# Patient Record
Sex: FEMALE | Race: WHITE | Employment: OTHER | URBAN - METROPOLITAN AREA
[De-identification: names, ages, dates, MRNs, and addresses within clinical notes are randomized per-mention and may not be internally consistent; named-entity substitution may affect disease eponyms.]

---

## 2017-03-29 ENCOUNTER — HOSPITAL ENCOUNTER (EMERGENCY)
Age: 42
Discharge: LEFT BEFORE DISCHARGE | End: 2017-03-29
Attending: EMERGENCY MEDICINE
Payer: SELF-PAY

## 2017-03-29 VITALS
RESPIRATION RATE: 24 BRPM | TEMPERATURE: 98.9 F | HEART RATE: 96 BPM | SYSTOLIC BLOOD PRESSURE: 130 MMHG | OXYGEN SATURATION: 99 % | WEIGHT: 190 LBS | DIASTOLIC BLOOD PRESSURE: 88 MMHG | BODY MASS INDEX: 25.73 KG/M2 | HEIGHT: 72 IN

## 2017-03-29 LAB
ALBUMIN SERPL BCP-MCNC: 3.9 G/DL (ref 3.5–5)
ALBUMIN/GLOB SERPL: 1.1 {RATIO} (ref 1.2–3.5)
ALP SERPL-CCNC: 84 U/L (ref 50–136)
ALT SERPL-CCNC: 15 U/L (ref 12–65)
ANION GAP BLD CALC-SCNC: 9 MMOL/L (ref 7–16)
AST SERPL W P-5'-P-CCNC: 8 U/L (ref 15–37)
BASOPHILS # BLD AUTO: 0 K/UL (ref 0–0.2)
BASOPHILS # BLD: 0 % (ref 0–2)
BILIRUB SERPL-MCNC: 0.3 MG/DL (ref 0.2–1.1)
BUN SERPL-MCNC: 11 MG/DL (ref 6–23)
CALCIUM SERPL-MCNC: 8.7 MG/DL (ref 8.3–10.4)
CHLORIDE SERPL-SCNC: 104 MMOL/L (ref 98–107)
CO2 SERPL-SCNC: 27 MMOL/L (ref 21–32)
CREAT SERPL-MCNC: 0.7 MG/DL (ref 0.6–1)
DIFFERENTIAL METHOD BLD: NORMAL
EOSINOPHIL # BLD: 0.1 K/UL (ref 0–0.8)
EOSINOPHIL NFR BLD: 2 % (ref 0.5–7.8)
ERYTHROCYTE [DISTWIDTH] IN BLOOD BY AUTOMATED COUNT: 13.5 % (ref 11.9–14.6)
GLOBULIN SER CALC-MCNC: 3.4 G/DL (ref 2.3–3.5)
GLUCOSE SERPL-MCNC: 152 MG/DL (ref 65–100)
HCT VFR BLD AUTO: 36.7 % (ref 35.8–46.3)
HGB BLD-MCNC: 12.3 G/DL (ref 11.7–15.4)
IMM GRANULOCYTES # BLD: 0 K/UL (ref 0–0.5)
IMM GRANULOCYTES NFR BLD AUTO: 0.2 % (ref 0–5)
LYMPHOCYTES # BLD AUTO: 25 % (ref 13–44)
LYMPHOCYTES # BLD: 1.3 K/UL (ref 0.5–4.6)
MCH RBC QN AUTO: 29.6 PG (ref 26.1–32.9)
MCHC RBC AUTO-ENTMCNC: 33.5 G/DL (ref 31.4–35)
MCV RBC AUTO: 88.2 FL (ref 79.6–97.8)
MONOCYTES # BLD: 0.4 K/UL (ref 0.1–1.3)
MONOCYTES NFR BLD AUTO: 7 % (ref 4–12)
NEUTS SEG # BLD: 3.5 K/UL (ref 1.7–8.2)
NEUTS SEG NFR BLD AUTO: 66 % (ref 43–78)
PLATELET # BLD AUTO: 175 K/UL (ref 150–450)
PMV BLD AUTO: 11 FL (ref 10.8–14.1)
POTASSIUM SERPL-SCNC: 3.7 MMOL/L (ref 3.5–5.1)
PROT SERPL-MCNC: 7.3 G/DL (ref 6.3–8.2)
RBC # BLD AUTO: 4.16 M/UL (ref 4.05–5.25)
SODIUM SERPL-SCNC: 140 MMOL/L (ref 136–145)
WBC # BLD AUTO: 5.3 K/UL (ref 4.3–11.1)

## 2017-03-29 PROCEDURE — 75810000275 HC EMERGENCY DEPT VISIT NO LEVEL OF CARE: Performed by: EMERGENCY MEDICINE

## 2017-03-29 PROCEDURE — 85025 COMPLETE CBC W/AUTO DIFF WBC: CPT | Performed by: EMERGENCY MEDICINE

## 2017-03-29 PROCEDURE — 80053 COMPREHEN METABOLIC PANEL: CPT | Performed by: EMERGENCY MEDICINE

## 2017-03-29 NOTE — ED TRIAGE NOTES
Patient arrives to the ER via EMS. EMS was called otu for vaginal bleeding. Patient states she has had vaginal bleeding for two weeks. Patient states today \"things\" have been falling out of her vagina.

## 2017-03-29 NOTE — ED NOTES
Pt presents to triage staff requesting IV be removed and stating that she wants to leave. IV removed, and pt  LWBS after triage.

## 2017-03-29 NOTE — ED NOTES
Patient states she needs her IV removed. This RN removed IV. Patient did not want to stay to sign AMA.

## 2018-01-31 ENCOUNTER — HOSPITAL ENCOUNTER (EMERGENCY)
Age: 43
Discharge: HOME | End: 2018-02-01
Attending: EMERGENCY MEDICINE
Payer: SELF-PAY

## 2018-01-31 DIAGNOSIS — T81.31XS POSTOPERATIVE WOUND DEHISCENCE, SEQUELA: Primary | ICD-10-CM

## 2018-01-31 PROCEDURE — 99284 EMERGENCY DEPT VISIT MOD MDM: CPT | Performed by: EMERGENCY MEDICINE

## 2018-01-31 PROCEDURE — 85025 COMPLETE CBC W/AUTO DIFF WBC: CPT | Performed by: EMERGENCY MEDICINE

## 2018-01-31 PROCEDURE — 80053 COMPREHEN METABOLIC PANEL: CPT | Performed by: EMERGENCY MEDICINE

## 2018-02-01 VITALS
HEIGHT: 72 IN | OXYGEN SATURATION: 93 % | BODY MASS INDEX: 32.51 KG/M2 | RESPIRATION RATE: 20 BRPM | TEMPERATURE: 97.9 F | HEART RATE: 74 BPM | SYSTOLIC BLOOD PRESSURE: 152 MMHG | DIASTOLIC BLOOD PRESSURE: 78 MMHG | WEIGHT: 240 LBS

## 2018-02-01 LAB
ALBUMIN SERPL-MCNC: 2.5 G/DL (ref 3.5–5)
ALBUMIN/GLOB SERPL: 0.5 {RATIO} (ref 1.2–3.5)
ALP SERPL-CCNC: 148 U/L (ref 50–136)
ALT SERPL-CCNC: 10 U/L (ref 12–65)
ANION GAP SERPL CALC-SCNC: 15 MMOL/L (ref 7–16)
AST SERPL-CCNC: 10 U/L (ref 15–37)
BASOPHILS # BLD: 0.1 K/UL (ref 0–0.2)
BASOPHILS NFR BLD: 1 % (ref 0–2)
BILIRUB SERPL-MCNC: 0.2 MG/DL (ref 0.2–1.1)
BUN SERPL-MCNC: 25 MG/DL (ref 6–23)
CALCIUM SERPL-MCNC: 6.7 MG/DL (ref 8.3–10.4)
CHLORIDE SERPL-SCNC: 105 MMOL/L (ref 98–107)
CO2 SERPL-SCNC: 21 MMOL/L (ref 21–32)
CREAT SERPL-MCNC: 1.5 MG/DL (ref 0.6–1)
DIFFERENTIAL METHOD BLD: ABNORMAL
EOSINOPHIL # BLD: 0.6 K/UL (ref 0–0.8)
EOSINOPHIL NFR BLD: 6 % (ref 0.5–7.8)
ERYTHROCYTE [DISTWIDTH] IN BLOOD BY AUTOMATED COUNT: 15.6 % (ref 11.9–14.6)
GLOBULIN SER CALC-MCNC: 5 G/DL (ref 2.3–3.5)
GLUCOSE SERPL-MCNC: 139 MG/DL (ref 65–100)
HCT VFR BLD AUTO: 36 % (ref 35.8–46.3)
HGB BLD-MCNC: 11.5 G/DL (ref 11.7–15.4)
IMM GRANULOCYTES # BLD: 0 K/UL (ref 0–0.5)
IMM GRANULOCYTES NFR BLD AUTO: 0 % (ref 0–5)
LYMPHOCYTES # BLD: 2.1 K/UL (ref 0.5–4.6)
LYMPHOCYTES NFR BLD: 23 % (ref 13–44)
MCH RBC QN AUTO: 27.8 PG (ref 26.1–32.9)
MCHC RBC AUTO-ENTMCNC: 31.9 G/DL (ref 31.4–35)
MCV RBC AUTO: 87.2 FL (ref 79.6–97.8)
MONOCYTES # BLD: 0.5 K/UL (ref 0.1–1.3)
MONOCYTES NFR BLD: 5 % (ref 4–12)
NEUTS SEG # BLD: 5.9 K/UL (ref 1.7–8.2)
NEUTS SEG NFR BLD: 65 % (ref 43–78)
PLATELET # BLD AUTO: 309 K/UL (ref 150–450)
PMV BLD AUTO: 9.6 FL (ref 10.8–14.1)
POTASSIUM SERPL-SCNC: 4.5 MMOL/L (ref 3.5–5.1)
PROT SERPL-MCNC: 7.5 G/DL (ref 6.3–8.2)
RBC # BLD AUTO: 4.13 M/UL (ref 4.05–5.25)
SODIUM SERPL-SCNC: 141 MMOL/L (ref 136–145)
WBC # BLD AUTO: 9.2 K/UL (ref 4.3–11.1)

## 2018-02-01 PROCEDURE — 97606 NEG PRS WND THER DME>50 SQCM: CPT

## 2018-02-01 RX ORDER — MINOCYCLINE HYDROCHLORIDE 100 MG/1
100 CAPSULE ORAL 2 TIMES DAILY
Qty: 60 CAP | Refills: 0 | Status: ON HOLD | OUTPATIENT
Start: 2018-02-01 | End: 2019-09-18

## 2018-02-01 RX ORDER — GABAPENTIN 600 MG/1
600 TABLET ORAL 3 TIMES DAILY
COMMUNITY

## 2018-02-01 RX ORDER — OXYCODONE HYDROCHLORIDE 5 MG/1
5 TABLET ORAL EVERY 4 HOURS
COMMUNITY
End: 2018-10-17

## 2018-02-01 NOTE — ED PROVIDER NOTES
HPI Comments: Patient is a 49-year-old female with history of diabetes and coronary artery disease who presents to the emergency department this evening after signing herself out of [facility].  She reportedly suffered a cardiac arrest around Thanksgiving and was admitted to [facility]  And had CABG surgery in early December. She was recently admitted to [facility] a few weeks ago with a sternal wound dehiscence. She was taken back to the operating room for debridement and then treated with a wound fact and long-term IV antibiotics. She was sent from [facility] to the [facility] facility earlier today to complete the wound treatment and IV antibiotic course for 6 weeks. She states she did not like the care at that facility and did not like how she was treated so she signed herself out [address] The nurse there remove the wound VAC and placed a dressing on the wound and then family drove her to our hospital system. Patient is a 43 y.o. female presenting with wound check. The history is provided by the patient and a relative. Wound Check           Past Medical History:   Diagnosis Date    DM (diabetes mellitus) ([address]     Other ill-defined conditions     back pain       Past Surgical History:   Procedure Laterality Date    HX ORTHOPAEDIC      back surgery 2000         No family history on file. Social History     Social History    Marital status: SINGLE     Spouse name: N/A    Number of children: N/A    Years of education: N/A     Occupational History    Not on file.      Social History Main Topics    Smoking status: Current Every Day Smoker     Packs/day: 1.00    Smokeless tobacco: Not on file    Alcohol use No    Drug use: No    Sexual activity: Not on file     Other Topics Concern    Not on file     Social History Narrative    No narrative on file         ALLERGIES: Review of patient's allergies indicates no known allergies. Review of Systems   Constitutional: Negative. HENT: Negative. Eyes: Negative. Respiratory: Negative. Cardiovascular: Negative. Endocrine: Negative. Genitourinary: Negative. Neurological: Negative. Vitals:    01/31/18 2041   BP: 160/88   Pulse: 81   Resp: 20   Temp: 97.9 °F (36.6 °C)   SpO2: 96%   Weight: 108.9 kg (240 lb)   Height: 6' 1\" (1.854 m)            Physical Exam   Constitutional: She is oriented to person, place, and time. She appears well-developed and well-nourished. HENT:   Head: Normocephalic and atraumatic. Cardiovascular: Normal rate and regular rhythm. Pulmonary/Chest: Effort normal and breath sounds normal.   Dressing covering the sternal wound. Abdominal: Soft. There is no rebound and no guarding. Musculoskeletal: Normal range of motion. She exhibits no edema or tenderness. Neurological: She is alert and oriented to person, place, and time. GCS eye subscore is 4. GCS verbal subscore is 5. GCS motor subscore is 6. Nursing note and vitals reviewed. MDM  Number of Diagnoses or Management Options  Postoperative wound dehiscence, sequela:   Diagnosis management comments: Differential diagnosis includes wound dehiscence, open surgical wound, osteomyelitis    because all of her care and surgeries were performed at [facility] I will call the referral Center and discussed the case with them and see if they're willing to accept her back. Amount and/or Complexity of Data Reviewed  Review and summarize past medical records: yes  Discuss the patient with other providers: yes    Risk of Complications, Morbidity, and/or Mortality  Presenting problems: low  Diagnostic procedures: low  Management options: low    Patient Progress  Patient progress: stable        ED Course   11:36 PM  Received a phone call back from the [facility] referral center and spoke to their cardiothoracic surgeon who knows patient well.   Their LTAC facility is not willing to accept the patient back. [facility] has no inpatient beds there cardiothoracic surgeon states that she just needs for more weeks of IV daptomycin for the infection and the wound is granulating in appropriately. He states that she does not need any further surgery but can be admitted here for intravenous antibiotics. He states that they were not willing to do a PICC line with home antibiotics because of her history of polysubstance abuse. She received her daily infusion of daptomycin around 4 PM today and is not due again until tomorrow afternoon. 1:33 AM  I discussed the case with the hospitalist tonight and he reports that the patient does not qualify for admission. We will hold her here in the emergency department tonight for social work and case management to be involved in the morning and figure out if they can arrange for home antibiotics and medication or possibly to an LTAC facility. [facility] she is supposed to be on on daptomycin 800 mg IV daily for 4 more weeks. Voice dictation software was used during the making of this note. This software is not perfect and grammatical and other typographical errors may be present. This note has been proofread, but may still contain errors. Jazmine Wilson MD; 2/1/2018 @1:34 AM   ===================================================================        Procedures    Amara Cm MD 1:10 PM  Patient signed out to me for the day shift.  has seen the patient  Unfortunately there is nobody willing to accept her for another LTAC given the patient is uninsured. I called infectious disease to discuss the case they stated that they would follow her up in the office and minocycline oral would be an acceptable alternative. We've had wound care come and see the patient and have set her up with close follow-up.

## 2018-02-01 NOTE — DISCHARGE INSTRUCTIONS
Opened Cut After Surgery: Care Instructions  Your Care Instructions  Sometimes a cut made during surgery opens when it isn't supposed to. This may be because of an infection or another problem that keeps the cut's edges from staying together. The doctor has checked your open cut. He or she may have put a dressing in the cut but left it open to heal. This lets the cut heal from the bottom up. Your doctor may have given you a vacuum device to take home that helps close the cut. A cut may be left open when it is infected or likely to become infected. This is because closing the cut may make an existing infection worse and a new infection more likely. You will have a bandage. Follow-up care is a key part of your treatment and safety. Be sure to make and go to all appointments, and call your doctor if you are having problems. It's also a good idea to know your test results and keep a list of the medicines you take. How can you care for yourself at home? · You may shower with soap and water. Your doctor will tell you when it is safe to use a bathtub or go swimming. · If your doctor told you how to care for your cut, follow your doctor's instructions. If you did not get instructions, follow this general advice:  ¨ Wash around the cut with clean water 2 times a day. Don't use hydrogen peroxide or alcohol, which can slow healing. · Avoid any activity that could cause your cut to get worse. For example, if your cut is in the belly, avoid lifting anything that would make you strain. This may include heavy grocery bags and milk containers, a heavy briefcase or backpack, cat litter or dog food bags, a vacuum , or a child. · Take pain medicines exactly as directed. ¨ If the doctor gave you a prescription medicine for pain, take it as prescribed. ¨ If you are not taking a prescription pain medicine, ask your doctor if you can take an over-the-counter medicine.   · If your doctor prescribed antibiotics, take them as directed. Do not stop taking them just because you feel better. You need to take the full course of antibiotics. · If your cut is packed (gauze is put into the cut), follow your doctor's instructions on how often and how to repack the cut. A home health worker may do this for you. When should you call for help? Call your doctor now or seek immediate medical care if:  ? · The cut gets bigger. ? · You can see organs under the skin. ? · You have new pain, or your pain gets worse. ? · The cut starts to bleed, and blood soaks through the bandage. Oozing small amounts of blood is normal.   ? · The skin near the cut is cold or pale or changes color. ? · You have tingling, weakness, or numbness near the cut.   ? · You have trouble moving the area near the cut.   ? · You have symptoms of infection, such as:  ¨ Increased pain, swelling, warmth, or redness around the cut. ¨ Red streaks leading from the cut. ¨ Pus draining from the cut. ¨ A fever. ? Watch closely for changes in your health, and be sure to contact your doctor if:  ? · The cut is not closing (getting smaller). ? · You do not get better as expected. Where can you learn more? Go to http://justin-yo.info/. Enter B181 in the search box to learn more about \"Opened Cut After Surgery: Care Instructions. \"  Current as of: March 20, 2017  Content Version: 11.4  © 5439-1606 [employer]. Care instructions adapted under license by [employer] (which disclaims liability or warranty for this information). If you have questions about a medical condition or this instruction, always ask your healthcare professional. [address] any warranty or liability for your use of this information.

## 2018-02-01 NOTE — CONSULTS
Case discussed with Dr. Atnonia Davis. Pt left AMA from [facility] while undergoing treatment for MRSA sternal wound infection following CABG. Her PICC line has been removed. Recommend oral minocycline 100mg BID until she can have follow up in the ID office. Pt given ID office information [phone] and instructed to call for follow up. ID office notified. She will also need cardiac surgery follow up for wound care / wound vac needs.

## 2018-02-01 NOTE — WOUND CARE
Placed home wound VAC dressing and set to NPWT machine. Sternal wound clean and has one area at 11 o'clock that has depth but base not visible. Removed aquacell and allevyn from wound prior to dressing placement. Measured 12x5.5x 0.3cm. Answered all questions. Home health needed for dressing changes. Discussed bathing and precautions. Understanding verbalized.

## 2018-02-01 NOTE — ED TRIAGE NOTES
Pt complains of chest wound. States she had CABG at [facility] on 12/3 and was sent to [facility] for abx treatment of chest wound. States she left that facility AMA due to quality of care received.

## 2018-02-02 NOTE — PROGRESS NOTES
Assisted Nurse  Nicolette with discharge plan.       Spoke with patient who states that her surgeon is Dr. Edward Streeter. Patient states that [facility] referred her to [facility]. She states that she has sponsorship already at [facility] and started a medicaid packet there. SW called [facility] to see if they can follow patient for wound care and wound vac. Per Kemar Siddiqui, since patient does not have a PCP, we will need to see if surgeon Norma Edwards) will follow for [facility]. SW has called Patient about also following up with her surgeon to follow her for home health. Patient states that she will go to [facility] office for follow-up as well.       Per Grace Mcdonald she has called Dr. Prince Dean office x2 to discuss them following patient for Home Care. Waiting on call back .       Patient is now staying with her aunt ([phone]) at . [address]

## 2018-02-02 NOTE — PROGRESS NOTES
Per Dr. Orlando Green / he spoke with ID and they state patient can be discharged on oral antibiotics / follow up with them in the office and they will address IV antibiotics in the future with patient if needed. Patient needs to also follow up with the cardi/thoracic surgeon. Per Milagros (CM), patient states surgeon is Dr. Jourdan Luis / patient also told Duyen she has sponsorship already at [facility]. Duyen called [facility] to see if they can follow patient for wound dsg and wound vac. Per [facility], since patient does not have a PCP, we will need to see if surgeon Joelle Nance) will follow for [facility]. Called Dr. Mary Guy office x2 to discuss them following patient for Home Care. Waiting on call back .

## 2018-02-02 NOTE — PROGRESS NOTES
Per MD, spoke with infusion center about patient getting IV infusions of antibiotics and if patient can get a new IV each day (for daily antibiotics x4 weeks) since patient has hx of substance abuse. Per infusion, it can be done but order would need to come from Infectious Disease as they would also need to follow for duration of infusions. Page ID  / ID consult placed / Dr. Nikhil Lyles to speak with ID. Wound care consult in system also to assess sternal wound / dressing and wound vac that patient brought with her. Wound nurse will have to work patient in. Patient and MD aware.

## 2018-08-26 ENCOUNTER — APPOINTMENT (OUTPATIENT)
Dept: CT IMAGING | Age: 43
End: 2018-08-26
Attending: EMERGENCY MEDICINE
Payer: SELF-PAY

## 2018-08-26 ENCOUNTER — HOSPITAL ENCOUNTER (EMERGENCY)
Age: 43
Discharge: HOME | End: 2018-08-26
Attending: EMERGENCY MEDICINE
Payer: SELF-PAY

## 2018-08-26 VITALS
SYSTOLIC BLOOD PRESSURE: 129 MMHG | BODY MASS INDEX: 32.51 KG/M2 | HEIGHT: 72 IN | DIASTOLIC BLOOD PRESSURE: 89 MMHG | TEMPERATURE: 98 F | RESPIRATION RATE: 18 BRPM | WEIGHT: 240 LBS | HEART RATE: 95 BPM | OXYGEN SATURATION: 97 %

## 2018-08-26 DIAGNOSIS — L03.213 PRESEPTAL CELLULITIS OF RIGHT EYE: Primary | ICD-10-CM

## 2018-08-26 LAB
ALBUMIN SERPL-MCNC: 3.2 G/DL (ref 3.5–5)
ALBUMIN/GLOB SERPL: 0.9 {RATIO} (ref 1.2–3.5)
ALP SERPL-CCNC: 130 U/L (ref 50–136)
ALT SERPL-CCNC: 21 U/L (ref 12–65)
ANION GAP SERPL CALC-SCNC: 11 MMOL/L (ref 7–16)
AST SERPL-CCNC: 13 U/L (ref 15–37)
BASOPHILS # BLD: 0.1 K/UL (ref 0–0.2)
BASOPHILS NFR BLD: 1 % (ref 0–2)
BILIRUB SERPL-MCNC: 0.2 MG/DL (ref 0.2–1.1)
BUN SERPL-MCNC: 13 MG/DL (ref 6–23)
CALCIUM SERPL-MCNC: 8.2 MG/DL (ref 8.3–10.4)
CHLORIDE SERPL-SCNC: 102 MMOL/L (ref 98–107)
CO2 SERPL-SCNC: 23 MMOL/L (ref 21–32)
CREAT SERPL-MCNC: 0.69 MG/DL (ref 0.6–1)
DIFFERENTIAL METHOD BLD: NORMAL
EOSINOPHIL # BLD: 0.2 K/UL (ref 0–0.8)
EOSINOPHIL NFR BLD: 3 % (ref 0.5–7.8)
ERYTHROCYTE [DISTWIDTH] IN BLOOD BY AUTOMATED COUNT: 13 %
GLOBULIN SER CALC-MCNC: 3.5 G/DL (ref 2.3–3.5)
GLUCOSE SERPL-MCNC: 228 MG/DL (ref 65–100)
HCT VFR BLD AUTO: 38.8 % (ref 35.8–46.3)
HGB BLD-MCNC: 12.5 G/DL (ref 11.7–15.4)
IMM GRANULOCYTES # BLD: 0 K/UL (ref 0–0.5)
IMM GRANULOCYTES NFR BLD AUTO: 0 % (ref 0–5)
LACTATE BLD-SCNC: 1.9 MMOL/L (ref 0.5–1.9)
LYMPHOCYTES # BLD: 1.3 K/UL (ref 0.5–4.6)
LYMPHOCYTES NFR BLD: 16 % (ref 13–44)
MCH RBC QN AUTO: 30.6 PG (ref 26.1–32.9)
MCHC RBC AUTO-ENTMCNC: 32.2 G/DL (ref 31.4–35)
MCV RBC AUTO: 94.9 FL (ref 79.6–97.8)
MONOCYTES # BLD: 0.5 K/UL (ref 0.1–1.3)
MONOCYTES NFR BLD: 6 % (ref 4–12)
NEUTS SEG # BLD: 6.3 K/UL (ref 1.7–8.2)
NEUTS SEG NFR BLD: 75 % (ref 43–78)
NRBC # BLD: 0 K/UL (ref 0–0.2)
PLATELET # BLD AUTO: 197 K/UL (ref 150–450)
PMV BLD AUTO: 10.8 FL (ref 9.4–12.3)
POTASSIUM SERPL-SCNC: 3.9 MMOL/L (ref 3.5–5.1)
PROT SERPL-MCNC: 6.7 G/DL (ref 6.3–8.2)
RBC # BLD AUTO: 4.09 M/UL (ref 4.05–5.2)
SODIUM SERPL-SCNC: 136 MMOL/L (ref 136–145)
WBC # BLD AUTO: 8.3 K/UL (ref 4.3–11.1)

## 2018-08-26 PROCEDURE — 74011000258 HC RX REV CODE- 258: Performed by: EMERGENCY MEDICINE

## 2018-08-26 PROCEDURE — 70491 CT SOFT TISSUE NECK W/DYE: CPT

## 2018-08-26 PROCEDURE — 36415 COLL VENOUS BLD VENIPUNCTURE: CPT

## 2018-08-26 PROCEDURE — 74011636320 HC RX REV CODE- 636/320: Performed by: EMERGENCY MEDICINE

## 2018-08-26 PROCEDURE — 96365 THER/PROPH/DIAG IV INF INIT: CPT | Performed by: EMERGENCY MEDICINE

## 2018-08-26 PROCEDURE — 99284 EMERGENCY DEPT VISIT MOD MDM: CPT | Performed by: EMERGENCY MEDICINE

## 2018-08-26 PROCEDURE — 80053 COMPREHEN METABOLIC PANEL: CPT

## 2018-08-26 PROCEDURE — 83605 ASSAY OF LACTIC ACID: CPT

## 2018-08-26 PROCEDURE — 85025 COMPLETE CBC W/AUTO DIFF WBC: CPT

## 2018-08-26 PROCEDURE — 74011250636 HC RX REV CODE- 250/636: Performed by: EMERGENCY MEDICINE

## 2018-08-26 PROCEDURE — 74011000250 HC RX REV CODE- 250: Performed by: EMERGENCY MEDICINE

## 2018-08-26 RX ORDER — SODIUM CHLORIDE 0.9 % (FLUSH) 0.9 %
10 SYRINGE (ML) INJECTION
Status: COMPLETED | OUTPATIENT
Start: 2018-08-26 | End: 2018-08-26

## 2018-08-26 RX ORDER — CLINDAMYCIN HYDROCHLORIDE 300 MG/1
300 CAPSULE ORAL 4 TIMES DAILY
Qty: 40 CAP | Refills: 0 | Status: SHIPPED | OUTPATIENT
Start: 2018-08-26 | End: 2018-09-02

## 2018-08-26 RX ADMIN — SODIUM CHLORIDE 900 MG: 900 INJECTION, SOLUTION INTRAVENOUS at 18:36

## 2018-08-26 RX ADMIN — Medication 10 ML: at 18:55

## 2018-08-26 RX ADMIN — SODIUM CHLORIDE 100 ML: 900 INJECTION, SOLUTION INTRAVENOUS at 18:55

## 2018-08-26 RX ADMIN — SODIUM CHLORIDE 1000 ML: 900 INJECTION, SOLUTION INTRAVENOUS at 18:37

## 2018-08-26 RX ADMIN — IOPAMIDOL 80 ML: 755 INJECTION, SOLUTION INTRAVENOUS at 18:55

## 2018-08-26 NOTE — ED NOTES
I have reviewed discharge instructions with the patient. The patient verbalized understanding. Patient left ED via Discharge Method: ambulatory to Home with self. Opportunity for questions and clarification provided. Patient given 1 scripts. To continue your aftercare when you leave the hospital, you may receive an automated call from our care team to check in on how you are doing. This is a free service and part of our promise to provide the best care and service to meet your aftercare needs.  If you have questions, or wish to unsubscribe from this service please call [phone]. Thank you for Choosing our [employer] Emergency Department.

## 2018-08-26 NOTE — DISCHARGE INSTRUCTIONS
Cellulitis: Care Instructions  Your Care Instructions    Cellulitis is a skin infection caused by bacteria, most often strep or staph. It often occurs after a break in the skin from a scrape, cut, bite, or puncture, or after a rash. Cellulitis may be treated without doing tests to find out what caused it. But your doctor may do tests, if needed, to look for a specific bacteria, like methicillin-resistant Staphylococcus aureus (MRSA). The doctor has checked you carefully, but problems can develop later. If you notice any problems or new symptoms, get medical treatment right away. Follow-up care is a key part of your treatment and safety. Be sure to make and go to all appointments, and call your doctor if you are having problems. It's also a good idea to know your test results and keep a list of the medicines you take. How can you care for yourself at home? · Take your antibiotics as directed. Do not stop taking them just because you feel better. You need to take the full course of antibiotics. · Prop up the infected area on pillows to reduce pain and swelling. Try to keep the area above the level of your heart as often as you can. · If your doctor told you how to care for your wound, follow your doctor's instructions. If you did not get instructions, follow this general advice:  ¨ Wash the wound with clean water 2 times a day. Don't use hydrogen peroxide or alcohol, which can slow healing. ¨ You may cover the wound with a thin layer of petroleum jelly, such as Vaseline, and a nonstick bandage. ¨ Apply more petroleum jelly and replace the bandage as needed. · Be safe with medicines. Take pain medicines exactly as directed. ¨ If the doctor gave you a prescription medicine for pain, take it as prescribed. ¨ If you are not taking a prescription pain medicine, ask your doctor if you can take an over-the-counter medicine.   To prevent cellulitis in the future  · Try to prevent cuts, scrapes, or other injuries to your skin. Cellulitis most often occurs where there is a break in the skin. · If you get a scrape, cut, mild burn, or bite, wash the wound with clean water as soon as you can to help avoid infection. Don't use hydrogen peroxide or alcohol, which can slow healing. · If you have swelling in your legs (edema), support stockings and good skin care may help prevent leg sores and cellulitis. · Take care of your feet, especially if you have diabetes or other conditions that increase the risk of infection. Wear shoes and socks. Do not go barefoot. If you have athlete's foot or other skin problems on your feet, talk to your doctor about how to treat them. When should you call for help? Call your doctor now or seek immediate medical care if:    · You have signs that your infection is getting worse, such as:  ¨ Increased pain, swelling, warmth, or redness. ¨ Red streaks leading from the area. ¨ Pus draining from the area. ¨ A fever.     · You get a rash.    Watch closely for changes in your health, and be sure to contact your doctor if:    · You do not get better as expected. Where can you learn more? Go to http://justin-yo.info/. Earlene Motta in the search box to learn more about \"Cellulitis: Care Instructions. \"  Current as of: May 10, 2017  Content Version: 11.7  © 7246-0220 [employer]. Care instructions adapted under license by [employer] (which disclaims liability or warranty for this information). If you have questions about a medical condition or this instruction, always ask your healthcare professional. [place] [address] any warranty or liability for your use of this information. Cellulitis of the Eye: Care Instructions  Your Care Instructions    Cellulitis of the eye is an infection of the skin and tissues around the eye. It is also called preseptal cellulitis or periorbital cellulitis. It is usually caused by bacteria.   This type of infection may happen after a sinus infection or a dental infection. It could also happen after an insect bite or an injury to the face. It most often occurs where there is a break in the skin. Cellulitis of the eye can be very serious. It's important to treat it right away. If you do, it usually goes away without lasting problems. Medicine and home treatment can help you get better. Follow-up care is a key part of your treatment and safety. Be sure to make and go to all appointments, and call your doctor if you are having problems. It's also a good idea to know your test results and keep a list of the medicines you take. How can you care for yourself at home? · Take your antibiotics as directed. Do not stop taking them just because you feel better. You need to take the full course of antibiotics. · Do not wear contact lenses unless your doctor tells you it is okay. · Put your head on pillows, and put a cool, damp cloth on your eye. This can reduce swelling and pain. · If your doctor recommends it, use a warm pack on your eye. · Keep the skin around your eye clean and dry. · Be safe with medicines. Read and follow all instructions on the label. ¨ If the doctor gave you a prescription medicine for pain, take it as prescribed. ¨ If you are not taking a prescription pain medicine, ask your doctor if you can take an over-the-counter medicine. To prevent cellulitis  · Wash your hands well after you use the bathroom and before and after you eat. · Do not rub or pick at the skin around your eyes. Cellulitis occurs most often where there is a break in the skin. · If you get a cut, pimple, or insect bite near your eye, clean the area as soon as you can. This can help prevent an infection. ¨ Wash the area with cool water and a mild soap, such as [place]. ¨ Do not use rubbing alcohol, hydrogen peroxide, iodine, or Mercurochrome. These can harm the tissues and slow healing.   · Call your doctor if you have a sinus infection with redness or swelling of your eyes. When should you call for help? Call your doctor now or seek immediate medical care if:    · You have new or worse signs of an eye infection, such as:  ¨ Pus or thick discharge coming from the eye. ¨ Redness or swelling around the eye. ¨ A fever.     · Your eye seems to be bulging out.     · You seem to be getting sicker.     · You have vision changes.    Watch closely for changes in your health, and be sure to contact your doctor if:    · You do not get better as expected. Where can you learn more? Go to http://justin-yo.info/. Enter 388 [id number] in the search box to learn more about \"Cellulitis of the Eye: Care Instructions. \"  Current as of: December 3, 2017  Content Version: 11.7  © 7709-9623 [employer]. Care instructions adapted under license by [employer] (which disclaims liability or warranty for this information). If you have questions about a medical condition or this instruction, always ask your healthcare professional. [place] [address] any warranty or liability for your use of this information.

## 2018-08-26 NOTE — ED TRIAGE NOTES
Pt states she had bypass surgery in November, and from that surgery she got a skin infection near the incision site with several other abscess like lesions. Pt noticed a similar spot to her chest near her right eye a few days ago and it has progressively gotten more swollen and the entire right orbital is red, swollen, and tender.  Pt denies fever/chills, n/v/d.

## 2018-08-26 NOTE — ED PROVIDER NOTES
HPI Comments: Patient is a 70-year-old female presenting with pain and swelling around her right eye. She states several days ago she thought she had a pimple in the right side of her face lateral to her right eye. Over the past 2 days she's had spreading redness and pain from this region. She thought she felt like she had a low-grade temperature at home but afebrile here. No vomiting. No significant pain with  Extraocular movements. Patient is a 43 y.o. female presenting with skin problem. The history is provided by the patient. No  was used. Skin Problem           Past Medical History:   Diagnosis Date    DM (diabetes mellitus) ([facility] [address]     Other ill-defined conditions     back pain       Past Surgical History:   Procedure Laterality Date    HX ORTHOPAEDIC      back surgery 2000         No family history on file. Social History     Social History    Marital status: SINGLE     Spouse name: N/A    Number of children: N/A    Years of education: N/A     Occupational History    Not on file. Social History Main Topics    Smoking status: Current Every Day Smoker     Packs/day: 1.00    Smokeless tobacco: Not on file    Alcohol use No    Drug use: No    Sexual activity: Not on file     Other Topics Concern    Not on file     Social History Narrative    No narrative on file         ALLERGIES: Review of patient's allergies indicates no known allergies. Review of Systems   Constitutional: Positive for fatigue and fever. HENT: Negative for sore throat. Swelling and redness around the right eye   Eyes: Negative for photophobia, pain, redness and visual disturbance. Respiratory: Negative for cough, chest tightness and shortness of breath. Cardiovascular: Negative for leg swelling. Gastrointestinal: Negative for abdominal pain. Genitourinary: Negative for dysuria. Musculoskeletal: Negative for back pain. Neurological: Negative for syncope and headaches. Psychiatric/Behavioral: Negative for confusion. Vitals:    08/26/18 1731   BP: (!) 134/92   Pulse: 99   Resp: 18   Temp: 98 °F (36.7 °C)   SpO2: 97%   Weight: 108.9 kg (240 lb)   Height: 6' 1\" (1.854 m)            Physical Exam   Constitutional: She is oriented to person, place, and time. She appears well-developed and well-nourished. No distress. HENT:   Head: Normocephalic and atraumatic. Eyes: Conjunctivae and EOM are normal. Pupils are equal, round, and reactive to light. Neck: Normal range of motion. Neck supple. Cardiovascular: Normal rate, regular rhythm and normal heart sounds. Pulmonary/Chest: Effort normal and breath sounds normal. No respiratory distress. She has no wheezes. She has no rales. Abdominal: Soft. She exhibits no distension. There is no tenderness. There is no rebound. Musculoskeletal: Normal range of motion. She exhibits no edema or tenderness. Neurological: She is alert and oriented to person, place, and time. Skin: Skin is warm and dry. No rash noted. She is not diaphoretic. Psychiatric: She has a normal mood and affect. Her behavior is normal.   Vitals reviewed. MDM  Number of Diagnoses or Management Options  Preseptal cellulitis of right eye: new and does not require workup  Diagnosis management comments: Labs unremarkable Except slight hyperglycemia and lactic 1.9  CT demonstrates preseptal and facial soft tissue cellulitis without fluid collection. She was given IV clindamycin in the emergency department. We'll send home on this antibiotic. Hydrated in the ED as well. Discharged in stable condition. Return precautions discussed. Patient expresses understanding. Leslie Nolan MD; 8/26/2018 @8:39 PM Voice dictation software was used during the making of this note. This software is not perfect and grammatical and other typographical errors may be present.   This note has not been proofread for errors.  ===================================================================         Amount and/or Complexity of Data Reviewed  Clinical lab tests: ordered and reviewed (Results for orders placed or performed during the hospital encounter of 08/26/18  -CBC WITH AUTOMATED DIFF       Result                                            Value                         Ref Range                       WBC                                               8.3                           4.3 - 11.1 K/uL                 RBC                                               4.09                          4.05 - 5.2 M/uL                 HGB                                               12.5                          11.7 - 15.4 g/dL                HCT                                               38.8                          35.8 - 46.3 %                   MCV                                               94.9                          79.6 - 97.8 FL                  MCH                                               30.6                          26.1 - 32.9 PG                  MCHC                                              32.2                          31.4 - 35.0 g/dL                RDW                                               13.0                          %                               PLATELET                                          197                           150 - 450 K/uL                  MPV                                               10.8                          9.4 - 12.3 FL                   ABSOLUTE NRBC                                     0.00                          0.0 - 0.2 K/uL                  DF                                                AUTOMATED                                                     NEUTROPHILS                                       75                            43 - 78 %                       LYMPHOCYTES                                       16                            13 - 44 % MONOCYTES                                         6                             4.0 - 12.0 %                    EOSINOPHILS                                       3                             0.5 - 7.8 %                     BASOPHILS                                         1                             0.0 - 2.0 %                     IMMATURE GRANULOCYTES                             0                             0.0 - 5.0 %                     ABS. NEUTROPHILS                                  6.3                           1.7 - 8.2 K/UL                  ABS. LYMPHOCYTES                                  1.3                           0.5 - 4.6 K/UL                  ABS. MONOCYTES                                    0.5                           0.1 - 1.3 K/UL                  ABS. EOSINOPHILS                                  0.2                           0.0 - 0.8 K/UL                  ABS. BASOPHILS                                    0.1                           0.0 - 0.2 K/UL                  ABS. IMM.  GRANS.                                  0.0                           0.0 - 0.5 K/UL             -METABOLIC PANEL, COMPREHENSIVE       Result                                            Value                         Ref Range                       Sodium                                            136                           136 - 145 mmol/L                Potassium                                         3.9                           3.5 - 5.1 mmol/L                Chloride                                          102                           98 - 107 mmol/L                 CO2                                               23                            21 - 32 mmol/L                  Anion gap                                         11                            7 - 16 mmol/L                   Glucose                                           228 (H)                       65 - 100 mg/dL BUN                                               13                            6 - 23 MG/DL                    Creatinine                                        0.69                          0.6 - 1.0 MG/DL                 GFR est AA                                        >60                           >60 ml/min/1.73m2               GFR est non-AA                                    >60                           >60 ml/min/1.73m2               Calcium                                           8.2 (L)                       8.3 - 10.4 MG/DL                Bilirubin, total                                  0.2                           0.2 - 1.1 MG/DL                 ALT (SGPT)                                        21                            12 - 65 U/L                     AST (SGOT)                                        13 (L)                        15 - 37 U/L                     Alk. phosphatase                                  130                           50 - 136 U/L                    Protein, total                                    6.7                           6.3 - 8.2 g/dL                  Albumin                                           3.2 (L)                       3.5 - 5.0 g/dL                  Globulin                                          3.5                           2.3 - 3.5 g/dL                  A-G Ratio                                         0.9 (L)                       1.2 - 3.5                  -POC LACTIC ACID       Result                                            Value                         Ref Range                       Lactic Acid (POC)                                 1.9                           0.5 - 1.9 mmol/L           )  Tests in the radiology section of CPT®: ordered and reviewed (Ct Neck Soft Tissue W Cont    Result Date: 8/26/2018  CT neck with contrast History: Skin infection, right orbital and facial swelling. Sternal wound infection from past grafting.  Technique: Helically acquired images were obtained from above the orbits to the thoracic inlet reconstructed at 3.0 mm thickness after the uneventful administration of 80 mL of intravenous Optiray-350 in order to better evaluate the soft tissue and vascular structures. Coronal reformatted images were submitted. Radiation dose reduction techniques were used for this study:  Our CT scanners use one or all of the following: Automated exposure control, adjustment of the mA and/or kVp according to patient's size, iterative reconstruction. Comparison: None. Findings: The mastoid air cells and paranasal sinuses are well pneumatized and aerated. There is right preseptal/infraorbital soft tissue swelling without discrete fluid collection. The major salivary glands are unremarkable. A low-density lesion within the right thyroid lobe demonstrates incomplete coarse peripheral calcification measuring 8 mm. No mucosal lesion is present. There are changes of median sternotomy with irregularity along the margins and sclerosis. There is soft tissue density surrounding this region extending into the anterior mediastinum, presumably infectious given the history. The major vascular structures are unremarkable. No suspicious adenopathy is present within the neck. There are several small lymph nodes within the visualized mediastinum, presumably reactive. IMPRESSION: 1. Right preseptal and facial soft tissue swelling without discrete fluid collection. 2. 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 3. Several small lymph nodes within the visualized mediastinum, presumably reactive.  Consider follow-up CT imaging of the chest in 3-6 months for reassessment.    )  Review and summarize past medical records: yes  Independent visualization of images, tracings, or specimens: yes    Risk of Complications, Morbidity, and/or Mortality  Presenting problems: high  Diagnostic procedures: moderate  Management options: moderate    Patient Progress  Patient progress: improved        ED Course       Procedures

## 2018-08-26 NOTE — Clinical Note
Please follow up with a primary care provider in 1 to 3 days. Return for any new or concerning symptoms. Please take antibiotics as indicated. Return for any fever, vomiting, worsening redness or visual issues.

## 2018-10-17 ENCOUNTER — HOSPITAL ENCOUNTER (EMERGENCY)
Age: 43
Discharge: HOME | End: 2018-10-17
Attending: EMERGENCY MEDICINE
Payer: SELF-PAY

## 2018-10-17 ENCOUNTER — APPOINTMENT (OUTPATIENT)
Dept: GENERAL RADIOLOGY | Age: 43
End: 2018-10-17
Attending: EMERGENCY MEDICINE
Payer: SELF-PAY

## 2018-10-17 VITALS
SYSTOLIC BLOOD PRESSURE: 121 MMHG | RESPIRATION RATE: 20 BRPM | OXYGEN SATURATION: 99 % | TEMPERATURE: 98 F | BODY MASS INDEX: 29.8 KG/M2 | DIASTOLIC BLOOD PRESSURE: 76 MMHG | HEIGHT: 72 IN | WEIGHT: 220 LBS | HEART RATE: 82 BPM

## 2018-10-17 DIAGNOSIS — R07.89 ATYPICAL CHEST PAIN: Primary | ICD-10-CM

## 2018-10-17 DIAGNOSIS — T81.32XA STERNAL WOUND DEHISCENCE, INITIAL ENCOUNTER: ICD-10-CM

## 2018-10-17 LAB
ALBUMIN SERPL-MCNC: 3.4 G/DL (ref 3.5–5)
ALBUMIN/GLOB SERPL: 0.9 {RATIO} (ref 1.2–3.5)
ALP SERPL-CCNC: 127 U/L (ref 50–136)
ALT SERPL-CCNC: 20 U/L (ref 12–65)
ANION GAP SERPL CALC-SCNC: 6 MMOL/L (ref 7–16)
AST SERPL-CCNC: 26 U/L (ref 15–37)
BASOPHILS # BLD: 0.1 K/UL (ref 0–0.2)
BASOPHILS NFR BLD: 1 % (ref 0–2)
BILIRUB SERPL-MCNC: 0.2 MG/DL (ref 0.2–1.1)
BUN SERPL-MCNC: 14 MG/DL (ref 6–23)
CALCIUM SERPL-MCNC: 8.4 MG/DL (ref 8.3–10.4)
CHLORIDE SERPL-SCNC: 99 MMOL/L (ref 98–107)
CO2 SERPL-SCNC: 29 MMOL/L (ref 21–32)
CREAT SERPL-MCNC: 0.91 MG/DL (ref 0.6–1)
DIFFERENTIAL METHOD BLD: NORMAL
EOSINOPHIL # BLD: 0.2 K/UL (ref 0–0.8)
EOSINOPHIL NFR BLD: 2 % (ref 0.5–7.8)
ERYTHROCYTE [DISTWIDTH] IN BLOOD BY AUTOMATED COUNT: 12.8 %
GLOBULIN SER CALC-MCNC: 3.8 G/DL (ref 2.3–3.5)
GLUCOSE BLD STRIP.AUTO-MCNC: 281 MG/DL (ref 65–100)
GLUCOSE SERPL-MCNC: 268 MG/DL (ref 65–100)
HCG UR QL: NEGATIVE
HCT VFR BLD AUTO: 39.6 % (ref 35.8–46.3)
HGB BLD-MCNC: 13.1 G/DL (ref 11.7–15.4)
IMM GRANULOCYTES # BLD: 0 K/UL (ref 0–0.5)
IMM GRANULOCYTES NFR BLD AUTO: 1 % (ref 0–5)
LACTATE BLD-SCNC: 0.9 MMOL/L (ref 0.5–1.9)
LYMPHOCYTES # BLD: 2.1 K/UL (ref 0.5–4.6)
LYMPHOCYTES NFR BLD: 23 % (ref 13–44)
MCH RBC QN AUTO: 30.3 PG (ref 26.1–32.9)
MCHC RBC AUTO-ENTMCNC: 33.1 G/DL (ref 31.4–35)
MCV RBC AUTO: 91.5 FL (ref 79.6–97.8)
MONOCYTES # BLD: 0.5 K/UL (ref 0.1–1.3)
MONOCYTES NFR BLD: 6 % (ref 4–12)
NEUTS SEG # BLD: 6 K/UL (ref 1.7–8.2)
NEUTS SEG NFR BLD: 68 % (ref 43–78)
NRBC # BLD: 0 K/UL (ref 0–0.2)
PLATELET # BLD AUTO: 246 K/UL (ref 150–450)
PMV BLD AUTO: 11.5 FL (ref 9.4–12.3)
POTASSIUM SERPL-SCNC: 4.5 MMOL/L (ref 3.5–5.1)
PROT SERPL-MCNC: 7.2 G/DL (ref 6.3–8.2)
RBC # BLD AUTO: 4.33 M/UL (ref 4.05–5.2)
SODIUM SERPL-SCNC: 134 MMOL/L (ref 136–145)
TROPONIN I SERPL-MCNC: <0.02 NG/ML (ref 0.02–0.05)
WBC # BLD AUTO: 8.9 K/UL (ref 4.3–11.1)

## 2018-10-17 PROCEDURE — 81025 URINE PREGNANCY TEST: CPT

## 2018-10-17 PROCEDURE — 93005 ELECTROCARDIOGRAM TRACING: CPT | Performed by: EMERGENCY MEDICINE

## 2018-10-17 PROCEDURE — 82962 GLUCOSE BLOOD TEST: CPT

## 2018-10-17 PROCEDURE — 87205 SMEAR GRAM STAIN: CPT

## 2018-10-17 PROCEDURE — 85025 COMPLETE CBC W/AUTO DIFF WBC: CPT

## 2018-10-17 PROCEDURE — 84484 ASSAY OF TROPONIN QUANT: CPT

## 2018-10-17 PROCEDURE — 74011250637 HC RX REV CODE- 250/637: Performed by: EMERGENCY MEDICINE

## 2018-10-17 PROCEDURE — 83605 ASSAY OF LACTIC ACID: CPT

## 2018-10-17 PROCEDURE — 87186 SC STD MICRODIL/AGAR DIL: CPT

## 2018-10-17 PROCEDURE — 71046 X-RAY EXAM CHEST 2 VIEWS: CPT

## 2018-10-17 PROCEDURE — 99285 EMERGENCY DEPT VISIT HI MDM: CPT | Performed by: EMERGENCY MEDICINE

## 2018-10-17 PROCEDURE — 81003 URINALYSIS AUTO W/O SCOPE: CPT | Performed by: EMERGENCY MEDICINE

## 2018-10-17 PROCEDURE — 87077 CULTURE AEROBIC IDENTIFY: CPT

## 2018-10-17 PROCEDURE — 80053 COMPREHEN METABOLIC PANEL: CPT

## 2018-10-17 RX ORDER — HYDROCODONE BITARTRATE AND ACETAMINOPHEN 7.5; 325 MG/1; MG/1
1 TABLET ORAL
Qty: 10 TAB | Refills: 0 | Status: SHIPPED | OUTPATIENT
Start: 2018-10-17

## 2018-10-17 RX ORDER — HYDROCODONE BITARTRATE AND ACETAMINOPHEN 7.5; 325 MG/1; MG/1
1 TABLET ORAL
Status: COMPLETED | OUTPATIENT
Start: 2018-10-17 | End: 2018-10-17

## 2018-10-17 RX ORDER — CEPHALEXIN 500 MG/1
500 CAPSULE ORAL 3 TIMES DAILY
Qty: 30 CAP | Refills: 0 | Status: ON HOLD | OUTPATIENT
Start: 2018-10-17 | End: 2019-09-18

## 2018-10-17 RX ORDER — SULFAMETHOXAZOLE AND TRIMETHOPRIM 800; 160 MG/1; MG/1
1 TABLET ORAL 2 TIMES DAILY
Qty: 28 TAB | Refills: 0 | Status: ON HOLD | OUTPATIENT
Start: 2018-10-17 | End: 2019-09-18

## 2018-10-17 RX ADMIN — HYDROCODONE BITARTRATE AND ACETAMINOPHEN 1 TABLET: 7.5; 325 TABLET ORAL at 21:21

## 2018-10-17 NOTE — ED TRIAGE NOTES
C/o post op complication s/p cabg 40/54/85, performed at [facility]. States complications since procedure. C/o drainage to site with possible bone fragment noted today. Admits to chills. States has been seen by pmd multiple times requesting abx however denies receiving and denies wound cultures. C/o pain to site. Per pt, told delayed healing due to uncontrolled dm. Denies checking bgl at home, no meds in approx 7 mos.

## 2018-10-18 LAB
ATRIAL RATE: 77 BPM
CALCULATED P AXIS, ECG09: 77 DEGREES
CALCULATED R AXIS, ECG10: -74 DEGREES
CALCULATED T AXIS, ECG11: 91 DEGREES
DIAGNOSIS, 93000: NORMAL
P-R INTERVAL, ECG05: 156 MS
Q-T INTERVAL, ECG07: 390 MS
QRS DURATION, ECG06: 78 MS
QTC CALCULATION (BEZET), ECG08: 441 MS
VENTRICULAR RATE, ECG03: 77 BPM

## 2018-10-18 NOTE — ED NOTES
I have reviewed discharge instructions with the patient. The patient verbalized understanding. Patient left ED via Discharge Method: ambulatory to Home with family member. Opportunity for questions and clarification provided. Patient given 3 scripts. To continue your aftercare when you leave the hospital, you may receive an automated call from our care team to check in on how you are doing. This is a free service and part of our promise to provide the best care and service to meet your aftercare needs.  If you have questions, or wish to unsubscribe from this service please call [phone]. Thank you for Choosing our [facility] Emergency Department.

## 2018-10-18 NOTE — DISCHARGE INSTRUCTIONS
Change dressing on sternal wound as needed. Call your heart surgeon for appointment or recheck regarding her wounds. Take antibiotics until you're seen. Recheck for high fever or shortness of breath. Wound Care: After Your Visit  Your Care Instructions  Taking good care of your wound at home will help it heal quickly and reduce your chance of infection. The doctor has checked you carefully, but problems can develop later. If you notice any problems or new symptoms, get medical treatment right away. Follow-up care is a key part of your treatment and safety. Be sure to make and go to all appointments, and call your doctor if you are having problems. It's also a good idea to know your test results and keep a list of the medicines you take. How can you care for yourself at home? · Clean the area with soap and water 2 times a day unless your doctor gives you different instructions. Don't use hydrogen peroxide or alcohol, which can slow healing. ¨ You may cover the wound with a thin layer of antibiotic ointment, such as bacitracin, and a nonstick bandage. ¨ Apply more ointment and replace the bandage as needed. · Take pain medicines exactly as directed. Some pain is normal with a wound, but do not ignore pain that is getting worse instead of better. You could have an infection. ¨ If the doctor gave you a prescription medicine for pain, take it as prescribed. ¨ If you are not taking a prescription pain medicine, ask your doctor if you can take an over-the-counter medicine. · Your doctor may have closed your wound with stitches (sutures), staples, or skin glue. ¨ If you have stitches, your doctor may remove them after several days to 2 weeks. Or you may have stitches that dissolve on their own. ¨ If you have staples, your doctor may remove them after 7 to 10 days. ¨ If your wound was closed with skin glue, the glue will wear off in a few days to 2 weeks. When should you call for help?   Call your doctor now or seek immediate medical care if:  · You have signs of infection, such as:  ¨ Increased pain, swelling, warmth, or redness near the wound. ¨ Red streaks leading from the wound. ¨ Pus draining from the wound. ¨ A fever. · You bleed so much from your incision that you soak one or more bandages over 2 to 4 hours. Watch closely for changes in your health, and be sure to contact your doctor if:  · The wound is not getting better each day. Where can you learn more? Go to [employer]  Enter M973 in the search box to learn more about \"Wound Care: After Your Visit. \"   © 5942-7073 Healthwise, Incorporated. Care instructions adapted under license by [facility] [employer] (which disclaims liability or warranty for this information). This care instruction is for use with your licensed healthcare professional. If you have questions about a medical condition or this instruction, always ask your healthcare professional. [place] [address] any warranty or liability for your use of this information. Content Version: 49.6.325061;  Last Revised: April 23, 2012

## 2018-10-18 NOTE — ED PROVIDER NOTES
70-year-old female is about 23 months out from coronary bypass graft. Over the last 6 months at least she's had problems with drainage and wound infections at the sternal site. She states she's had persistent drainage or last 2 months from a sternal wound. But seems to have a lot more pain today. Hurts to breathe. Feels like a small piece of bone came out of the wound as well. She's had nausea but no vomiting fever. No cough congestion. The history is provided by the patient. Chest Pain (Angina) This is a new problem. The current episode started 12 to 24 hours ago. The problem has not changed since onset. The problem occurs constantly. The pain is present in the substernal region. The pain is moderate. The quality of the pain is described as pressure-like. The pain does not radiate. The symptoms are aggravated by movement. Associated symptoms include nausea. Pertinent negatives include no abdominal pain, no back pain, no cough, no diaphoresis, no dizziness, no fever, no headaches, no palpitations, no shortness of breath and no vomiting. She has tried nothing for the symptoms. Risk factors include cardiac disease. Her past medical history is significant for DM and HTN. Procedural history includes cardiac catheterization and CABG. Past Medical History:  
Diagnosis Date  DM (diabetes mellitus) ([address]  Other ill-defined conditions   
 back pain Past Surgical History:  
Procedure Laterality Date  HX ORTHOPAEDIC    
 back surgery 2000 No family history on file. Social History Socioeconomic History  Marital status: SINGLE Spouse name: Not on file  Number of children: Not on file  Years of education: Not on file  Highest education level: Not on file Social Needs  Financial resource strain: Not on file  Food insecurity - worry: Not on file  Food insecurity - inability: Not on file  Transportation needs - medical: Not on file  Transportation needs - non-medical: Not on file Occupational History  Not on file Tobacco Use  Smoking status: Current Every Day Smoker Packs/day: 1.00 Substance and Sexual Activity  Alcohol use: No  
 Drug use: No  
 Sexual activity: Not on file Other Topics Concern  Not on file Social History Narrative  Not on file ALLERGIES: Patient has no known allergies. Review of Systems Constitutional: Negative for chills, diaphoresis and fever. HENT: Negative for ear pain. Eyes: Negative for pain and visual disturbance. Respiratory: Negative for cough and shortness of breath. Cardiovascular: Positive for chest pain. Negative for palpitations. Gastrointestinal: Positive for nausea. Negative for abdominal pain, diarrhea and vomiting. Endocrine: Negative for polydipsia and polyuria. Genitourinary: Negative for dysuria and hematuria. Musculoskeletal: Negative for arthralgias, back pain and neck pain. Skin: Negative for rash and wound. Neurological: Negative for dizziness, syncope and headaches. Hematological: Negative for adenopathy. Does not bruise/bleed easily. Vitals:  
 10/17/18 2002 10/17/18 2107 BP: 122/77 115/69 Pulse: 89 89 Resp: 20 Temp: 97.6 °F (36.4 °C) SpO2: 97% 100% Weight: 99.8 kg (220 lb) Height: 6' 1\" (1.854 m) Physical Exam  
Constitutional: She is oriented to person, place, and time. She appears well-developed and well-nourished. No distress. HENT:  
Head: Normocephalic and atraumatic. Right Ear: External ear normal.  
Left Ear: External ear normal.  
Mouth/Throat: Oropharynx is clear and moist.  
Eyes: Conjunctivae and EOM are normal. Pupils are equal, round, and reactive to light. Neck: Normal range of motion. Neck supple. Cardiovascular: Normal rate, regular rhythm and normal heart sounds. Pulmonary/Chest: Effort normal and breath sounds normal. No respiratory distress. Musculoskeletal: Normal range of motion. She exhibits no edema. Neurological: She is alert and oriented to person, place, and time. Skin: Skin is warm and dry. Nursing note and vitals reviewed. MDM Number of Diagnoses or Management Options Diagnosis management comments: Check EKG and troponin. Check chest x-ray. Check for signs of infection with regard to pneumonia, empyema. Suspect chronic osteo of the sternum. Amount and/or Complexity of Data Reviewed Clinical lab tests: reviewed and ordered Tests in the radiology section of CPT®: ordered and reviewed Tests in the medicine section of CPT®: ordered and reviewed Independent visualization of images, tracings, or specimens: yes Risk of Complications, Morbidity, and/or Mortality Presenting problems: moderate Diagnostic procedures: low Management options: moderate Patient Progress Patient progress: stable Procedures Results Include: 
 
Recent Results (from the past 24 hour(s)) GLUCOSE, POC Collection Time: 10/17/18  8:05 PM  
Result Value Ref Range Glucose (POC) 281 (H) 65 - 100 mg/dL CBC WITH AUTOMATED DIFF Collection Time: 10/17/18  8:06 PM  
Result Value Ref Range WBC 8.9 4.3 - 11.1 K/uL  
 RBC 4.33 4.05 - 5.2 M/uL  
 HGB 13.1 11.7 - 15.4 g/dL HCT 39.6 35.8 - 46.3 % MCV 91.5 79.6 - 97.8 FL  
 MCH 30.3 26.1 - 32.9 PG  
 MCHC 33.1 31.4 - 35.0 g/dL  
 RDW 12.8 % PLATELET 949 395 - 483 K/uL MPV 11.5 9.4 - 12.3 FL ABSOLUTE NRBC 0.00 0.0 - 0.2 K/uL  
 DF AUTOMATED NEUTROPHILS 68 43 - 78 % LYMPHOCYTES 23 13 - 44 % MONOCYTES 6 4.0 - 12.0 % EOSINOPHILS 2 0.5 - 7.8 % BASOPHILS 1 0.0 - 2.0 % IMMATURE GRANULOCYTES 1 0.0 - 5.0 %  
 ABS. NEUTROPHILS 6.0 1.7 - 8.2 K/UL  
 ABS. LYMPHOCYTES 2.1 0.5 - 4.6 K/UL  
 ABS. MONOCYTES 0.5 0.1 - 1.3 K/UL  
 ABS. EOSINOPHILS 0.2 0.0 - 0.8 K/UL  
 ABS. BASOPHILS 0.1 0.0 - 0.2 K/UL  
 ABS. IMM. GRANS. 0.0 0.0 - 0.5 K/UL METABOLIC PANEL, COMPREHENSIVE Collection Time: 10/17/18  8:06 PM  
Result Value Ref Range Sodium 134 (L) 136 - 145 mmol/L Potassium 4.5 3.5 - 5.1 mmol/L Chloride 99 98 - 107 mmol/L  
 CO2 29 21 - 32 mmol/L Anion gap 6 (L) 7 - 16 mmol/L Glucose 268 (H) 65 - 100 mg/dL BUN 14 6 - 23 MG/DL Creatinine 0.91 0.6 - 1.0 MG/DL  
 GFR est AA >60 >60 ml/min/1.73m2 GFR est non-AA >60 >60 ml/min/1.73m2 Calcium 8.4 8.3 - 10.4 MG/DL Bilirubin, total 0.2 0.2 - 1.1 MG/DL  
 ALT (SGPT) 20 12 - 65 U/L  
 AST (SGOT) 26 15 - 37 U/L Alk. phosphatase 127 50 - 136 U/L Protein, total 7.2 6.3 - 8.2 g/dL Albumin 3.4 (L) 3.5 - 5.0 g/dL Globulin 3.8 (H) 2.3 - 3.5 g/dL A-G Ratio 0.9 (L) 1.2 - 3.5    
TROPONIN I Collection Time: 10/17/18  8:06 PM  
Result Value Ref Range Troponin-I, Qt. <0.02 (L) 0.02 - 0.05 NG/ML  
POC LACTIC ACID Collection Time: 10/17/18  8:10 PM  
Result Value Ref Range Lactic Acid (POC) 0.9 0.5 - 1.9 mmol/L  
HCG URINE, QL. - POC Collection Time: 10/17/18  9:00 PM  
Result Value Ref Range Pregnancy test,urine (POC) NEGATIVE  NEG    
EKG, 12 LEAD, INITIAL Collection Time: 10/17/18 10:18 PM  
Result Value Ref Range Ventricular Rate 77 BPM  
 Atrial Rate 77 BPM  
 P-R Interval 156 ms QRS Duration 78 ms Q-T Interval 390 ms QTC Calculation (Bezet) 441 ms Calculated P Axis 77 degrees Calculated R Axis -74 degrees Calculated T Axis 91 degrees Diagnosis    
  !! AGE AND GENDER SPECIFIC ECG ANALYSIS !! Normal sinus rhythm Left axis deviation Low voltage QRS Cannot rule out Inferior infarct , age undetermined Cannot rule out Anteroseptal infarct , age undetermined Abnormal ECG No previous ECGs available Xr Chest Pa Lat Result Date: 10/17/2018 EXAM:  XR CHEST PA LAT INDICATION:   Chest Pain COMPARISON: 8/9/2013. FINDINGS: PA and lateral radiographs of the chest demonstrate clear lungs. The cardiac and mediastinal contours and pulmonary vascularity are normal.  The bones and soft tissues are within normal limits. IMPRESSION: Normal chest.  
 
No evidence for sepsis. Suspect chronic osteomyelitis. Will start patient back on antibiotics and refer back to cardiothoracic surgery clinic

## 2018-10-21 LAB
BACTERIA SPEC CULT: ABNORMAL
GRAM STN SPEC: ABNORMAL
GRAM STN SPEC: ABNORMAL
SERVICE CMNT-IMP: ABNORMAL

## 2019-09-16 ENCOUNTER — HOSPITAL ENCOUNTER (INPATIENT)
Age: 44
LOS: 1 days | Discharge: LEFT BEFORE DISCHARGE | DRG: 383 | End: 2019-09-17
Attending: EMERGENCY MEDICINE | Admitting: FAMILY MEDICINE
Payer: MEDICAID

## 2019-09-16 ENCOUNTER — APPOINTMENT (OUTPATIENT)
Dept: GENERAL RADIOLOGY | Age: 44
DRG: 383 | End: 2019-09-16
Attending: EMERGENCY MEDICINE
Payer: MEDICAID

## 2019-09-16 DIAGNOSIS — A41.9 SEPSIS, DUE TO UNSPECIFIED ORGANISM: Primary | ICD-10-CM

## 2019-09-16 DIAGNOSIS — E11.628 DIABETIC FOOT INFECTION (HCC): ICD-10-CM

## 2019-09-16 DIAGNOSIS — L08.9 DIABETIC FOOT INFECTION (HCC): ICD-10-CM

## 2019-09-16 PROBLEM — L03.90 CELLULITIS: Status: ACTIVE | Noted: 2019-09-16

## 2019-09-16 LAB
ALBUMIN SERPL-MCNC: 3.4 G/DL (ref 3.5–5)
ALBUMIN/GLOB SERPL: 0.9 {RATIO} (ref 1.2–3.5)
ALP SERPL-CCNC: 135 U/L (ref 50–136)
ALT SERPL-CCNC: 22 U/L (ref 12–65)
ANION GAP SERPL CALC-SCNC: 10 MMOL/L (ref 7–16)
APPEARANCE UR: CLEAR
AST SERPL-CCNC: 27 U/L (ref 15–37)
BASOPHILS # BLD: 0.1 K/UL (ref 0–0.2)
BASOPHILS NFR BLD: 0 % (ref 0–2)
BILIRUB SERPL-MCNC: 0.4 MG/DL (ref 0.2–1.1)
BILIRUB UR QL: NEGATIVE
BUN SERPL-MCNC: 8 MG/DL (ref 6–23)
CALCIUM SERPL-MCNC: 8.5 MG/DL (ref 8.3–10.4)
CHLORIDE SERPL-SCNC: 95 MMOL/L (ref 98–107)
CO2 SERPL-SCNC: 24 MMOL/L (ref 21–32)
COLOR UR: YELLOW
CREAT SERPL-MCNC: 0.94 MG/DL (ref 0.6–1)
CRP SERPL-MCNC: 9.6 MG/DL (ref 0–0.9)
DIFFERENTIAL METHOD BLD: ABNORMAL
EOSINOPHIL # BLD: 0 K/UL (ref 0–0.8)
EOSINOPHIL NFR BLD: 0 % (ref 0.5–7.8)
ERYTHROCYTE [DISTWIDTH] IN BLOOD BY AUTOMATED COUNT: 12.4 % (ref 11.9–14.6)
GLOBULIN SER CALC-MCNC: 3.9 G/DL (ref 2.3–3.5)
GLUCOSE SERPL-MCNC: 365 MG/DL (ref 65–100)
GLUCOSE UR STRIP.AUTO-MCNC: >1000 MG/DL
HCT VFR BLD AUTO: 37.5 % (ref 35.8–46.3)
HGB BLD-MCNC: 12.1 G/DL (ref 11.7–15.4)
HGB UR QL STRIP: NEGATIVE
IMM GRANULOCYTES # BLD AUTO: 0.3 K/UL (ref 0–0.5)
IMM GRANULOCYTES NFR BLD AUTO: 1 % (ref 0–5)
KETONES UR QL STRIP.AUTO: NEGATIVE MG/DL
LACTATE BLD-SCNC: 2.44 MMOL/L (ref 0.5–1.9)
LEUKOCYTE ESTERASE UR QL STRIP.AUTO: NEGATIVE
LYMPHOCYTES # BLD: 0.4 K/UL (ref 0.5–4.6)
LYMPHOCYTES NFR BLD: 2 % (ref 13–44)
MCH RBC QN AUTO: 29.4 PG (ref 26.1–32.9)
MCHC RBC AUTO-ENTMCNC: 32.3 G/DL (ref 31.4–35)
MCV RBC AUTO: 91.2 FL (ref 79.6–97.8)
MONOCYTES # BLD: 0.6 K/UL (ref 0.1–1.3)
MONOCYTES NFR BLD: 2 % (ref 4–12)
NEUTS SEG # BLD: 23.6 K/UL (ref 1.7–8.2)
NEUTS SEG NFR BLD: 95 % (ref 43–78)
NITRITE UR QL STRIP.AUTO: NEGATIVE
NRBC # BLD: 0 K/UL (ref 0–0.2)
PH UR STRIP: 6 [PH] (ref 5–9)
PLATELET # BLD AUTO: 160 K/UL (ref 150–450)
PMV BLD AUTO: 11 FL (ref 9.4–12.3)
POTASSIUM SERPL-SCNC: 4.1 MMOL/L (ref 3.5–5.1)
PROCALCITONIN SERPL-MCNC: 1.4 NG/ML
PROT SERPL-MCNC: 7.3 G/DL (ref 6.3–8.2)
PROT UR STRIP-MCNC: NEGATIVE MG/DL
RBC # BLD AUTO: 4.11 M/UL (ref 4.05–5.2)
SODIUM SERPL-SCNC: 129 MMOL/L (ref 136–145)
SP GR UR REFRACTOMETRY: 1.02 (ref 1–1.02)
UROBILINOGEN UR QL STRIP.AUTO: 1 EU/DL (ref 0.2–1)
WBC # BLD AUTO: 24.9 K/UL (ref 4.3–11.1)

## 2019-09-16 PROCEDURE — 86140 C-REACTIVE PROTEIN: CPT

## 2019-09-16 PROCEDURE — 96365 THER/PROPH/DIAG IV INF INIT: CPT | Performed by: EMERGENCY MEDICINE

## 2019-09-16 PROCEDURE — 80053 COMPREHEN METABOLIC PANEL: CPT

## 2019-09-16 PROCEDURE — 83605 ASSAY OF LACTIC ACID: CPT

## 2019-09-16 PROCEDURE — 74011250637 HC RX REV CODE- 250/637: Performed by: EMERGENCY MEDICINE

## 2019-09-16 PROCEDURE — 87040 BLOOD CULTURE FOR BACTERIA: CPT

## 2019-09-16 PROCEDURE — 81003 URINALYSIS AUTO W/O SCOPE: CPT

## 2019-09-16 PROCEDURE — 99285 EMERGENCY DEPT VISIT HI MDM: CPT | Performed by: EMERGENCY MEDICINE

## 2019-09-16 PROCEDURE — 65270000029 HC RM PRIVATE

## 2019-09-16 PROCEDURE — 96374 THER/PROPH/DIAG INJ IV PUSH: CPT | Performed by: EMERGENCY MEDICINE

## 2019-09-16 PROCEDURE — 73660 X-RAY EXAM OF TOE(S): CPT

## 2019-09-16 PROCEDURE — 71046 X-RAY EXAM CHEST 2 VIEWS: CPT

## 2019-09-16 PROCEDURE — 74011000258 HC RX REV CODE- 258: Performed by: EMERGENCY MEDICINE

## 2019-09-16 PROCEDURE — 74011250636 HC RX REV CODE- 250/636: Performed by: EMERGENCY MEDICINE

## 2019-09-16 PROCEDURE — 85025 COMPLETE CBC W/AUTO DIFF WBC: CPT

## 2019-09-16 PROCEDURE — 87086 URINE CULTURE/COLONY COUNT: CPT

## 2019-09-16 PROCEDURE — 84145 PROCALCITONIN (PCT): CPT

## 2019-09-16 PROCEDURE — 96361 HYDRATE IV INFUSION ADD-ON: CPT | Performed by: EMERGENCY MEDICINE

## 2019-09-16 RX ORDER — VANCOMYCIN HYDROCHLORIDE 1 G/20ML
INJECTION, POWDER, LYOPHILIZED, FOR SOLUTION INTRAVENOUS ONCE
Status: DISCONTINUED | OUTPATIENT
Start: 2019-09-16 | End: 2019-09-16 | Stop reason: SDUPTHER

## 2019-09-16 RX ORDER — ACETAMINOPHEN 500 MG
1000 TABLET ORAL
Status: COMPLETED | OUTPATIENT
Start: 2019-09-16 | End: 2019-09-16

## 2019-09-16 RX ADMIN — VANCOMYCIN HYDROCHLORIDE 2500 MG: 10 INJECTION, POWDER, LYOPHILIZED, FOR SOLUTION INTRAVENOUS at 22:58

## 2019-09-16 RX ADMIN — PIPERACILLIN AND TAZOBACTAM 4.5 G: 4; .5 INJECTION, POWDER, LYOPHILIZED, FOR SOLUTION INTRAVENOUS; PARENTERAL at 22:28

## 2019-09-16 RX ADMIN — ACETAMINOPHEN 1000 MG: 500 TABLET, FILM COATED ORAL at 20:18

## 2019-09-16 RX ADMIN — SODIUM CHLORIDE 1000 ML: 900 INJECTION, SOLUTION INTRAVENOUS at 22:28

## 2019-09-17 ENCOUNTER — HOSPITAL ENCOUNTER (INPATIENT)
Dept: MRI IMAGING | Age: 44
DRG: 383 | End: 2019-09-17
Attending: FAMILY MEDICINE | Admitting: FAMILY MEDICINE
Payer: MEDICAID

## 2019-09-17 VITALS
DIASTOLIC BLOOD PRESSURE: 65 MMHG | SYSTOLIC BLOOD PRESSURE: 107 MMHG | BODY MASS INDEX: 30.88 KG/M2 | HEIGHT: 72 IN | OXYGEN SATURATION: 95 % | RESPIRATION RATE: 16 BRPM | WEIGHT: 228 LBS | HEART RATE: 86 BPM | TEMPERATURE: 98.1 F

## 2019-09-17 PROBLEM — E11.65 HYPERGLYCEMIA DUE TO TYPE 2 DIABETES MELLITUS (HCC): Status: ACTIVE | Noted: 2019-09-17

## 2019-09-17 LAB
AMPHET UR QL SCN: POSITIVE
ANION GAP SERPL CALC-SCNC: 10 MMOL/L (ref 7–16)
ARTERIAL PATENCY WRIST A: YES
BARBITURATES UR QL SCN: NEGATIVE
BASE DEFICIT BLD-SCNC: 1 MMOL/L
BASOPHILS # BLD: 0 K/UL (ref 0–0.2)
BASOPHILS NFR BLD: 0 % (ref 0–2)
BDY SITE: ABNORMAL
BENZODIAZ UR QL: NEGATIVE
BODY TEMPERATURE: 98.6
BUN SERPL-MCNC: 7 MG/DL (ref 6–23)
CALCIUM SERPL-MCNC: 8.1 MG/DL (ref 8.3–10.4)
CANNABINOIDS UR QL SCN: POSITIVE
CHLORIDE SERPL-SCNC: 100 MMOL/L (ref 98–107)
CO2 BLD-SCNC: 25 MMOL/L
CO2 SERPL-SCNC: 25 MMOL/L (ref 21–32)
COCAINE UR QL SCN: NEGATIVE
COLLECT TIME,HTIME: 1200
CREAT SERPL-MCNC: 0.8 MG/DL (ref 0.6–1)
DIFFERENTIAL METHOD BLD: ABNORMAL
EOSINOPHIL # BLD: 0 K/UL (ref 0–0.8)
EOSINOPHIL NFR BLD: 0 % (ref 0.5–7.8)
ERYTHROCYTE [DISTWIDTH] IN BLOOD BY AUTOMATED COUNT: 12.6 % (ref 11.9–14.6)
GAS FLOW.O2 O2 DELIVERY SYS: ABNORMAL L/MIN
GLUCOSE BLD STRIP.AUTO-MCNC: 248 MG/DL (ref 65–100)
GLUCOSE BLD STRIP.AUTO-MCNC: 263 MG/DL (ref 65–100)
GLUCOSE SERPL-MCNC: 263 MG/DL (ref 65–100)
HCO3 BLD-SCNC: 24.1 MMOL/L (ref 22–26)
HCT VFR BLD AUTO: 39.1 % (ref 35.8–46.3)
HGB BLD-MCNC: 11.4 G/DL (ref 11.7–15.4)
HGB BLD-MCNC: 12.4 G/DL (ref 11.7–15.4)
IMM GRANULOCYTES # BLD AUTO: 0.2 K/UL (ref 0–0.5)
IMM GRANULOCYTES NFR BLD AUTO: 1 % (ref 0–5)
LACTATE SERPL-SCNC: 1.6 MMOL/L (ref 0.4–2)
LACTATE SERPL-SCNC: 2.5 MMOL/L (ref 0.4–2)
LYMPHOCYTES # BLD: 0.8 K/UL (ref 0.5–4.6)
LYMPHOCYTES NFR BLD: 4 % (ref 13–44)
MCH RBC QN AUTO: 29.5 PG (ref 26.1–32.9)
MCHC RBC AUTO-ENTMCNC: 31.7 G/DL (ref 31.4–35)
MCV RBC AUTO: 92.9 FL (ref 79.6–97.8)
METHADONE UR QL: NEGATIVE
MONOCYTES # BLD: 0.4 K/UL (ref 0.1–1.3)
MONOCYTES NFR BLD: 2 % (ref 4–12)
NEUTS SEG # BLD: 19.5 K/UL (ref 1.7–8.2)
NEUTS SEG NFR BLD: 93 % (ref 43–78)
NRBC # BLD: 0 K/UL (ref 0–0.2)
OPIATES UR QL: NEGATIVE
PCO2 BLD: 41.3 MMHG (ref 35–45)
PCP UR QL: NEGATIVE
PH BLD: 7.38 [PH] (ref 7.35–7.45)
PLATELET # BLD AUTO: 163 K/UL (ref 150–450)
PMV BLD AUTO: 10.8 FL (ref 9.4–12.3)
PO2 BLD: 66 MMHG (ref 75–100)
POTASSIUM SERPL-SCNC: 3.5 MMOL/L (ref 3.5–5.1)
RBC # BLD AUTO: 4.21 M/UL (ref 4.05–5.2)
SAO2 % BLD: 92 % (ref 95–98)
SERVICE CMNT-IMP: ABNORMAL
SERVICE CMNT-IMP: ABNORMAL
SODIUM SERPL-SCNC: 135 MMOL/L (ref 136–145)
SPECIMEN TYPE: ABNORMAL
WBC # BLD AUTO: 20.9 K/UL (ref 4.3–11.1)

## 2019-09-17 PROCEDURE — 93005 ELECTROCARDIOGRAM TRACING: CPT | Performed by: FAMILY MEDICINE

## 2019-09-17 PROCEDURE — 74011250637 HC RX REV CODE- 250/637: Performed by: FAMILY MEDICINE

## 2019-09-17 PROCEDURE — 74011000258 HC RX REV CODE- 258: Performed by: FAMILY MEDICINE

## 2019-09-17 PROCEDURE — 74011250636 HC RX REV CODE- 250/636: Performed by: FAMILY MEDICINE

## 2019-09-17 PROCEDURE — 80048 BASIC METABOLIC PNL TOTAL CA: CPT

## 2019-09-17 PROCEDURE — 36415 COLL VENOUS BLD VENIPUNCTURE: CPT

## 2019-09-17 PROCEDURE — 99285 EMERGENCY DEPT VISIT HI MDM: CPT

## 2019-09-17 PROCEDURE — 80307 DRUG TEST PRSMV CHEM ANLYZR: CPT

## 2019-09-17 PROCEDURE — 82803 BLOOD GASES ANY COMBINATION: CPT

## 2019-09-17 PROCEDURE — 83605 ASSAY OF LACTIC ACID: CPT

## 2019-09-17 PROCEDURE — 85025 COMPLETE CBC W/AUTO DIFF WBC: CPT

## 2019-09-17 PROCEDURE — 82962 GLUCOSE BLOOD TEST: CPT

## 2019-09-17 PROCEDURE — C8929 TTE W OR WO FOL WCON,DOPPLER: HCPCS

## 2019-09-17 PROCEDURE — 74011636637 HC RX REV CODE- 636/637: Performed by: FAMILY MEDICINE

## 2019-09-17 PROCEDURE — 85018 HEMOGLOBIN: CPT

## 2019-09-17 PROCEDURE — 36600 WITHDRAWAL OF ARTERIAL BLOOD: CPT

## 2019-09-17 RX ORDER — ONDANSETRON 2 MG/ML
4 INJECTION INTRAMUSCULAR; INTRAVENOUS
Status: DISCONTINUED | OUTPATIENT
Start: 2019-09-17 | End: 2019-09-17 | Stop reason: HOSPADM

## 2019-09-17 RX ORDER — GUAIFENESIN 100 MG/5ML
81 LIQUID (ML) ORAL DAILY
Status: DISCONTINUED | OUTPATIENT
Start: 2019-09-17 | End: 2019-09-17

## 2019-09-17 RX ORDER — ACETAMINOPHEN 325 MG/1
650 TABLET ORAL
Status: DISCONTINUED | OUTPATIENT
Start: 2019-09-17 | End: 2019-09-17 | Stop reason: HOSPADM

## 2019-09-17 RX ORDER — SODIUM CHLORIDE 9 MG/ML
100 INJECTION, SOLUTION INTRAVENOUS CONTINUOUS
Status: DISCONTINUED | OUTPATIENT
Start: 2019-09-17 | End: 2019-09-17 | Stop reason: HOSPADM

## 2019-09-17 RX ORDER — ROSUVASTATIN CALCIUM 20 MG/1
40 TABLET, COATED ORAL
Status: DISCONTINUED | OUTPATIENT
Start: 2019-09-17 | End: 2019-09-17 | Stop reason: HOSPADM

## 2019-09-17 RX ORDER — GABAPENTIN 300 MG/1
600 CAPSULE ORAL 3 TIMES DAILY
Status: DISCONTINUED | OUTPATIENT
Start: 2019-09-17 | End: 2019-09-17 | Stop reason: HOSPADM

## 2019-09-17 RX ORDER — POVIDONE-IODINE 10 %
SOLUTION, NON-ORAL TOPICAL DAILY
Status: DISCONTINUED | OUTPATIENT
Start: 2019-09-18 | End: 2019-09-17 | Stop reason: HOSPADM

## 2019-09-17 RX ORDER — IBUPROFEN 200 MG
1 TABLET ORAL EVERY 24 HOURS
Status: DISCONTINUED | OUTPATIENT
Start: 2019-09-17 | End: 2019-09-17 | Stop reason: HOSPADM

## 2019-09-17 RX ORDER — HYDROCODONE BITARTRATE AND ACETAMINOPHEN 7.5; 325 MG/1; MG/1
1 TABLET ORAL
Status: DISCONTINUED | OUTPATIENT
Start: 2019-09-17 | End: 2019-09-17 | Stop reason: HOSPADM

## 2019-09-17 RX ORDER — GUAIFENESIN 100 MG/5ML
81 LIQUID (ML) ORAL DAILY
Status: DISCONTINUED | OUTPATIENT
Start: 2019-09-17 | End: 2019-09-17 | Stop reason: HOSPADM

## 2019-09-17 RX ORDER — DIPHENHYDRAMINE HCL 25 MG
CAPSULE ORAL
Status: DISCONTINUED
Start: 2019-09-17 | End: 2019-09-17 | Stop reason: HOSPADM

## 2019-09-17 RX ORDER — MORPHINE SULFATE 2 MG/ML
1 INJECTION, SOLUTION INTRAMUSCULAR; INTRAVENOUS
Status: DISCONTINUED | OUTPATIENT
Start: 2019-09-17 | End: 2019-09-17 | Stop reason: HOSPADM

## 2019-09-17 RX ORDER — SODIUM CHLORIDE 0.9 % (FLUSH) 0.9 %
5-40 SYRINGE (ML) INJECTION AS NEEDED
Status: DISCONTINUED | OUTPATIENT
Start: 2019-09-17 | End: 2019-09-17 | Stop reason: HOSPADM

## 2019-09-17 RX ORDER — ENOXAPARIN SODIUM 100 MG/ML
40 INJECTION SUBCUTANEOUS EVERY 24 HOURS
Status: DISCONTINUED | OUTPATIENT
Start: 2019-09-17 | End: 2019-09-17 | Stop reason: HOSPADM

## 2019-09-17 RX ORDER — INSULIN LISPRO 100 [IU]/ML
INJECTION, SOLUTION INTRAVENOUS; SUBCUTANEOUS
Status: DISCONTINUED | OUTPATIENT
Start: 2019-09-17 | End: 2019-09-17 | Stop reason: HOSPADM

## 2019-09-17 RX ORDER — DIPHENHYDRAMINE HCL 25 MG
25 CAPSULE ORAL
Status: DISCONTINUED | OUTPATIENT
Start: 2019-09-17 | End: 2019-09-17 | Stop reason: HOSPADM

## 2019-09-17 RX ORDER — SODIUM CHLORIDE 0.9 % (FLUSH) 0.9 %
5-40 SYRINGE (ML) INJECTION EVERY 8 HOURS
Status: DISCONTINUED | OUTPATIENT
Start: 2019-09-17 | End: 2019-09-17 | Stop reason: HOSPADM

## 2019-09-17 RX ORDER — VANCOMYCIN 1.75 GRAM/500 ML IN 0.9 % SODIUM CHLORIDE INTRAVENOUS
1750 EVERY 12 HOURS
Status: DISCONTINUED | OUTPATIENT
Start: 2019-09-17 | End: 2019-09-17 | Stop reason: HOSPADM

## 2019-09-17 RX ADMIN — Medication 10 ML: at 05:05

## 2019-09-17 RX ADMIN — Medication 1 AMPULE: at 07:22

## 2019-09-17 RX ADMIN — ROSUVASTATIN CALCIUM 40 MG: 20 TABLET, COATED ORAL at 09:09

## 2019-09-17 RX ADMIN — ACETAMINOPHEN 325 MG: 325 TABLET, FILM COATED ORAL at 07:21

## 2019-09-17 RX ADMIN — SODIUM CHLORIDE 100 ML/HR: 900 INJECTION, SOLUTION INTRAVENOUS at 00:52

## 2019-09-17 RX ADMIN — ENOXAPARIN SODIUM 40 MG: 40 INJECTION SUBCUTANEOUS at 07:23

## 2019-09-17 RX ADMIN — ASPIRIN 81 MG 81 MG: 81 TABLET ORAL at 09:09

## 2019-09-17 RX ADMIN — PERFLUTREN 1 ML: 6.52 INJECTION, SUSPENSION INTRAVENOUS at 12:58

## 2019-09-17 RX ADMIN — Medication 10 ML: at 01:27

## 2019-09-17 RX ADMIN — PIPERACILLIN SODIUM,TAZOBACTAM SODIUM 3.38 G: 3; .375 INJECTION, POWDER, FOR SOLUTION INTRAVENOUS at 05:24

## 2019-09-17 RX ADMIN — HYDROCODONE BITARTRATE AND ACETAMINOPHEN 1 TABLET: 7.5; 325 TABLET ORAL at 07:22

## 2019-09-17 RX ADMIN — GABAPENTIN 600 MG: 300 CAPSULE ORAL at 07:21

## 2019-09-17 RX ADMIN — INSULIN LISPRO 4 UNITS: 100 INJECTION, SOLUTION INTRAVENOUS; SUBCUTANEOUS at 10:05

## 2019-09-17 NOTE — PROGRESS NOTES
Problem: Falls - Risk of  Goal: *Absence of Falls  Description  Document Martha Omi Fall Risk and appropriate interventions in the flowsheet.   Outcome: Progressing Towards Goal  Note:   Fall Risk Interventions:  Mobility Interventions: Communicate number of staff needed for ambulation/transfer, Patient to call before getting OOB         Medication Interventions: Evaluate medications/consider consulting pharmacy, Patient to call before getting OOB, Teach patient to arise slowly    Elimination Interventions: Call light in reach, Patient to call for help with toileting needs, Toilet paper/wipes in reach

## 2019-09-17 NOTE — PROGRESS NOTES
END OF SHIFT NOTE:    Intake/Output  No intake/output data recorded. Voiding: YES  Catheter: NO  Drain:              Stool:  0 occurrences. Emesis:  0 occurrences. VITAL SIGNS  Patient Vitals for the past 12 hrs:   Temp Pulse Resp BP SpO2   09/17/19 0356 97.5 °F (36.4 °C) (!) 101 16 108/56 95 %   09/17/19 0043 99.3 °F (37.4 °C) (!) 104 15 93/51 94 %   09/16/19 2359    121/72    09/16/19 2346     93 %   09/16/19 2339  (!) 108  120/67 97 %   09/16/19 2318  (!) 103 15 107/66 96 %   09/16/19 2218  (!) 103 18 105/63 95 %   09/16/19 2149 (!) 100.5 °F (38.1 °C) (!) 105 (!) 48 98/56 95 %   09/16/19 2012 (!) 101.8 °F (38.8 °C) (!) 115 18 105/62 98 %       Pain Assessment  Pain 1  Pain Scale 1: Visual (09/17/19 0200)  Pain Intensity 1: 0 (09/17/19 0200)  Patient Stated Pain Goal: 0 (09/17/19 0200)  Pain Reassessment 1: Patient resting w/respiratory rate greater than 10 (09/17/19 0200)  Pain Onset 1: now (09/17/19 0109)  Pain Location 1: Leg (09/17/19 0109)  Pain Orientation 1: Right (09/17/19 0109)  Pain Description 1: Aching (09/17/19 0109)  Pain Intervention(s) 1: Refused (09/17/19 0109)    Ambulating  Yes    Additional Information: patient came up last night around midnight. Was extremely drowsy and would not arouse easily. Wounds throughout feet that wound care is consulted for today. No fevers since being on the floor. VSS. No needs voiced. Shift report given to oncoming nurse at the bedside.     Shaan Webb

## 2019-09-17 NOTE — ED TRIAGE NOTES
Pt arrives to triage via wheelchair with c/o right leg pain and swelling. Redness extending from right foot up to knee. Wound to right 3rd toe, reports dog stepping on foot. Onset of symptoms approx 1 week pta, redness onset yesterday. Reports bgl 301 today, hx DM, noncompliant with insulin. Reports nauea and productive cough with white sputum. +smoker. Denies vomiting or diarrhea. Denies urinary symptoms. Denies hx chf or blood clots. Code sirs initiated in triage, dr Montemayor Diver in to assess pt.

## 2019-09-17 NOTE — PROGRESS NOTES
09/17/19 0109   Dual Skin Pressure Injury Assessment   Dual Skin Pressure Injury Assessment WDL   Second Care Provider (Based on Facility Policy) Tripp Johnson RN   Skin Integumentary   Skin Integumentary (WDL) X   Skin Color Pale;Red;Ecchymosis (comment)  (redness on lower extremeties)   Skin Condition/Temp Rash;Itching;Cool; Inflamed;Dry;Warm  (on back and buttocks; right leg inflammed and warm)   Skin Integrity Scars (comment); Tattoos (comment); Wound (add Wound LDA)  (wounds to bottom of both feet)   Turgor Non-tenting   Hair Growth Present   Varicosities Absent    Pressure  Injury Documentation No Pressure Injury Noted-Pressure Ulcer Prevention Initiated   Patient with multiple tattoos throughout body. Rash on buttocks and back that started yesterday and is slightly itchy. Scar on abdomen and chest, patient said from multiple bypass surgery. Redness on the inside of the right leg with warm and tenderness. Both feet swollen with multiple small wounds throughout and the bottom of feet are black.
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
## 2019-09-17 NOTE — ED PROVIDER NOTES
[facility] Emergency Department  Arrival Date/Time: No admission date for patient encounter. Anika Mitchell  MRN: [id number]    YOB: 1975   37 y.o. female    [facility] EMERGENCY DEPT Room/bed info not found  Seen on 9/16/2019 @ 8:17 PM      Today's Chief Complaint:   Chief Complaint   Patient presents with    Leg Pain       TRIAGE Provider NOTE: Called to triage to evaluate a \"code Sirs\" patient. This is a 51-year-old female known diabetic and coronary artery disease presenting for several days of worsening swelling, pain, redness and fevers of the right lower extremity. On exam the patient does have a swollen, erythematous, warm right lower extremity with an ulcerative lesion on the third toe of the right foot. Prior vein grafting scars from the right lower extremity as well. Ordering sepsis labs, blood cultures, urinalysis with urine culture, chest x-ray. Inés Coulter MD; 9/16/2019 @8:17 PM============================     Anika Mitchell is a 37 y.o. female seen on 9/16/2019 at 8:17 PM in the [facility] EMERGENCY DEPT      9:44 PM  Agree with triage assessment. Open wound on right foot for more than 2 days, erythema and fever worsening today. Diabetic, uncontrolled blood sugars. The history is provided by the patient. Fever    This is a new problem. The current episode started more than 2 days ago. The problem occurs constantly. Pertinent negatives include no vomiting, no congestion and no shortness of breath. Leg Pain    This is a new problem. The current episode started more than 2 days ago. The problem has not changed since onset. The pain is present in the right foot, right toes and right lower leg. The quality of the pain is described as aching. The pain is at a severity of 4/10. The pain is moderate. Pertinent negatives include no numbness and no back pain. Review of Systems: Review of Systems   Constitutional: Positive for chills, fatigue and fever. HENT: Negative for congestion and dental problem. Eyes: Negative for pain, redness and visual disturbance. Respiratory: Negative for chest tightness, shortness of breath and stridor. Cardiovascular: Negative for palpitations and leg swelling. Gastrointestinal: Negative for abdominal pain, nausea and vomiting. Genitourinary: Negative for flank pain and urgency. Musculoskeletal: Negative for back pain and gait problem. Skin: Positive for wound. Neurological: Negative for light-headedness and numbness. Hematological: Negative for adenopathy. Does not bruise/bleed easily. All other systems reviewed and are negative. Past Medical History: Primary Care Doctor: None  Meds, PMH, PSHx, SocHx at end of this note     Allergies: No Known Allergies      Key Anti-Platelet Anticoagulant Meds     The patient is on no antiplatelet meds or anticoagulants. Physical Exam:  Nursing documentation reviewed. Vitals:    09/16/19 2012   BP: 105/62   Pulse: (!) 115   Resp: 18   Temp: (!) 101.8 °F (38.8 °C)   SpO2: 98%    Vital signs were reviewed. Physical Exam   Constitutional: She is oriented to person, place, and time. She appears well-developed and well-nourished. HENT:   Head: Normocephalic and atraumatic. Eyes: Pupils are equal, round, and reactive to light. Conjunctivae and EOM are normal.   Neck: Normal range of motion. Neck supple. No thyromegaly present. Cardiovascular: Regular rhythm. Tachycardia present. Exam reveals no friction rub. No murmur heard. Pulmonary/Chest: Effort normal and breath sounds normal. No respiratory distress. Abdominal: Soft. Bowel sounds are normal. She exhibits no distension. There is no tenderness. Musculoskeletal:        Legs:       Feet:    Erythema tracking up the inner aspect of right lower leg, right inguinal lymphadenopathy palpated as well   Neurological: She is alert and oriented to person, place, and time. No cranial nerve deficit. Coordination normal.   Skin: There is erythema. Nursing note and vitals reviewed. MEDICAL DECISION MAKING:   Differential Diagnosis:    MDM  Number of Diagnoses or Management Options  Diagnosis management comments: Concern for cellulitis as well as lymphangitis. Patient is a diabetic. Will obtain x-ray of third toe to rule out any obvious signs of osteomyelitis. Check labs to rule out any signs of DKA as well. 10:50 PM  White count elevated at 25,000. Lactic acid is 2.4. Concern for postictal cellulitis and developing sepsis. X-ray of toe shows no obvious signs of osteomyelitis, possible foreign body. However toe was tender and difficult to see whether she actually has a foreign body in there. Case discussed with hospitalist for admission. Broad-spectrum antibiotics initiated       Amount and/or Complexity of Data Reviewed  Clinical lab tests: ordered and reviewed  Tests in the radiology section of CPT®: ordered and reviewed  Discuss the patient with other providers: yes    Risk of Complications, Morbidity, and/or Mortality  Presenting problems: high  Diagnostic procedures: moderate  Management options: moderate    Patient Progress  Patient progress: stable        Data:      Lab findings during this visit: No results found for this or any previous visit (from the past 24 hour(s)).      Radiology studies during this visit:   XR CHEST PA LAT    (Results Pending)        Medications given in the ED:   Medications   acetaminophen (TYLENOL) tablet 1,000 mg (has no administration in time range)       Procedure Documentation: Procedures     Assessment and Plan:      Other ED Course Notes:        Past Medical History:      Past Medical History:   Diagnosis Date    DM (diabetes mellitus) ([facility] [address]     Other ill-defined conditions     back pain     Past Surgical History:   Procedure Laterality Date    HX ORTHOPAEDIC      back surgery 2000     Social History     Tobacco Use    Smoking status: Current Every Day Smoker     Packs/day: 1.00   Substance Use Topics    Alcohol use: No    Drug use: No     Home Medication:   Cannot display prior to admission medications because the patient has not been admitted in this contact. Results Include:    Recent Results (from the past 24 hour(s))   CBC WITH AUTOMATED DIFF    Collection Time: 09/16/19  8:22 PM   Result Value Ref Range    WBC 24.9 (H) 4.3 - 11.1 K/uL    RBC 4.11 4.05 - 5.2 M/uL    HGB 12.1 11.7 - 15.4 g/dL    HCT 37.5 35.8 - 46.3 %    MCV 91.2 79.6 - 97.8 FL    MCH 29.4 26.1 - 32.9 PG    MCHC 32.3 31.4 - 35.0 g/dL    RDW 12.4 11.9 - 14.6 %    PLATELET 650 696 - 140 K/uL    MPV 11.0 9.4 - 12.3 FL    ABSOLUTE NRBC 0.00 0.0 - 0.2 K/uL    DF AUTOMATED      NEUTROPHILS 95 (H) 43 - 78 %    LYMPHOCYTES 2 (L) 13 - 44 %    MONOCYTES 2 (L) 4.0 - 12.0 %    EOSINOPHILS 0 (L) 0.5 - 7.8 %    BASOPHILS 0 0.0 - 2.0 %    IMMATURE GRANULOCYTES 1 0.0 - 5.0 %    ABS. NEUTROPHILS 23.6 (H) 1.7 - 8.2 K/UL    ABS. LYMPHOCYTES 0.4 (L) 0.5 - 4.6 K/UL    ABS. MONOCYTES 0.6 0.1 - 1.3 K/UL    ABS. EOSINOPHILS 0.0 0.0 - 0.8 K/UL    ABS. BASOPHILS 0.1 0.0 - 0.2 K/UL    ABS. IMM. GRANS. 0.3 0.0 - 0.5 K/UL   METABOLIC PANEL, COMPREHENSIVE    Collection Time: 09/16/19  8:22 PM   Result Value Ref Range    Sodium 129 (L) 136 - 145 mmol/L    Potassium 4.1 3.5 - 5.1 mmol/L    Chloride 95 (L) 98 - 107 mmol/L    CO2 24 21 - 32 mmol/L    Anion gap 10 7 - 16 mmol/L    Glucose 365 (H) 65 - 100 mg/dL    BUN 8 6 - 23 MG/DL    Creatinine 0.94 0.6 - 1.0 MG/DL    GFR est AA >60 >60 ml/min/1.73m2    GFR est non-AA >60 >60 ml/min/1.73m2    Calcium 8.5 8.3 - 10.4 MG/DL    Bilirubin, total 0.4 0.2 - 1.1 MG/DL    ALT (SGPT) 22 12 - 65 U/L    AST (SGOT) 27 15 - 37 U/L    Alk.  phosphatase 135 50 - 136 U/L    Protein, total 7.3 6.3 - 8.2 g/dL    Albumin 3.4 (L) 3.5 - 5.0 g/dL    Globulin 3.9 (H) 2.3 - 3.5 g/dL    A-G Ratio 0.9 (L) 1.2 - 3.5     POC LACTIC ACID    Collection Time: 09/16/19  8:23 PM   Result Value Ref Range    Lactic Acid (POC) 2.44 (H) 0.5 - 1.9 mmol/L     Voice dictation software was used during the making of this note. This software is not perfect and grammatical and other typographical errors may be present. This note has been proofread, but may still contain errors.   Jeri Salcedo MD; 9/16/2019 @10:51 PM   ===================================================================

## 2019-09-17 NOTE — PROGRESS NOTES
Problem: Falls - Risk of  Goal: *Absence of Falls  Description  Document Albino Messing Fall Risk and appropriate interventions in the flowsheet.   Outcome: Progressing Towards Goal  Note:   Fall Risk Interventions:  Mobility Interventions: Communicate number of staff needed for ambulation/transfer, Patient to call before getting OOB         Medication Interventions: Evaluate medications/consider consulting pharmacy, Patient to call before getting OOB, Teach patient to arise slowly    Elimination Interventions: Call light in reach, Patient to call for help with toileting needs, Toilet paper/wipes in reach

## 2019-09-17 NOTE — PROGRESS NOTES
TRANSFER - IN REPORT:    Verbal report received from E[facility] on Kandice Nicolas  being received from ED for routine progression of care      Report consisted of patients Situation, Background, Assessment and   Recommendations(SBAR). Information from the following report(s) SBAR, Kardex, ED Summary, Accordion and Recent Results was reviewed with the receiving nurse. Opportunity for questions and clarification was provided. Assessment will be completed upon patients arrival to unit and care assumed.
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
## 2019-09-17 NOTE — ROUTINE PROCESS
TRANSFER - OUT REPORT:    Verbal report given to JOSE galloway(name) on Bridget Dial  being transferred to 5th floor(unit) for routine progression of care       Report consisted of patients Situation, Background, Assessment and   Recommendations(SBAR). Information from the following report(s) SBAR, Kardex, ED Summary, Intake/Output, MAR and Recent Results was reviewed with the receiving nurse. Lines:   Peripheral IV Right Antecubital (Active)       Peripheral IV Right Forearm (Active)        Opportunity for questions and clarification was provided. Patient transported with:  Transport.

## 2019-09-17 NOTE — PROGRESS NOTES
Pharmacokinetic Consult to Pharmacist    Sherif Beth is a 37 y.o. female being treated for skin and soft tissue infection with vancomycin. Height: 6' 1\" (185.4 cm)  Weight: 103.4 kg (228 lb)  Lab Results   Component Value Date/Time    BUN 8 09/16/2019 08:22 PM    Creatinine 0.94 09/16/2019 08:22 PM    WBC 24.9 (H) 09/16/2019 08:22 PM    Procalcitonin 1.4 09/16/2019 10:26 PM    Lactic Acid (POC) 2.44 (H) 09/16/2019 08:23 PM      Estimated Creatinine Clearance: 105.5 mL/min (based on SCr of 0.94 mg/dL). CULTURES:  Results     Procedure Component Value Units Date/Time    CULTURE, URINE [[id number]] Collected:  09/16/19 2237    Order Status:  Completed Specimen:  Urine from Clean catch Updated:  09/16/19 2308    CULTURE, BLOOD [[id number]] Collected:  09/16/19 2226    Order Status:  Completed Specimen:  Blood Updated:  09/16/19 2251    CULTURE, BLOOD [[id number]] Collected:  09/16/19 2022    Order Status:  Completed Specimen:  Blood Updated:  09/16/19 2029            Day 1 of vancomycin. Goal trough is 10-20. Vancomycin dose initiated at 2,500 mg load, followed by 1,750 mg q12h. Will continue to follow patient.       Thank you,  Ana Ivory, TcD

## 2019-09-17 NOTE — H&P
HOSPITALIST H&P/CONSULT  NAME:  Ibrahima Blackman   Age:  37 y.o.  :   1975   MRN:   [id number]  PCP: None  Consulting MD:  Treatment Team: Attending Provider: Cas Abdi MD; Primary Nurse: Fabiola Lee RN  HPI:   Patient is a 42yoF with PMH significant for DM2 (does not appear to be on home meds) who presents with wound to R 3rd toe with associated erythema and swelling. Patient reports that a dog scratched her toe several days ago. Currently, she is very somnolent, arouses briefly, but falls back asleep on my bedside evaluation of her in the ER. Reports fevers/chills, foot pain/redness/swelling. Unable to complete full ROS as pt continues to fall asleep during questions. Past Medical History:   Diagnosis Date    DM (diabetes mellitus) ([facility] [address]     Hyperglycemia due to type 2 diabetes mellitus ([facility] [address] 2019    Other ill-defined conditions(799.89)     back pain    Past Medical History reviewed. Past Surgical History:   Procedure Laterality Date    HX ORTHOPAEDIC      back surgery       Prior to Admission Medications   Prescriptions Last Dose Informant Patient Reported? Taking? HYDROcodone-acetaminophen (NORCO) 7.5-325 mg per tablet   No No   Sig: Take 1 Tab by mouth every six (6) hours as needed for Pain. Max Daily Amount: 4 Tabs. cephALEXin (KEFLEX) 500 mg capsule   No No   Sig: Take 1 Cap by mouth three (3) times daily. gabapentin (NEURONTIN) 600 mg tablet   Yes No   Sig: Take 600 mg by mouth three (3) times daily. minocycline (MINOCIN, DYNACIN) 100 mg capsule   No No   Sig: Take 1 Cap by mouth two (2) times a day. trimethoprim-sulfamethoxazole (BACTRIM DS, SEPTRA DS) 160-800 mg per tablet   No No   Sig: Take 1 Tab by mouth two (2) times a day.       Facility-Administered Medications: None     No Known Allergies   Social History     Tobacco Use    Smoking status: Current Every Day Smoker     Packs/day: 1.00   Substance Use Topics    Alcohol use: No      No family history on file. Family History reviewed and is non-contributory to current presentation. Objective:     Visit Vitals  BP 93/51 (BP 1 Location: Left arm, BP Patient Position: At rest)   Pulse (!) 104   Temp 99.3 °F (37.4 °C)   Resp 15   Ht 6' 1\" (1.854 m)   Wt 103.4 kg (228 lb)   SpO2 94%   BMI 30.08 kg/m²      Temp (24hrs), Av.5 °F (38.1 °C), Min:99.3 °F (37.4 °C), Max:101.8 °F (38.8 °C)    Oxygen Therapy  O2 Sat (%): 94 % (19 0043)  Pulse via Oximetry: 108 beats per minute (19 2339)  O2 Device: Room air (19 2208)  Physical Exam:  General:    Somnolent, no distress, appears stated age. Head:   Normocephalic, without obvious abnormality, atraumatic. Nose:  Nares normal. No drainage or sinus tenderness. Lungs:   Clear to auscultation bilaterally. No Wheezing or Rhonchi. No rales. Heart:   Regular rate and rhythm, no murmur, rub or gallop. Abdomen:   Soft, non-tender. Not distended. Bowel sounds normal.   Extremities: No cyanosis. Trace edema in R foot/ankle  Skin:     Multiple tattoos and scars, R 3rd toe has an anterior open wound with surrounding erythema, soles of feet are soiled  Neurologic: Somnolent, no focal deficits. Data Review:   Recent Results (from the past 24 hour(s))   CBC WITH AUTOMATED DIFF    Collection Time: 19  8:22 PM   Result Value Ref Range    WBC 24.9 (H) 4.3 - 11.1 K/uL    RBC 4.11 4.05 - 5.2 M/uL    HGB 12.1 11.7 - 15.4 g/dL    HCT 37.5 35.8 - 46.3 %    MCV 91.2 79.6 - 97.8 FL    MCH 29.4 26.1 - 32.9 PG    MCHC 32.3 31.4 - 35.0 g/dL    RDW 12.4 11.9 - 14.6 %    PLATELET 571 358 - 824 K/uL    MPV 11.0 9.4 - 12.3 FL    ABSOLUTE NRBC 0.00 0.0 - 0.2 K/uL    DF AUTOMATED      NEUTROPHILS 95 (H) 43 - 78 %    LYMPHOCYTES 2 (L) 13 - 44 %    MONOCYTES 2 (L) 4.0 - 12.0 %    EOSINOPHILS 0 (L) 0.5 - 7.8 %    BASOPHILS 0 0.0 - 2.0 %    IMMATURE GRANULOCYTES 1 0.0 - 5.0 %    ABS. NEUTROPHILS 23.6 (H) 1.7 - 8.2 K/UL    ABS.  LYMPHOCYTES 0.4 (L) 0.5 - 4.6 K/UL ABS. MONOCYTES 0.6 0.1 - 1.3 K/UL    ABS. EOSINOPHILS 0.0 0.0 - 0.8 K/UL    ABS. BASOPHILS 0.1 0.0 - 0.2 K/UL    ABS. IMM. GRANS. 0.3 0.0 - 0.5 K/UL   METABOLIC PANEL, COMPREHENSIVE    Collection Time: 09/16/19  8:22 PM   Result Value Ref Range    Sodium 129 (L) 136 - 145 mmol/L    Potassium 4.1 3.5 - 5.1 mmol/L    Chloride 95 (L) 98 - 107 mmol/L    CO2 24 21 - 32 mmol/L    Anion gap 10 7 - 16 mmol/L    Glucose 365 (H) 65 - 100 mg/dL    BUN 8 6 - 23 MG/DL    Creatinine 0.94 0.6 - 1.0 MG/DL    GFR est AA >60 >60 ml/min/1.73m2    GFR est non-AA >60 >60 ml/min/1.73m2    Calcium 8.5 8.3 - 10.4 MG/DL    Bilirubin, total 0.4 0.2 - 1.1 MG/DL    ALT (SGPT) 22 12 - 65 U/L    AST (SGOT) 27 15 - 37 U/L    Alk. phosphatase 135 50 - 136 U/L    Protein, total 7.3 6.3 - 8.2 g/dL    Albumin 3.4 (L) 3.5 - 5.0 g/dL    Globulin 3.9 (H) 2.3 - 3.5 g/dL    A-G Ratio 0.9 (L) 1.2 - 3.5     POC LACTIC ACID    Collection Time: 09/16/19  8:23 PM   Result Value Ref Range    Lactic Acid (POC) 2.44 (H) 0.5 - 1.9 mmol/L   PROCALCITONIN    Collection Time: 09/16/19 10:26 PM   Result Value Ref Range    Procalcitonin 1.4 ng/mL   C REACTIVE PROTEIN, QT    Collection Time: 09/16/19 10:26 PM   Result Value Ref Range    C-Reactive protein 9.6 (H) 0.0 - 0.9 mg/dL   URINALYSIS W/ RFLX MICROSCOPIC    Collection Time: 09/16/19 10:37 PM   Result Value Ref Range    Color YELLOW      Appearance CLEAR      Specific gravity 1.023 1.001 - 1.023      pH (UA) 6.0 5.0 - 9.0      Protein NEGATIVE  NEG mg/dL    Glucose >1,000 mg/dL    Ketone NEGATIVE  NEG mg/dL    Bilirubin NEGATIVE  NEG      Blood NEGATIVE  NEG      Urobilinogen 1.0 0.2 - 1.0 EU/dL    Nitrites NEGATIVE  NEG      Leukocyte Esterase NEGATIVE  NEG       Imaging /Procedures /Studies     Assessment and Plan:      Active Hospital Problems    Diagnosis Date Noted    Hyperglycemia due to type 2 diabetes mellitus ([facility] [address] 09/17/2019    Cellulitis 09/16/2019       PLAN  Cellulitis  - R 3rd toe  - Reports dog scratch  - Vanc/zosyn  - IVF  - Wound care  - Blood cx pending  - Follow CBC    Hyperglycemia  - Chart h/o DM  - Does not appear to be taking meds for this though  - Will confirm meds with pt once she is more alert  - SSI for coverage       Anticipated discharge: 2-3 days, pending clinical course    Signed By: Juanis Rosas MD     September 17, 2019

## 2019-09-17 NOTE — WOUND CARE
Right 3rd toe with ulcer on dorsal surface, 1x1x0.2cm with erythema, concern for osteo or deep infection of toe, Dr. Mago Barajas notified of concerns for deep infection. Right lower leg with erythema, marked concern for cellulitis. Recommend betadine paint of toe. Will monitor.

## 2019-09-18 ENCOUNTER — HOSPITAL ENCOUNTER (INPATIENT)
Age: 44
LOS: 1 days | Discharge: LEFT BEFORE DISCHARGE | DRG: 383 | End: 2019-09-18
Admitting: INTERNAL MEDICINE
Payer: MEDICAID

## 2019-09-18 VITALS
SYSTOLIC BLOOD PRESSURE: 132 MMHG | WEIGHT: 228 LBS | TEMPERATURE: 98 F | OXYGEN SATURATION: 94 % | HEART RATE: 70 BPM | RESPIRATION RATE: 17 BRPM | DIASTOLIC BLOOD PRESSURE: 70 MMHG | BODY MASS INDEX: 30.88 KG/M2 | HEIGHT: 72 IN

## 2019-09-18 DIAGNOSIS — E11.628 DIABETIC FOOT INFECTION (HCC): ICD-10-CM

## 2019-09-18 DIAGNOSIS — L08.9 DIABETIC FOOT INFECTION (HCC): ICD-10-CM

## 2019-09-18 DIAGNOSIS — L03.115 CELLULITIS OF RIGHT LOWER LEG: Primary | ICD-10-CM

## 2019-09-18 PROBLEM — I25.10 CAD (CORONARY ARTERY DISEASE): Status: ACTIVE | Noted: 2019-09-18

## 2019-09-18 LAB
AMPHET UR QL SCN: POSITIVE
ANION GAP SERPL CALC-SCNC: 7 MMOL/L (ref 7–16)
ATRIAL RATE: 84 BPM
BARBITURATES UR QL SCN: NEGATIVE
BENZODIAZ UR QL: NEGATIVE
BUN SERPL-MCNC: 11 MG/DL (ref 6–23)
CALCIUM SERPL-MCNC: 8.7 MG/DL (ref 8.3–10.4)
CALCULATED P AXIS, ECG09: 44 DEGREES
CALCULATED R AXIS, ECG10: -178 DEGREES
CALCULATED T AXIS, ECG11: 94 DEGREES
CANNABINOIDS UR QL SCN: POSITIVE
CHLORIDE SERPL-SCNC: 103 MMOL/L (ref 98–107)
CO2 SERPL-SCNC: 27 MMOL/L (ref 21–32)
COCAINE UR QL SCN: NEGATIVE
CREAT SERPL-MCNC: 0.89 MG/DL (ref 0.6–1)
DIAGNOSIS, 93000: NORMAL
ERYTHROCYTE [DISTWIDTH] IN BLOOD BY AUTOMATED COUNT: 12.7 % (ref 11.9–14.6)
EST. AVERAGE GLUCOSE BLD GHB EST-MCNC: 235 MG/DL
GLUCOSE BLD STRIP.AUTO-MCNC: 240 MG/DL (ref 65–100)
GLUCOSE BLD STRIP.AUTO-MCNC: 254 MG/DL (ref 65–100)
GLUCOSE SERPL-MCNC: 333 MG/DL (ref 65–100)
HBA1C MFR BLD: 9.8 % (ref 4.8–6)
HCG SERPL QL: NEGATIVE
HCT VFR BLD AUTO: 36.4 % (ref 35.8–46.3)
HGB BLD-MCNC: 11.7 G/DL (ref 11.7–15.4)
MAGNESIUM SERPL-MCNC: 1.9 MG/DL (ref 1.8–2.4)
MCH RBC QN AUTO: 29.3 PG (ref 26.1–32.9)
MCHC RBC AUTO-ENTMCNC: 32.1 G/DL (ref 31.4–35)
MCV RBC AUTO: 91.2 FL (ref 79.6–97.8)
METHADONE UR QL: NEGATIVE
NRBC # BLD: 0 K/UL (ref 0–0.2)
OPIATES UR QL: NEGATIVE
P-R INTERVAL, ECG05: 146 MS
PCP UR QL: NEGATIVE
PLATELET # BLD AUTO: 169 K/UL (ref 150–450)
PMV BLD AUTO: 11.4 FL (ref 9.4–12.3)
POTASSIUM SERPL-SCNC: 3.2 MMOL/L (ref 3.5–5.1)
Q-T INTERVAL, ECG07: 394 MS
QRS DURATION, ECG06: 78 MS
QTC CALCULATION (BEZET), ECG08: 465 MS
RBC # BLD AUTO: 3.99 M/UL (ref 4.05–5.2)
SODIUM SERPL-SCNC: 137 MMOL/L (ref 136–145)
VENTRICULAR RATE, ECG03: 84 BPM
WBC # BLD AUTO: 11.2 K/UL (ref 4.3–11.1)

## 2019-09-18 PROCEDURE — 74011000258 HC RX REV CODE- 258: Performed by: INTERNAL MEDICINE

## 2019-09-18 PROCEDURE — 74011636637 HC RX REV CODE- 636/637: Performed by: NURSE PRACTITIONER

## 2019-09-18 PROCEDURE — 85027 COMPLETE CBC AUTOMATED: CPT

## 2019-09-18 PROCEDURE — 74011250637 HC RX REV CODE- 250/637: Performed by: INTERNAL MEDICINE

## 2019-09-18 PROCEDURE — 82962 GLUCOSE BLOOD TEST: CPT

## 2019-09-18 PROCEDURE — 74011250636 HC RX REV CODE- 250/636

## 2019-09-18 PROCEDURE — 74011250636 HC RX REV CODE- 250/636: Performed by: INTERNAL MEDICINE

## 2019-09-18 PROCEDURE — 65270000029 HC RM PRIVATE

## 2019-09-18 PROCEDURE — 36415 COLL VENOUS BLD VENIPUNCTURE: CPT

## 2019-09-18 PROCEDURE — 84703 CHORIONIC GONADOTROPIN ASSAY: CPT

## 2019-09-18 PROCEDURE — 74011250637 HC RX REV CODE- 250/637: Performed by: NURSE PRACTITIONER

## 2019-09-18 PROCEDURE — 96365 THER/PROPH/DIAG IV INF INIT: CPT

## 2019-09-18 PROCEDURE — 83735 ASSAY OF MAGNESIUM: CPT

## 2019-09-18 PROCEDURE — 80048 BASIC METABOLIC PNL TOTAL CA: CPT

## 2019-09-18 PROCEDURE — 83036 HEMOGLOBIN GLYCOSYLATED A1C: CPT

## 2019-09-18 PROCEDURE — 74011636637 HC RX REV CODE- 636/637: Performed by: INTERNAL MEDICINE

## 2019-09-18 PROCEDURE — 80307 DRUG TEST PRSMV CHEM ANLYZR: CPT

## 2019-09-18 RX ORDER — VANCOMYCIN 2 GRAM/500 ML IN 0.9 % SODIUM CHLORIDE INTRAVENOUS
2000 EVERY 12 HOURS
Status: DISCONTINUED | OUTPATIENT
Start: 2019-09-18 | End: 2019-09-18 | Stop reason: HOSPADM

## 2019-09-18 RX ORDER — INSULIN LISPRO 100 [IU]/ML
INJECTION, SOLUTION INTRAVENOUS; SUBCUTANEOUS
Status: DISCONTINUED | OUTPATIENT
Start: 2019-09-18 | End: 2019-09-18 | Stop reason: HOSPADM

## 2019-09-18 RX ORDER — ONDANSETRON 2 MG/ML
4 INJECTION INTRAMUSCULAR; INTRAVENOUS
Status: DISCONTINUED | OUTPATIENT
Start: 2019-09-18 | End: 2019-09-18 | Stop reason: HOSPADM

## 2019-09-18 RX ORDER — SODIUM CHLORIDE 900 MG/100ML
INJECTION INTRAVENOUS
Status: ACTIVE
Start: 2019-09-18 | End: 2019-09-18

## 2019-09-18 RX ORDER — ACETAMINOPHEN 325 MG/1
650 TABLET ORAL
Status: DISCONTINUED | OUTPATIENT
Start: 2019-09-18 | End: 2019-09-18 | Stop reason: HOSPADM

## 2019-09-18 RX ORDER — VANCOMYCIN HYDROCHLORIDE 1 G/20ML
INJECTION, POWDER, LYOPHILIZED, FOR SOLUTION INTRAVENOUS
Status: ACTIVE
Start: 2019-09-18 | End: 2019-09-18

## 2019-09-18 RX ORDER — LORAZEPAM 0.5 MG/1
0.5 TABLET ORAL
Status: DISCONTINUED | OUTPATIENT
Start: 2019-09-18 | End: 2019-09-18 | Stop reason: HOSPADM

## 2019-09-18 RX ORDER — INSULIN GLARGINE 100 [IU]/ML
20 INJECTION, SOLUTION SUBCUTANEOUS DAILY
Status: DISCONTINUED | OUTPATIENT
Start: 2019-09-18 | End: 2019-09-18 | Stop reason: HOSPADM

## 2019-09-18 RX ORDER — POLYETHYLENE GLYCOL 3350 17 G/17G
17 POWDER, FOR SOLUTION ORAL 2 TIMES DAILY
Status: DISCONTINUED | OUTPATIENT
Start: 2019-09-18 | End: 2019-09-18 | Stop reason: HOSPADM

## 2019-09-18 RX ORDER — POTASSIUM CHLORIDE 20 MEQ/1
40 TABLET, EXTENDED RELEASE ORAL 2 TIMES DAILY
Status: DISCONTINUED | OUTPATIENT
Start: 2019-09-18 | End: 2019-09-18 | Stop reason: HOSPADM

## 2019-09-18 RX ORDER — ENOXAPARIN SODIUM 100 MG/ML
40 INJECTION SUBCUTANEOUS EVERY 24 HOURS
Status: DISCONTINUED | OUTPATIENT
Start: 2019-09-18 | End: 2019-09-18 | Stop reason: HOSPADM

## 2019-09-18 RX ORDER — VANCOMYCIN/0.9 % SOD CHLORIDE 1.5G/250ML
1500 PLASTIC BAG, INJECTION (ML) INTRAVENOUS ONCE
Status: COMPLETED | OUTPATIENT
Start: 2019-09-18 | End: 2019-09-18

## 2019-09-18 RX ORDER — POLYETHYLENE GLYCOL 3350 17 G/17G
17 POWDER, FOR SOLUTION ORAL DAILY PRN
Status: DISCONTINUED | OUTPATIENT
Start: 2019-09-18 | End: 2019-09-18

## 2019-09-18 RX ADMIN — ENOXAPARIN SODIUM 40 MG: 40 INJECTION SUBCUTANEOUS at 08:15

## 2019-09-18 RX ADMIN — ACETAMINOPHEN 650 MG: 325 TABLET, FILM COATED ORAL at 08:16

## 2019-09-18 RX ADMIN — VANCOMYCIN HYDROCHLORIDE 1500 MG: 10 INJECTION, POWDER, LYOPHILIZED, FOR SOLUTION INTRAVENOUS at 03:59

## 2019-09-18 RX ADMIN — POLYETHYLENE GLYCOL 3350 17 G: 17 POWDER, FOR SOLUTION ORAL at 08:17

## 2019-09-18 RX ADMIN — CEFTRIAXONE 2 G: 2 INJECTION, POWDER, FOR SOLUTION INTRAMUSCULAR; INTRAVENOUS at 05:06

## 2019-09-18 RX ADMIN — INSULIN LISPRO 4 UNITS: 100 INJECTION, SOLUTION INTRAVENOUS; SUBCUTANEOUS at 08:15

## 2019-09-18 RX ADMIN — INSULIN GLARGINE 20 UNITS: 100 INJECTION, SOLUTION SUBCUTANEOUS at 09:37

## 2019-09-18 RX ADMIN — INSULIN LISPRO 6 UNITS: 100 INJECTION, SOLUTION INTRAVENOUS; SUBCUTANEOUS at 12:23

## 2019-09-18 RX ADMIN — ACETAMINOPHEN 650 MG: 325 TABLET, FILM COATED ORAL at 14:30

## 2019-09-18 RX ADMIN — POTASSIUM CHLORIDE 40 MEQ: 20 TABLET, EXTENDED RELEASE ORAL at 08:16

## 2019-09-18 RX ADMIN — VANCOMYCIN HYDROCHLORIDE 1000 MG: 1 INJECTION, POWDER, LYOPHILIZED, FOR SOLUTION INTRAVENOUS at 02:30

## 2019-09-18 RX ADMIN — VANCOMYCIN HYDROCHLORIDE 2000 MG: 10 INJECTION, POWDER, LYOPHILIZED, FOR SOLUTION INTRAVENOUS at 13:39
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
## 2019-09-18 NOTE — ED TRIAGE NOTES
Arrives to triage via wheelchair with c/o right lower extremity swelling redness and pain. Seen here last night for same with admission for sepsis however signed out AMA earlier today.

## 2019-09-18 NOTE — PROGRESS NOTES
spoke with Pt about Spiritual Care services. Pt was expressed frustration and grief over possibly having to lose her right foot. She also expressed frustrations about home life and how this appeared to all be compounding. Pt expressed that she was waiting on the doctors to make a decision and she was trying to stay positive by looking at the bright side of things. Pt appears to be dealing with anticipatory grief of no longer being able to function the way she was functioning previously (learning how to walk again, what does life look like now without a foot. How much of my foot/leg will be taken off).  offered active listening and a calming presence during this time. Pt finds meaning in her grandbabies and takes great comfort in knowing that she will be able to see them again. Fox Gaspar, [address] [place] [address] Road.

## 2019-09-18 NOTE — PROGRESS NOTES
Patient requests her son be given no information about her being here. Sons name is Lata Azevedo. Will make patient private encounter.

## 2019-09-18 NOTE — DISCHARGE SUMMARY
Hospitalist Discharge Summary     Admit Date:  2019 12:04 AM   Name:  Kandice Nicolas   Age:  37 y.o.  :  1975   MRN:  [id number]   PCP:  None  Treatment Team: Care Manager: Jae Shannon    Problem List for this Hospitalization:  Hospital Problems as of 2019 Never Reviewed          Codes Class Noted - Resolved POA    CAD (coronary artery disease) ICD-10-CM: I25.10  ICD-9-CM: 414.00  2019 - Present Unknown        Hyperglycemia due to type 2 diabetes mellitus ([facility] [address] ICD-10-CM: E11.65  ICD-9-CM: 250.00  2019 - Present Yes        * (Principal) Cellulitis ICD-10-CM: L03.90  ICD-9-CM: 682.9  2019 - Present Unknown                Admission HPI from 2019:    \" Review H&P for details of admission \"    Hospital Course:   Patient  is a 37 y.o. female with PMH CAD s/p CABG, HFrEF, gout, and  who was admitted for RLE cellulitis and left AMA yesterday and returned to the ER. Noted patient was very sleepy and was not cooperative with questioning. Patient admitted with RLE cellulitis and started on Rocephin and vanc. Patient with elevated BG levels. HbgA1c 9.8. UDS + THC and amphetamines. Patient was scheduled for MRI to rule out OM. Patient left AMA stating \"she wanted to go be with her daughter\". Explained risk of leaving and she acknowledged understanding and left AMA.                Follow up instructions and discharge meds at bottom of this note. Plan was discussed with patient. All questions answered. Patient was stable at time of discharge. Diagnostic Imaging/Tests:   No results found. Echocardiogram results:  No results found for this visit on 19.       All Micro Results     None          Labs: Results:       BMP, Mg, Phos Recent Labs     19  0132 19  0115 19    135* 129*   K 3.2* 3.5 4.1    100 95*   CO2 27 25 24   AGAP 7 10 10   BUN 11 7 8   CREA 0.89 0.80 0.94   CA 8.7 8.1* 8.5   * 263* 365*   MG 1.9  --   -- CBC Recent Labs     09/18/19  0132 09/17/19  1153 09/17/19  0115 09/16/19 2022   WBC 11.2*  --  20.9* 24.9*   RBC 3.99*  --  4.21 4.11   HGB 11.7 11.4* 12.4 12.1   HCT 36.4  --  39.1 37.5     --  163 160   GRANS  --   --  93* 95*   LYMPH  --   --  4* 2*   EOS  --   --  0* 0*   MONOS  --   --  2* 2*   BASOS  --   --  0 0   IG  --   --  1 1   ANEU  --   --  19.5* 23.6*   ABL  --   --  0.8 0.4*   SARIKA  --   --  0.0 0.0   ABM  --   --  0.4 0.6   ABB  --   --  0.0 0.1   AIG  --   --  0.2 0.3      LFT Recent Labs     09/16/19 2022   SGOT 27   ALT 22      TP 7.3   ALB 3.4*   GLOB 3.9*   AGRAT 0.9*      Cardiac Testing Lab Results   Component Value Date/Time    Troponin-I, Qt. <0.02 (L) 10/17/2018 08:06 PM      Coagulation Tests No results found for: PTP, INR, APTT   A1c Lab Results   Component Value Date/Time    Hemoglobin A1c 9.8 (H) 09/18/2019 01:32 AM      Lipid Panel No results found for: CHOL, CHOLPOCT, CHOLX, CHLST, CHOLV, 674811, HDL, HDLP, LDL, LDLC, DLDLP, 619883, VLDLC, VLDL, TGLX, TRIGL, TRIGP, TGLPOCT, CHHD, CHHDX   Thyroid Panel No results found for: TSH, T4, FT4, TT3, T3U, TSHEXT     Most Recent UA Lab Results   Component Value Date/Time    Color YELLOW 09/16/2019 10:37 PM    Appearance CLEAR 09/16/2019 10:37 PM    Specific gravity 1.023 09/16/2019 10:37 PM    pH (UA) 6.0 09/16/2019 10:37 PM    Protein NEGATIVE  09/16/2019 10:37 PM    Glucose >1,000 09/16/2019 10:37 PM    Ketone NEGATIVE  09/16/2019 10:37 PM    Bilirubin NEGATIVE  09/16/2019 10:37 PM    Blood NEGATIVE  09/16/2019 10:37 PM    Urobilinogen 1.0 09/16/2019 10:37 PM    Nitrites NEGATIVE  09/16/2019 10:37 PM    Leukocyte Esterase NEGATIVE  09/16/2019 10:37 PM        No Known Allergies    There is no immunization history on file for this patient.     All Labs from Last 24 Hrs:  Recent Results (from the past 24 hour(s))   CBC W/O DIFF    Collection Time: 09/18/19  1:32 AM   Result Value Ref Range    WBC 11.2 (H) 4.3 - 11.1 K/uL    RBC 3.99 (L) 4.05 - 5.2 M/uL    HGB 11.7 11.7 - 15.4 g/dL    HCT 36.4 35.8 - 46.3 %    MCV 91.2 79.6 - 97.8 FL    MCH 29.3 26.1 - 32.9 PG    MCHC 32.1 31.4 - 35.0 g/dL    RDW 12.7 11.9 - 14.6 %    PLATELET 338 825 - 068 K/uL    MPV 11.4 9.4 - 12.3 FL    ABSOLUTE NRBC 0.00 0.0 - 0.2 K/uL   METABOLIC PANEL, BASIC    Collection Time: 09/18/19  1:32 AM   Result Value Ref Range    Sodium 137 136 - 145 mmol/L    Potassium 3.2 (L) 3.5 - 5.1 mmol/L    Chloride 103 98 - 107 mmol/L    CO2 27 21 - 32 mmol/L    Anion gap 7 7 - 16 mmol/L    Glucose 333 (H) 65 - 100 mg/dL    BUN 11 6 - 23 MG/DL    Creatinine 0.89 0.6 - 1.0 MG/DL    GFR est AA >60 >60 ml/min/1.73m2    GFR est non-AA >60 >60 ml/min/1.73m2    Calcium 8.7 8.3 - 10.4 MG/DL   MAGNESIUM    Collection Time: 09/18/19  1:32 AM   Result Value Ref Range    Magnesium 1.9 1.8 - 2.4 mg/dL   HEMOGLOBIN A1C WITH EAG    Collection Time: 09/18/19  1:32 AM   Result Value Ref Range    Hemoglobin A1c 9.8 (H) 4.8 - 6.0 %    Est. average glucose 235 mg/dL   DRUG SCREEN, URINE    Collection Time: 09/18/19  3:00 AM   Result Value Ref Range    PCP(PHENCYCLIDINE) NEGATIVE       BENZODIAZEPINES NEGATIVE       COCAINE NEGATIVE       AMPHETAMINES POSITIVE      METHADONE NEGATIVE       THC (TH-CANNABINOL) POSITIVE      OPIATES NEGATIVE       BARBITURATES NEGATIVE      GLUCOSE, POC    Collection Time: 09/18/19  7:43 AM   Result Value Ref Range    Glucose (POC) 240 (H) 65 - 100 mg/dL   GLUCOSE, POC    Collection Time: 09/18/19 11:40 AM   Result Value Ref Range    Glucose (POC) 254 (H) 65 - 100 mg/dL   HCG QL SERUM    Collection Time: 09/18/19  3:22 PM   Result Value Ref Range    HCG, Ql. NEGATIVE  NEG         Discharge Exam:  Patient Vitals for the past 24 hrs:   Temp Pulse Resp BP SpO2   09/18/19 1532 98 °F (36.7 °C) 70 17 132/70 94 %   09/18/19 1206 98.1 °F (36.7 °C) 92 17 128/78 97 %   09/18/19 0850 98.1 °F (36.7 °C) (!) 102 17 137/77 97 %   09/18/19 0320 97.8 °F (36.6 °C) (!) 103 18 126/76 100 % 09/18/19 0236  (!) 103   99 %   09/18/19 0043  (!) 114 20 115/69 99 %   09/18/19 0015  96 18 123/60 99 %   09/17/19 2355 98 °F (36.7 °C) 97 20 121/57 98 %     Oxygen Therapy  O2 Sat (%): 94 % (09/18/19 1532)  Pulse via Oximetry: 103 beats per minute (09/18/19 0236)  O2 Device: Room air (09/17/19 2355)  No intake or output data in the 24 hours ending 09/18/19 1752    General:    Well nourished. Alert. No distress. Eyes:   Normal sclera. Extraocular movements intact. ENT:  Normocephalic, atraumatic. Moist mucous membranes  CV:   Regular rate and rhythm. No murmur, rub, or gallop. Lungs:  Clear to auscultation bilaterally. No wheezing, rhonchi, or rales. Abdomen: Soft, nontender, nondistended. Bowel sounds normal.   Extremities: Warm and dry. No cyanosis or edema. Neurologic: CN II-XII grossly intact. Sensation intact. Skin:     No rashes or jaundice. Psych:  Normal mood and affect. Discharge Info:   Discharge Medication List as of 9/18/2019  4:15 PM            Disposition: home    Activity: left AMA  Diet: DIET DIABETIC WITH OPTIONS Consistent Carb 2000kcal; Regular    No orders of the defined types were placed in this encounter. Follow-up Information    None         Time spent in patient discharge planning and coordination 35 minutes.   Patient left AMA, informed supervising physician Dr. Flor Dumont,   Signed:  Doreen Adams NP

## 2019-09-18 NOTE — ED PROVIDER NOTES
72-year-old female with cellulitis the right lower leg. Patient had been admitted to the hospital earlier today. She had a problem at home and had to leave emergently. She signed out AMA. Patient states that they told her to come back and she would be readmitted. Past Medical History:   Diagnosis Date    DM (diabetes mellitus) ([facility] [address]     Hyperglycemia due to type 2 diabetes mellitus ([facility] [address] 9/17/2019    Other ill-defined conditions(799.89)     back pain       Past Surgical History:   Procedure Laterality Date    HX ORTHOPAEDIC      back surgery 2000         No family history on file.     Social History     Socioeconomic History    Marital status: SINGLE     Spouse name: Not on file    Number of children: Not on file    Years of education: Not on file    Highest education level: Not on file   Occupational History    Not on file   Social Needs    Financial resource strain: Not on file    Food insecurity:     Worry: Not on file     Inability: Not on file    Transportation needs:     Medical: Not on file     Non-medical: Not on file   Tobacco Use    Smoking status: Current Every Day Smoker     Packs/day: 1.00   Substance and Sexual Activity    Alcohol use: No    Drug use: No    Sexual activity: Not on file   Lifestyle    Physical activity:     Days per week: Not on file     Minutes per session: Not on file    Stress: Not on file   Relationships    Social connections:     Talks on phone: Not on file     Gets together: Not on file     Attends Nondenominational service: Not on file     Active member of club or organization: Not on file     Attends meetings of clubs or organizations: Not on file     Relationship status: Not on file    Intimate partner violence:     Fear of current or ex partner: Not on file     Emotionally abused: Not on file     Physically abused: Not on file     Forced sexual activity: Not on file   Other Topics Concern    Not on file   Social History Narrative    Not on file ALLERGIES: Patient has no known allergies. Review of Systems   Constitutional: Negative. Negative for activity change. HENT: Negative. Eyes: Negative. Respiratory: Negative. Cardiovascular: Negative. Gastrointestinal: Negative. Genitourinary: Negative. Musculoskeletal: Negative. Skin: Negative. Neurological: Negative. Psychiatric/Behavioral: Negative. All other systems reviewed and are negative. Vitals:    09/17/19 2355 09/18/19 0015 09/18/19 0043   BP: 121/57 123/60 115/69   Pulse: 97 96 (!) 114   Resp: 20 18 20   Temp: 98 °F (36.7 °C)     SpO2: 98% 99% 99%   Weight: 103.4 kg (228 lb)     Height: 6' 1\" (1.854 m)              Physical Exam   Constitutional: She is oriented to person, place, and time. She appears well-developed and well-nourished. No distress. HENT:   Head: Normocephalic and atraumatic. Right Ear: External ear normal.   Left Ear: External ear normal.   Nose: Nose normal.   Mouth/Throat: Oropharynx is clear and moist. No oropharyngeal exudate. Eyes: Pupils are equal, round, and reactive to light. Conjunctivae and EOM are normal. Right eye exhibits no discharge. Left eye exhibits no discharge. No scleral icterus. Neck: Normal range of motion. Neck supple. No JVD present. No tracheal deviation present. Cardiovascular: Normal rate, regular rhythm and intact distal pulses. Pulmonary/Chest: Effort normal and breath sounds normal. No stridor. No respiratory distress. She has no wheezes. She exhibits no tenderness. Abdominal: Soft. Bowel sounds are normal. She exhibits no distension and no mass. There is no tenderness. Musculoskeletal: Normal range of motion. She exhibits no edema or tenderness. Neurological: She is alert and oriented to person, place, and time. No cranial nerve deficit. Skin: Skin is warm and dry. No rash noted. She is not diaphoretic. No erythema. No pallor. Psychiatric: She has a normal mood and affect.  Her behavior is normal. Thought content normal.   Nursing note and vitals reviewed. MDM  Number of Diagnoses or Management Options  Cellulitis of right lower leg:   Diabetic foot infection [facility]):   Diagnosis management comments: Requested to the hospitalist reevaluate patient.        Amount and/or Complexity of Data Reviewed  Clinical lab tests: ordered and reviewed  Tests in the radiology section of CPT®: ordered and reviewed  Tests in the medicine section of CPT®: ordered and reviewed           Procedures

## 2019-09-18 NOTE — PROGRESS NOTES
Hospitalist Progress Note     Admit Date:  2019 12:04 AM   Name:  Maite Claudio   Age:  37 y.o.  :  1975   MRN:  [id number]   PCP:  None  Treatment Team: Attending Provider: Gaby Guerra MD; Care Manager: Ameya Durant    Subjective:     HPI - Patient  is a 37 y.o. female with PMH CAD s/p CABG, HFrEF, gout, and  who was admitted for RLE cellulitis and left AMA yesterday and returned to the ER. Noted patient was very sleepy and was not cooperative with questioning. Patient admitted with RLE cellulitis and started on Rocephin and vanc. Patient with elevated BG levels. HbgA1c 9.8. UDS + THC and amphetamines. Subjective  Patient alert and oriented. Became tearful with conversation stating \"I don't want to lose my leg\". Patient denies fever/chills. Report RLE pain. Objective:     Patient Vitals for the past 24 hrs:   Temp Pulse Resp BP SpO2   19 1206 98.1 °F (36.7 °C) 92 17 128/78 97 %   19 0850 98.1 °F (36.7 °C) (!) 102 17 137/77 97 %   19 0320 97.8 °F (36.6 °C) (!) 103 18 126/76 100 %   19 0236  (!) 103   99 %   19 0043  (!) 114 20 115/69 99 %   19 0015  96 18 123/60 99 %   19 2355 98 °F (36.7 °C) 97 20 121/57 98 %     Oxygen Therapy  O2 Sat (%): 97 % (19 1206)  Pulse via Oximetry: 103 beats per minute (19 0236)  O2 Device: Room air (19 2355)  No intake or output data in the 24 hours ending 19 1342      General:    Well nourished. Alert. CV:   RRR. No murmur, rub, or gallop. Lungs:   CTAB. No wheezing, rhonchi, or rales. Abdomen:   Soft, nontender, nondistended. Bowel sounds present  Extremities: Warm and dry. No cyanosis or edema. Skin:     No rashes or jaundice. Data Review:  I have reviewed all labs, meds, telemetry events, and studies from the last 24 hours.     Recent Results (from the past 24 hour(s))   CBC W/O DIFF    Collection Time: 19  1:32 AM   Result Value Ref Range    WBC 11.2 (H) 4.3 - 11.1 K/uL    RBC 3.99 (L) 4.05 - 5.2 M/uL    HGB 11.7 11.7 - 15.4 g/dL    HCT 36.4 35.8 - 46.3 %    MCV 91.2 79.6 - 97.8 FL    MCH 29.3 26.1 - 32.9 PG    MCHC 32.1 31.4 - 35.0 g/dL    RDW 12.7 11.9 - 14.6 %    PLATELET 133 638 - 519 K/uL    MPV 11.4 9.4 - 12.3 FL    ABSOLUTE NRBC 0.00 0.0 - 0.2 K/uL   METABOLIC PANEL, BASIC    Collection Time: 09/18/19  1:32 AM   Result Value Ref Range    Sodium 137 136 - 145 mmol/L    Potassium 3.2 (L) 3.5 - 5.1 mmol/L    Chloride 103 98 - 107 mmol/L    CO2 27 21 - 32 mmol/L    Anion gap 7 7 - 16 mmol/L    Glucose 333 (H) 65 - 100 mg/dL    BUN 11 6 - 23 MG/DL    Creatinine 0.89 0.6 - 1.0 MG/DL    GFR est AA >60 >60 ml/min/1.73m2    GFR est non-AA >60 >60 ml/min/1.73m2    Calcium 8.7 8.3 - 10.4 MG/DL   MAGNESIUM    Collection Time: 09/18/19  1:32 AM   Result Value Ref Range    Magnesium 1.9 1.8 - 2.4 mg/dL   HEMOGLOBIN A1C WITH EAG    Collection Time: 09/18/19  1:32 AM   Result Value Ref Range    Hemoglobin A1c 9.8 (H) 4.8 - 6.0 %    Est. average glucose 235 mg/dL   DRUG SCREEN, URINE    Collection Time: 09/18/19  3:00 AM   Result Value Ref Range    PCP(PHENCYCLIDINE) NEGATIVE       BENZODIAZEPINES NEGATIVE       COCAINE NEGATIVE       AMPHETAMINES POSITIVE      METHADONE NEGATIVE       THC (TH-CANNABINOL) POSITIVE      OPIATES NEGATIVE       BARBITURATES NEGATIVE      GLUCOSE, POC    Collection Time: 09/18/19  7:43 AM   Result Value Ref Range    Glucose (POC) 240 (H) 65 - 100 mg/dL   GLUCOSE, POC    Collection Time: 09/18/19 11:40 AM   Result Value Ref Range    Glucose (POC) 254 (H) 65 - 100 mg/dL        All Micro Results     None          Current Meds:  Current Facility-Administered Medications   Medication Dose Route Frequency    acetaminophen (TYLENOL) tablet 650 mg  650 mg Oral Q6H PRN    cefTRIAXone (ROCEPHIN) 2 g in 0.9% sodium chloride (MBP/ADV) 50 mL  2 g IntraVENous Q24H    ondansetron (ZOFRAN) injection 4 mg  4 mg IntraVENous Q6H PRN    insulin lispro (HUMALOG) injection   SubCUTAneous AC&HS    polyethylene glycol (MIRALAX) packet 17 g  17 g Oral DAILY PRN    enoxaparin (LOVENOX) injection 40 mg  40 mg SubCUTAneous Q24H    vancomycin (VANCOCIN) 2000 mg in  ml infusion  2,000 mg IntraVENous Q12H    potassium chloride (K-DUR, KLOR-CON) SR tablet 40 mEq  40 mEq Oral BID    insulin glargine (LANTUS) injection 20 Units  20 Units SubCUTAneous DAILY       Other Studies (last 24 hours):  No results found. Assessment and Plan:     Hospital Problems as of 9/18/2019 Never Reviewed          Codes Class Noted - Resolved POA    CAD (coronary artery disease) ICD-10-CM: I25.10  ICD-9-CM: 414.00  9/18/2019 - Present Unknown        Hyperglycemia due to type 2 diabetes mellitus ([facility] ICD-10-CM: E11.65  ICD-9-CM: 250.00  9/17/2019 - Present Yes        * (Principal) Cellulitis ICD-10-CM: L03.90  ICD-9-CM: 682.9  9/16/2019 - Present Unknown              PLAN:    Cellulitis- RLE  Admit to medical floor  Empiric IV antibiotics. rocephin and vanc   Symptomatic treatments   -cbc daily  -helder outline- monitor progress      Diabetes mellitus type 2  Monitor blood sugar. Cover with insulin sliding scale accordingly.   -Diabetes management consult      Hypokalemia K+ 3.2   Replace and recheck  Check magnesium 1.9      CAD   Noted   Continue home medications.       Substance abuse   Advised her to quit. PRN ativan for withdrawal      I have discussed with patient regarding advance directive. Patient would like to have a full-code status.       Healthcare power of  is her grandmother.      DVT prophylaxis : Lovenox SC      Patient denies pain now.  Will monitor.      No charge for this note, admitted after midnight    Dr. Shanta Gao     Signed:  PRISCILLA HectorC
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
## 2019-09-18 NOTE — DISCHARGE INSTRUCTIONS

## 2019-09-19 LAB
BACTERIA SPEC CULT: NORMAL
SERVICE CMNT-IMP: NORMAL

## 2019-09-19 NOTE — PROGRESS NOTES
At [phone] pt requested to leave AMA stated \"I need to go and check my daughter in [place]", notified Jenn Zuniga NP. Drew Sorenson NP came to the room and spoke with pt. After speaking with NP patient decided to leave AMA.

## 2019-09-21 LAB
BACTERIA SPEC CULT: NORMAL
BACTERIA SPEC CULT: NORMAL
SERVICE CMNT-IMP: NORMAL
SERVICE CMNT-IMP: NORMAL

## 2020-11-26 ENCOUNTER — HOSPITAL ENCOUNTER (EMERGENCY)
Age: 45
Discharge: HOME | End: 2020-11-27
Payer: MEDICAID

## 2020-11-26 ENCOUNTER — APPOINTMENT (OUTPATIENT)
Dept: GENERAL RADIOLOGY | Age: 45
End: 2020-11-26
Attending: EMERGENCY MEDICINE
Payer: MEDICAID

## 2020-11-26 DIAGNOSIS — Z20.822 EXPOSURE TO COVID-19 VIRUS: ICD-10-CM

## 2020-11-26 DIAGNOSIS — I50.9 ACUTE CONGESTIVE HEART FAILURE, UNSPECIFIED HEART FAILURE TYPE (HCC): ICD-10-CM

## 2020-11-26 DIAGNOSIS — R06.02 SOB (SHORTNESS OF BREATH): Primary | ICD-10-CM

## 2020-11-26 LAB
ALBUMIN SERPL-MCNC: 2.9 G/DL (ref 3.5–5)
ALBUMIN/GLOB SERPL: 0.7 {RATIO} (ref 1.2–3.5)
ALP SERPL-CCNC: 110 U/L (ref 50–136)
ALT SERPL-CCNC: 15 U/L (ref 12–65)
ANION GAP SERPL CALC-SCNC: 6 MMOL/L (ref 7–16)
AST SERPL-CCNC: 49 U/L (ref 15–37)
BASOPHILS # BLD: 0.1 K/UL (ref 0–0.2)
BASOPHILS NFR BLD: 1 % (ref 0–2)
BILIRUB SERPL-MCNC: 0.3 MG/DL (ref 0.2–1.1)
BUN SERPL-MCNC: 18 MG/DL (ref 6–23)
CALCIUM SERPL-MCNC: 8.1 MG/DL (ref 8.3–10.4)
CHLORIDE SERPL-SCNC: 106 MMOL/L (ref 98–107)
CO2 SERPL-SCNC: 25 MMOL/L (ref 21–32)
CREAT SERPL-MCNC: 1.23 MG/DL (ref 0.6–1)
DIFFERENTIAL METHOD BLD: ABNORMAL
EOSINOPHIL # BLD: 0.2 K/UL (ref 0–0.8)
EOSINOPHIL NFR BLD: 3 % (ref 0.5–7.8)
ERYTHROCYTE [DISTWIDTH] IN BLOOD BY AUTOMATED COUNT: 16.9 % (ref 11.9–14.6)
GLOBULIN SER CALC-MCNC: 4 G/DL (ref 2.3–3.5)
GLUCOSE SERPL-MCNC: 307 MG/DL (ref 65–100)
HCT VFR BLD AUTO: 27.3 % (ref 35.8–46.3)
HGB BLD-MCNC: 8.1 G/DL (ref 11.7–15.4)
IMM GRANULOCYTES # BLD AUTO: 0.1 K/UL (ref 0–0.5)
IMM GRANULOCYTES NFR BLD AUTO: 1 % (ref 0–5)
LYMPHOCYTES # BLD: 2 K/UL (ref 0.5–4.6)
LYMPHOCYTES NFR BLD: 28 % (ref 13–44)
MCH RBC QN AUTO: 23.8 PG (ref 26.1–32.9)
MCHC RBC AUTO-ENTMCNC: 29.7 G/DL (ref 31.4–35)
MCV RBC AUTO: 80.3 FL (ref 79.6–97.8)
MONOCYTES # BLD: 0.4 K/UL (ref 0.1–1.3)
MONOCYTES NFR BLD: 6 % (ref 4–12)
NEUTS SEG # BLD: 4.5 K/UL (ref 1.7–8.2)
NEUTS SEG NFR BLD: 62 % (ref 43–78)
NRBC # BLD: 0 K/UL (ref 0–0.2)
PLATELET # BLD AUTO: 266 K/UL (ref 150–450)
PMV BLD AUTO: 11.4 FL (ref 9.4–12.3)
POTASSIUM SERPL-SCNC: 4.6 MMOL/L (ref 3.5–5.1)
PROT SERPL-MCNC: 6.9 G/DL (ref 6.3–8.2)
RBC # BLD AUTO: 3.4 M/UL (ref 4.05–5.2)
SODIUM SERPL-SCNC: 137 MMOL/L (ref 136–145)
WBC # BLD AUTO: 7.3 K/UL (ref 4.3–11.1)

## 2020-11-26 PROCEDURE — 71045 X-RAY EXAM CHEST 1 VIEW: CPT

## 2020-11-26 PROCEDURE — 80053 COMPREHEN METABOLIC PANEL: CPT

## 2020-11-26 PROCEDURE — 93005 ELECTROCARDIOGRAM TRACING: CPT

## 2020-11-26 PROCEDURE — 99285 EMERGENCY DEPT VISIT HI MDM: CPT

## 2020-11-26 PROCEDURE — 84484 ASSAY OF TROPONIN QUANT: CPT

## 2020-11-26 PROCEDURE — 83880 ASSAY OF NATRIURETIC PEPTIDE: CPT

## 2020-11-26 PROCEDURE — 96374 THER/PROPH/DIAG INJ IV PUSH: CPT

## 2020-11-26 PROCEDURE — 96375 TX/PRO/DX INJ NEW DRUG ADDON: CPT

## 2020-11-26 PROCEDURE — 85025 COMPLETE CBC W/AUTO DIFF WBC: CPT

## 2020-11-27 VITALS
OXYGEN SATURATION: 95 % | TEMPERATURE: 98.8 F | DIASTOLIC BLOOD PRESSURE: 65 MMHG | HEART RATE: 103 BPM | SYSTOLIC BLOOD PRESSURE: 103 MMHG | WEIGHT: 200 LBS | HEIGHT: 72 IN | BODY MASS INDEX: 27.09 KG/M2 | RESPIRATION RATE: 16 BRPM

## 2020-11-27 LAB
ATRIAL RATE: 312 BPM
BNP SERPL-MCNC: 2761 PG/ML (ref 5–125)
CALCULATED R AXIS, ECG10: 152 DEGREES
CALCULATED T AXIS, ECG11: 43 DEGREES
DIAGNOSIS, 93000: NORMAL
Q-T INTERVAL, ECG07: 282 MS
QRS DURATION, ECG06: 72 MS
QTC CALCULATION (BEZET), ECG08: 409 MS
SARS COV-2, XPGCVT: NEGATIVE
SOURCE, COVRS: NORMAL
TROPONIN-HIGH SENSITIVITY: 18.8 PG/ML (ref 0–14)
TROPONIN-HIGH SENSITIVITY: 19.8 PG/ML (ref 0–14)
VENTRICULAR RATE, ECG03: 127 BPM

## 2020-11-27 PROCEDURE — 96375 TX/PRO/DX INJ NEW DRUG ADDON: CPT

## 2020-11-27 PROCEDURE — 84484 ASSAY OF TROPONIN QUANT: CPT

## 2020-11-27 PROCEDURE — 74011000250 HC RX REV CODE- 250

## 2020-11-27 PROCEDURE — 87635 SARS-COV-2 COVID-19 AMP PRB: CPT

## 2020-11-27 PROCEDURE — 96374 THER/PROPH/DIAG INJ IV PUSH: CPT

## 2020-11-27 PROCEDURE — 74011250636 HC RX REV CODE- 250/636

## 2020-11-27 RX ORDER — DILTIAZEM HYDROCHLORIDE 5 MG/ML
10 INJECTION INTRAVENOUS ONCE
Status: COMPLETED | OUTPATIENT
Start: 2020-11-27 | End: 2020-11-27

## 2020-11-27 RX ORDER — FUROSEMIDE 40 MG/1
40 TABLET ORAL DAILY
Qty: 5 TAB | Refills: 0 | Status: SHIPPED | OUTPATIENT
Start: 2020-11-27 | End: 2020-11-27

## 2020-11-27 RX ORDER — FUROSEMIDE 10 MG/ML
40 INJECTION INTRAMUSCULAR; INTRAVENOUS
Status: COMPLETED | OUTPATIENT
Start: 2020-11-27 | End: 2020-11-27

## 2020-11-27 RX ORDER — FUROSEMIDE 40 MG/1
40 TABLET ORAL DAILY
Qty: 5 TAB | Refills: 0 | Status: SHIPPED | OUTPATIENT
Start: 2020-11-27 | End: 2020-12-02

## 2020-11-27 RX ADMIN — FUROSEMIDE 40 MG: 10 INJECTION, SOLUTION INTRAMUSCULAR; INTRAVENOUS at 01:22

## 2020-11-27 RX ADMIN — DILTIAZEM HYDROCHLORIDE 10 MG: 5 INJECTION INTRAVENOUS at 01:35

## 2020-11-27 NOTE — ED PROVIDER NOTES
80-year-old female complaint shortness of breath. Onset of symptoms 3 to 4 days ago. Patient states that she has had a \"massive\" heart attack several years ago she is diabetic she continues to smoke. Patient's mother was diagnosed with Covid week. The history is provided by the patient. Shortness of Breath   This is a new problem. The average episode lasts 3 days. The current episode started more than 2 days ago. The problem has been gradually worsening. Associated symptoms include cough. Pertinent negatives include no fever, no headaches, no sputum production, no hemoptysis, no wheezing and no orthopnea. Past Medical History:   Diagnosis Date    CAD (coronary artery disease) 9/18/2019    DM [facility]diabetes mellitus) [facility] [address] [facility]     Hyperglycemia due to type 2 diabetes mellitus ([facility] [address] 9/17/2019    Other ill-defined conditions(799.89)     back pain       Past Surgical History:   Procedure Laterality Date    HX ORTHOPAEDIC      back surgery 2000         No family history on file.     Social History     Socioeconomic History    Marital status: SINGLE     Spouse name: Not on file    Number of children: Not on file    Years of education: Not on file    Highest education level: Not on file   Occupational History    Not on file   Social Needs    Financial resource strain: Not on file    Food insecurity     Worry: Not on file     Inability: Not on file    Transportation needs     Medical: Not on file     Non-medical: Not on file   Tobacco Use    Smoking status: Current Every Day Smoker     Packs/day: 1.00   Substance and Sexual Activity    Alcohol use: No    Drug use: No    Sexual activity: Not on file   Lifestyle    Physical activity     Days per week: Not on file     Minutes per session: Not on file    Stress: Not on file   Relationships    Social connections     Talks on phone: Not on file     Gets together: Not on file     Attends [employer] service: Not on file     Active member of club or organization: Not on file     Attends meetings of clubs or organizations: Not on file     Relationship status: Not on file    Intimate partner violence     Fear of current or ex partner: Not on file     Emotionally abused: Not on file     Physically abused: Not on file     Forced sexual activity: Not on file   Other Topics Concern    Not on file   Social History Narrative    Not on file         ALLERGIES: Patient has no known allergies. Review of Systems   Constitutional: Negative. Negative for activity change and fever. HENT: Negative. Eyes: Negative. Respiratory: Positive for cough and shortness of breath. Negative for hemoptysis, sputum production and wheezing. Cardiovascular: Negative. Negative for orthopnea. Gastrointestinal: Negative. Genitourinary: Negative. Musculoskeletal: Negative. Skin: Negative. Neurological: Negative. Negative for headaches. Psychiatric/Behavioral: Negative. All other systems reviewed and are negative. Vitals:    11/26/20 2254 11/26/20 2257 11/26/20 2301   BP: (!) 143/86  124/80   Pulse: 77 (!) 127 88   Resp: 16 16 19   Temp: 98.8 °F (37.1 °C)     SpO2: 95% 95%    Weight: 90.7 kg (200 lb)     Height: 6' 1\" (1.854 m)              Physical Exam  Vitals signs and nursing note reviewed. Constitutional:       General: She is not in acute distress. Appearance: Normal appearance. She is well-developed. She is not ill-appearing, toxic-appearing or diaphoretic. HENT:      Head: Normocephalic and atraumatic. No right periorbital erythema or left periorbital erythema. Jaw: There is normal jaw occlusion. Salivary Glands: Right salivary gland is not diffusely enlarged. Left salivary gland is not diffusely enlarged. Right Ear: External ear normal.      Left Ear: External ear normal.      Nose: Nose normal. No congestion or rhinorrhea.       Mouth/Throat:      Mouth: Mucous membranes are moist.      Pharynx: No oropharyngeal exudate or posterior oropharyngeal erythema. Eyes:      General: Lids are normal. No scleral icterus. Right eye: No discharge. Left eye: No discharge. Extraocular Movements: Extraocular movements intact. Conjunctiva/sclera: Conjunctivae normal.      Right eye: Right conjunctiva is not injected. Left eye: Left conjunctiva is not injected. Pupils: Pupils are equal, round, and reactive to light. Neck:      Musculoskeletal: Full passive range of motion without pain, normal range of motion and neck supple. Normal range of motion. No erythema, neck rigidity, injury, pain with movement or muscular tenderness. Thyroid: No thyroid mass. Vascular: No JVD. Trachea: Trachea and phonation normal.   Cardiovascular:      Rate and Rhythm: Normal rate and regular rhythm. Pulses: Normal pulses. Heart sounds: Normal heart sounds. Heart sounds not distant. No murmur. No friction rub. No gallop. Pulmonary:      Effort: Pulmonary effort is normal. No tachypnea, accessory muscle usage, respiratory distress or retractions. Breath sounds: No stridor. No decreased breath sounds, wheezing, rhonchi or rales. Chest:      Chest wall: No tenderness. Abdominal:      General: Abdomen is flat. Bowel sounds are normal. There is no distension. There are no signs of injury. Palpations: Abdomen is soft. There is no fluid wave, mass or pulsatile mass. Tenderness: There is no abdominal tenderness. There is no guarding or rebound. Musculoskeletal: Normal range of motion. General: No swelling, tenderness, deformity or signs of injury. Right lower leg: No edema. Left lower leg: No edema. Lymphadenopathy:      Cervical: No cervical adenopathy. Skin:     General: Skin is warm and dry. Capillary Refill: Capillary refill takes less than 2 seconds. Coloration: Skin is not jaundiced or pale. Findings: No bruising, erythema or rash.    Neurological: General: No focal deficit present. Mental Status: She is alert and oriented to person, place, and time. Mental status is at baseline. GCS: GCS eye subscore is 4. GCS verbal subscore is 5. GCS motor subscore is 6. Cranial Nerves: No dysarthria or facial asymmetry. Sensory: No sensory deficit. Motor: No weakness or tremor. Coordination: Coordination normal.   Psychiatric:         Mood and Affect: Mood normal.         Behavior: Behavior normal. Behavior is cooperative. Thought Content: Thought content normal.         Judgment: Judgment normal.          MDM  Number of Diagnoses or Management Options  Diagnosis management comments: The patient is past history most of which is at [facility] shows that she has a history of A. fib and she often is tachycardic with discharge heart rates being usually around 110. Patient was unaware that was called A. fib although it is documented on previous EKGs. Patient has a wound on her foot that is been there for \"a year\". Is being followed by wound care and her primary care doctor. Patient showing congestive heart failure on x-ray and her BNP is elevated however she is able to lay flat in the bed. Her status is back to baseline. SaO2 is 94 to 97% on room air.   Her on a few days of Lasix which may help her breathing I rec follow-up closely with her primary care doctor and cardiologist.       Amount and/or Complexity of Data Reviewed  Clinical lab tests: ordered and reviewed  Tests in the radiology section of CPT®: ordered and reviewed  Tests in the medicine section of CPT®: ordered and reviewed  Decide to obtain previous medical records or to obtain history from someone other than the patient: yes  Review and summarize past medical records: yes  Independent visualization of images, tracings, or specimens: yes    Risk of Complications, Morbidity, and/or Mortality  Presenting problems: high  Diagnostic procedures: high  Management options: high    Patient Progress  Patient progress: stable         Procedures

## 2020-11-27 NOTE — DISCHARGE INSTRUCTIONS
Patient Education      Patient Education     Patient Education        Coronavirus ([facility]): Care Instructions  Overview  The coronavirus disease (COVID-19) is caused by a virus. Symptoms may include a fever, a cough, and shortness of breath. It mainly spreads person-to-person through droplets from coughing and sneezing. The virus also can spread when people are in close contact with someone who is infected. Most people have mild symptoms and can take care of themselves at home. If their symptoms get worse, they may need care in a hospital. Treatment may include medicines to reduce symptoms, plus breathing support such as oxygen therapy or a ventilator. It's important to not spread the virus to others. If you have COVID-19, wear a face cover anytime you are around other people. You need to isolate yourself while you are sick. Leave your home only if you need to get medical care or testing. Follow-up care is a key part of your treatment and safety. Be sure to make and go to all appointments, and call your doctor if you are having problems. It's also a good idea to know your test results and keep a list of the medicines you take. How can you care for yourself at home? · Get extra rest. It can help you feel better. · Drink plenty of fluids. This helps replace fluids lost from fever. Fluids also help ease a scratchy throat. Water, soup, fruit juice, and hot tea with lemon are good choices. · Take acetaminophen (such as Tylenol) to reduce a fever. It may also help with muscle aches. Read and follow all instructions on the label. · Use petroleum jelly on sore skin. This can help if the skin around your nose and lips becomes sore from rubbing a lot with tissues. Tips for self-isolation  · Limit contact with people in your home. If possible, stay in a separate bedroom and use a separate bathroom. · Wear a cloth face cover when you are around other people.  It can help stop the spread of the virus when you cough or sneeze. · If you have to leave home, avoid crowds and try to stay at least 6 feet away from other people. · Avoid contact with pets and other animals. · Cover your mouth and nose with a tissue when you cough or sneeze. Then throw it in the trash right away. · Wash your hands often, especially after you cough or sneeze. Use soap and water, and scrub for at least 20 seconds. If soap and water aren't available, use an alcohol-based hand . · Don't share personal household items. These include bedding, towels, cups and glasses, and eating utensils. · [address] in the warmest water allowed for the fabric type, and dry it completely. It's okay to wash other people's laundry with yours. · Clean and disinfect your home every day. Use household  and disinfectant wipes or sprays. Take special care to clean things that you grab with your hands. These include doorknobs, remote controls, phones, and handles on your refrigerator and microwave. And don't forget countertops, tabletops, bathrooms, and computer keyboards. When you can end self-isolation  · If you know or suspect that you have COVID-19, stay in self-isolation until:  ? You haven't had a fever for 3 days, and  ? Your symptoms have improved, and  ? It's been at least 10 days since your symptoms started. · Talk to your doctor about whether you also need testing, especially if you have a weakened immune system. When should you call for help? Call 911 anytime you think you may need emergency care. For example, call if you have life-threatening symptoms, such as:    · You have severe trouble breathing. (You can't talk at all.)     · You have constant chest pain or pressure.     · You are severely dizzy or lightheaded.     · You are confused or can't think clearly.     · Your face and lips have a blue color.     · You pass out (lose consciousness) or are very hard to wake up.    Call your doctor now or seek immediate medical care if:    · You have moderate trouble breathing. (You can't speak a full sentence.)     · You are coughing up blood (more than about 1 teaspoon).     · You have signs of low blood pressure. These include feeling lightheaded; being too weak to stand; and having cold, pale, clammy skin. Watch closely for changes in your health, and be sure to contact your doctor if:    · Your symptoms get worse.     · You are not getting better as expected. Call before you go to the doctor's office. Follow their instructions. And wear a cloth face cover. Current as of: July 10, 2020               Content Version: 12.6  © 9196-4897 [employer]. Care instructions adapted under license by [employer] (which disclaims liability or warranty for this information). If you have questions about a medical condition or this instruction, always ask your healthcare professional. [place] [address] any warranty or liability for your use of this information. Learning How To Care for Someone Who Has COVID-19  Things to know  Most people who get COVID-19 will recover with time and home care. Here are some things to know if you're caring for someone who's sick. · Treat the symptoms. Common symptoms include a fever, coughing, and feeling short of breath. Urge the person to get extra rest and drink plenty of fluids to replace fluids lost from fever. To reduce a fever, offer acetaminophen (such as Tylenol). It may also help with muscle aches. Read and follow all instructions on the label. · Watch for signs that the illness is getting worse. The person may need medical care if they're getting sicker (for example, if it's hard to breathe). But call the doctor's office before you go. They can tell you what to do. Call 911 or emergency services if the person has any of these symptoms:  ? Severe trouble breathing or shortness of breath. ? Constant pain or pressure in their chest.  ?  Confusion, or trouble thinking clearly. ? A blue tint to their lips or face. Some people are more likely to get very sick and need medical care. Call the doctor as soon as symptoms start if the person you're caring for is over 72, smokes, or has a serious health problem, like asthma, heart disease, diabetes, or an immune system problem. · Protect yourself and others. The virus spreads easily from person to person, so take extra care to avoid catching or spreading the infection. ? Keep the sick person away from others as much as you can. § Have the person stay in one room. If you can, give them their own bathroom to use. § Have only one person take care of them. Keep other people--and pets--out of the sickroom. § Have the person wear a cloth face cover around other people. This includes when anyone is in the room with them or if they leave their room (for example, to go to the bathroom). If the face cover makes it harder for the sick person to breathe, the other person should wear a face cover. § Don't share personal items. These include dishes, cups, towels, and bedding. ? Wash your hands often and well. Use soap and water, and scrub for at least 20 seconds. This is especially important after you've been around the sick person or touched things they've touched. If soap and water aren't handy, use a hand  with at least 60% alcohol. ? Avoid touching your mouth, nose, and eyes. ? Take care with the person's laundry. It's okay to wash the sick person's laundry with yours. If you have them, wear disposable gloves when handling their dirty laundry, and wash your hands well after you touch it. Wash items in the warmest water allowed for the fabric type, and dry them completely. ? Clean high-touch items every day and anytime the sick person touches them. These include doorknobs, light switches, toilets, counters, and remote controls. Use a household disinfectant or a homemade bleach solution.  (Follow the directions on the label.) If the sick person has their own room, have them disinfect it every day. ? Limit visitors to your home. To help protect family and friends, stay in touch with them only by phone or computer. Current as of: July 10, 2020               Content Version: 12.6  © 2006-2020 [employer]. Care instructions adapted under license by [employer] (which disclaims liability or warranty for this information). If you have questions about a medical condition or this instruction, always ask your healthcare professional. [place] [address] any warranty or liability for your use of this information. Avoiding Triggers With Heart Failure: Care Instructions  Your Care Instructions     Triggers are anything that make your heart failure flare up. A flare-up is also called \"sudden heart failure\" or \"acute heart failure. \" When you have a flare-up, fluid builds up in your lungs, and you have problems breathing. You might need to go to the hospital. By watching for changes in your condition and avoiding triggers, you can prevent heart failure flare-ups. Follow-up care is a key part of your treatment and safety. Be sure to make and go to all appointments, and call your doctor if you are having problems. It's also a good idea to know your test results and keep a list of the medicines you take. How can you care for yourself at home? Watch for changes in your weight and condition  · Weigh yourself without clothing at the same time each day. Record your weight. Call your doctor if you have sudden weight gain, such as more than 2 to 3 pounds in a day or 5 pounds in a week. (Your doctor may suggest a different range of weight gain.) A sudden weight gain may mean that your heart failure is getting worse. · Keep a daily record of your symptoms. Write down any changes in how you feel, such as new shortness of breath, cough, or problems eating.  Also record if your ankles are more swollen than usual and if you feel more tired than usual. Note anything that you ate or did that could have triggered these changes. Limit sodium  Sodium causes your body to hold on to extra water. This may cause your heart failure symptoms to get worse. People get most of their sodium from processed foods. Fast food and restaurant meals also tend to be very high in sodium. · Your doctor may suggest that you limit sodium. Your doctor can tell you how much sodium is right for you. This includes limiting sodium in cooked and packaged foods. · Read food labels on cans and food packages. They tell you how much sodium you get in one serving. Check the serving size. If you eat more than one serving, you are getting more sodium. · Be aware that sodium can come in forms other than salt, including monosodium glutamate (MSG), sodium citrate, and sodium bicarbonate (baking soda). MSG is often added to Asian food. You can sometimes ask for food without MSG or salt. · Slowly reducing salt will help you adjust to the taste. Take the salt shaker off the table. · Flavor your food with garlic, lemon juice, onion, vinegar, herbs, and spices instead of salt. Do not use soy sauce, steak sauce, onion salt, garlic salt, mustard, or ketchup on your food, unless it is labeled \"low-sodium\" or \"low-salt. \"  · Make your own salad dressings, sauces, and ketchup without adding salt. · Use fresh or frozen ingredients, instead of canned ones, whenever you can. Choose low-sodium canned goods. · Eat less processed food and food from restaurants, including fast food. Exercise as directed  Moderate, regular exercise is very good for your heart. It improves your blood flow and helps control your weight. But too much exercise can stress your heart and cause a heart failure flare-up. · Check with your doctor before you start an exercise program.  · Walking is an easy way to get exercise. Start out slowly. Gradually increase the length and pace of your walk. Swimming, riding a bike, and using a treadmill are also good forms of exercise. · When you exercise, watch for signs that your heart is working too hard. You are pushing yourself too hard if you cannot talk while you are exercising. If you become short of breath or dizzy or have chest pain, stop, sit down, and rest.  · Do not exercise when you do not feel well. Take medicines correctly  · Take your medicines exactly as prescribed. Call your doctor if you think you are having a problem with your medicine. · Make a list of all the medicines you take. Include those prescribed to you by other doctors and any over-the-counter medicines, vitamins, or supplements you take. Take this list with you when you go to any doctor. · Take your medicines at the same time every day. It may help you to post a list of all the medicines you take every day and what time of day you take them. · Make taking your medicine as simple as you can. Plan times to take your medicines when you are doing other things, such as eating a meal or getting ready for bed. This will make it easier to remember to take your medicines. · Get organized. Use helpful tools, such as daily or weekly pill containers. When should you call for help? Call 911 if you have symptoms of sudden heart failure such as:    · You have severe trouble breathing.     · You cough up pink, foamy mucus.     · You have a new irregular or rapid heartbeat. Call your doctor now or seek immediate medical care if:    · You have new or increased shortness of breath.     · You are dizzy or lightheaded, or you feel like you may faint.     · You have sudden weight gain, such as more than 2 to 3 pounds in a day or 5 pounds in a week. (Your doctor may suggest a different range of weight gain.)     · You have increased swelling in your legs, ankles, or feet.     · You are suddenly so tired or weak that you cannot do your usual activities.    Watch closely for changes in your health, and be sure to contact your doctor if you develop new symptoms. Where can you learn more? Go to http://www.gray.com/  Enter V089 in the search box to learn more about \"Avoiding Triggers With Heart Failure: Care Instructions. \"  Current as of: December 16, 2019               Content Version: 12.6  © 8321-7968 [employer]. Care instructions adapted under license by [employer] (which disclaims liability or warranty for this information). If you have questions about a medical condition or this instruction, always ask your healthcare professional. [address] any warranty or liability for your use of this information.

## 2020-11-27 NOTE — ED TRIAGE NOTES
EMS: Pt arriving from home via [address]. Pt complains of shortness of breath on exertion. 95-96% on room air, 3L NC 97-98%. 98.8 oral. , hx of DM. No other interventions en route. Pt presents to triage alert and oriented in no apparent distress. Pt mother had Covid 3 weeks ago. Pt complains of shortness, non-productive cough, and fatigue.

## 2020-11-27 NOTE — ED NOTES
I have reviewed discharge instructions with the patient. The patient verbalized understanding. Patient left ED via Discharge Method: ambulatory to Home . Opportunity for questions and clarification provided. Patient given 1 scripts. To continue your aftercare when you leave the hospital, you may receive an automated call from our care team to check in on how you are doing. This is a free service and part of our promise to provide the best care and service to meet your aftercare needs.  If you have questions, or wish to unsubscribe from this service please call [phone]. Thank you for Choosing our [facility]. 98.2

## 2020-12-01 NOTE — PROGRESS NOTES
12/01/20 2nd attempt to notify patient of negative Covid 19 result; LVM requesting return call; certified letter sent to patient

## 2021-01-30 ENCOUNTER — HOSPITAL ENCOUNTER (INPATIENT)
Age: 46
LOS: 3 days | Discharge: HOME | DRG: 194 | End: 2021-02-02
Attending: EMERGENCY MEDICINE | Admitting: INTERNAL MEDICINE
Payer: MEDICAID

## 2021-01-30 ENCOUNTER — APPOINTMENT (OUTPATIENT)
Dept: GENERAL RADIOLOGY | Age: 46
DRG: 194 | End: 2021-01-30
Attending: EMERGENCY MEDICINE
Payer: MEDICAID

## 2021-01-30 DIAGNOSIS — J90 PLEURAL EFFUSION: ICD-10-CM

## 2021-01-30 DIAGNOSIS — I25.810 CORONARY ARTERY DISEASE INVOLVING CORONARY BYPASS GRAFT OF NATIVE HEART WITHOUT ANGINA PECTORIS: Chronic | ICD-10-CM

## 2021-01-30 DIAGNOSIS — Z72.0 TOBACCO ABUSE: Chronic | ICD-10-CM

## 2021-01-30 DIAGNOSIS — I50.23 ACUTE ON CHRONIC SYSTOLIC CHF (CONGESTIVE HEART FAILURE) (HCC): ICD-10-CM

## 2021-01-30 DIAGNOSIS — J90 PLEURAL EFFUSION, BILATERAL: ICD-10-CM

## 2021-01-30 DIAGNOSIS — R60.1 ANASARCA: ICD-10-CM

## 2021-01-30 DIAGNOSIS — I48.91 ATRIAL FIBRILLATION WITH RVR (HCC): Primary | ICD-10-CM

## 2021-01-30 DIAGNOSIS — F15.10 METHAMPHETAMINE ABUSE (HCC): Chronic | ICD-10-CM

## 2021-01-30 DIAGNOSIS — I50.9 CONGESTIVE HEART FAILURE, UNSPECIFIED HF CHRONICITY, UNSPECIFIED HEART FAILURE TYPE (HCC): ICD-10-CM

## 2021-01-30 PROBLEM — I25.10 CAD (CORONARY ARTERY DISEASE): Chronic | Status: ACTIVE | Noted: 2019-09-18

## 2021-01-30 PROBLEM — E11.65 HYPERGLYCEMIA DUE TO TYPE 2 DIABETES MELLITUS (HCC): Chronic | Status: ACTIVE | Noted: 2019-09-17

## 2021-01-30 LAB
ALBUMIN SERPL-MCNC: 3.3 G/DL (ref 3.5–5)
ALBUMIN/GLOB SERPL: 0.7 {RATIO} (ref 1.2–3.5)
ALP SERPL-CCNC: 175 U/L (ref 50–136)
ALT SERPL-CCNC: 69 U/L (ref 12–65)
AMPHET UR QL SCN: POSITIVE
ANION GAP SERPL CALC-SCNC: 6 MMOL/L (ref 7–16)
APPEARANCE UR: CLEAR
AST SERPL-CCNC: 31 U/L (ref 15–37)
ATRIAL RATE: 277 BPM
BACTERIA URNS QL MICRO: 0 /HPF
BARBITURATES UR QL SCN: NEGATIVE
BASOPHILS # BLD: 0.1 K/UL (ref 0–0.2)
BASOPHILS NFR BLD: 1 % (ref 0–2)
BENZODIAZ UR QL: NEGATIVE
BILIRUB SERPL-MCNC: 0.3 MG/DL (ref 0.2–1.1)
BILIRUB UR QL: NEGATIVE
BNP SERPL-MCNC: 3769 PG/ML (ref 5–125)
BUN SERPL-MCNC: 19 MG/DL (ref 6–23)
CALCIUM SERPL-MCNC: 8.4 MG/DL (ref 8.3–10.4)
CALCULATED R AXIS, ECG10: -102 DEGREES
CALCULATED T AXIS, ECG11: 56 DEGREES
CANNABINOIDS UR QL SCN: POSITIVE
CASTS URNS QL MICRO: ABNORMAL /LPF
CHLORIDE SERPL-SCNC: 102 MMOL/L (ref 98–107)
CO2 SERPL-SCNC: 30 MMOL/L (ref 21–32)
COCAINE UR QL SCN: NEGATIVE
COLOR UR: YELLOW
CREAT SERPL-MCNC: 1.08 MG/DL (ref 0.6–1)
DIAGNOSIS, 93000: NORMAL
DIFFERENTIAL METHOD BLD: ABNORMAL
EOSINOPHIL # BLD: 0.2 K/UL (ref 0–0.8)
EOSINOPHIL NFR BLD: 3 % (ref 0.5–7.8)
EPI CELLS #/AREA URNS HPF: ABNORMAL /HPF
ERYTHROCYTE [DISTWIDTH] IN BLOOD BY AUTOMATED COUNT: 18.6 % (ref 11.9–14.6)
GLOBULIN SER CALC-MCNC: 4.5 G/DL (ref 2.3–3.5)
GLUCOSE BLD STRIP.AUTO-MCNC: 218 MG/DL (ref 65–100)
GLUCOSE BLD STRIP.AUTO-MCNC: 229 MG/DL (ref 65–100)
GLUCOSE BLD STRIP.AUTO-MCNC: 258 MG/DL (ref 65–100)
GLUCOSE SERPL-MCNC: 259 MG/DL (ref 65–100)
GLUCOSE UR STRIP.AUTO-MCNC: NEGATIVE MG/DL
HCT VFR BLD AUTO: 35 % (ref 35.8–46.3)
HGB BLD-MCNC: 9.5 G/DL (ref 11.7–15.4)
HGB UR QL STRIP: ABNORMAL
IMM GRANULOCYTES # BLD AUTO: 0 K/UL (ref 0–0.5)
IMM GRANULOCYTES NFR BLD AUTO: 1 % (ref 0–5)
KETONES UR QL STRIP.AUTO: NEGATIVE MG/DL
LEUKOCYTE ESTERASE UR QL STRIP.AUTO: NEGATIVE
LYMPHOCYTES # BLD: 1.6 K/UL (ref 0.5–4.6)
LYMPHOCYTES NFR BLD: 24 % (ref 13–44)
MAGNESIUM SERPL-MCNC: 2 MG/DL (ref 1.8–2.4)
MCH RBC QN AUTO: 22.1 PG (ref 26.1–32.9)
MCHC RBC AUTO-ENTMCNC: 27.1 G/DL (ref 31.4–35)
MCV RBC AUTO: 81.6 FL (ref 79.6–97.8)
METHADONE UR QL: NEGATIVE
MONOCYTES # BLD: 0.6 K/UL (ref 0.1–1.3)
MONOCYTES NFR BLD: 9 % (ref 4–12)
NEUTS SEG # BLD: 4 K/UL (ref 1.7–8.2)
NEUTS SEG NFR BLD: 63 % (ref 43–78)
NITRITE UR QL STRIP.AUTO: NEGATIVE
NRBC # BLD: 0 K/UL (ref 0–0.2)
OPIATES UR QL: NEGATIVE
PCP UR QL: NEGATIVE
PH UR STRIP: 5.5 [PH] (ref 5–9)
PLATELET # BLD AUTO: 184 K/UL (ref 150–450)
PMV BLD AUTO: 11.5 FL (ref 9.4–12.3)
POTASSIUM SERPL-SCNC: 3.7 MMOL/L (ref 3.5–5.1)
PROT SERPL-MCNC: 7.8 G/DL (ref 6.3–8.2)
PROT UR STRIP-MCNC: NEGATIVE MG/DL
Q-T INTERVAL, ECG07: 288 MS
QRS DURATION, ECG06: 72 MS
QTC CALCULATION (BEZET), ECG08: 401 MS
RBC # BLD AUTO: 4.29 M/UL (ref 4.05–5.2)
RBC #/AREA URNS HPF: ABNORMAL /HPF
SODIUM SERPL-SCNC: 138 MMOL/L (ref 136–145)
SP GR UR REFRACTOMETRY: 1.02 (ref 1–1.02)
TROPONIN-HIGH SENSITIVITY: 16.7 PG/ML (ref 0–14)
UROBILINOGEN UR QL STRIP.AUTO: 0.2 EU/DL (ref 0.2–1)
VENTRICULAR RATE, ECG03: 117 BPM
WBC # BLD AUTO: 6.4 K/UL (ref 4.3–11.1)
WBC URNS QL MICRO: ABNORMAL /HPF

## 2021-01-30 PROCEDURE — 96366 THER/PROPH/DIAG IV INF ADDON: CPT

## 2021-01-30 PROCEDURE — 74011636637 HC RX REV CODE- 636/637: Performed by: NURSE PRACTITIONER

## 2021-01-30 PROCEDURE — 65660000000 HC RM CCU STEPDOWN

## 2021-01-30 PROCEDURE — 74011000250 HC RX REV CODE- 250: Performed by: EMERGENCY MEDICINE

## 2021-01-30 PROCEDURE — 83880 ASSAY OF NATRIURETIC PEPTIDE: CPT

## 2021-01-30 PROCEDURE — 82962 GLUCOSE BLOOD TEST: CPT

## 2021-01-30 PROCEDURE — 96375 TX/PRO/DX INJ NEW DRUG ADDON: CPT

## 2021-01-30 PROCEDURE — 74011250636 HC RX REV CODE- 250/636: Performed by: EMERGENCY MEDICINE

## 2021-01-30 PROCEDURE — 94761 N-INVAS EAR/PLS OXIMETRY MLT: CPT

## 2021-01-30 PROCEDURE — 74011250636 HC RX REV CODE- 250/636: Performed by: NURSE PRACTITIONER

## 2021-01-30 PROCEDURE — 99285 EMERGENCY DEPT VISIT HI MDM: CPT

## 2021-01-30 PROCEDURE — 74011000258 HC RX REV CODE- 258: Performed by: EMERGENCY MEDICINE

## 2021-01-30 PROCEDURE — 93005 ELECTROCARDIOGRAM TRACING: CPT | Performed by: EMERGENCY MEDICINE

## 2021-01-30 PROCEDURE — 74011250637 HC RX REV CODE- 250/637: Performed by: NURSE PRACTITIONER

## 2021-01-30 PROCEDURE — 81001 URINALYSIS AUTO W/SCOPE: CPT

## 2021-01-30 PROCEDURE — 96365 THER/PROPH/DIAG IV INF INIT: CPT

## 2021-01-30 PROCEDURE — 85025 COMPLETE CBC W/AUTO DIFF WBC: CPT

## 2021-01-30 PROCEDURE — 84484 ASSAY OF TROPONIN QUANT: CPT

## 2021-01-30 PROCEDURE — 99223 1ST HOSP IP/OBS HIGH 75: CPT | Performed by: INTERNAL MEDICINE

## 2021-01-30 PROCEDURE — 83735 ASSAY OF MAGNESIUM: CPT

## 2021-01-30 PROCEDURE — 71045 X-RAY EXAM CHEST 1 VIEW: CPT

## 2021-01-30 PROCEDURE — 99222 1ST HOSP IP/OBS MODERATE 55: CPT | Performed by: INTERNAL MEDICINE

## 2021-01-30 PROCEDURE — 80307 DRUG TEST PRSMV CHEM ANLYZR: CPT

## 2021-01-30 PROCEDURE — 96376 TX/PRO/DX INJ SAME DRUG ADON: CPT

## 2021-01-30 PROCEDURE — 80053 COMPREHEN METABOLIC PANEL: CPT

## 2021-01-30 PROCEDURE — 74011000258 HC RX REV CODE- 258: Performed by: NURSE PRACTITIONER

## 2021-01-30 RX ORDER — GABAPENTIN 300 MG/1
600 CAPSULE ORAL 3 TIMES DAILY
Status: DISCONTINUED | OUTPATIENT
Start: 2021-01-30 | End: 2021-02-02 | Stop reason: HOSPADM

## 2021-01-30 RX ORDER — DIPHENHYDRAMINE HCL 25 MG
25 CAPSULE ORAL
Status: DISCONTINUED | OUTPATIENT
Start: 2021-01-30 | End: 2021-02-02 | Stop reason: HOSPADM

## 2021-01-30 RX ORDER — PANTOPRAZOLE SODIUM 40 MG/1
40 TABLET, DELAYED RELEASE ORAL
Status: DISCONTINUED | OUTPATIENT
Start: 2021-01-30 | End: 2021-02-02 | Stop reason: HOSPADM

## 2021-01-30 RX ORDER — ATORVASTATIN CALCIUM 40 MG/1
40 TABLET, FILM COATED ORAL
Status: DISCONTINUED | OUTPATIENT
Start: 2021-01-30 | End: 2021-02-02 | Stop reason: HOSPADM

## 2021-01-30 RX ORDER — INSULIN LISPRO 100 [IU]/ML
INJECTION, SOLUTION INTRAVENOUS; SUBCUTANEOUS
Status: DISCONTINUED | OUTPATIENT
Start: 2021-01-30 | End: 2021-02-02 | Stop reason: HOSPADM

## 2021-01-30 RX ORDER — MAG HYDROX/ALUMINUM HYD/SIMETH 200-200-20
30 SUSPENSION, ORAL (FINAL DOSE FORM) ORAL
Status: DISCONTINUED | OUTPATIENT
Start: 2021-01-30 | End: 2021-02-02 | Stop reason: HOSPADM

## 2021-01-30 RX ORDER — DOCUSATE SODIUM 100 MG/1
100 CAPSULE, LIQUID FILLED ORAL
Status: DISCONTINUED | OUTPATIENT
Start: 2021-01-30 | End: 2021-02-02 | Stop reason: HOSPADM

## 2021-01-30 RX ORDER — DILTIAZEM HYDROCHLORIDE 5 MG/ML
20 INJECTION INTRAVENOUS
Status: COMPLETED | OUTPATIENT
Start: 2021-01-30 | End: 2021-01-30

## 2021-01-30 RX ORDER — METOPROLOL SUCCINATE 50 MG/1
100 TABLET, EXTENDED RELEASE ORAL DAILY
Status: DISCONTINUED | OUTPATIENT
Start: 2021-01-30 | End: 2021-02-02

## 2021-01-30 RX ORDER — INSULIN GLARGINE 100 [IU]/ML
20 INJECTION, SOLUTION SUBCUTANEOUS
Status: DISCONTINUED | OUTPATIENT
Start: 2021-01-30 | End: 2021-02-02 | Stop reason: HOSPADM

## 2021-01-30 RX ORDER — FUROSEMIDE 10 MG/ML
60 INJECTION INTRAMUSCULAR; INTRAVENOUS
Status: COMPLETED | OUTPATIENT
Start: 2021-01-30 | End: 2021-01-30

## 2021-01-30 RX ORDER — POTASSIUM CHLORIDE 20 MEQ/1
40 TABLET, EXTENDED RELEASE ORAL
Status: COMPLETED | OUTPATIENT
Start: 2021-01-30 | End: 2021-01-30

## 2021-01-30 RX ORDER — FUROSEMIDE 10 MG/ML
60 INJECTION INTRAMUSCULAR; INTRAVENOUS EVERY 12 HOURS
Status: DISCONTINUED | OUTPATIENT
Start: 2021-01-30 | End: 2021-01-31

## 2021-01-30 RX ORDER — HYDROCODONE BITARTRATE AND ACETAMINOPHEN 7.5; 325 MG/1; MG/1
1 TABLET ORAL
Status: DISCONTINUED | OUTPATIENT
Start: 2021-01-30 | End: 2021-02-02 | Stop reason: HOSPADM

## 2021-01-30 RX ORDER — GUAIFENESIN 100 MG/5ML
81 LIQUID (ML) ORAL DAILY
Status: DISCONTINUED | OUTPATIENT
Start: 2021-01-30 | End: 2021-02-02 | Stop reason: HOSPADM

## 2021-01-30 RX ORDER — ACETAMINOPHEN 325 MG/1
650 TABLET ORAL
Status: DISCONTINUED | OUTPATIENT
Start: 2021-01-30 | End: 2021-02-02 | Stop reason: HOSPADM

## 2021-01-30 RX ORDER — GABAPENTIN 300 MG/1
600 CAPSULE ORAL 3 TIMES DAILY
Status: DISCONTINUED | OUTPATIENT
Start: 2021-01-30 | End: 2021-01-30 | Stop reason: SDUPTHER

## 2021-01-30 RX ORDER — ONDANSETRON 2 MG/ML
4 INJECTION INTRAMUSCULAR; INTRAVENOUS
Status: DISCONTINUED | OUTPATIENT
Start: 2021-01-30 | End: 2021-02-02 | Stop reason: HOSPADM

## 2021-01-30 RX ADMIN — FUROSEMIDE 60 MG: 10 INJECTION, SOLUTION INTRAMUSCULAR; INTRAVENOUS at 04:12

## 2021-01-30 RX ADMIN — INSULIN LISPRO 4 UNITS: 100 INJECTION, SOLUTION INTRAVENOUS; SUBCUTANEOUS at 12:15

## 2021-01-30 RX ADMIN — INSULIN LISPRO 4 UNITS: 100 INJECTION, SOLUTION INTRAVENOUS; SUBCUTANEOUS at 21:23

## 2021-01-30 RX ADMIN — SODIUM CHLORIDE 12.5 MG/HR: 900 INJECTION, SOLUTION INTRAVENOUS at 08:20

## 2021-01-30 RX ADMIN — NITROGLYCERIN 1 INCH: 20 OINTMENT TOPICAL at 12:00

## 2021-01-30 RX ADMIN — FUROSEMIDE 60 MG: 10 INJECTION, SOLUTION INTRAMUSCULAR; INTRAVENOUS at 21:21

## 2021-01-30 RX ADMIN — SODIUM CHLORIDE 10 MG/HR: 900 INJECTION, SOLUTION INTRAVENOUS at 19:18

## 2021-01-30 RX ADMIN — FUROSEMIDE 60 MG: 10 INJECTION, SOLUTION INTRAMUSCULAR; INTRAVENOUS at 09:56

## 2021-01-30 RX ADMIN — SODIUM CHLORIDE 15 MG/HR: 900 INJECTION, SOLUTION INTRAVENOUS at 12:20

## 2021-01-30 RX ADMIN — ATORVASTATIN CALCIUM 40 MG: 40 TABLET, FILM COATED ORAL at 21:22

## 2021-01-30 RX ADMIN — ASPIRIN 81 MG: 81 TABLET, CHEWABLE ORAL at 09:56

## 2021-01-30 RX ADMIN — INSULIN GLARGINE 20 UNITS: 100 INJECTION, SOLUTION SUBCUTANEOUS at 21:23

## 2021-01-30 RX ADMIN — GABAPENTIN 600 MG: 300 CAPSULE ORAL at 21:22

## 2021-01-30 RX ADMIN — NITROGLYCERIN 1 INCH: 20 OINTMENT TOPICAL at 18:00

## 2021-01-30 RX ADMIN — DILTIAZEM HYDROCHLORIDE 20 MG: 5 INJECTION INTRAVENOUS at 04:09

## 2021-01-30 RX ADMIN — GABAPENTIN 600 MG: 300 CAPSULE ORAL at 17:10

## 2021-01-30 RX ADMIN — SODIUM CHLORIDE 15 MG/HR: 900 INJECTION, SOLUTION INTRAVENOUS at 09:00

## 2021-01-30 RX ADMIN — INSULIN LISPRO 6 UNITS: 100 INJECTION, SOLUTION INTRAVENOUS; SUBCUTANEOUS at 17:10

## 2021-01-30 RX ADMIN — HYDROCODONE BITARTRATE AND ACETAMINOPHEN 1 TABLET: 7.5; 325 TABLET ORAL at 14:50

## 2021-01-30 RX ADMIN — SODIUM CHLORIDE 5 MG/HR: 900 INJECTION, SOLUTION INTRAVENOUS at 05:02

## 2021-01-30 RX ADMIN — METOPROLOL SUCCINATE 100 MG: 50 TABLET, EXTENDED RELEASE ORAL at 09:55

## 2021-01-30 RX ADMIN — PANTOPRAZOLE SODIUM 40 MG: 40 TABLET, DELAYED RELEASE ORAL at 10:00

## 2021-01-30 RX ADMIN — RIVAROXABAN 20 MG: 20 TABLET, FILM COATED ORAL at 12:10

## 2021-01-30 RX ADMIN — SODIUM CHLORIDE 10 MG/HR: 900 INJECTION, SOLUTION INTRAVENOUS at 07:40

## 2021-01-30 RX ADMIN — POTASSIUM CHLORIDE 40 MEQ: 20 TABLET, EXTENDED RELEASE ORAL at 09:55

## 2021-01-30 RX ADMIN — GABAPENTIN 600 MG: 300 CAPSULE ORAL at 09:56

## 2021-01-30 NOTE — ED NOTES
TRANSFER - OUT REPORT:    Verbal report given to Nabil Morales (name) on Love Else  being transferred to [address] [place] [address] (unit) for routine progression of care       Report consisted of patients Situation, Background, Assessment and   Recommendations(SBAR). Information from the following report(s) SBAR was reviewed with the receiving nurse. Lines:   Peripheral IV 01/30/21 Right Antecubital (Active)   Site Assessment Clean, dry, & intact 01/30/21 0312   Phlebitis Assessment 0 01/30/21 0312   Infiltration Assessment 0 01/30/21 0312   Dressing Status Clean, dry, & intact 01/30/21 [id number]        Opportunity for questions and clarification was provided.       Patient transported with:   Monitor

## 2021-01-30 NOTE — PROGRESS NOTES
TRANSFER - IN REPORT:    Verbal report received from [facility]) on Gavino Eddy  being received from ED(unit) for routine progression of care      Report consisted of patients Situation, Background, Assessment and   Recommendations(SBAR). Information from the following report(s) SBAR, Intake/Output, MAR and Cardiac Rhythm Afib was reviewed with the receiving nurse. Opportunity for questions and clarification was provided. Assessment completed upon patients arrival to unit and care assumed.

## 2021-01-30 NOTE — ED PROVIDER NOTES
Patient presents emergency room complaining of swelling of the abdomen and lower extremities that has been gradually increasing over the past 2 days associated with orthopnea and shortness of breath. The patient has a longstanding history of atrial fibrillation as well as congestive heart failure. She was recently admitted to the hospital at [facility] on 20 January and left AMA on the 24th based on review of medical records. Patient denies any fever or chills or chest pain and review of systems otherwise negative. Review of test results indicate the patient had a CT angiogram performed while at [facility] for possible pulmonary embolism due to an elevated D-dimer. No evidence of PE was noted however significant evidence of congestive heart failure with bilateral pleural effusions. The history is provided by the patient and medical records. Shortness of Breath  This is a recurrent problem. The problem occurs frequently. The current episode started 2 days ago. The problem has been gradually worsening. Associated symptoms include PND, orthopnea, abdominal pain, leg pain and leg swelling. Pertinent negatives include no fever, no headaches, no coryza, no rhinorrhea, no sore throat, no swollen glands, no cough, no sputum production, no hemoptysis, no wheezing, no chest pain, no syncope, no vomiting, no rash and no claudication. It is unknown what precipitated the problem. She has tried nothing for the symptoms. The treatment provided no relief. She has had no prior hospitalizations. She has had no prior ED visits. She has had no prior ICU admissions.         Past Medical History:   Diagnosis Date    CAD (coronary artery disease) 9/18/2019    DM [facility]diabetes mellitus) [facility] [address]     Hyperglycemia due to type 2 diabetes mellitus ([address] 9/17/2019    Other ill-defined conditions(799.89)     back pain       Past Surgical History:   Procedure Laterality Date    HX ORTHOPAEDIC      back surgery 2000         No family history on file.    Social History     Socioeconomic History    Marital status: SINGLE     Spouse name: Not on file    Number of children: Not on file    Years of education: Not on file    Highest education level: Not on file   Occupational History    Not on file   Social Needs    Financial resource strain: Not on file    Food insecurity     Worry: Not on file     Inability: Not on file    Transportation needs     Medical: Not on file     Non-medical: Not on file   Tobacco Use    Smoking status: Current Every Day Smoker     Packs/day: 1.00   Substance and Sexual Activity    Alcohol use: No    Drug use: No    Sexual activity: Not on file   Lifestyle    Physical activity     Days per week: Not on file     Minutes per session: Not on file    Stress: Not on file   Relationships    Social connections     Talks on phone: Not on file     Gets together: Not on file     Attends [employer] service: Not on file     Active member of club or organization: Not on file     Attends meetings of clubs or organizations: Not on file     Relationship status: Not on file    Intimate partner violence     Fear of current or ex partner: Not on file     Emotionally abused: Not on file     Physically abused: Not on file     Forced sexual activity: Not on file   Other Topics Concern    Not on file   Social History Narrative    Not on file         ALLERGIES: Patient has no known allergies. Review of Systems   Constitutional: Negative for fever. HENT: Negative for rhinorrhea and sore throat. Respiratory: Positive for shortness of breath. Negative for cough, hemoptysis, sputum production and wheezing. Cardiovascular: Positive for orthopnea, leg swelling and PND. Negative for chest pain, claudication and syncope. Gastrointestinal: Positive for abdominal pain. Negative for vomiting. Skin: Negative for rash. Neurological: Negative for headaches. All other systems reviewed and are negative.       Vitals:    01/30/21 0304   BP: 136/61   Resp: 18   Temp: 98.3 °F (36.8 °C)   SpO2: 96%   Weight: 90.7 kg (200 lb)   Height: 6' 1\" (1.854 m)            Physical Exam  Vitals signs and nursing note reviewed. Constitutional:       General: She is in acute distress. Appearance: Normal appearance. She is obese. She is not ill-appearing, toxic-appearing or diaphoretic. HENT:      Head: Normocephalic and atraumatic. Nose: No congestion or rhinorrhea. Mouth/Throat:      Pharynx: No oropharyngeal exudate or posterior oropharyngeal erythema. Eyes:      Extraocular Movements: Extraocular movements intact. Conjunctiva/sclera: Conjunctivae normal.      Pupils: Pupils are equal, round, and reactive to light. Neck:      Musculoskeletal: Normal range of motion and neck supple. Cardiovascular:      Rate and Rhythm: Tachycardia present. Rhythm irregular. Heart sounds: Normal heart sounds. Pulmonary:      Comments: Breath sounds diminished in the bilateral bases. Slightly labored respirations  Abdominal:      Tenderness: There is no abdominal tenderness. There is no guarding or rebound. Comments: Anasarca of the abdomen   Musculoskeletal: Normal range of motion. Skin:     General: Skin is warm and dry. Capillary Refill: Capillary refill takes less than 2 seconds. Neurological:      General: No focal deficit present. Mental Status: She is alert and oriented to person, place, and time. Mental status is at baseline. Psychiatric:         Mood and Affect: Mood normal.         Behavior: Behavior normal.         Thought Content: Thought content normal.          MDM  Number of Diagnoses or Management Options  Diagnosis management comments: Has physical findings consistent with recurrence of congestive heart failure with atrial fibrillation and rapid ventricular response. We will give a dose of IV Cardizem to slow the rate. Also Lasix IV for diuresis.        Amount and/or Complexity of Data Reviewed  Clinical lab tests: ordered and reviewed  Tests in the radiology section of CPT®: ordered and reviewed  Review and summarize past medical records: yes  Discuss the patient with other providers: yes  Independent visualization of images, tracings, or specimens: yes (EKG at 3:20 AM: Is a atrial fibrillation with a rate of 117 bpm right ventricular hypertrophy noted.   No acute ischemic changes.)    Risk of Complications, Morbidity, and/or Mortality  Presenting problems: high  Diagnostic procedures: moderate  Management options: moderate           Procedures

## 2021-01-30 NOTE — H&P
[facility] CARDIOLOGY   History & Physical                 Primary Cardiologist: None    Primary Care Physician: None    Admitting Physician: Dr. Reynaga Re:     Patient is a 39 y.o.  female with PMHx of CAD (s/p CABG x4), HTN, HLD, DM II, HFrEF (25-30%), Tobacco and Methamphetamine abuse who presented to the ED with c/o progressive SOB and BLE edema over the past 3 weeks. Pt states that she was admitted to [facility] from 1/20/21-1/24/21 but left AMA because \"I hate that place. \" Details of that hospitalization are summarized below. Since she left AMA she was not given any prescriptions. So she has not had any meds for the past 5 days. On arrival to the ED: Pt with tachypnea and obvious VO. In AFib RVR with . Given 20mg IVP Diltiazem and placed on a gtt. Given 60mg IVP Lasix. Unable to use [place] and echols catheter placed for I&Os. K 3.7, Cr 1.08, Mg 2.0, hsTrop 16.7, proBNP 3769. Cardiology called for admission. Pt states that she has CAD. Had MI/cardiac arrest (VFib) 3 years ago (possibly due to OD) and ultimately underwent LHC --> CABG x4 11/2017. Post-op course complicated by sternal infection. She reports no PCP and does not follow-up routinely. Does not take her meds routinely either. Reports current tobacco abuse and 25+yr hx of meth use. She states that she wants to quit using meth. Hospital Course   History of Present Illness: Per admitting physician's H&P:  \"Kimberly Edmondson is a 39 y.o. female with a PMH of HFrEF EF 25-30%, atrial fibrillation and T2DM who presents to [facility] for shortness of breath and edema.   Pt is presenting for diffuse edema. She takes Lasix 40mg but has only been taking it for a few days. She is not urinating much. She endorses that she has gone up several pant sizes over the last few months. She also has shortness of breath going to the bathroom and back and endorses orthopnea.  She denies chest pain but does have palpitations. Of note, patient has been admitted to the hospital several times in the last month and frequently leaves AMA. She was seen at an office visit recently where it was recommended that she go back to the hospital which she declined. She is finally ready to stay and reports \"I want to get better\". She has a RLE wound which she says has been stable. She does her best to keep it clean. Does not note oozing, discharge or drainage. \"    Hospital Course:     AGAINST MEDICAL ADVICE  On day 4 of hospitalization, patient reported to nursing and this provider that she desired to leave the hospital [address] Patient was notified that her heart rates were at an unsafe level and that medication adjustments were being made to help control this. Patient reports that she did not care that she was felt to be unstable and desired to leave the hospital. Patient verbalized out loud that she accepted the risk of dying as a result of leaving AMA. Patient was provided dose of Xarelto prior to her discharge. Per hospital policy she was not provided any medications or follow-up appointments. Patient was notified that she needed to contact the [facility] for family medicine as soon as possible to schedule an outpatient appointment. AMA documentation signed prior to departure. Of note, patient has multiple episodes of leaving the hospital [address] Would recommend outpatient discussion regarding goals of care and desire for future medical therapy. A. fib with RVR  Patient presented with A. fib with RVR and rate sustained greater than 130. Started on esmolol and heparin drips per cardiology. After patient was unable to be weaned from esmolol, cardiology with recommendations for DC cardioversion, which was completed on 1/22. Patient remained in normal sinus rhythm for approximately 24 hours before converting back to A. fib.  At time of discharge, patient had been recently transition from [address] [place] [address] to Toprol-XL as part of her treatment regimen for her concomitant heart failure. She was started on anticoagulation with Xarelto given once daily dosing and potential for improved medication adherence. however, prior to patient leaving AMA, her rates were still poorly controlled. Recommend up titration of Toprol in outpatient setting and follow-up with electrophysiology. Patient will also need ongoing anticoagulation with Xarelto. HFrEF  Patient with a history of heart failure, GOKUL completed prior to cardioversion without evidence of LA thrombus and with ejection fraction of 25 to 30%. Noted to be fluid positive approximately 5 L in the setting of persistent diuresis. Given hypotension, patient's ARB was held. GDMT at time of patient leaving AMA included Toprol as monotherapy. Recommend volume status evaluation and discussion of further diuresis in outpatient setting. Right foot wound  History of osteomyelitis with amputation of fifth metatarsal. Wound care consulted without concern for current infection. Wound treated locally and patient did not require systemic antibiotics. T2DM:   A1c 8.5. Lantus 20 as monotherapy provided. Patient would likely benefit from mealtime insulin as well. History of polysubstance abuse  Noted on admission. UDS positive for sympathomimetic amines. Consults: Cardiology  Procedures: DCCV 1/22    Imaging:     Echocardiogram Complete (w/wo Contrast Or Bubble Study Per Protocol)  Result Date: 1/3/2021  · The left ventricle is mildly dilated. · The left ventricular systolic function is decreased (25 - 30%). · Left ventricular global hypokinesis. · Indeterminate left ventricular diastolic function. AF · The right ventricle is moderately dilated. · The right ventricular systolic function is moderately reduced. · The left atrium is moderately dilated. · The right atrium is dilated. · Mild mitral valve regurgitation. · Mild tricuspid valve regurgitation.      Xr Foot 3+ Vw Right  Result Date: 1/20/2021  1. Changes of amputation at the base of the fifth metatarsal. Some gas and edema is noted seen adjacent to the fifth metatarsal and lateral foot. 2. As above. Signed by: 1/20/2021 7:33 PM: Cherie Offer     Ct Angiogram Chest W Wo Contrast  Result Date: 1/20/2021  1. No evidence of acute pulmonary embolus through the segmental level. 2. Congestive failure pattern of disease with large right and moderate left pleural effusion and moderate alveolar and interstitial pulmonary edema. Additional findings of systemic fluid overload include ascites based on limited imaging the upper abdomen and diffuse subcutaneous soft tissue edema. Signed by: 1/20/2021 11:36 PM: DataRPM     Xr Chest 1 Vw  Result Date: 1/20/2021  EXAM: CHEST X-RAY SINGLE VIEW HISTORY: Free Text Reason and Hx;dyspnea; 39years of age. Difficulty breathing. COMPARISON: 1/3/2021 TECHNIQUE: Single AP view 1840 HOURS. FINDINGS: Lungs / Pleura: There are bilateral pulmonary opacities/infiltrates. The opacities are worse on the left as compared to 1/3/2021. Heart / Mediastinum: Mild cardiomegaly is evident. An atrial clip is noted. Bony thorax / Soft tissues: The bony thorax is intact. Lines / Support Apparatus: None     Bilateral pulmonary opacities/infiltrates. Small right pleural effusion. Signed by: 1/20/2021 6:58 PM: Cherie Offer     Transesophageal Echocardiogram (shelly) With Possible Cardioversion W And/or W/o Contrast Per Protocol  Result Date: 1/22/2021  · The left ventricular systolic function is decreased (25 - 30%). · Left ventricular global hypokinesis. · Indeterminate left ventricular diastolic function. · The right ventricle is moderately dilated. · The right ventricular systolic function is moderately reduced. · The left atrium is moderately dilated. · The right atrium is severely dilated. · There is no thrombus present in the left atrium. · Mild mitral valve regurgitation.  · Moderate tricuspid valve regurgitation.     Discharge Med List     Medications being administered prior to pt leaving AGAINST MEDICAL ADVICE:  -Aspirin 81 mg daily  -Atorvastatin 40 mg daily  -Lasix 60 mg IV twice daily  -Gabapentin 600 mg 3 times daily  -Lantus 20 units daily  -Metoprolol succinate 100 mg daily  -Protonix 40 mg daily  -Xarelto 20 mg daily         Past Medical History:   Diagnosis Date   • Acute on chronic systolic CHF (congestive heart failure) ([facility]) 1/30/2021   • CAD (coronary artery disease) 9/18/2019   • DM (diabetes mellitus) ([facility])    • Hyperglycemia due to type 2 diabetes mellitus ([facility]) 9/17/2019   • Other ill-defined conditions(799.89)     back pain      Past Surgical History:   Procedure Laterality Date   • HX ORTHOPAEDIC      back surgery 2000      No Known Allergies  Social History     Tobacco Use   • Smoking status: Current Every Day Smoker     Packs/day: 1.00   Substance Use Topics   • Alcohol use: No      FH: No family history on file.     Review of Systems  General: +weight gain, +weakness, no fever or chills, no diaphoresis  Skin: no rashes, lumps, +wound RLE, no other acute skin changes  HEENT: no headache, dizziness, lightheadedness, vision changes, hearing changes, tinnitus, vertigo, sinus pressure/pain, bleeding gums, sore throat, or hoarseness  Neck: no swollen glands, goiter, pain or stiffness  Respiratory: no cough, no congestion, no sputum, no hemoptysis, ++dyspnea, ++wheezing  Cardiovascular: + as per HPI, +PND, +orthopnea, +palpitations  Gastrointestinal: no increased reflux, no constipation, diarrhea, liver problems, GI bleeding, N/V, no abdominal pain, +abdominal swelling  Urinary: no frequency, urgency, hematuria, burning/pain with urination, flank pain, polyuria, nocturia, or difficulty urinating  Peripheral Vascular: no claudication, leg cramps, prior DVTs, ++BLE, no acute color change  Musculoskeletal: no muscle or joint pain/stiffness, joint swelling, erythema of joints, or back  pain  Psychiatric: no increased depression, anxiety or excessive stress, +substance abuse  Neurological: no sensory or motor loss, seizures, syncope, tremors, numbness, tingling, no changes in mood, attention, or speech, no changes in orientation, memory, insight, or judgment. Hematologic: no anemia, easy bruising or bleeding  Endocrine: no thyroid problems, no heat or cold intolerance, excessive sweating, polyuria, polydipsia, +diabetes. Objective:       Visit Vitals  /71   Pulse (!) 117   Temp 98.3 °F (36.8 °C)   Resp 11   Ht 6' 1\" (1.854 m)   Wt 90.7 kg (200 lb)   SpO2 93%   BMI 26.39 kg/m²       No intake/output data recorded. No intake/output data recorded. Physical Exam:  General: morbidly obese, mild respiratory distress, unhealthy appearance  HEENT: pupils equal and round, no abnormalities noted, sclera clear, EOMs intact  Neck: supple, unable to assess JVD due to habitus, trachea midline  Heart: S1S2 with IRIR without obvious murmurs, rubs or gallops (heart tones somewhat distant +coarse BS)  Lungs: coarse bilaterally, tachypnea on O2, +wheezing, appropriate sats  Abd: soft, nontender, not distended but edematous, +bowel sounds, morbidly obese  Ext: warm, 3+ pitting edema, diffuse erythema, pulses 1+ bilaterally, s/p R 5th toe amputation   Skin: warm and dry, intact to view  Psychiatric: appropriate mood and affect presently, cooperative  Neurologic: A&O X 3, moves all 4 equally, CNs intact      ECG: AFib RVR , NSST changes    ECHO: as above from [facility] last week    CXR: IMPRESSION: Findings most consistent with pulmonary edema with a right pleural effusion unchanged.     Data Review:      Recent Results (from the past 24 hour(s))   NT-PRO BNP    Collection Time: 01/30/21  3:14 AM   Result Value Ref Range    NT pro-BNP 3,769 (H) 5 - 125 PG/ML   MAGNESIUM    Collection Time: 01/30/21  3:14 AM   Result Value Ref Range    Magnesium 2.0 1.8 - 2.4 mg/dL   TROPONIN-HIGH SENSITIVITY Collection Time: 01/30/21  3:14 AM   Result Value Ref Range    Troponin-High Sensitivity 16.7 (H) 0 - 14 pg/mL   CBC WITH AUTOMATED DIFF    Collection Time: 01/30/21  3:16 AM   Result Value Ref Range    WBC 6.4 4.3 - 11.1 K/uL    RBC 4.29 4.05 - 5.2 M/uL    HGB 9.5 (L) 11.7 - 15.4 g/dL    HCT 35.0 (L) 35.8 - 46.3 %    MCV 81.6 79.6 - 97.8 FL    MCH 22.1 (L) 26.1 - 32.9 PG    MCHC 27.1 (L) 31.4 - 35.0 g/dL    RDW 18.6 (H) 11.9 - 14.6 %    PLATELET 512 712 - 304 K/uL    MPV 11.5 9.4 - 12.3 FL    ABSOLUTE NRBC 0.00 0.0 - 0.2 K/uL    DF AUTOMATED      NEUTROPHILS 63 43 - 78 %    LYMPHOCYTES 24 13 - 44 %    MONOCYTES 9 4.0 - 12.0 %    EOSINOPHILS 3 0.5 - 7.8 %    BASOPHILS 1 0.0 - 2.0 %    IMMATURE GRANULOCYTES 1 0.0 - 5.0 %    ABS. NEUTROPHILS 4.0 1.7 - 8.2 K/UL    ABS. LYMPHOCYTES 1.6 0.5 - 4.6 K/UL    ABS. MONOCYTES 0.6 0.1 - 1.3 K/UL    ABS. EOSINOPHILS 0.2 0.0 - 0.8 K/UL    ABS. BASOPHILS 0.1 0.0 - 0.2 K/UL    ABS. IMM. GRANS. 0.0 0.0 - 0.5 K/UL   METABOLIC PANEL, COMPREHENSIVE    Collection Time: 01/30/21  3:16 AM   Result Value Ref Range    Sodium 138 136 - 145 mmol/L    Potassium 3.7 3.5 - 5.1 mmol/L    Chloride 102 98 - 107 mmol/L    CO2 30 21 - 32 mmol/L    Anion gap 6 (L) 7 - 16 mmol/L    Glucose 259 (H) 65 - 100 mg/dL    BUN 19 6 - 23 MG/DL    Creatinine 1.08 (H) 0.6 - 1.0 MG/DL    GFR est AA >60 >60 ml/min/1.73m2    GFR est non-AA 58 (L) >60 ml/min/1.73m2    Calcium 8.4 8.3 - 10.4 MG/DL    Bilirubin, total 0.3 0.2 - 1.1 MG/DL    ALT (SGPT) 69 (H) 12 - 65 U/L    AST (SGOT) 31 15 - 37 U/L    Alk.  phosphatase 175 (H) 50 - 136 U/L    Protein, total 7.8 6.3 - 8.2 g/dL    Albumin 3.3 (L) 3.5 - 5.0 g/dL    Globulin 4.5 (H) 2.3 - 3.5 g/dL    A-G Ratio 0.7 (L) 1.2 - 3.5     EKG, 12 LEAD, INITIAL    Collection Time: 01/30/21  3:20 AM   Result Value Ref Range    Ventricular Rate 117 BPM    Atrial Rate 277 BPM    QRS Duration 72 ms    Q-T Interval 288 ms    QTC Calculation (Bezet) 401 ms    Calculated R Axis -102 degrees    Calculated T Axis 56 degrees    Diagnosis       !! AGE AND GENDER SPECIFIC ECG ANALYSIS !!  af with rvr  Inferior infarct , age undetermined  Anterolateral infarct (cited on or before 17-OCT-2018)  Nonspecific ST abnormality  Abnormal ECG  When compared with ECG of 26-NOV-2020 22:58,  Current undetermined rhythm precludes rhythm comparison, needs review  Nonspecific T wave abnormality, worse in Inferior leads  Confirmed by Stacie Galvez MD (), SADIA LUTHER ([id number]) on 1/30/2021 7:54:49 AM     DRUG SCREEN, URINE    Collection Time: 01/30/21  6:36 AM   Result Value Ref Range    PCP(PHENCYCLIDINE) Negative      BENZODIAZEPINES Negative      COCAINE Negative      AMPHETAMINES Positive      METHADONE Negative      THC (TH-CANNABINOL) Positive      OPIATES Negative      BARBITURATES Negative     URINALYSIS W/ RFLX MICROSCOPIC    Collection Time: 01/30/21  6:36 AM   Result Value Ref Range    Color YELLOW      Appearance CLEAR      Specific gravity 1.017 1.001 - 1.023      pH (UA) 5.5 5.0 - 9.0      Protein Negative NEG mg/dL    Glucose Negative mg/dL    Ketone Negative NEG mg/dL    Bilirubin Negative NEG      Blood MODERATE (A) NEG      Urobilinogen 0.2 0.2 - 1.0 EU/dL    Nitrites Negative NEG      Leukocyte Esterase Negative NEG      WBC 5-10 0 /hpf    RBC 20-50 0 /hpf    Epithelial cells 0-3 0 /hpf    Bacteria 0 0 /hpf    Casts 0-3 0 /lpf         Assessment/Plan:   Principal Problem:    Acute on chronic systolic CHF (congestive heart failure) -- admit to tele, cont diuresis with IVP Lasix (was getting 60mg IV BID at [facility]), follow daily labs/lytes, strict I&Os (has echols now), daily weights, FR, cont BB, not on ACEi/ARB due to hypotension on these during recent admit at [facility], re-eval and try again as clinically appropriate, recent ECHO noted, maximize meds as tolerated, needs RDU cessation    Active Problems:    Hyperglycemia due to type 2 diabetes mellitus -- cont home Lantus, SSI PRN      CAD (coronary artery disease) -- s/p CABG x4, cont ASA, BB and statin      Tobacco abuse -- cessation imperative and discussed      Methamphetamine abuse -- check UDS, cessation imperative and discussed      Atrial fibrillation with RVR -- cont IV Diltiazem gtt, resume BB, daily labs/lytes and supplement to keep K>4, Mag>2, likely exacerbated by continued methamphetamine abuse, was on Xarelto and will resume, importance of med compliance discussed    Pt has a known habit of leaving AMA after a few days in the hospital -- so that she can use meth -- states that she wants to stop using meth. Discussed she will not get better if she continues to leave AMA as she will not be given Rx's when this happens. Discussed that with her degree of heart failure, missing several days of meds is critical. Will ask CM to be involved and assist with OP options for rehab and med assistance.          Jairo Sherman, PRISCILLAC  1/30/2021

## 2021-01-30 NOTE — PROGRESS NOTES
Physical Exam  Cardiovascular:      Pulses:           Dorsalis pedis pulses are 1+ on the right side and 1+ on the left side. Posterior tibial pulses are 1+ on the right side and 1+ on the left side. Musculoskeletal:        Feet:    Feet:      Right foot:      Skin integrity: Erythema, callus and dry skin present. Comments: Dual skin assessment completed with RN. Skin:            Comments: Multiple tattoos over body.

## 2021-01-30 NOTE — CONSULTS
PULMONARY/CCM CONSULT :  1/30/2021     Date of Admission:  1/30/2021    This patient has been seen and evaluated at the request of Dr. Diann Florez. Patient is a 39 y.o.  female presents with a PMHx of CAD (s/p CABG x 4), HTN, HLD, DM II, HFrEF (25-30%), tobacco,and methamphetamine abuse who presented to the ED with c/o progressive SOB and BLE over the past 3 weeks. She was recently admitted to [facility] from 01/20/21-01/24/21 but left AMA.      Per chart she h ad MI/cardiac arrest (VFib) 3 years ago (possibly due to OD) and ultimately underwent LHC --> CABG x4 11/2017. Post-op course complicated by sternal infection. She reports no PCP and does not follow-up routinely. Does not take her meds routinely either. Reports current tobacco abuse and 25+yr hx of meth use. She states that she wants to quit using meth. Pulmonary was consulted for bilateral pleural effusions with R>L. She has diffuse anasarca up to her waist with 4+BLE pitting edema. She just recently converted into SR on monitor, where she was in A fib and is currently on a Cardizem drip in the stepdown unit. She is on 3 LPM NC. She denies any chest pain. C/O edema, SOB, nonproductive cough. Denies fevers or sick contacts. Will plan to cont lasix for now. She is on xarelto, so unable to perform thoracentesis today. This will need to be held for 48 hours prior to thoracentesis, if a thoracentesis is indicated. Pulmonary will follow. Past Surgical History:   Procedure Laterality Date    HX ORTHOPAEDIC      back surgery 2000      Social History     Tobacco Use    Smoking status: Current Every Day Smoker     Packs/day: 1.00   Substance Use Topics    Alcohol use: No      No family history on file. No Known Allergies   Prior to Admission Medications   Prescriptions Last Dose Informant Patient Reported? Taking?    HYDROcodone-acetaminophen (NORCO) 7.5-325 mg per tablet   No No   Sig: Take 1 Tab by mouth every six (6) hours as needed for Pain. Max Daily Amount: 4 Tabs.   gabapentin (NEURONTIN) 600 mg tablet   Yes No   Sig: Take 600 mg by mouth three (3) times daily. Facility-Administered Medications: None       MEDS SCHEDULED:    Current Facility-Administered Medications   Medication Dose Route Frequency    metoprolol succinate (TOPROL-XL) XL tablet 100 mg  100 mg Oral DAILY    ondansetron (ZOFRAN) injection 4 mg  4 mg IntraVENous Q6H PRN    acetaminophen (TYLENOL) tablet 650 mg  650 mg Oral Q6H PRN    furosemide (LASIX) injection 60 mg  60 mg IntraVENous Q12H    nitroglycerin (NITROBID) 2 % ointment 1 Inch  1 Inch Topical Q6H    docusate sodium (COLACE) capsule 100 mg  100 mg Oral BID PRN    insulin lispro (HUMALOG) injection   SubCUTAneous AC&HS    insulin glargine (LANTUS) injection 20 Units  20 Units SubCUTAneous QHS    aspirin chewable tablet 81 mg  81 mg Oral DAILY    atorvastatin (LIPITOR) tablet 40 mg  40 mg Oral QHS    gabapentin (NEURONTIN) capsule 600 mg  600 mg Oral TID    pantoprazole (PROTONIX) tablet 40 mg  40 mg Oral ACB    rivaroxaban (XARELTO) tablet 20 mg  20 mg Oral DAILY WITH BREAKFAST    alum-mag hydroxide-simeth (MYLANTA) oral suspension 30 mL  30 mL Oral Q4H PRN    diphenhydrAMINE (BENADRYL) capsule 25 mg  25 mg Oral Q6H PRN    HYDROcodone-acetaminophen (NORCO) 7.5-325 mg per tablet 1 Tab  1 Tab Oral Q6H PRN    dilTIAZem (CARDIZEM) 100 mg in 0.9% sodium chloride (MBP/ADV) 100 mL infusion  0-15 mg/hr IntraVENous TITRATE         Review of Systems  Pertinent items are noted in HPI. Objective:     Vitals:    01/30/21 1029 01/30/21 1055 01/30/21 1100 01/30/21 1200   BP: 117/83 98/61 122/70 101/80   Pulse: (!) 101 (!) 121 (!) 112    Resp: 17 12 22    Temp:  97.3 °F (36.3 °C)     SpO2: 96% 95% 95%    Weight:       Height:         01/30 0701 - 01/30 1900  In: 240 [P.O.:240]  Out: 600 [Urine:600]  No intake/output data recorded.       PHYSICAL EXAM     Physical Exam:   General:  Alert, cooperative, no acute distress, appears stated age. Eyes:  Conjunctivae/corneas clear. Nose: Nares patent and moist.    Mouth/Throat: Lips, mucosa, and tongue pink and intact. Neck: Supple, symmetrical.   Respiratory:   Diminished    Cardiovascular:  Regular rate and rhythm, S1, S2, no murmur, click, rub or gallop. Telemetry monitor: A fib/SR on monitor   GI:   Abdomen soft, non-tender. Bowel sounds active X 4 Q. No masses,     Musculoskeletal: Extremities symmetrical, atraumatic, no cyanosis, 4+ pitting BLE edema. Pulses: 2+ and symmetric all extremities. Skin: Skin color, texture, turgor normal. No rashes or lesions anasarca. Chronic venous changes noted BLE        Neurologic: 2+ strength bilateral upper and lower extremities, sensation throughout appropriate. Alert and oriented. CHEST X-RAYS:  01/30/2021        ECHO:   09/2019  SUMMARY:     -  Left ventricle: Systolic function was mildly reduced. Ejection fraction   was  estimated in the range of 40 % to 45 %. There is moderate to severe   hypokinesis  noted of the mid to apical septum. LABS    Recent Labs     01/30/21 0316   WBC 6.4   HGB 9.5*   HCT 35.0*        Recent Labs     01/30/21  0316 01/30/21  0314     --    K 3.7  --      --    *  --    CO2 30  --    BUN 19  --    CREA 1.08*  --    MG  --  2.0     No results for input(s): PH, PCO2, PO2, HCO3 in the last 72 hours.     Assessment:     Hospital Problems  Never Reviewed          Codes Class Noted POA    * (Principal) Acute on chronic systolic CHF (congestive heart failure) ([facility] ICD-10-CM: I50.23  ICD-9-CM: 428.23, 428.0  1/30/2021 Yes    Need diuresis      Tobacco abuse (Chronic) ICD-10-CM: Z72.0  ICD-9-CM: 305.1  1/30/2021 Yes        Methamphetamine abuse (HCC) (Chronic) ICD-10-CM: F15.10  ICD-9-CM: 305.70  1/30/2021 Yes        Atrial fibrillation with RVR (HCC) ICD-10-CM: I48.91  ICD-9-CM: 427.31  1/30/2021 Yes    On cardizem drip      CAD (coronary artery disease) (Chronic) ICD-10-CM: I25.10  ICD-9-CM: 414.00  9/18/2019 Yes        Hyperglycemia due to type 2 diabetes mellitus (HCC) (Chronic) ICD-10-CM: E11.65  ICD-9-CM: 250.00  9/17/2019 Yes            Anasarca    Pleural effusions      Plan:   --CXR tomorrow AM  --monitor electrolytes with lasix, she needs aggressive diuresis  --monitor I/Os  --if CXR not improved will plan to hold xarelto starting tomorrow, this will need to be held x 48 hours prior to thoracentesis since risk of bleeding.   --pulm will follow   --empiric CPAP qHS, she likely has HOLLEY/OHV. Could benefit from sleep study, but given PMH of non-compliance unsure if she would follow up with this. Marguerite Ferguson MD    More than 50% of time documented was spent in face-to-face contact with the patient and in the care of the patient on the floor/unit where the patient is located.

## 2021-01-30 NOTE — ED TRIAGE NOTES
Pt arrives via [facility] from home. Complains of SOB and lower extremity and abdominal swelling. Reports hx of CHF and compliance with Lasix regimen. EMS administered albuterol tx. Pt reports recent hospital admission for SOB and fluid retention.  Denies cp, n/v/d.

## 2021-01-30 NOTE — H&P (VIEW-ONLY)
PULMONARY/CCM CONSULT :  1/30/2021     Date of Admission:  1/30/2021    This patient has been seen and evaluated at the request of Dr. Perri Solomon. Patient is a 39 y.o.  female presents with a PMHx of CAD (s/p CABG x 4), HTN, HLD, DM II, HFrEF (25-30%), tobacco,and methamphetamine abuse who presented to the ED with c/o progressive SOB and BLE over the past 3 weeks. She was recently admitted to [facility] from 01/20/21-01/24/21 but left AMA.      Per chart she h ad MI/cardiac arrest (VFib) 3 years ago (possibly due to OD) and ultimately underwent LHC --> CABG x4 11/2017. Post-op course complicated by sternal infection. She reports no PCP and does not follow-up routinely. Does not take her meds routinely either. Reports current tobacco abuse and 25+yr hx of meth use. She states that she wants to quit using meth. Pulmonary was consulted for bilateral pleural effusions with R>L. She has diffuse anasarca up to her waist with 4+BLE pitting edema. She just recently converted into SR on monitor, where she was in A fib and is currently on a Cardizem drip in the stepdown unit. She is on 3 LPM NC. She denies any chest pain. C/O edema, SOB, nonproductive cough. Denies fevers or sick contacts. Will plan to cont lasix for now. She is on xarelto, so unable to perform thoracentesis today. This will need to be held for 48 hours prior to thoracentesis, if a thoracentesis is indicated. Pulmonary will follow. Past Surgical History:   Procedure Laterality Date    HX ORTHOPAEDIC      back surgery 2000      Social History     Tobacco Use    Smoking status: Current Every Day Smoker     Packs/day: 1.00   Substance Use Topics    Alcohol use: No      No family history on file. No Known Allergies   Prior to Admission Medications   Prescriptions Last Dose Informant Patient Reported? Taking?    HYDROcodone-acetaminophen (NORCO) 7.5-325 mg per tablet   No No   Sig: Take 1 Tab by mouth every six (6) hours as needed for Pain. Max Daily Amount: 4 Tabs.   gabapentin (NEURONTIN) 600 mg tablet   Yes No   Sig: Take 600 mg by mouth three (3) times daily.      Facility-Administered Medications: None       MEDS SCHEDULED:    Current Facility-Administered Medications   Medication Dose Route Frequency   • metoprolol succinate (TOPROL-XL) XL tablet 100 mg  100 mg Oral DAILY   • ondansetron (ZOFRAN) injection 4 mg  4 mg IntraVENous Q6H PRN   • acetaminophen (TYLENOL) tablet 650 mg  650 mg Oral Q6H PRN   • furosemide (LASIX) injection 60 mg  60 mg IntraVENous Q12H   • nitroglycerin (NITROBID) 2 % ointment 1 Inch  1 Inch Topical Q6H   • docusate sodium (COLACE) capsule 100 mg  100 mg Oral BID PRN   • insulin lispro (HUMALOG) injection   SubCUTAneous AC&HS   • insulin glargine (LANTUS) injection 20 Units  20 Units SubCUTAneous QHS   • aspirin chewable tablet 81 mg  81 mg Oral DAILY   • atorvastatin (LIPITOR) tablet 40 mg  40 mg Oral QHS   • gabapentin (NEURONTIN) capsule 600 mg  600 mg Oral TID   • pantoprazole (PROTONIX) tablet 40 mg  40 mg Oral ACB   • rivaroxaban (XARELTO) tablet 20 mg  20 mg Oral DAILY WITH BREAKFAST   • alum-mag hydroxide-simeth (MYLANTA) oral suspension 30 mL  30 mL Oral Q4H PRN   • diphenhydrAMINE (BENADRYL) capsule 25 mg  25 mg Oral Q6H PRN   • HYDROcodone-acetaminophen (NORCO) 7.5-325 mg per tablet 1 Tab  1 Tab Oral Q6H PRN   • dilTIAZem (CARDIZEM) 100 mg in 0.9% sodium chloride (MBP/ADV) 100 mL infusion  0-15 mg/hr IntraVENous TITRATE         Review of Systems  Pertinent items are noted in HPI.    Objective:     Vitals:    01/30/21 1029 01/30/21 1055 01/30/21 1100 01/30/21 1200   BP: 117/83 98/61 122/70 101/80   Pulse: (!) 101 (!) 121 (!) 112    Resp: 17 12 22    Temp:  97.3 °F (36.3 °C)     SpO2: 96% 95% 95%    Weight:       Height:         01/30 0701 - 01/30 1900  In: 240 [P.O.:240]  Out: 600 [Urine:600]  No intake/output data recorded.      PHYSICAL EXAM     Physical Exam:   General:  Alert, cooperative, no acute  distress, appears stated age. Eyes:  Conjunctivae/corneas clear. Nose: Nares patent and moist.    Mouth/Throat: Lips, mucosa, and tongue pink and intact. Neck: Supple, symmetrical.   Respiratory:   Diminished    Cardiovascular:  Regular rate and rhythm, S1, S2, no murmur, click, rub or gallop. Telemetry monitor: A fib/SR on monitor   GI:   Abdomen soft, non-tender. Bowel sounds active X 4 Q. No masses,     Musculoskeletal: Extremities symmetrical, atraumatic, no cyanosis, 4+ pitting BLE edema. Pulses: 2+ and symmetric all extremities. Skin: Skin color, texture, turgor normal. No rashes or lesions anasarca. Chronic venous changes noted BLE        Neurologic: 2+ strength bilateral upper and lower extremities, sensation throughout appropriate. Alert and oriented. CHEST X-RAYS:  01/30/2021        ECHO:   09/2019  SUMMARY:     -  Left ventricle: Systolic function was mildly reduced. Ejection fraction   was  estimated in the range of 40 % to 45 %. There is moderate to severe   hypokinesis  noted of the mid to apical septum. LABS    Recent Labs     01/30/21 0316   WBC 6.4   HGB 9.5*   HCT 35.0*        Recent Labs     01/30/21  0316 01/30/21  0314     --    K 3.7  --      --    *  --    CO2 30  --    BUN 19  --    CREA 1.08*  --    MG  --  2.0     No results for input(s): PH, PCO2, PO2, HCO3 in the last 72 hours.     Assessment:     Hospital Problems  Never Reviewed          Codes Class Noted POA    * (Principal) Acute on chronic systolic CHF (congestive heart failure) ([facility] [address] ICD-10-CM: I50.23  ICD-9-CM: 428.23, 428.0  1/30/2021 Yes    Need diuresis      Tobacco abuse (Chronic) ICD-10-CM: Z72.0  ICD-9-CM: 305.1  1/30/2021 Yes        Methamphetamine abuse (HCC) (Chronic) ICD-10-CM: F15.10  ICD-9-CM: 305.70  1/30/2021 Yes        Atrial fibrillation with RVR (HCC) ICD-10-CM: I48.91  ICD-9-CM: 427.31  1/30/2021 Yes    On cardizem drip      CAD (coronary artery disease) (Chronic) ICD-10-CM: I25.10  ICD-9-CM: 414.00  9/18/2019 Yes        Hyperglycemia due to type 2 diabetes mellitus (HCC) (Chronic) ICD-10-CM: E11.65  ICD-9-CM: 250.00  9/17/2019 Yes            Anasarca    Pleural effusions      Plan:   --CXR tomorrow AM  --monitor electrolytes with lasix, she needs aggressive diuresis  --monitor I/Os  --if CXR not improved will plan to hold xarelto starting tomorrow, this will need to be held x 48 hours prior to thoracentesis since risk of bleeding.   --pulm will follow   --empiric CPAP qHS, she likely has HOLLEY/OHV. Could benefit from sleep study, but given PMH of non-compliance unsure if she would follow up with this. César Henley MD    More than 50% of time documented was spent in face-to-face contact with the patient and in the care of the patient on the floor/unit where the patient is located.

## 2021-01-30 NOTE — PROGRESS NOTES
I awoke patient to readjust ECG leads. She c/o of leg pain again. Pt several times has c/o pain and then when I return to room she is somnolent and back to \"sleep\". In an effort not to compromise her airway I have not provided pain medication until now. She rates her pain 9/10 dull constant pain. Lortab provided. Pt O2sat 92% on 4 L NC. Encouraged patient to take deep breaths and Dr. Baldemar Iglesias at bedside encouraged her to do the same.

## 2021-01-31 ENCOUNTER — APPOINTMENT (OUTPATIENT)
Dept: GENERAL RADIOLOGY | Age: 46
DRG: 194 | End: 2021-01-31
Attending: NURSE PRACTITIONER
Payer: MEDICAID

## 2021-01-31 PROBLEM — J90 PLEURAL EFFUSION, BILATERAL: Status: ACTIVE | Noted: 2021-01-31

## 2021-01-31 PROBLEM — R60.1 ANASARCA: Status: ACTIVE | Noted: 2021-01-31

## 2021-01-31 LAB
ANION GAP SERPL CALC-SCNC: 6 MMOL/L (ref 7–16)
BUN SERPL-MCNC: 23 MG/DL (ref 6–23)
CALCIUM SERPL-MCNC: 8.5 MG/DL (ref 8.3–10.4)
CHLORIDE SERPL-SCNC: 98 MMOL/L (ref 98–107)
CO2 SERPL-SCNC: 28 MMOL/L (ref 21–32)
CREAT SERPL-MCNC: 1.59 MG/DL (ref 0.6–1)
ERYTHROCYTE [DISTWIDTH] IN BLOOD BY AUTOMATED COUNT: 18.6 % (ref 11.9–14.6)
GLUCOSE BLD STRIP.AUTO-MCNC: 204 MG/DL (ref 65–100)
GLUCOSE BLD STRIP.AUTO-MCNC: 213 MG/DL (ref 65–100)
GLUCOSE BLD STRIP.AUTO-MCNC: 214 MG/DL (ref 65–100)
GLUCOSE BLD STRIP.AUTO-MCNC: 287 MG/DL (ref 65–100)
GLUCOSE SERPL-MCNC: 253 MG/DL (ref 65–100)
HCT VFR BLD AUTO: 32.2 % (ref 35.8–46.3)
HGB BLD-MCNC: 8.9 G/DL (ref 11.7–15.4)
MAGNESIUM SERPL-MCNC: 2.2 MG/DL (ref 1.8–2.4)
MCH RBC QN AUTO: 22.5 PG (ref 26.1–32.9)
MCHC RBC AUTO-ENTMCNC: 27.6 G/DL (ref 31.4–35)
MCV RBC AUTO: 81.3 FL (ref 79.6–97.8)
NRBC # BLD: 0 K/UL (ref 0–0.2)
PLATELET # BLD AUTO: 188 K/UL (ref 150–450)
PMV BLD AUTO: 11.3 FL (ref 9.4–12.3)
POTASSIUM SERPL-SCNC: 4.6 MMOL/L (ref 3.5–5.1)
POTASSIUM SERPL-SCNC: 4.8 MMOL/L (ref 3.5–5.1)
RBC # BLD AUTO: 3.96 M/UL (ref 4.05–5.2)
SODIUM SERPL-SCNC: 132 MMOL/L (ref 136–145)
SODIUM UR-SCNC: 30 MMOL/L
WBC # BLD AUTO: 6.9 K/UL (ref 4.3–11.1)

## 2021-01-31 PROCEDURE — 74011250637 HC RX REV CODE- 250/637: Performed by: INTERNAL MEDICINE

## 2021-01-31 PROCEDURE — 83735 ASSAY OF MAGNESIUM: CPT

## 2021-01-31 PROCEDURE — 74011636637 HC RX REV CODE- 636/637: Performed by: NURSE PRACTITIONER

## 2021-01-31 PROCEDURE — 84132 ASSAY OF SERUM POTASSIUM: CPT

## 2021-01-31 PROCEDURE — 74011250637 HC RX REV CODE- 250/637: Performed by: NURSE PRACTITIONER

## 2021-01-31 PROCEDURE — 82962 GLUCOSE BLOOD TEST: CPT

## 2021-01-31 PROCEDURE — 74011250636 HC RX REV CODE- 250/636: Performed by: INTERNAL MEDICINE

## 2021-01-31 PROCEDURE — 85027 COMPLETE CBC AUTOMATED: CPT

## 2021-01-31 PROCEDURE — 84300 ASSAY OF URINE SODIUM: CPT

## 2021-01-31 PROCEDURE — 99232 SBSQ HOSP IP/OBS MODERATE 35: CPT | Performed by: INTERNAL MEDICINE

## 2021-01-31 PROCEDURE — 74011250636 HC RX REV CODE- 250/636: Performed by: NURSE PRACTITIONER

## 2021-01-31 PROCEDURE — 36415 COLL VENOUS BLD VENIPUNCTURE: CPT

## 2021-01-31 PROCEDURE — 65660000000 HC RM CCU STEPDOWN

## 2021-01-31 PROCEDURE — 71045 X-RAY EXAM CHEST 1 VIEW: CPT

## 2021-01-31 PROCEDURE — 80048 BASIC METABOLIC PNL TOTAL CA: CPT

## 2021-01-31 RX ORDER — FUROSEMIDE 10 MG/ML
80 INJECTION INTRAMUSCULAR; INTRAVENOUS 2 TIMES DAILY
Status: DISCONTINUED | OUTPATIENT
Start: 2021-01-31 | End: 2021-02-02 | Stop reason: HOSPADM

## 2021-01-31 RX ORDER — POLYETHYLENE GLYCOL 3350 17 G/17G
17 POWDER, FOR SOLUTION ORAL 2 TIMES DAILY
Status: DISCONTINUED | OUTPATIENT
Start: 2021-01-31 | End: 2021-02-02 | Stop reason: HOSPADM

## 2021-01-31 RX ORDER — DILTIAZEM HYDROCHLORIDE 30 MG/1
30 TABLET, FILM COATED ORAL
Status: DISCONTINUED | OUTPATIENT
Start: 2021-01-31 | End: 2021-02-02 | Stop reason: HOSPADM

## 2021-01-31 RX ADMIN — FUROSEMIDE 80 MG: 10 INJECTION, SOLUTION INTRAMUSCULAR; INTRAVENOUS at 21:50

## 2021-01-31 RX ADMIN — INSULIN LISPRO 6 UNITS: 100 INJECTION, SOLUTION INTRAVENOUS; SUBCUTANEOUS at 08:16

## 2021-01-31 RX ADMIN — NITROGLYCERIN 1 INCH: 20 OINTMENT TOPICAL at 00:09

## 2021-01-31 RX ADMIN — POLYETHYLENE GLYCOL 3350 17 G: 17 POWDER, FOR SOLUTION ORAL at 18:13

## 2021-01-31 RX ADMIN — INSULIN LISPRO 4 UNITS: 100 INJECTION, SOLUTION INTRAVENOUS; SUBCUTANEOUS at 16:07

## 2021-01-31 RX ADMIN — ASPIRIN 81 MG: 81 TABLET, CHEWABLE ORAL at 08:16

## 2021-01-31 RX ADMIN — HYDROCODONE BITARTRATE AND ACETAMINOPHEN 1 TABLET: 7.5; 325 TABLET ORAL at 22:42

## 2021-01-31 RX ADMIN — GABAPENTIN 600 MG: 300 CAPSULE ORAL at 15:57

## 2021-01-31 RX ADMIN — GABAPENTIN 600 MG: 300 CAPSULE ORAL at 21:49

## 2021-01-31 RX ADMIN — GABAPENTIN 600 MG: 300 CAPSULE ORAL at 08:16

## 2021-01-31 RX ADMIN — INSULIN LISPRO 4 UNITS: 100 INJECTION, SOLUTION INTRAVENOUS; SUBCUTANEOUS at 21:47

## 2021-01-31 RX ADMIN — PANTOPRAZOLE SODIUM 40 MG: 40 TABLET, DELAYED RELEASE ORAL at 06:13

## 2021-01-31 RX ADMIN — INSULIN GLARGINE 20 UNITS: 100 INJECTION, SOLUTION SUBCUTANEOUS at 21:48

## 2021-01-31 RX ADMIN — INSULIN LISPRO 4 UNITS: 100 INJECTION, SOLUTION INTRAVENOUS; SUBCUTANEOUS at 12:23

## 2021-01-31 RX ADMIN — DILTIAZEM HYDROCHLORIDE 30 MG: 30 TABLET, FILM COATED ORAL at 12:18

## 2021-01-31 RX ADMIN — ATORVASTATIN CALCIUM 40 MG: 40 TABLET, FILM COATED ORAL at 21:49

## 2021-01-31 RX ADMIN — HYDROCODONE BITARTRATE AND ACETAMINOPHEN 1 TABLET: 7.5; 325 TABLET ORAL at 00:09

## 2021-01-31 RX ADMIN — ONDANSETRON 4 MG: 2 INJECTION INTRAMUSCULAR; INTRAVENOUS at 03:10

## 2021-01-31 RX ADMIN — FUROSEMIDE 80 MG: 10 INJECTION, SOLUTION INTRAMUSCULAR; INTRAVENOUS at 15:57

## 2021-01-31 NOTE — PROGRESS NOTES
1/31/2021 10:22 AM    Admit Date: 1/30/2021    Admit Diagnosis: Acute on chronic systolic CHF (congestive heart failure) ([facility]) [I50.23]      Subjective:   No cp- breathing better      Objective:      Visit Vitals  BP 96/80   Pulse 82   Temp 97.3 °F (36.3 °C)   Resp 14   Ht 6' 1\" (1.854 m)   Wt 320 lb 3.2 oz (145.2 kg)   SpO2 93%   BMI 42.25 kg/m²       Physical Exam:  [place], Well Nourished, No Acute Distress, Alert & Oriented x 3, appropriate mood. Neck- supple, no JVD  CV- irregular rate and rhythm no MRG  Lung- clear bilaterally  Abd- soft, nontender, nondistended  Ext- 2-3 plus edema bilaterally. Skin- warm and dry        Data Review:   Recent Labs     01/31/21  0306   *   K 4.8   BUN 23   CREA 1.59*   WBC 6.9   HGB 8.9*   HCT 32.2*          Assessment/Plan:     Principal Problem:    Acute on chronic systolic CHF (congestive [facility]) ([facility] (1/30/2021)Improved with current therapy.  Will continue medications   cr worse- hold lasix and ask renal to see    Active Problems:    Hyperglycemia due to type 2 diabetes mellitus ([facility] (9/17/2019)      CAD (coronary artery disease) (9/18/2019)      Tobacco abuse (1/30/2021)      Methamphetamine abuse ([facility] (1/30/2021)      Atrial fibrillation with RVR ([facility] (1/30/2021)rate better- stop cardizem gtt and start po cardizem

## 2021-01-31 NOTE — PROGRESS NOTES
Perfect Serve message sent to Dr. Carter Donovan for consult:  POOR UOP with Diuretics +3 edema IN BLE and Creatine elevated.

## 2021-01-31 NOTE — PROGRESS NOTES
Daily Progress Note: 1/31/2021    Gavino Eddy   Admission Date: 1/30/2021         The patient's chart is reviewed and the patient is discussed with the staff. Ms. Alma Delia Rosen is a 38 yo F with a PMH of polysubstance abuse including methamphetamies who presented with worsening SOB and progressive lower extremity edema. Pulmonary was consulted for bilateral pleural effusions with R>L. She has diffuse anasarca up to her waist with 4+BLE pitting edema. Neph following for KAIDEN/volume overload       Subjective:   No acute changes overnight:   Lasix 80mg BID: put out 1450 last 24hr. BTNP 3769  +4 pitting edema BLE: anasarka  No resp distress on  My exam.  Reena Loosen on hold for possible thoracentesis tomorrow.  (been on hold x2days)    Current Facility-Administered Medications   Medication Dose Route Frequency    dilTIAZem IR (CARDIZEM) tablet 30 mg  30 mg Oral TIDAC    furosemide (LASIX) injection 80 mg  80 mg IntraVENous BID    metoprolol succinate (TOPROL-XL) XL tablet 100 mg  100 mg Oral DAILY    ondansetron (ZOFRAN) injection 4 mg  4 mg IntraVENous Q6H PRN    acetaminophen (TYLENOL) tablet 650 mg  650 mg Oral Q6H PRN    docusate sodium (COLACE) capsule 100 mg  100 mg Oral BID PRN    insulin lispro (HUMALOG) injection   SubCUTAneous AC&HS    insulin glargine (LANTUS) injection 20 Units  20 Units SubCUTAneous QHS    aspirin chewable tablet 81 mg  81 mg Oral DAILY    atorvastatin (LIPITOR) tablet 40 mg  40 mg Oral QHS    gabapentin (NEURONTIN) capsule 600 mg  600 mg Oral TID    pantoprazole (PROTONIX) tablet 40 mg  40 mg Oral ACB    [Held by provider] rivaroxaban (XARELTO) tablet 20 mg  20 mg Oral DAILY WITH BREAKFAST    alum-mag hydroxide-simeth (MYLANTA) oral suspension 30 mL  30 mL Oral Q4H PRN    diphenhydrAMINE (BENADRYL) capsule 25 mg  25 mg Oral Q6H PRN    HYDROcodone-acetaminophen (NORCO) 7.5-325 mg per tablet 1 Tab  1 Tab Oral Q6H PRN       Review of Systems    Constitutional: negative for fever, chills, sweats  RESP: +SOB  Cardiovascular:  negative for chest pain, palpitations, syncope, edema  Gastrointestinal:  negative for dysphagia, reflux, vomiting, diarrhea, abdominal pain, or melena  Neurologic:  negative for focal weakness, numbness, headache      Objective:     Vitals:    01/31/21 0356 01/31/21 0527 01/31/21 0709 01/31/21 1116   BP: 116/83  96/80 93/79   Pulse: 82  82 96   Resp:   14 14   Temp:   97.3 °F (36.3 °C) 97.4 °F (36.3 °C)   SpO2:   93% 95%   Weight:  320 lb 3.2 oz (145.2 kg)     Height:           Intake/Output Summary (Last 24 hours) at 1/31/2021 1227  Last data filed at 1/31/2021 1209  Gross per 24 hour   Intake 2546 ml   Output 850 ml   Net 1696 ml     Physical Exam:          Constitutional:  the patient is well developed and in no acute distress  EENMT:  Sclera clear, pupils equal, oral mucosa moist  Respiratory: decreased bilat, but otherwise CTA  Cardiovascular:  Irregular, no MGR  Gastrointestinal: soft and non-tender; with positive bowel sounds. Musculoskeletal: warm without cyanosis. There is +4 pitting lower extremity edema. and generalized anasarka. Skin:  no jaundice or rashes  Neurologic: no gross neuro deficits     Psychiatric:  alert and oriented x 3    LINES:  IV    DRIPS:  None. CXR:   1-31-21        CXR: 1-30-21      LAB  Recent Labs     01/31/21  1114 01/31/21  0617 01/30/21  2056 01/30/21  1535 01/30/21  1121   GLUCPOC 204* 287* 218* 258* 229*      Recent Labs     01/31/21  0306 01/30/21  0316   WBC 6.9 6.4   HGB 8.9* 9.5*   HCT 32.2* 35.0*    184     Recent Labs     01/31/21  0306 01/30/21  0316 01/30/21  0314   * 138  --    K 4.8 3.7  --    CL 98 102  --    CO2 28 30  --    * 259*  --    BUN 23 19  --    CREA 1.59* 1.08*  --    MG 2.2  --  2.0   CA 8.5 8.4  --    ALB  --  3.3*  --    TBILI  --  0.3  --    ALT  --  69*  --      No results for input(s): PH, PCO2, PO2, HCO3, PHI, PCO2I, PO2I, HCO3I in the last 72 hours.   No results for input(s): LCAD, LAC in the last 72 hours. No results for input(s): PH, PCO2, PO2, HCO3, PHI, PCO2I, PO2I, HCO3I in the last 72 hours. Patient Active Problem List   Diagnosis Code    Cellulitis L03.90    Hyperglycemia due to type 2 diabetes mellitus (HCC) E11.65    CAD (coronary artery disease) I25.10    Acute on chronic systolic CHF (congestive heart failure) (HCC) I50.23    Tobacco abuse Z72.0    [facility] F15.10    Atrial fibrillation with RVR ([facility] I48.91         Assessment:  (Medical Decision Making)     Hospital Problems  Never Reviewed          Codes Class Noted POA    * (Principal) Acute on chronic systolic CHF (congestive heart failure) ([facility] [address] ICD-10-CM: I50.23  ICD-9-CM: 428.23, 428.0  1/30/2021 Yes        Tobacco abuse (Chronic) ICD-10-CM: Z72.0  ICD-9-CM: 305.1  1/30/2021 Yes        Methamphetamine abuse (HCC) (Chronic) ICD-10-CM: F15.10  ICD-9-CM: 305.70  1/30/2021 Yes        Atrial fibrillation with RVR ([facility] ICD-10-CM: I48.91  ICD-9-CM: 427.31  1/30/2021 Yes        CAD (coronary artery disease) (Chronic) ICD-10-CM: I25.10  ICD-9-CM: 414.00  9/18/2019 Yes        Hyperglycemia due to type 2 diabetes mellitus (HCC) (Chronic) ICD-10-CM: E11.65  ICD-9-CM: 250.00  9/17/2019 Yes            Anasarca with pleural effusion      Plan:  (Medical Decision Making)     --CXR this am improved but bilat effusions remain, will repeat CXR in am.   - Hold Xarelto for now: poss thoracentesis in am. Will discuss with Dr. Soledad Calhoun. - INR/PTT in am.   --monitor electrolytes with lasix, she needs aggressive diuresis  --monitor I/Os  --empiric CPAP qHS, she likely has HOLLEY/OHV. Could benefit from sleep study, but given PMH of non-compliance unsure if she would follow up with this. Keyla Preciado, MAXINE     Lungs: less crackles  Heart:  RRR with no Murmur/Rubs/Gallops    Additional Comments:  Wean O2.  Will hold Xarelto to assess effusion in am with U/S     I have spoken with and examined the patient. I agree with the above assessment and plan as documented.     Marguerite Ferguson MD

## 2021-01-31 NOTE — CONSULTS
NU NEPHROLOGY CONSULT NOTE    Admission Date:  1/30/2021    Admission Diagnosis:  Acute on chronic systolic CHF (congestive heart failure) ([facility] [I50.23]    Consulting physician:  Jayla Bowen  Reason for consult:  KAIDEN and volume overload    Subjective:   History of Present Illness:    Ms. Demetria Cordova is a 38 yo F with a PMH of polysubstance abuse including methamphetamies who presented with worsening SOB and progressive lower extremity edema. She was recently hospitalized at [facility] for similar complaints, but left AMA. She had an Echo during that hospital stay that showed an EF of 25-30% down from her prior EF assessment. In the ED, noted to have 2+ edema to her mid abdomen. She was given Lasix 60mg IV with 1300cc of UOP. This AM, her Cr is up to 1.5 from 1.08 in the ED yesterday. Nephrology consulted for evaluation of KAIDEN and volume overload. On my assessment, she is massively volume overloaded, but states that she is breathing comfortably. She admitted to the nurse that she uses meth and pot daily, including just prior to admission, but states \"I quit using meth. \" when I asked her about drug use.         Past Medical History:   Diagnosis Date    Acute on chronic systolic CHF (congestive heart failure) (HCC) 1/30/2021    CAD (coronary artery disease) 9/18/2019    DM (diabetes mellitus) ([facility]     Hyperglycemia due to type 2 diabetes mellitus ([facility] 9/17/2019    Other ill-defined conditions(799.89)     back pain      Past Surgical History:   Procedure Laterality Date    HX ORTHOPAEDIC      back surgery 2000      Current Facility-Administered Medications   Medication Dose Route Frequency    dilTIAZem IR (CARDIZEM) tablet 30 mg  30 mg Oral TIDAC    metoprolol succinate (TOPROL-XL) XL tablet 100 mg  100 mg Oral DAILY    ondansetron (ZOFRAN) injection 4 mg  4 mg IntraVENous Q6H PRN    acetaminophen (TYLENOL) tablet 650 mg  650 mg Oral Q6H PRN    docusate sodium (COLACE) capsule 100 mg  100 mg Oral BID PRN  insulin lispro (HUMALOG) injection   SubCUTAneous AC&HS    insulin glargine (LANTUS) injection 20 Units  20 Units SubCUTAneous QHS    aspirin chewable tablet 81 mg  81 mg Oral DAILY    atorvastatin (LIPITOR) tablet 40 mg  40 mg Oral QHS    gabapentin (NEURONTIN) capsule 600 mg  600 mg Oral TID    pantoprazole (PROTONIX) tablet 40 mg  40 mg Oral ACB    [Held by provider] rivaroxaban (XARELTO) tablet 20 mg  20 mg Oral DAILY WITH BREAKFAST    alum-mag hydroxide-simeth (MYLANTA) oral suspension 30 mL  30 mL Oral Q4H PRN    diphenhydrAMINE (BENADRYL) capsule 25 mg  25 mg Oral Q6H PRN    HYDROcodone-acetaminophen (NORCO) 7.5-325 mg per tablet 1 Tab  1 Tab Oral Q6H PRN     No Known Allergies   Social History     Tobacco Use    Smoking status: Current Every Day Smoker     Packs/day: 1.00   Substance Use Topics    Alcohol use: No      No family history on file. Review of Systems- per HPI    Objective:   Vitals:    01/31/21 0356 01/31/21 0527 01/31/21 0709 01/31/21 1116   BP: 116/83  96/80 93/79   Pulse: 82  82 96   Resp:   14 14   Temp:   97.3 °F (36.3 °C) 97.4 °F (36.3 °C)   SpO2:   93% 95%   Weight:  145.2 kg (320 lb 3.2 oz)     Height:           Intake/Output Summary (Last 24 hours) at 1/31/2021 1145  Last data filed at 1/31/2021 1100  Gross per 24 hour   Intake 2426 ml   Output 850 ml   Net 1576 ml       Physical Exam  GEN :in no distress, alert and oriented  HEENT: anicteric sclerae, eomi. Oropharynx without lesions. Neck - supple, no thyromegaly. No lymphadenopathy. CV - regular rate and rhythm, no murmur, no rub  Lung - diminished at the bases  Abd - soft, nontender, bowel sounds present  Ext - 2+ pitting edema to mid abdomen  Neurologic - nonfocal  Genitourinary -Blackwell in place  Skin - no rashes, no purpura, no ecchymoses  Psychiatric: Normal mood and affect.       Data Review:   Recent Labs     01/31/21  0306 01/30/21  0316   WBC 6.9 6.4   HGB 8.9* 9.5*   HCT 32.2* 35.0*    184 Recent Labs     01/31/21  0306 01/30/21  0316 01/30/21  0314   * 138  --    K 4.8 3.7  --    CL 98 102  --    CO2 28 30  --    BUN 23 19  --    CREA 1.59* 1.08*  --    * 259*  --    CA 8.5 8.4  --    MG 2.2  --  2.0     No results for input(s): PH, PCO2, PO2, PCO2 in the last 72 hours. Problem List:     Patient Active Problem List    Diagnosis Date Noted    Acute on chronic systolic CHF (congestive heart failure) ([facility] 01/30/2021    Tobacco abuse 01/30/2021    Methamphetamine abuse ([facility] 01/30/2021    Atrial fibrillation with RVR ([facility] 01/30/2021    CAD (coronary artery disease) 09/18/2019    Hyperglycemia due to type 2 diabetes mellitus ([facility] 09/17/2019    Cellulitis 09/16/2019       Assessment and Plan:    1) KAIDEN- in the setting of decompensated heart failure and massive volume overload. Will check urine Na. Start Lasix 80mg IV BID and monitor renal function and UOP. Needs volume control even at the expense of worsening azotemia. 2) Volume- as above. Overloaded. Diuresis. 3) Anemia- Hb is low. Check Fe studies. 4) Afib with RVR- rate control per cardiology. 5) Polysubstance abuse-  Needs to stop abusing methamphetamines. Discussed at length.       Tia Perez MD

## 2021-01-31 NOTE — PROGRESS NOTES
During AM care patient open admitted to daily \"meth use and pot\". Pt told PCT that she used meth prior to \"comin in so I would have enough energy. \"

## 2021-01-31 NOTE — PROGRESS NOTES
CPAP placed at patient bedside. Multiple attempts to place patient on CPAP. Patient postponed each attempt.

## 2021-01-31 NOTE — PROGRESS NOTES
Verbal bedside report given to oncoming RN, Piotr Portillo. Patient's situation, background, assessment and recommendations provided. Opportunity for questions provided. Oncoming RN assumed care of patient.

## 2021-01-31 NOTE — PROGRESS NOTES
Richard Thakkar NP with pulmonary Updated to Xarelto being held for possible Thoracentesis due to pleural effusion.

## 2021-02-01 ENCOUNTER — APPOINTMENT (OUTPATIENT)
Dept: GENERAL RADIOLOGY | Age: 46
DRG: 194 | End: 2021-02-01
Attending: NURSE PRACTITIONER
Payer: MEDICAID

## 2021-02-01 ENCOUNTER — APPOINTMENT (OUTPATIENT)
Dept: ULTRASOUND IMAGING | Age: 46
DRG: 194 | End: 2021-02-01
Attending: INTERNAL MEDICINE
Payer: MEDICAID

## 2021-02-01 LAB
ANION GAP SERPL CALC-SCNC: 7 MMOL/L (ref 7–16)
APTT PPP: 33.4 SEC (ref 24.1–35.1)
ARTERIAL PATENCY WRIST A: YES
BASE EXCESS BLD CALC-SCNC: 3 MMOL/L
BDY SITE: ABNORMAL
BUN SERPL-MCNC: 30 MG/DL (ref 6–23)
CALCIUM SERPL-MCNC: 8.5 MG/DL (ref 8.3–10.4)
CHLORIDE SERPL-SCNC: 97 MMOL/L (ref 98–107)
CO2 BLD-SCNC: 29 MMOL/L
CO2 SERPL-SCNC: 27 MMOL/L (ref 21–32)
COLLECT TIME,HTIME: 1218
CREAT SERPL-MCNC: 1.99 MG/DL (ref 0.6–1)
ERYTHROCYTE [DISTWIDTH] IN BLOOD BY AUTOMATED COUNT: 18.1 % (ref 11.9–14.6)
GAS FLOW.O2 O2 DELIVERY SYS: ABNORMAL L/MIN
GLUCOSE BLD STRIP.AUTO-MCNC: 204 MG/DL (ref 65–100)
GLUCOSE BLD STRIP.AUTO-MCNC: 211 MG/DL (ref 65–100)
GLUCOSE BLD STRIP.AUTO-MCNC: 216 MG/DL (ref 65–100)
GLUCOSE BLD STRIP.AUTO-MCNC: 248 MG/DL (ref 65–100)
GLUCOSE SERPL-MCNC: 124 MG/DL (ref 65–100)
HCO3 BLD-SCNC: 27.7 MMOL/L (ref 22–26)
HCT VFR BLD AUTO: 32.6 % (ref 35.8–46.3)
HGB BLD-MCNC: 9.1 G/DL (ref 11.7–15.4)
INR PPP: 1.5
MAGNESIUM SERPL-MCNC: 2.6 MG/DL (ref 1.8–2.4)
MCH RBC QN AUTO: 22.3 PG (ref 26.1–32.9)
MCHC RBC AUTO-ENTMCNC: 27.9 G/DL (ref 31.4–35)
MCV RBC AUTO: 79.9 FL (ref 79.6–97.8)
NRBC # BLD: 0 K/UL (ref 0–0.2)
O2/TOTAL GAS SETTING VFR VENT: 21 %
PCO2 BLD: 43.2 MMHG (ref 35–45)
PH BLD: 7.42 [PH] (ref 7.35–7.45)
PLATELET # BLD AUTO: 193 K/UL (ref 150–450)
PMV BLD AUTO: 11.6 FL (ref 9.4–12.3)
PO2 BLD: 55 MMHG (ref 75–100)
POTASSIUM SERPL-SCNC: 4.6 MMOL/L (ref 3.5–5.1)
PROTHROMBIN TIME: 18 SEC (ref 12.5–14.7)
RBC # BLD AUTO: 4.08 M/UL (ref 4.05–5.2)
SAO2 % BLD: 89 % (ref 95–98)
SERVICE CMNT-IMP: ABNORMAL
SERVICE CMNT-IMP: ABNORMAL
SODIUM SERPL-SCNC: 131 MMOL/L (ref 136–145)
SPECIMEN TYPE: ABNORMAL
WBC # BLD AUTO: 6.9 K/UL (ref 4.3–11.1)

## 2021-02-01 PROCEDURE — 74011250636 HC RX REV CODE- 250/636: Performed by: INTERNAL MEDICINE

## 2021-02-01 PROCEDURE — 82803 BLOOD GASES ANY COMBINATION: CPT

## 2021-02-01 PROCEDURE — 82784 ASSAY IGA/IGD/IGG/IGM EACH: CPT

## 2021-02-01 PROCEDURE — 82962 GLUCOSE BLOOD TEST: CPT

## 2021-02-01 PROCEDURE — 87077 CULTURE AEROBIC IDENTIFY: CPT

## 2021-02-01 PROCEDURE — 71045 X-RAY EXAM CHEST 1 VIEW: CPT

## 2021-02-01 PROCEDURE — 83883 ASSAY NEPHELOMETRY NOT SPEC: CPT

## 2021-02-01 PROCEDURE — 85027 COMPLETE CBC AUTOMATED: CPT

## 2021-02-01 PROCEDURE — 76604 US EXAM CHEST: CPT | Performed by: INTERNAL MEDICINE

## 2021-02-01 PROCEDURE — 83520 IMMUNOASSAY QUANT NOS NONAB: CPT

## 2021-02-01 PROCEDURE — 80048 BASIC METABOLIC PNL TOTAL CA: CPT

## 2021-02-01 PROCEDURE — 36600 WITHDRAWAL OF ARTERIAL BLOOD: CPT

## 2021-02-01 PROCEDURE — 86160 COMPLEMENT ANTIGEN: CPT

## 2021-02-01 PROCEDURE — 74011250637 HC RX REV CODE- 250/637: Performed by: NURSE PRACTITIONER

## 2021-02-01 PROCEDURE — 85610 PROTHROMBIN TIME: CPT

## 2021-02-01 PROCEDURE — 74011636637 HC RX REV CODE- 636/637: Performed by: NURSE PRACTITIONER

## 2021-02-01 PROCEDURE — 86334 IMMUNOFIX E-PHORESIS SERUM: CPT

## 2021-02-01 PROCEDURE — 74011250637 HC RX REV CODE- 250/637: Performed by: INTERNAL MEDICINE

## 2021-02-01 PROCEDURE — 77030041076 HC DRSG AG OPTICELL MDII -A

## 2021-02-01 PROCEDURE — 94660 CPAP INITIATION&MGMT: CPT

## 2021-02-01 PROCEDURE — 86038 ANTINUCLEAR ANTIBODIES: CPT

## 2021-02-01 PROCEDURE — 87186 SC STD MICRODIL/AGAR DIL: CPT

## 2021-02-01 PROCEDURE — BH4BZZZ ULTRASONOGRAPHY OF CHEST WALL: ICD-10-PCS | Performed by: INTERNAL MEDICINE

## 2021-02-01 PROCEDURE — 65660000000 HC RM CCU STEPDOWN

## 2021-02-01 PROCEDURE — 36415 COLL VENOUS BLD VENIPUNCTURE: CPT

## 2021-02-01 PROCEDURE — 74011000258 HC RX REV CODE- 258: Performed by: PHYSICIAN ASSISTANT

## 2021-02-01 PROCEDURE — 74011250636 HC RX REV CODE- 250/636: Performed by: PHYSICIAN ASSISTANT

## 2021-02-01 PROCEDURE — 99232 SBSQ HOSP IP/OBS MODERATE 35: CPT | Performed by: INTERNAL MEDICINE

## 2021-02-01 PROCEDURE — 83735 ASSAY OF MAGNESIUM: CPT

## 2021-02-01 PROCEDURE — 87205 SMEAR GRAM STAIN: CPT

## 2021-02-01 PROCEDURE — 76775 US EXAM ABDO BACK WALL LIM: CPT

## 2021-02-01 PROCEDURE — 85730 THROMBOPLASTIN TIME PARTIAL: CPT

## 2021-02-01 RX ADMIN — GABAPENTIN 600 MG: 300 CAPSULE ORAL at 16:22

## 2021-02-01 RX ADMIN — INSULIN LISPRO 4 UNITS: 100 INJECTION, SOLUTION INTRAVENOUS; SUBCUTANEOUS at 16:21

## 2021-02-01 RX ADMIN — HYDROCODONE BITARTRATE AND ACETAMINOPHEN 1 TABLET: 7.5; 325 TABLET ORAL at 21:58

## 2021-02-01 RX ADMIN — HYDROCODONE BITARTRATE AND ACETAMINOPHEN 1 TABLET: 7.5; 325 TABLET ORAL at 16:22

## 2021-02-01 RX ADMIN — DILTIAZEM HYDROCHLORIDE 30 MG: 30 TABLET, FILM COATED ORAL at 16:21

## 2021-02-01 RX ADMIN — INSULIN LISPRO 4 UNITS: 100 INJECTION, SOLUTION INTRAVENOUS; SUBCUTANEOUS at 21:58

## 2021-02-01 RX ADMIN — PIPERACILLIN AND TAZOBACTAM 3.38 G: 3; .375 INJECTION, POWDER, FOR SOLUTION INTRAVENOUS at 21:26

## 2021-02-01 RX ADMIN — INSULIN LISPRO 4 UNITS: 100 INJECTION, SOLUTION INTRAVENOUS; SUBCUTANEOUS at 11:55

## 2021-02-01 RX ADMIN — HYDROCODONE BITARTRATE AND ACETAMINOPHEN 1 TABLET: 7.5; 325 TABLET ORAL at 08:39

## 2021-02-01 RX ADMIN — ATORVASTATIN CALCIUM 40 MG: 40 TABLET, FILM COATED ORAL at 21:26

## 2021-02-01 RX ADMIN — DILTIAZEM HYDROCHLORIDE 30 MG: 30 TABLET, FILM COATED ORAL at 05:33

## 2021-02-01 RX ADMIN — INSULIN GLARGINE 20 UNITS: 100 INJECTION, SOLUTION SUBCUTANEOUS at 22:06

## 2021-02-01 RX ADMIN — POLYETHYLENE GLYCOL 3350 17 G: 17 POWDER, FOR SOLUTION ORAL at 18:14

## 2021-02-01 RX ADMIN — PANTOPRAZOLE SODIUM 40 MG: 40 TABLET, DELAYED RELEASE ORAL at 05:34

## 2021-02-01 RX ADMIN — GABAPENTIN 600 MG: 300 CAPSULE ORAL at 21:26

## 2021-02-01 RX ADMIN — METOPROLOL SUCCINATE 100 MG: 50 TABLET, EXTENDED RELEASE ORAL at 08:12

## 2021-02-01 RX ADMIN — GABAPENTIN 600 MG: 300 CAPSULE ORAL at 08:12

## 2021-02-01 RX ADMIN — POLYETHYLENE GLYCOL 3350 17 G: 17 POWDER, FOR SOLUTION ORAL at 08:12

## 2021-02-01 RX ADMIN — INSULIN LISPRO 4 UNITS: 100 INJECTION, SOLUTION INTRAVENOUS; SUBCUTANEOUS at 08:12

## 2021-02-01 RX ADMIN — PIPERACILLIN AND TAZOBACTAM 3.38 G: 3; .375 INJECTION, POWDER, FOR SOLUTION INTRAVENOUS at 13:30

## 2021-02-01 RX ADMIN — FUROSEMIDE 80 MG: 10 INJECTION, SOLUTION INTRAMUSCULAR; INTRAVENOUS at 18:14

## 2021-02-01 RX ADMIN — DILTIAZEM HYDROCHLORIDE 30 MG: 30 TABLET, FILM COATED ORAL at 11:57

## 2021-02-01 RX ADMIN — FUROSEMIDE 80 MG: 10 INJECTION, SOLUTION INTRAMUSCULAR; INTRAVENOUS at 08:12

## 2021-02-01 RX ADMIN — ASPIRIN 81 MG: 81 TABLET, CHEWABLE ORAL at 08:12

## 2021-02-01 NOTE — ROUTINE PROCESS
Ultra Sound done of the bilateral chest, picture taken and in chart. Dr. Fox Grider would like to try thoracentesis after one more day of holding blood thinner. Primary RN notified.

## 2021-02-01 NOTE — PROGRESS NOTES
2/1/2021 3:07 PM    Admit Date: 1/30/2021    Admit Diagnosis: Acute on chronic systolic CHF (congestive heart failure) ([facility]) [I50.23]      Subjective:   No cp and sob improved      Objective:      Visit Vitals  BP (!) 107/91   Pulse (!) 103   Temp 97 °F (36.1 °C)   Resp 16   Ht 6' 1\" (1.854 m)   Wt 321 lb 6.4 oz (145.8 kg)   SpO2 96%   BMI 42.40 kg/m²       Physical Exam:  Guy Jester, Well Nourished, No Acute Distress, Alert & Oriented x 3, appropriate mood. Neck- supple, no JVD  CV- irregular rate and rhythm no MRG  Lung- clear bilaterally  Abd- soft, nontender, nondistended  Ext- 1-2 edema bilaterally. Skin- warm and dry        Data Review:   Recent Labs     02/01/21  0302   *   K 4.6   BUN 30*   CREA 1.99*   WBC 6.9   HGB 9.1*   HCT 32.6*      INR 1.5       Assessment/Plan:     Principal Problem:    Acute on chronic systolic CHF (congestive heart failure) ([facility] (1/30/2021)Improved with current therapy. Will continue medications   on lasix- cr worsening- diuretics per renal    Active Problems:    Hyperglycemia due to type 2 diabetes mellitus ([facility] (9/17/2019)      CAD (coronary artery disease) (9/18/2019)Stable. Continue current medical therapy. Tobacco abuse (1/30/2021)      Methamphetamine abuse ([facility] (1/30/2021)      Atrial fibrillation with RVR ([facility] (1/30/2021)Stable. Continue current medical therapy.         Anasarca (1/31/2021)      Pleural effusion, bilateral (1/31/2021)

## 2021-02-01 NOTE — WOUND CARE
Right lateral foot at 5th toe amputation site 9x3x1.4cm open area with foul odor, surrounding erythema and edema and green drainage, concern for infection, cleansed with NS and wound culture taken sent to lab. Packed with opticel ag, cover with abd, pati, change every other day. Patient has documented poor follow through and medical non-compliance. Patient states to me \" I check my sugars, but I need a new meter and stuff. She also admits she was recently \"kicked out\" of the place she was living. With patients history and current social economic statis follow up and outpatient care for foot will be difficult, briefly discussed with Jesus Valderrama. May need to give supplies for selft care at discharge. Will monitor.

## 2021-02-01 NOTE — PROGRESS NOTES
Care Management Interventions  PCP Verified by CM: Yes([facility] )  Mode of Transport at Discharge: Other (see comment)(TBD based on need)  Transition of Care Consult (CM Consult): Discharge Planning  Discharge Durable Medical Equipment: No  Physical Therapy Consult: No  Occupational Therapy Consult: No  Speech Therapy Consult: No  Current Support Network: Relative's Home, Other(Lives with her aunt)  Confirm Follow Up Transport: Other (see comment)(TBD)  The Plan for Transition of Care is Related to the Following Treatment Goals : return home with aunt  The Patient and/or Patient Representative was Provided with a Choice of Provider and Agrees with the Discharge Plan?: Yes  Name of the Patient Representative Who was Provided with a Choice of Provider and Agrees with the Discharge Plan: Ms. Kandis Li of Choice List was Provided with Basic Dialogue that Supports the Patient's Individualized Plan of Care/Goals, Treatment Preferences and Shares the Quality Data Associated with the Providers?: Yes  [place] Resource Information Provided?: No  Discharge Location  Discharge Placement: Home      This CM met with pt this day to complete assessment. Pt verified her PCP, insurance, emergency contact, and home address. She reports she gets her medications filled at [email or web address]. She lives at home with her aunt. She reports that she has been needing more assistance with her ADLs including bathing and dressing. Pt with multiple hospital admissions since December 2020. Each time (12/19-12/21; 12/30-1/3; 1/3-1/4; 1/20-1/24) she has left the hospital ([facility]) AMA. Per chart review, each time pt leaves [place] the hospital does not provide discharge medications for pt. This CM called [facility] to confirm pt has had medications filled recently. They confirm she got her medications filled there on 1/25/21.      Pt was very forthcoming about her use of methamphetamine during our conversation. She confirms that she has been using for about 24 years but that she uses 'every once and a while' and that her use has been declining. This CM discussed with pt if she was interested in any type of inpatient drug rehab that could be arranged for her to discharge to - she declined need at this time. We discussed discharge planning this day. Pt was interested in assistance with finding affordable housing or an assisted living she could move to. She reports her monthly income is $529/month. This CM offered to print out applications for affordable housing in the area or look for a group home that may could fit her budget. She declined at this time. Pt confirms that she is able to return home with her aunt's house when the time comes for discharge. She is interested in any type of aide assistance at home. This CM offered to placed referral to [facility] - she was agreeable. Referral confirmation # [id number]. Discussed with pt she may need home health at discharge - she is agreeable to referral.     Question regarding if pt has a LifeVest at home or not. Per chart review, during recent admission to [facility] a lifevest was ordered but pt left AMA before it could be fit in the hospital.  This CM called [employer] representative and asked if they could check to see if pt does/does not have lifevest at home. Will follow up with answer. No additional CM needs at this time. Will continue to follow and update as needed.

## 2021-02-01 NOTE — ROUTINE PROCESS
IP consult to Cardiac Rehab. Pt EF=40-45% on Echo completed 9/17/19. EF must be 35% or less to qualify for Cardiac Rehab with diagnosis of chronic heart failure. We will contact the patient if a qualifying referral is received. Thank you, YESENIA Novoa, RN Cardiac Rehab Nurse Liaison

## 2021-02-01 NOTE — PROCEDURES
PROCEDURE:  DIAGNOSTIC ULTRASOUND OF CHEST    DIAGNOSIS:  BILATERALPLEURAL EFFUSION    CHEST ULTRASOUND FINDINGS:    A Turbo-M, Sonosite ultrasound with a 5-16 mHz probe was used to image the chest and localize the pleural effusion on the bilateral  chest.    A small anechoic space was seen on the left consistent with an uncomplicated pleural effusion. A moderate to large effusion was seen on the right. Only the right side at present appears large enough to warrant thoracentesis.        Vickey Ceja MD

## 2021-02-01 NOTE — PROGRESS NOTES
Jluis Villagomez  Admission Date: 1/30/2021             Daily Progress Note: 2/1/2021    The patient's chart is reviewed and the patient is discussed with the staff. Pt is a 40 yo female with a history of CAD (s/p CABG x4),HTN, HLD, DM2, HFrEF (25-30%), and tobacco and amphetamine abuse who presented to  ER on 1/30/21 with complaints of shortness of breath and edema with abdominal swelling after recent admission to [facility] for similar problem 1/20-1/24 but pt left AMA. Pt was admitted by Cardiology and she was started on lasix. Pt's CXR admission concerning for B pleural effusions and we were consulted. Nephrology was consulted for KAIDEN and volume overload. Subjective:     Pt is lying in bed on RA-sat 93%. Pt sleepy but awakes easily. Pt denies cough. She reports dyspnea is improved. Pt concerned about blood in echols but urine is clear presently. Pt reports some wheezing. She admits to snoring at home. She did smoke 1- 1 1/2 ppd x 20+ years. Pt did not wear APAP last night because RT didn't put it on her.      Current Facility-Administered Medications   Medication Dose Route Frequency    dilTIAZem IR (CARDIZEM) tablet 30 mg  30 mg Oral TIDAC    furosemide (LASIX) injection 80 mg  80 mg IntraVENous BID    polyethylene glycol (MIRALAX) packet 17 g  17 g Oral BID    metoprolol succinate (TOPROL-XL) XL tablet 100 mg  100 mg Oral DAILY    ondansetron (ZOFRAN) injection 4 mg  4 mg IntraVENous Q6H PRN    acetaminophen (TYLENOL) tablet 650 mg  650 mg Oral Q6H PRN    docusate sodium (COLACE) capsule 100 mg  100 mg Oral BID PRN    insulin lispro (HUMALOG) injection   SubCUTAneous AC&HS    insulin glargine (LANTUS) injection 20 Units  20 Units SubCUTAneous QHS    aspirin chewable tablet 81 mg  81 mg Oral DAILY    atorvastatin (LIPITOR) tablet 40 mg  40 mg Oral QHS    gabapentin (NEURONTIN) capsule 600 mg  600 mg Oral TID    pantoprazole (PROTONIX) tablet 40 mg  40 mg Oral ACB    [Held by provider] rivaroxaban (XARELTO) tablet 20 mg  20 mg Oral DAILY WITH BREAKFAST    alum-mag hydroxide-simeth (MYLANTA) oral suspension 30 mL  30 mL Oral Q4H PRN    diphenhydrAMINE (BENADRYL) capsule 25 mg  25 mg Oral Q6H PRN    HYDROcodone-acetaminophen (NORCO) 7.5-325 mg per tablet 1 Tab  1 Tab Oral Q6H PRN       Review of Systems  +wheezing  +dyspnea   Constitutional: negative for fever, chills, sweats  Cardiovascular: negative for chest pain, palpitations, syncope, edema  Gastrointestinal:  negative for dysphagia, reflux, vomiting, diarrhea, abdominal pain, or melena  Neurologic:  negative for focal weakness, numbness, headache    Objective:     Vitals:    02/01/21 0306 02/01/21 0341 02/01/21 0532 02/01/21 0708   BP: 101/86  127/84 127/89   Pulse: 98  (!) 122 (!) 102   Resp: 16   14   Temp: 97.6 °F (36.4 °C)   97.3 °F (36.3 °C)   SpO2: 91%   92%   Weight:  321 lb 6.4 oz (145.8 kg)     Height:             Intake/Output Summary (Last 24 hours) at 2/1/2021 1007  Last data filed at 2/1/2021 0816  Gross per 24 hour   Intake 1590 ml   Output 1890 ml   Net -300 ml       Physical Exam:   Constitution:  the patient is well developed and in no acute distress, on RA  EENMT:  Sclera clear, pupils equal, oral mucosa moist  Respiratory: few crackles on the R  Cardiovascular:  RRR without M,G,R  Gastrointestinal: soft and non-tender; with positive bowel sounds. Musculoskeletal: warm without cyanosis. There is 1+ lower extremity edema.   Skin:  no jaundice or rashes   Neurologic: no gross neuro deficits     Psychiatric:  alert and oriented x 3    CXR:       LAB  Recent Labs     02/01/21  0637 01/31/21  2030 01/31/21  1537 01/31/21  1114 01/31/21  0617   GLUCPOC 204* 214* 213* 204* 287*      Recent Labs     02/01/21  0302 01/31/21  0306 01/30/21  0316   WBC 6.9 6.9 6.4   HGB 9.1* 8.9* 9.5*   HCT 32.6* 32.2* 35.0*    188 184   INR 1.5  --   --      Recent Labs     02/01/21  0302 01/31/21  1821 01/31/21  0306 01/30/21 0316 01/30/21 0314 01/30/21 0314   *  --  132* 138  --   --    K 4.6 4.6 4.8 3.7   < >  --    CL 97*  --  98 102  --   --    CO2 27  --  28 30  --   --    *  --  253* 259*  --   --    BUN 30*  --  23 19  --   --    CREA 1.99*  --  1.59* 1.08*  --   --    MG 2.6*  --  2.2  --   --  2.0   CA 8.5  --  8.5 8.4  --   --    ALB  --   --   --  3.3*  --   --    TBILI  --   --   --  0.3  --   --    ALT  --   --   --  69*  --   --     < > = values in this interval not displayed. No results for input(s): PH, PCO2, PO2, HCO3, PHI, PCO2I, PO2I, HCO3I in the last 72 hours. No results for input(s): LCAD, LAC in the last 72 hours.       Assessment:  (Medical Decision Making)     Hospital Problems  Date Reviewed: 2/1/2021          Codes Class Noted POA    Anasarca ICD-10-CM: R60.1  ICD-9-CM: 782.3  1/31/2021 Yes    Nephrology following     Pleural effusion, bilateral ICD-10-CM: J90  ICD-9-CM: 511.9  1/31/2021 Yes    Plans for ultrasound evaluation and probable thoracentesis tomorrow (after holding xarelto 48*)    * (Principal) Acute on chronic systolic CHF (congestive heart failure) ([facility] ICD-10-CM: Y19.06  ICD-9-CM: 428.23, 428.0  1/30/2021 Yes    Continue lasix IV BID    Tobacco abuse (Chronic) ICD-10-CM: Z72.0  ICD-9-CM: 305.1  1/30/2021 Yes    Smoking cessation stressed     Methamphetamine abuse (HCC) (Chronic) ICD-10-CM: F15.10  ICD-9-CM: 305.70  1/30/2021 Yes    Pt reports that she quit     Atrial fibrillation with RVR ([facility] ICD-10-CM: I48.91  ICD-9-CM: 427.31  1/30/2021 Yes    Cardiology following     CAD (coronary artery disease) (Chronic) ICD-10-CM: I25.10  ICD-9-CM: 414.00  9/18/2019 Yes        Hyperglycemia due to type 2 diabetes mellitus (HCC) (Chronic) ICD-10-CM: E11.65  ICD-9-CM: 250.00  9/17/2019 Yes    SSI          Plan:  (Medical Decision Making)     --on RA  --check ABG now, concern for OHS/HOLLEY   --APAP in the room but pt did not use it last night, I will order for RT to place on APAP  --smoking cessation stressed  --pt reports that she has quit smoking meth  --continue Lasix IV  --xarelto on hold, plans for ultrasound evaluation and probable thoracentesis tomorrow  --continue SSI    More than 50% of the time documented was spent in face-to-face contact with the patient and in the care of the patient on the floor/unit where the patient is located. PORSCHE Cramer        Lungs:  Bibasilar inspiratory crackles. Heart:  RRR with no Murmur/Rubs/Gallops    Additional Comments:    Patient had B US performed. Not enough effusion on the left for thoracentesis but at present enough on the right. Will hold xarelto one more day and reassess tomorrow. Continue efforts at diuresis in the meantime. Hopefully will be able to mobilize more fluid and still may be able to avoid thora. I have spoken with and examined the patient. I agree with the above assessment and plan as documented.     Waldo Baltazar MD

## 2021-02-01 NOTE — PROGRESS NOTES
[facility] Nephrology        Subjective: A vs CKD   No new complaints    Review of Systems -   General ROS: negative for - fever, chills  Respiratory ROS: no SOB, cough, LI  Cardiovascular ROS: no CP, palpitations  Gastrointestinal ROS: no N&V, abdominal pain, diarrhea  Genito-Urinary ROS: no difficulty voiding, dysuria  Neurological ROS: no seizures, focal weekness        Objective:    Vitals:    02/01/21 0306 02/01/21 0341 02/01/21 0532 02/01/21 0708   BP: 101/86  127/84 127/89   Pulse: 98  (!) 122 (!) 102   Resp: 16   14   Temp: 97.6 °F (36.4 °C)   97.3 °F (36.3 °C)   SpO2: 91%   92%   Weight:  145.8 kg (321 lb 6.4 oz)     Height:           PE  Gen: in no acute distress  CV:reg rate  Chest:clear  Abd: soft  Ext/Access: 3+ edema       . LAB  Recent Labs     02/01/21 0302 01/31/21 0306 01/30/21 0316   WBC 6.9 6.9 6.4   HGB 9.1* 8.9* 9.5*   HCT 32.6* 32.2* 35.0*    188 184   INR 1.5  --   --      Recent Labs     02/01/21 0302 01/31/21  1821 01/31/21  0306 01/30/21  0316 01/30/21  0314 01/30/21 0314   *  --  132* 138  --   --    K 4.6 4.6 4.8 3.7   < >  --    CL 97*  --  98 102  --   --    CO2 27  --  28 30  --   --    *  --  253* 259*  --   --    BUN 30*  --  23 19  --   --    CREA 1.99*  --  1.59* 1.08*  --   --    MG 2.6*  --  2.2  --   --  2.0   CA 8.5  --  8.5 8.4  --   --    ALB  --   --   --  3.3*  --   --    TBILI  --   --   --  0.3  --   --    ALT  --   --   --  69*  --   --     < > = values in this interval not displayed.            Radiology    A/P:   Patient Active Problem List   Diagnosis Code    Cellulitis L03.90    Hyperglycemia due to type 2 diabetes mellitus ([facility]) E11.65    CAD (coronary artery disease) I25.10    Acute on chronic systolic CHF (congestive heart failure) ([facility]) I50.23    Tobacco abuse Z72.0    Methamphetamine abuse ([facility] F15.10    Atrial fibrillation with RVR ([facility]) I48.91    Anasarca R60.1    Pleural effusion, bilateral J90       KAIDEN vs CKD - she has had proteinuria since 2018 in care everywhere. Creatinine is drifting up. Not clear how much is acute vs diabetic nephropathy vs CHF vs something else. Continue lasix drip for now. Work up in progress. Anemia    A Fib    DM    Polysubstance abuse.       Shan Valencia MD

## 2021-02-02 VITALS
HEART RATE: 75 BPM | WEIGHT: 293 LBS | HEIGHT: 72 IN | DIASTOLIC BLOOD PRESSURE: 49 MMHG | TEMPERATURE: 97.9 F | SYSTOLIC BLOOD PRESSURE: 103 MMHG | BODY MASS INDEX: 39.68 KG/M2 | RESPIRATION RATE: 18 BRPM | OXYGEN SATURATION: 95 %

## 2021-02-02 LAB
ALBUMIN SERPL ELPH-MCNC: 3.16 G/DL (ref 3.2–5.6)
ALBUMIN/GLOB SERPL: 0.8 {RATIO}
ALPHA1 GLOB SERPL ELPH-MCNC: 0.37 G/DL (ref 0.1–0.4)
ALPHA2 GLOB SERPL ELPH-MCNC: 0.74 G/DL (ref 0.4–1.2)
ANA SER QL: NEGATIVE
ANION GAP SERPL CALC-SCNC: 6 MMOL/L (ref 7–16)
APPEARANCE FLD: NORMAL
B-GLOBULIN SERPL QL ELPH: 1.06 G/DL (ref 0.6–1.3)
BUN SERPL-MCNC: 31 MG/DL (ref 6–23)
C-ANCA TITR SER IF: NORMAL TITER
C3 SERPL-MCNC: 139 MG/DL (ref 82–167)
C4 SERPL-MCNC: 17 MG/DL (ref 12–38)
CALCIUM SERPL-MCNC: 8.2 MG/DL (ref 8.3–10.4)
CHLORIDE SERPL-SCNC: 98 MMOL/L (ref 98–107)
CO2 SERPL-SCNC: 29 MMOL/L (ref 21–32)
COLOR FLD: YELLOW
CREAT SERPL-MCNC: 1.83 MG/DL (ref 0.6–1)
CREAT UR-MCNC: 43.6 MG/DL
ERYTHROCYTE [DISTWIDTH] IN BLOOD BY AUTOMATED COUNT: 18.1 % (ref 11.9–14.6)
GAMMA GLOB MFR SERPL ELPH: 1.58 G/DL (ref 0.5–1.6)
GLUCOSE BLD STRIP.AUTO-MCNC: 157 MG/DL (ref 65–100)
GLUCOSE FLD-MCNC: 183 MG/DL
GLUCOSE SERPL-MCNC: 178 MG/DL (ref 65–100)
HCT VFR BLD AUTO: 31.4 % (ref 35.8–46.3)
HGB BLD-MCNC: 8.9 G/DL (ref 11.7–15.4)
IGA SERPL-MCNC: 236 MG/DL (ref 85–499)
IGG SERPL-MCNC: 1418 MG/DL (ref 610–1616)
IGM SERPL-MCNC: 162 MG/DL (ref 35–242)
KAPPA LC FREE SER-MCNC: 60.52 MG/L (ref 3.3–19.4)
KAPPA LC FREE/LAMBDA FREE SER: 1.55 {RATIO} (ref 0.26–1.65)
LAMBDA LC FREE SERPL-MCNC: 38.95 MG/L (ref 5.71–26.3)
LDH FLD L TO P-CCNC: 81 U/L
LYMPHOCYTES NFR BRONCH MANUAL: 85 %
M PROTEIN SERPL ELPH-MCNC: ABNORMAL G/DL
MACROPHAGES NFR BRONCH MANUAL: 10 %
MCH RBC QN AUTO: 22.8 PG (ref 26.1–32.9)
MCHC RBC AUTO-ENTMCNC: 28.3 G/DL (ref 31.4–35)
MCV RBC AUTO: 80.5 FL (ref 79.6–97.8)
MYELOPEROXIDASE AB SER IA-ACNC: <9 U/ML (ref 0–9)
NEUTROPHILS NFR BRONCH MANUAL: 5 %
NRBC # BLD: 0 K/UL (ref 0–0.2)
NUC CELL # FLD: 457 /CU MM
P-ANCA ATYPICAL TITR SER IF: NORMAL TITER
P-ANCA TITR SER IF: NORMAL TITER
PLATELET # BLD AUTO: 180 K/UL (ref 150–450)
PMV BLD AUTO: 11 FL (ref 9.4–12.3)
POTASSIUM SERPL-SCNC: 4.2 MMOL/L (ref 3.5–5.1)
PROT FLD-MCNC: 2.2 G/DL
PROT PATTERN SERPL ELPH-IMP: ABNORMAL
PROT PATTERN SPEC IFE-IMP: ABNORMAL
PROT SERPL-MCNC: 6.9 G/DL (ref 6.3–8.2)
PROT UR-MCNC: 20 MG/DL
PROT/CREAT UR-RTO: 0.5
PROTEINASE3 AB SER IA-ACNC: <3.5 U/ML (ref 0–3.5)
RBC # BLD AUTO: 3.9 M/UL (ref 4.05–5.2)
RBC # FLD: 1000 /CU MM
SODIUM SERPL-SCNC: 133 MMOL/L (ref 136–145)
SPECIMEN SOURCE FLD: 1
SPECIMEN SOURCE FLD: NORMAL
WBC # BLD AUTO: 6.4 K/UL (ref 4.3–11.1)

## 2021-02-02 PROCEDURE — 82962 GLUCOSE BLOOD TEST: CPT

## 2021-02-02 PROCEDURE — 77030014147 HC TY THORCENT PARA TELE -B: Performed by: INTERNAL MEDICINE

## 2021-02-02 PROCEDURE — 84157 ASSAY OF PROTEIN OTHER: CPT

## 2021-02-02 PROCEDURE — 32555 ASPIRATE PLEURA W/ IMAGING: CPT | Performed by: INTERNAL MEDICINE

## 2021-02-02 PROCEDURE — 36415 COLL VENOUS BLD VENIPUNCTURE: CPT

## 2021-02-02 PROCEDURE — 2709999900 HC NON-CHARGEABLE SUPPLY: Performed by: INTERNAL MEDICINE

## 2021-02-02 PROCEDURE — 80048 BASIC METABOLIC PNL TOTAL CA: CPT

## 2021-02-02 PROCEDURE — 99232 SBSQ HOSP IP/OBS MODERATE 35: CPT | Performed by: INTERNAL MEDICINE

## 2021-02-02 PROCEDURE — 83615 LACTATE (LD) (LDH) ENZYME: CPT

## 2021-02-02 PROCEDURE — 74011250636 HC RX REV CODE- 250/636: Performed by: PHYSICIAN ASSISTANT

## 2021-02-02 PROCEDURE — 74011250637 HC RX REV CODE- 250/637: Performed by: NURSE PRACTITIONER

## 2021-02-02 PROCEDURE — 82945 GLUCOSE OTHER FLUID: CPT

## 2021-02-02 PROCEDURE — 89050 BODY FLUID CELL COUNT: CPT

## 2021-02-02 PROCEDURE — 87116 MYCOBACTERIA CULTURE: CPT

## 2021-02-02 PROCEDURE — 75810000165 HC THORACENTESIS

## 2021-02-02 PROCEDURE — 85027 COMPLETE CBC AUTOMATED: CPT

## 2021-02-02 PROCEDURE — 88112 CYTOPATH CELL ENHANCE TECH: CPT

## 2021-02-02 PROCEDURE — 76040000025: Performed by: INTERNAL MEDICINE

## 2021-02-02 PROCEDURE — 74011250637 HC RX REV CODE- 250/637: Performed by: INTERNAL MEDICINE

## 2021-02-02 PROCEDURE — 74011250636 HC RX REV CODE- 250/636: Performed by: INTERNAL MEDICINE

## 2021-02-02 PROCEDURE — 87205 SMEAR GRAM STAIN: CPT

## 2021-02-02 PROCEDURE — 99238 HOSP IP/OBS DSCHRG MGMT 30/<: CPT | Performed by: INTERNAL MEDICINE

## 2021-02-02 PROCEDURE — 76604 US EXAM CHEST: CPT | Performed by: INTERNAL MEDICINE

## 2021-02-02 PROCEDURE — 74011000258 HC RX REV CODE- 258: Performed by: PHYSICIAN ASSISTANT

## 2021-02-02 PROCEDURE — 88305 TISSUE EXAM BY PATHOLOGIST: CPT

## 2021-02-02 PROCEDURE — 87102 FUNGUS ISOLATION CULTURE: CPT

## 2021-02-02 PROCEDURE — 0W993ZZ DRAINAGE OF RIGHT PLEURAL CAVITY, PERCUTANEOUS APPROACH: ICD-10-PCS | Performed by: INTERNAL MEDICINE

## 2021-02-02 PROCEDURE — 84156 ASSAY OF PROTEIN URINE: CPT

## 2021-02-02 RX ORDER — METOPROLOL SUCCINATE 50 MG/1
50 TABLET, EXTENDED RELEASE ORAL 2 TIMES DAILY
Status: DISCONTINUED | OUTPATIENT
Start: 2021-02-02 | End: 2021-02-02 | Stop reason: HOSPADM

## 2021-02-02 RX ADMIN — DILTIAZEM HYDROCHLORIDE 30 MG: 30 TABLET, FILM COATED ORAL at 09:24

## 2021-02-02 RX ADMIN — METOPROLOL SUCCINATE 50 MG: 50 TABLET, EXTENDED RELEASE ORAL at 09:24

## 2021-02-02 RX ADMIN — FUROSEMIDE 80 MG: 10 INJECTION, SOLUTION INTRAMUSCULAR; INTRAVENOUS at 09:24

## 2021-02-02 RX ADMIN — GABAPENTIN 600 MG: 300 CAPSULE ORAL at 09:24

## 2021-02-02 RX ADMIN — PANTOPRAZOLE SODIUM 40 MG: 40 TABLET, DELAYED RELEASE ORAL at 05:37

## 2021-02-02 RX ADMIN — PIPERACILLIN AND TAZOBACTAM 3.38 G: 3; .375 INJECTION, POWDER, FOR SOLUTION INTRAVENOUS at 05:37

## 2021-02-02 RX ADMIN — ASPIRIN 81 MG: 81 TABLET, CHEWABLE ORAL at 09:24

## 2021-02-02 NOTE — PROCEDURES
PROCEDURE:  DIAGNOSTIC THORACENTESIS, THERAPEUTIC THORACENTESIS       PRE-OP DIAGNOSIS:  R PLEURAL EFFUSION    POST-OP DIAGNOSIS:  R PLEURAL EFFUSION    VOLUME REMOVED:    1200cc    ANESTHESIA:    LOCAL ANESTHESIA WITH 1% LIDOCAINE 10 CC TOTAL. CHEST ULTRASOUND FINDINGS:    A Turbo-M, Sonosite ultrasound with a 5-16 mHz probe was used to image the chest and localize the pleural effusion on the bilateral  chest.    There was no visible fluid seen on the left. A large anechoic space was seen on the right consistent with an uncomplicated pleural effusion. DESCRIPTION OF PROCEDURE:    After obtaining informed consent and localizing the safest location for thoracentesis, the  8th intercostal space was marked with a blunt, plastic needle cap in the mid scapular line. An [employer] AK-0100 Pleral-Seal thoracentesis kit was used to perform the procedure. The skin was cleansed with the supplied chlorhexidine swab and then draped in the usual fashion. Using the previously marked location as a guide, a 22 G 1.5 inch needle was used to inject 1% lidocaine into the skin and subcutaneous tissue, as well as onto the underlying rib and inter-costal muscles. Pleural fluid was aspirated to assure proper location and additional lidocaine was injected into the pleural space prior to removing the anesthesia needle. A 3mm incision was then made with the supplied scalpel in the usual fashion to facilitate the insertion of the thoracentesis needle. The needle with an 8 Latvian thoracentesis catheter was then introduced into the chest through the previously made incision in the usual fashion, the rib localized with the needle, and the catheter then marched over the rib into the pleural space. After aspirating fluid, the thoracentesis catheter was then placed into the chest using the needle itself as a trocar.   The needle was then removed and the catheter was attached to the supplied tubing without complication. 1200 cc of clear, yellow fluid was aspirated and sent for analysis. Fluid was sent for the following tests:      LDH  Total Protein  Glucose  Cell count with differential  Routine culture and Gram stain  Cytology  AFB  Fungus      Post procedure US confirmed complete drainage of the effusion and presence of lung sliding, ruling out pneumothorax. ([id number])    EBL:     <0KD    COMPLICATIONS:    None. Hope that this will be the only thoracentesis that the patient will require. Likely ok to restart anticoagulation from a pulmonary standpoint.        Jl Peterson MD

## 2021-02-02 NOTE — PROGRESS NOTES
Patient is agreeable to try CPAP tonight. Pt placed on nasal CPAP with AutoSet 5-20cm with no O2 bleed.

## 2021-02-02 NOTE — PROGRESS NOTES
Max Lai  Admission Date: 1/30/2021             Daily Progress Note: 2/2/2021    The patient's chart is reviewed and the patient is discussed with the staff. Pt is a 40 yo female with a history of CAD (s/p CABG x4),HTN, HLD, DM2, HFrEF (25-30%), and tobacco and amphetamine abuse who presented to  ER on 1/30/21 with complaints of shortness of breath and edema with abdominal swelling after recent admission to [facility] for similar problem 1/20-1/24 but pt left AMA. Pt was admitted by Cardiology and she was started on lasix. Pt's CXR admission concerning for B pleural effusions and we were consulted. Nephrology was consulted for KAIDEN and volume overload. Pt had an ABG but did not have hypercarbia. Subjective:     Pt lying in bed on RA. Pt reports that her breathing better. Pt has a dry cough. She complains of back pain which she attributes to the fluid.      Current Facility-Administered Medications   Medication Dose Route Frequency    metoprolol succinate (TOPROL-XL) XL tablet 50 mg  50 mg Oral BID    piperacillin-tazobactam (ZOSYN) 3.375 g in 0.9% sodium chloride (MBP/ADV) 100 mL MBP  3.375 g IntraVENous Q8H    dilTIAZem IR (CARDIZEM) tablet 30 mg  30 mg Oral TIDAC    furosemide (LASIX) injection 80 mg  80 mg IntraVENous BID    polyethylene glycol (MIRALAX) packet 17 g  17 g Oral BID    ondansetron (ZOFRAN) injection 4 mg  4 mg IntraVENous Q6H PRN    acetaminophen (TYLENOL) tablet 650 mg  650 mg Oral Q6H PRN    docusate sodium (COLACE) capsule 100 mg  100 mg Oral BID PRN    insulin lispro (HUMALOG) injection   SubCUTAneous AC&HS    insulin glargine (LANTUS) injection 20 Units  20 Units SubCUTAneous QHS    aspirin chewable tablet 81 mg  81 mg Oral DAILY    atorvastatin (LIPITOR) tablet 40 mg  40 mg Oral QHS    gabapentin (NEURONTIN) capsule 600 mg  600 mg Oral TID    pantoprazole (PROTONIX) tablet 40 mg  40 mg Oral ACB    [Held by provider] rivaroxaban (XARELTO) tablet 20 mg  20 mg Oral DAILY WITH BREAKFAST    alum-mag hydroxide-simeth (MYLANTA) oral suspension 30 mL  30 mL Oral Q4H PRN    diphenhydrAMINE (BENADRYL) capsule 25 mg  25 mg Oral Q6H PRN    HYDROcodone-acetaminophen (NORCO) 7.5-325 mg per tablet 1 Tab  1 Tab Oral Q6H PRN       Review of Systems  +dyspnea  +back pain   +edema   Constitutional: negative for fever, chills, sweats  Cardiovascular: negative for chest pain, palpitations, syncope  Gastrointestinal:  negative for dysphagia, reflux, vomiting, diarrhea, abdominal pain, or melena  Neurologic:  negative for focal weakness, numbness, headache    Objective:     Vitals:    02/02/21 0225 02/02/21 0356 02/02/21 0537 02/02/21 0717   BP: (!) 90/55  (!) 90/49 117/82   Pulse: 91  87 84   Resp: 20   16   Temp: 97.6 °F (36.4 °C)   97 °F (36.1 °C)   SpO2: 93%   90%   Weight:  322 lb 3.2 oz (146.1 kg)     Height:             Intake/Output Summary (Last 24 hours) at 2/2/2021 0731  Last data filed at 2/2/2021 5296  Gross per 24 hour   Intake 1950 ml   Output 3700 ml   Net -1750 ml       Physical Exam:   Constitution:  the patient is well developed and in no acute distress, on RA  EENMT:  Sclera clear, pupils equal, oral mucosa moist  Respiratory: diminished breath sounds anteriorly   Cardiovascular:  RRR without M,G,R  Gastrointestinal: soft and non-tender; with positive bowel sounds. Musculoskeletal: warm without cyanosis. There is 2+ pitting lower extremity edema.   Skin:  no jaundice or rashes  Neurologic: no gross neuro deficits     Psychiatric:  alert and oriented x 3    CXR:       LAB  Recent Labs     02/02/21  0636 02/01/21  2125 02/01/21  1604 02/01/21  1108 02/01/21  0637   GLUCPOC 157* 248* 211* 216* 204*      Recent Labs     02/02/21  0309 02/01/21  0302 01/31/21  0306   WBC 6.4 6.9 6.9   HGB 8.9* 9.1* 8.9*   HCT 31.4* 32.6* 32.2*    193 188   INR  --  1.5  --      Recent Labs     02/02/21  0309 02/01/21  0302 01/31/21  1821 01/31/21  0306   * 131*  -- 132*   K 4.2 4.6 4.6 4.8   CL 98 97*  --  98   CO2 29 27  --  28   * 124*  --  253*   BUN 31* 30*  --  23   CREA 1.83* 1.99*  --  1.59*   MG  --  2.6*  --  2.2   CA 8.2* 8.5  --  8.5     Recent Labs     02/01/21  1221   PHI 7.42   PCO2I 43.2   PO2I 55*   HCO3I 27.7*     No results for input(s): LCAD, LAC in the last 72 hours. Assessment:  (Medical Decision Making)     Hospital Problems  Date Reviewed: 2/2/2021          Codes Class Noted POA    Anasarca ICD-10-CM: R60.1  ICD-9-CM: 782.3  1/31/2021 Yes    Persistent, continue IV lasix 80mg BID    Pleural effusion, bilateral ICD-10-CM: J90  ICD-9-CM: 511.9  1/31/2021 Yes    R>L, possible R thoracentesis today     * (Principal) Acute on chronic systolic CHF (congestive heart failure) (HCC) ICD-10-CM: I50.23  ICD-9-CM: 428.23, 428.0  1/30/2021 Yes    Continue IV lasix     Tobacco abuse (Chronic) ICD-10-CM: Z72.0  ICD-9-CM: 305.1  1/30/2021 Yes    Cessation stressed     Methamphetamine abuse (HCC) (Chronic) ICD-10-CM: F15.10  ICD-9-CM: 305.70  1/30/2021 Yes    Cessation stressed     Atrial fibrillation with RVR (HCC) ICD-10-CM: I48.91  ICD-9-CM: 427.31  1/30/2021 Yes    Rate controlled     CAD (coronary artery disease) (Chronic) ICD-10-CM: I25.10  ICD-9-CM: 414.00  9/18/2019 Yes        Hyperglycemia due to type 2 diabetes mellitus (HCC) (Chronic) ICD-10-CM: E11.65  ICD-9-CM: 250.00  9/17/2019 Yes              Plan:  (Medical Decision Making)     --on RA  --pt refused to wear APAP more than 5 mins last night  --continue IV lasix  --started on Zosyn for wound infection, wound culture pending  --xarelto on hold, possible thoracentesis today  --smoking cessation (cigarettes and meth) stressed  --continue Protonix daily  --hopefully home soon     More than 50% of the time documented was spent in face-to-face contact with the patient and in the care of the patient on the floor/unit where the patient is located.     [facility] [place]      Lungs:  Decreased at bases.   Heart:  RRR with no Murmur/Rubs/Gallops. Mild LLE edema. Additional Comments:    Patient will be evaluated with US for possible thoracentesis today. I have spoken with and examined the patient. I agree with the above assessment and plan as documented.     Kell Serrato MD

## 2021-02-02 NOTE — PROGRESS NOTES
[facility]        Subjective: A vs CKD  Feeling better    Review of Systems -   General ROS: negative for - fever, chills  Respiratory ROS: no SOB, cough, LI  Cardiovascular ROS: no CP, palpitations  Gastrointestinal ROS: no N&V, abdominal pain, diarrhea  Genito-Urinary ROS: no difficulty voiding, dysuria  Neurological ROS: no seizures, focal weekness        Objective:    Vitals:    02/02/21 0225 02/02/21 0356 02/02/21 0537 02/02/21 0717   BP: (!) 90/55  (!) 90/49 117/82   Pulse: 91  87 84   Resp: 20   16   Temp: 97.6 °F (36.4 °C)   97 °F (36.1 °C)   SpO2: 93%   90%   Weight:  146.1 kg (322 lb 3.2 oz)     Height:           PE  Gen: in no acute distress  CV:reg rate  Chest:clear  Abd: soft  Ext/Access: 3+ edema       . LAB  Recent Labs     02/02/21  0309 02/01/21  0302 01/31/21  0306   WBC 6.4 6.9 6.9   HGB 8.9* 9.1* 8.9*   HCT 31.4* 32.6* 32.2*    193 188   INR  --  1.5  --      Recent Labs     02/02/21  0309 02/01/21  0302 01/31/21  1821 01/31/21  0306   * 131*  --  132*   K 4.2 4.6 4.6 4.8   CL 98 97*  --  98   CO2 29 27  --  28   * 124*  --  253*   BUN 31* 30*  --  23   CREA 1.83* 1.99*  --  1.59*   MG  --  2.6*  --  2.2   CA 8.2* 8.5  --  8.5           Radiology    A/P:   Patient Active Problem List   Diagnosis Code    Cellulitis L03.90    Hyperglycemia due to type 2 diabetes mellitus ([facility]) E11.65    CAD (coronary artery disease) I25.10    Acute on chronic systolic CHF (congestive heart failure) ([facility]) I50.23    Tobacco abuse Z72.0    Methamphetamine abuse ([facility]) F15.10    Atrial fibrillation with RVR ([facility]) I48.91    Anasarca R60.1    Pleural effusion, bilateral J90       KAIDEN vs CKD - Renal function is stable. In negative fluid balance, but wt going up. Continue lasix drip for now. Work up in progress. Anemia    A Fib    DM    Polysubstance abuse.       Nehal Smith MD

## 2021-02-02 NOTE — PROGRESS NOTES
Spoke with family member stating patient is her niece. Security code given by family member. Stated she would like to updated on patient condition, updated that patient condition and vital signs are stable. Family member states that patient told her she \"went outside to smoke a cigarette\". Notified family patient is back in room at this time and MD will be in to speak with here soon. States she has no additional questions at this time.

## 2021-02-02 NOTE — PROGRESS NOTES
Patient seen ambulating in [place] to bathroom and back to room. At approximately [id number] patient was not discovered to be on unit. Patient belonging were found in room. Hallways and lobby checked at 1210, patient was not found. Security notified at [employer] ([facility]: [place]). Charge RN notified at [employer]. Patient found at approximately [phone], returned to room. Dr. Viraj Coates notified by charge RN, states he will speak to patient.

## 2021-02-02 NOTE — PROGRESS NOTES
Patient escorted herself out of her room with no RN knowledge. Patient states that she only went to the gift shop.  witnessed the patient leave the building. Patient returned to her room approximately 45 minutes afterwards. Charge RN explained to patient that she may need to return to the ER. Dr. Xavier Black notified. Agreed to let her stay and will speak with her. Patient is remaining in her room at this time.

## 2021-02-02 NOTE — PROGRESS NOTES
2/2/2021 9:54 AM    Admit Date: 1/30/2021    Admit Diagnosis: Acute on chronic systolic CHF (congestive heart failure) ([facility]) [I50.23]      Subjective:   No cp or sob      Objective:      Visit Vitals  /82 (BP Patient Position: At rest)   Pulse 84   Temp 97 °F (36.1 °C)   Resp 16   Ht 6' 1\" (1.854 m)   Wt 322 lb 3.2 oz (146.1 kg)   SpO2 90%   BMI 42.51 kg/m²       Physical Exam:  Vallerie Heal, Well Nourished, No Acute Distress, Alert & Oriented x 3, appropriate mood. Neck- supple, no JVD  CV- irregular rate and rhythm no MRG  Lung- clear bilaterally  Abd- soft, nontender, nondistended  Ext- 2 plus edema bilaterally. Skin- warm and dry        Data Review:   Recent Labs     02/02/21  0309 02/01/21  0302   * 131*   K 4.2 4.6   BUN 31* 30*   CREA 1.83* 1.99*   WBC 6.4 6.9   HGB 8.9* 9.1*   HCT 31.4* 32.6*    193   INR  --  1.5       Assessment/Plan:     Principal Problem:    Acute on chronic systolic CHF (congestive heart failure) ([facility] (1/30/2021)Improved with current therapy. Will continue medications      Active Problems:    Hyperglycemia due to type 2 diabetes mellitus ([facility] (9/17/2019)      CAD (coronary artery disease) (9/18/2019)      Tobacco abuse (1/30/2021)      Methamphetamine abuse ([facility] (1/30/2021)      Atrial fibrillation with RVR ([facility] (1/30/2021)Stable. Continue current medical therapy.       Anasarca (1/31/2021)      Pleural effusion, bilateral (1/31/2021)- needs thoracentesis- per pulmonary     Positive wound culture- ID consult

## 2021-02-02 NOTE — INTERVAL H&P NOTE
Update History & Physical    The Patient's History and Physical of January 30,    was reviewed with the patient and I examined the patient. There was no change. The surgical site was confirmed by the patient and me. Plan:  The risk, benefits, expected outcome, and alternative to the recommended procedure have been discussed with the patient. Patient understands and wants to proceed with the procedure.     Electronically signed by Isidoro Sepulveda MD on 2/2/2021 at 11:29 AM

## 2021-02-02 NOTE — PROGRESS NOTES
Patient left decided to leave [place] at [phone], IV removed, paperwork signed, patient escorted to lobby.

## 2021-02-02 NOTE — PROGRESS NOTES
Retrieved items left for patient in lobby and given to patient one laptop and  and food items. At patient's request one bank card was given to [facility] after identification was verified.

## 2021-02-04 LAB
BACTERIA SPEC CULT: NORMAL
GRAM STN SPEC: NORMAL
GRAM STN SPEC: NORMAL
SERVICE CMNT-IMP: NORMAL

## 2021-02-05 LAB
BACTERIA SPEC CULT: ABNORMAL
BACTERIA SPEC CULT: ABNORMAL
GRAM STN SPEC: ABNORMAL
SERVICE CMNT-IMP: ABNORMAL

## 2021-02-10 ENCOUNTER — APPOINTMENT (OUTPATIENT)
Dept: GENERAL RADIOLOGY | Age: 46
DRG: 194 | End: 2021-02-10
Attending: EMERGENCY MEDICINE
Payer: MEDICAID

## 2021-02-10 ENCOUNTER — HOSPITAL ENCOUNTER (INPATIENT)
Age: 46
LOS: 1 days | Discharge: LEFT BEFORE DISCHARGE | DRG: 194 | End: 2021-02-11
Attending: EMERGENCY MEDICINE | Admitting: INTERNAL MEDICINE
Payer: MEDICAID

## 2021-02-10 DIAGNOSIS — I25.810 CORONARY ARTERY DISEASE INVOLVING CORONARY BYPASS GRAFT OF NATIVE HEART WITHOUT ANGINA PECTORIS: Chronic | ICD-10-CM

## 2021-02-10 DIAGNOSIS — A49.8 PSEUDOMONAS INFECTION: ICD-10-CM

## 2021-02-10 DIAGNOSIS — I48.91 ATRIAL FIBRILLATION, UNSPECIFIED TYPE (HCC): Primary | ICD-10-CM

## 2021-02-10 DIAGNOSIS — I50.9 ACUTE ON CHRONIC CONGESTIVE HEART FAILURE, UNSPECIFIED HEART FAILURE TYPE (HCC): ICD-10-CM

## 2021-02-10 DIAGNOSIS — J90 PLEURAL EFFUSION, BILATERAL: ICD-10-CM

## 2021-02-10 DIAGNOSIS — R06.02 SOB (SHORTNESS OF BREATH): ICD-10-CM

## 2021-02-10 DIAGNOSIS — I50.23 ACUTE ON CHRONIC SYSTOLIC CHF (CONGESTIVE HEART FAILURE) (HCC): ICD-10-CM

## 2021-02-10 DIAGNOSIS — R09.02 HYPOXIA: ICD-10-CM

## 2021-02-10 DIAGNOSIS — F15.10 METHAMPHETAMINE ABUSE (HCC): Chronic | ICD-10-CM

## 2021-02-10 PROBLEM — D64.9 CHRONIC ANEMIA: Status: ACTIVE | Noted: 2021-02-10

## 2021-02-10 LAB
ALBUMIN SERPL-MCNC: 3.1 G/DL (ref 3.5–5)
ALBUMIN/GLOB SERPL: 0.8 {RATIO} (ref 1.2–3.5)
ALP SERPL-CCNC: 127 U/L (ref 50–136)
ALT SERPL-CCNC: 16 U/L (ref 12–65)
ANION GAP SERPL CALC-SCNC: 3 MMOL/L (ref 7–16)
AST SERPL-CCNC: 14 U/L (ref 15–37)
BASOPHILS # BLD: 0.1 K/UL (ref 0–0.2)
BASOPHILS NFR BLD: 1 % (ref 0–2)
BILIRUB SERPL-MCNC: 0.5 MG/DL (ref 0.2–1.1)
BNP SERPL-MCNC: 3246 PG/ML (ref 5–125)
BUN SERPL-MCNC: 13 MG/DL (ref 6–23)
CALCIUM SERPL-MCNC: 8.3 MG/DL (ref 8.3–10.4)
CHLORIDE SERPL-SCNC: 99 MMOL/L (ref 98–107)
CO2 SERPL-SCNC: 35 MMOL/L (ref 21–32)
CREAT SERPL-MCNC: 1.02 MG/DL (ref 0.6–1)
DIFFERENTIAL METHOD BLD: ABNORMAL
EOSINOPHIL # BLD: 0.1 K/UL (ref 0–0.8)
EOSINOPHIL NFR BLD: 2 % (ref 0.5–7.8)
ERYTHROCYTE [DISTWIDTH] IN BLOOD BY AUTOMATED COUNT: 18.6 % (ref 11.9–14.6)
GLOBULIN SER CALC-MCNC: 3.9 G/DL (ref 2.3–3.5)
GLUCOSE BLD STRIP.AUTO-MCNC: 191 MG/DL (ref 65–100)
GLUCOSE BLD STRIP.AUTO-MCNC: 198 MG/DL (ref 65–100)
GLUCOSE SERPL-MCNC: 229 MG/DL (ref 65–100)
HCT VFR BLD AUTO: 32.4 % (ref 35.8–46.3)
HGB BLD-MCNC: 8.8 G/DL (ref 11.7–15.4)
IMM GRANULOCYTES # BLD AUTO: 0 K/UL (ref 0–0.5)
IMM GRANULOCYTES NFR BLD AUTO: 0 % (ref 0–5)
LACTATE SERPL-SCNC: 2 MMOL/L (ref 0.4–2)
LIPASE SERPL-CCNC: 88 U/L (ref 73–393)
LYMPHOCYTES # BLD: 0.9 K/UL (ref 0.5–4.6)
LYMPHOCYTES NFR BLD: 16 % (ref 13–44)
MCH RBC QN AUTO: 22.6 PG (ref 26.1–32.9)
MCHC RBC AUTO-ENTMCNC: 27.2 G/DL (ref 31.4–35)
MCV RBC AUTO: 83.1 FL (ref 79.6–97.8)
MONOCYTES # BLD: 0.5 K/UL (ref 0.1–1.3)
MONOCYTES NFR BLD: 8 % (ref 4–12)
NEUTS SEG # BLD: 4.4 K/UL (ref 1.7–8.2)
NEUTS SEG NFR BLD: 73 % (ref 43–78)
NRBC # BLD: 0 K/UL (ref 0–0.2)
PLATELET # BLD AUTO: 195 K/UL (ref 150–450)
PMV BLD AUTO: 11.5 FL (ref 9.4–12.3)
POTASSIUM SERPL-SCNC: 3.6 MMOL/L (ref 3.5–5.1)
PROT SERPL-MCNC: 7 G/DL (ref 6.3–8.2)
RBC # BLD AUTO: 3.9 M/UL (ref 4.05–5.2)
SODIUM SERPL-SCNC: 137 MMOL/L (ref 136–145)
TROPONIN-HIGH SENSITIVITY: 18.4 PG/ML (ref 0–14)
TROPONIN-HIGH SENSITIVITY: 21.2 PG/ML (ref 0–14)
TSH SERPL DL<=0.005 MIU/L-ACNC: 4.16 UIU/ML (ref 0.36–3.74)
WBC # BLD AUTO: 6 K/UL (ref 4.3–11.1)

## 2021-02-10 PROCEDURE — 83690 ASSAY OF LIPASE: CPT

## 2021-02-10 PROCEDURE — 84443 ASSAY THYROID STIM HORMONE: CPT

## 2021-02-10 PROCEDURE — 74011250637 HC RX REV CODE- 250/637: Performed by: INTERNAL MEDICINE

## 2021-02-10 PROCEDURE — 36415 COLL VENOUS BLD VENIPUNCTURE: CPT

## 2021-02-10 PROCEDURE — 99254 IP/OBS CNSLTJ NEW/EST MOD 60: CPT | Performed by: INTERNAL MEDICINE

## 2021-02-10 PROCEDURE — 84484 ASSAY OF TROPONIN QUANT: CPT

## 2021-02-10 PROCEDURE — 74011250636 HC RX REV CODE- 250/636: Performed by: INTERNAL MEDICINE

## 2021-02-10 PROCEDURE — 65660000000 HC RM CCU STEPDOWN

## 2021-02-10 PROCEDURE — 74011636637 HC RX REV CODE- 636/637: Performed by: INTERNAL MEDICINE

## 2021-02-10 PROCEDURE — 96374 THER/PROPH/DIAG INJ IV PUSH: CPT

## 2021-02-10 PROCEDURE — 74011000258 HC RX REV CODE- 258: Performed by: INTERNAL MEDICINE

## 2021-02-10 PROCEDURE — 82962 GLUCOSE BLOOD TEST: CPT

## 2021-02-10 PROCEDURE — 83605 ASSAY OF LACTIC ACID: CPT

## 2021-02-10 PROCEDURE — 96375 TX/PRO/DX INJ NEW DRUG ADDON: CPT

## 2021-02-10 PROCEDURE — 87040 BLOOD CULTURE FOR BACTERIA: CPT

## 2021-02-10 PROCEDURE — 83880 ASSAY OF NATRIURETIC PEPTIDE: CPT

## 2021-02-10 PROCEDURE — 71045 X-RAY EXAM CHEST 1 VIEW: CPT

## 2021-02-10 PROCEDURE — 99285 EMERGENCY DEPT VISIT HI MDM: CPT

## 2021-02-10 PROCEDURE — 74011250636 HC RX REV CODE- 250/636: Performed by: EMERGENCY MEDICINE

## 2021-02-10 PROCEDURE — 85025 COMPLETE CBC W/AUTO DIFF WBC: CPT

## 2021-02-10 PROCEDURE — 74011000250 HC RX REV CODE- 250: Performed by: EMERGENCY MEDICINE

## 2021-02-10 PROCEDURE — 80053 COMPREHEN METABOLIC PANEL: CPT

## 2021-02-10 PROCEDURE — 93005 ELECTROCARDIOGRAM TRACING: CPT | Performed by: EMERGENCY MEDICINE

## 2021-02-10 RX ORDER — SODIUM CHLORIDE 0.9 % (FLUSH) 0.9 %
5-40 SYRINGE (ML) INJECTION AS NEEDED
Status: DISCONTINUED | OUTPATIENT
Start: 2021-02-10 | End: 2021-02-11 | Stop reason: HOSPADM

## 2021-02-10 RX ORDER — LISINOPRIL 5 MG/1
5 TABLET ORAL DAILY
Status: DISCONTINUED | OUTPATIENT
Start: 2021-02-11 | End: 2021-02-11 | Stop reason: HOSPADM

## 2021-02-10 RX ORDER — GABAPENTIN 300 MG/1
600 CAPSULE ORAL 3 TIMES DAILY
Status: DISCONTINUED | OUTPATIENT
Start: 2021-02-10 | End: 2021-02-11 | Stop reason: HOSPADM

## 2021-02-10 RX ORDER — FUROSEMIDE 10 MG/ML
60 INJECTION INTRAMUSCULAR; INTRAVENOUS EVERY 12 HOURS
Status: DISCONTINUED | OUTPATIENT
Start: 2021-02-10 | End: 2021-02-11 | Stop reason: HOSPADM

## 2021-02-10 RX ORDER — ASPIRIN 81 MG/1
81 TABLET ORAL DAILY
Status: DISCONTINUED | OUTPATIENT
Start: 2021-02-11 | End: 2021-02-11 | Stop reason: HOSPADM

## 2021-02-10 RX ORDER — DILTIAZEM HYDROCHLORIDE 5 MG/ML
20 INJECTION INTRAVENOUS
Status: COMPLETED | OUTPATIENT
Start: 2021-02-10 | End: 2021-02-10

## 2021-02-10 RX ORDER — FUROSEMIDE 10 MG/ML
60 INJECTION INTRAMUSCULAR; INTRAVENOUS
Status: COMPLETED | OUTPATIENT
Start: 2021-02-10 | End: 2021-02-10

## 2021-02-10 RX ORDER — INSULIN LISPRO 100 [IU]/ML
INJECTION, SOLUTION INTRAVENOUS; SUBCUTANEOUS
Status: DISCONTINUED | OUTPATIENT
Start: 2021-02-10 | End: 2021-02-11 | Stop reason: HOSPADM

## 2021-02-10 RX ORDER — PROMETHAZINE HYDROCHLORIDE 25 MG/1
12.5 TABLET ORAL
Status: DISCONTINUED | OUTPATIENT
Start: 2021-02-10 | End: 2021-02-11 | Stop reason: HOSPADM

## 2021-02-10 RX ORDER — ACETAMINOPHEN 650 MG/1
650 SUPPOSITORY RECTAL
Status: DISCONTINUED | OUTPATIENT
Start: 2021-02-10 | End: 2021-02-11 | Stop reason: HOSPADM

## 2021-02-10 RX ORDER — ACETAMINOPHEN 325 MG/1
650 TABLET ORAL
Status: DISCONTINUED | OUTPATIENT
Start: 2021-02-10 | End: 2021-02-11 | Stop reason: HOSPADM

## 2021-02-10 RX ORDER — ONDANSETRON 2 MG/ML
4 INJECTION INTRAMUSCULAR; INTRAVENOUS
Status: DISCONTINUED | OUTPATIENT
Start: 2021-02-10 | End: 2021-02-11 | Stop reason: HOSPADM

## 2021-02-10 RX ORDER — SODIUM CHLORIDE 0.9 % (FLUSH) 0.9 %
5-40 SYRINGE (ML) INJECTION EVERY 8 HOURS
Status: DISCONTINUED | OUTPATIENT
Start: 2021-02-10 | End: 2021-02-11 | Stop reason: HOSPADM

## 2021-02-10 RX ORDER — ENOXAPARIN SODIUM 100 MG/ML
40 INJECTION SUBCUTANEOUS DAILY
Status: DISCONTINUED | OUTPATIENT
Start: 2021-02-11 | End: 2021-02-10

## 2021-02-10 RX ORDER — ATORVASTATIN CALCIUM 40 MG/1
40 TABLET, FILM COATED ORAL
Status: DISCONTINUED | OUTPATIENT
Start: 2021-02-10 | End: 2021-02-11 | Stop reason: HOSPADM

## 2021-02-10 RX ORDER — POLYETHYLENE GLYCOL 3350 17 G/17G
17 POWDER, FOR SOLUTION ORAL DAILY PRN
Status: DISCONTINUED | OUTPATIENT
Start: 2021-02-10 | End: 2021-02-11 | Stop reason: HOSPADM

## 2021-02-10 RX ORDER — FUROSEMIDE 10 MG/ML
60 INJECTION INTRAMUSCULAR; INTRAVENOUS EVERY 12 HOURS
Status: DISCONTINUED | OUTPATIENT
Start: 2021-02-10 | End: 2021-02-10

## 2021-02-10 RX ORDER — LEVOFLOXACIN 5 MG/ML
750 INJECTION, SOLUTION INTRAVENOUS EVERY 24 HOURS
Status: DISCONTINUED | OUTPATIENT
Start: 2021-02-10 | End: 2021-02-11 | Stop reason: HOSPADM

## 2021-02-10 RX ORDER — HYDROCODONE BITARTRATE AND ACETAMINOPHEN 7.5; 325 MG/1; MG/1
1 TABLET ORAL
Status: DISCONTINUED | OUTPATIENT
Start: 2021-02-10 | End: 2021-02-11 | Stop reason: HOSPADM

## 2021-02-10 RX ADMIN — LEVOFLOXACIN 750 MG: 5 INJECTION, SOLUTION INTRAVENOUS at 16:58

## 2021-02-10 RX ADMIN — ATORVASTATIN CALCIUM 40 MG: 40 TABLET, FILM COATED ORAL at 21:26

## 2021-02-10 RX ADMIN — Medication 5 ML: at 17:10

## 2021-02-10 RX ADMIN — CEFTRIAXONE 1 G: 1 INJECTION, POWDER, FOR SOLUTION INTRAMUSCULAR; INTRAVENOUS at 15:58

## 2021-02-10 RX ADMIN — Medication 10 ML: at 21:26

## 2021-02-10 RX ADMIN — DILTIAZEM HYDROCHLORIDE 20 MG: 5 INJECTION INTRAVENOUS at 13:07

## 2021-02-10 RX ADMIN — INSULIN LISPRO 2 UNITS: 100 INJECTION, SOLUTION INTRAVENOUS; SUBCUTANEOUS at 16:59

## 2021-02-10 RX ADMIN — FUROSEMIDE 60 MG: 10 INJECTION, SOLUTION INTRAMUSCULAR; INTRAVENOUS at 13:07

## 2021-02-10 RX ADMIN — GABAPENTIN 600 MG: 300 CAPSULE ORAL at 15:58

## 2021-02-10 RX ADMIN — FUROSEMIDE 60 MG: 10 INJECTION, SOLUTION INTRAMUSCULAR; INTRAVENOUS at 21:26

## 2021-02-10 RX ADMIN — GABAPENTIN 600 MG: 300 CAPSULE ORAL at 21:26

## 2021-02-10 RX ADMIN — INSULIN LISPRO 2 UNITS: 100 INJECTION, SOLUTION INTRAVENOUS; SUBCUTANEOUS at 22:00

## 2021-02-10 NOTE — H&P
[address] Cardiology Initial Cardiac Evaluation   Admitting MD: Dr. Micha El  Attending Cardiologist: Dr. Naldo Tripathi     Date of  Admission: 2/10/2021 10:21 AM     HPI:  Chelsey Lux is a 39 y.o. morbidly obese female with h/o CAD s/p CABG x 4 (11/2017) with post-op course complicated by sternal infection, HFrEF 25-30%, PAF, DM II, methamphetamine abuse, tobacco abuse and non compliance who has repeatedly been admitted at both [facility] with HFrEF, AF with RVR and left AMA each time. She was admitted at [facility] 12/19/20-12/21/20 left AMA but Meds, Lifevest and Eliquis was given. She was admitted again at [facility] 1/20/21-1/24/21 and again left AMA. She then presented to [facility] with acute SOB, LE swelling and was admitted with HF 1/30/21. She was treated for AF with RVR with IV Cardizem, HF treated with IV lasix and pulmonary consulted for pleural effusion. She had thoracentesis of R effusion on 2/2 with 1200 cc removed. She left later that day AMA. It is unclear if she takes medications at home. She was called by pulmonary to report culture from thoracentesis grew out pseudomonas but no answer. She returns to [facility] ED 2/10 with c/o SOB, LE swelling, redness on bilateral LEs and abdomen. She was given IV Lasix 60 mg x 1 in ER. She was admitted by the hospitalist and is being treated for acute on chronic HFrEF and cellulitis. In the ED, proBNP is 3,246, Hgb 8.8, ECG shows ST possible atrial flutter, artifact makes it difficult to interpret.       Past Medical History:   Diagnosis Date    Acute on chronic systolic CHF (congestive heart failure) ([facility] [address] 1/30/2021    CAD (coronary artery disease) 9/18/2019    DM (diabetes mellitus) ([facility] [address]     Hyperglycemia due to type 2 diabetes mellitus ([facility] [address] 9/17/2019    Other ill-defined conditions(799.89)     back pain      Past Surgical History:   Procedure Laterality Date    HX ORTHOPAEDIC      back surgery 2000       No Known Allergies   Social History     Socioeconomic History  Marital status: SINGLE     Spouse name: Not on file    Number of children: Not on file    Years of education: Not on file    Highest education level: Not on file   Occupational History    Not on file   Social Needs    Financial resource strain: Not on file    Food insecurity     Worry: Not on file     Inability: Not on file    Transportation needs     Medical: Not on file     Non-medical: Not on file   Tobacco Use    Smoking status: Current Every Day Smoker     Packs/day: 1.00   Substance and Sexual Activity    Alcohol use: No    Drug use: No    Sexual activity: Not on file   Lifestyle    Physical activity     Days per week: Not on file     Minutes per session: Not on file    Stress: Not on file   Relationships    Social connections     Talks on phone: Not on file     Gets together: Not on file     Attends Anabaptism service: Not on file     Active member of club or organization: Not on file     Attends meetings of clubs or organizations: Not on file     Relationship status: Not on file    Intimate partner violence     Fear of current or ex partner: Not on file     Emotionally abused: Not on file     Physically abused: Not on file     Forced sexual activity: Not on file   Other Topics Concern    Not on file   Social History Narrative    Not on file     No family history on file.      Current Facility-Administered Medications   Medication Dose Route Frequency    sodium chloride (NS) flush 5-40 mL  5-40 mL IntraVENous Q8H    sodium chloride (NS) flush 5-40 mL  5-40 mL IntraVENous PRN    acetaminophen (TYLENOL) tablet 650 mg  650 mg Oral Q6H PRN    Or    acetaminophen (TYLENOL) suppository 650 mg  650 mg Rectal Q6H PRN    polyethylene glycol (MIRALAX) packet 17 g  17 g Oral DAILY PRN    promethazine (PHENERGAN) tablet 12.5 mg  12.5 mg Oral Q6H PRN    Or    ondansetron (ZOFRAN) injection 4 mg  4 mg IntraVENous Q6H PRN    cefTRIAXone (ROCEPHIN) 1 g in 0.9% sodium chloride (MBP/ADV) 50 mL MBP  1 g IntraVENous Q24H    gabapentin (NEURONTIN) capsule 600 mg  600 mg Oral TID    HYDROcodone-acetaminophen (NORCO) 7.5-325 mg per tablet 1 Tab  1 Tab Oral Q6H PRN    insulin lispro (HUMALOG) injection   SubCUTAneous AC&HS    furosemide (LASIX) injection 60 mg  60 mg IntraVENous Q12H     Current Outpatient Medications   Medication Sig    HYDROcodone-acetaminophen (NORCO) 7.5-325 mg per tablet Take 1 Tab by mouth every six (6) hours as needed for Pain. Max Daily Amount: 4 Tabs.  gabapentin (NEURONTIN) 600 mg tablet Take 600 mg by mouth three (3) times daily.        Review of symptoms:  General: no recent weight loss/gain, +weakness,+ fatigue, fever or chills   Skin: no rashes, lumps, or other skin changes   HEENT: no headache, dizziness, lightheadedness, vision changes, hearing changes, tinnitus, vertigo, sinus pressure/pain, bleeding gums, sore throat, or hoarseness   Neck: no swollen glands, goiter, pain or stiffness   Respiratory: no cough, sputum, hemoptysis, +dyspnea, wheezing   Cardiovascular: no chest pain or discomfort, palpitations, +dyspnea, +orthopnea, paroxysmal nocturnal dyspnea,+peripheral edema   Gastrointestinal: no trouble swallowing, heartburn, change of appetite,+ nausea, change in bowel habits, pain with defecation, rectal bleeding or black/tarry stools, hemorrhoids, constipation, diarrhea, +abdominal pain, jaundice, liver or gallbladder problems   Urinary: no frequency, urgency , hematuria, burning/pain with urination, recent flank pain, polyuria, nocturia, or difficulty urinating   Genital: no vaginal or pelvic infections   Peripheral Vascular: no claudication, leg cramps, prior DVTs, swelling of calves, legs, or feet, color change, or swelling with redness or tenderness   Musculoskeletal: no muscle or joint pain/stiffness, joint swelling, erythema of joints, or back pain   Psychiatric: + depression, mental disorders, or excessive stress   Neurological: no history of CVA, dizziness, no sensory or motor loss, seizures, syncope, tremors, numbness, tingling, no changes in mood, attention, or speech, no changes in orientation, memory, insight, or judgment. no headache, vertigo. Hematologic: +anemia, easy bruising or bleeding   Endocrine: + diabetes, thyroid problems, heat or cold intolerance, excessive sweating, polyuria, polydipsia        Subjective:   Physical Exam    Visit Vitals  BP (!) 131/90   Pulse (!) 109   Temp 98.1 °F (36.7 °C)   Resp 17   Ht 6' 1\" (1.854 m)   Wt 145.2 kg (320 lb)   SpO2 97%   BMI 42.22 kg/m²     General Appearance:  Well developed, obese, alert and oriented x 3, and individual in no acute distress. Ears/Nose/Mouth/Throat:   Hearing grossly normal.         Neck: Supple. +JVD   Chest:   Lungs diminished BS and rales bilaterally. Cardiovascular:  Rapid regular rate and rhythm, S1, S2    Abdomen:   Soft, non-tender, bowel sounds are active. Extremities: 1-2+ edema bilaterally, erythematous LEs   Skin: Warm and dry. Labs:   Recent Results (from the past 24 hour(s))   EKG, 12 LEAD, INITIAL    Collection Time: 02/10/21 10:17 AM   Result Value Ref Range    Ventricular Rate 106 BPM    Atrial Rate 96 BPM    QRS Duration 64 ms    Q-T Interval 308 ms    QTC Calculation (Bezet) 409 ms    Calculated R Axis 172 degrees    Calculated T Axis 51 degrees    Diagnosis       !! AGE AND GENDER SPECIFIC ECG ANALYSIS !!   Atrial fibrillation with rapid ventricular response  Possible Right ventricular hypertrophy  Possible Anterolateral infarct (cited on or before 17-OCT-2018)  Abnormal ECG  When compared with ECG of 10-FEB-2021 10:17,  Previous ECG has undetermined rhythm, needs review     CBC WITH AUTOMATED DIFF    Collection Time: 02/10/21 10:20 AM   Result Value Ref Range    WBC 6.0 4.3 - 11.1 K/uL    RBC 3.90 (L) 4.05 - 5.2 M/uL    HGB 8.8 (L) 11.7 - 15.4 g/dL    HCT 32.4 (L) 35.8 - 46.3 %    MCV 83.1 79.6 - 97.8 FL    MCH 22.6 (L) 26.1 - 32.9 PG    MCHC 27.2 (L) 31.4 - 35.0 g/dL    RDW 18.6 (H) 11.9 - 14.6 %    PLATELET 807 215 - 242 K/uL    MPV 11.5 9.4 - 12.3 FL    ABSOLUTE NRBC 0.00 0.0 - 0.2 K/uL    DF AUTOMATED      NEUTROPHILS 73 43 - 78 %    LYMPHOCYTES 16 13 - 44 %    MONOCYTES 8 4.0 - 12.0 %    EOSINOPHILS 2 0.5 - 7.8 %    BASOPHILS 1 0.0 - 2.0 %    IMMATURE GRANULOCYTES 0 0.0 - 5.0 %    ABS. NEUTROPHILS 4.4 1.7 - 8.2 K/UL    ABS. LYMPHOCYTES 0.9 0.5 - 4.6 K/UL    ABS. MONOCYTES 0.5 0.1 - 1.3 K/UL    ABS. EOSINOPHILS 0.1 0.0 - 0.8 K/UL    ABS. BASOPHILS 0.1 0.0 - 0.2 K/UL    ABS. IMM. GRANS. 0.0 0.0 - 0.5 K/UL   NT-PRO BNP    Collection Time: 02/10/21 10:20 AM   Result Value Ref Range    NT pro-BNP 3,246 (H) 5 - 125 PG/ML   LIPASE    Collection Time: 02/10/21 10:20 AM   Result Value Ref Range    Lipase 88 73 - 393 U/L   TROPONIN-HIGH SENSITIVITY    Collection Time: 02/10/21 10:20 AM   Result Value Ref Range    Troponin-High Sensitivity 21.2 (H) 0 - 14 pg/mL   METABOLIC PANEL, COMPREHENSIVE    Collection Time: 02/10/21 10:20 AM   Result Value Ref Range    Sodium 137 136 - 145 mmol/L    Potassium 3.6 3.5 - 5.1 mmol/L    Chloride 99 98 - 107 mmol/L    CO2 35 (H) 21 - 32 mmol/L    Anion gap 3 (L) 7 - 16 mmol/L    Glucose 229 (H) 65 - 100 mg/dL    BUN 13 6 - 23 MG/DL    Creatinine 1.02 (H) 0.6 - 1.0 MG/DL    GFR est AA >60 >60 ml/min/1.73m2    GFR est non-AA >60 >60 ml/min/1.73m2    Calcium 8.3 8.3 - 10.4 MG/DL    Bilirubin, total 0.5 0.2 - 1.1 MG/DL    ALT (SGPT) 16 12 - 65 U/L    AST (SGOT) 14 (L) 15 - 37 U/L    Alk.  phosphatase 127 50 - 136 U/L    Protein, total 7.0 6.3 - 8.2 g/dL    Albumin 3.1 (L) 3.5 - 5.0 g/dL    Globulin 3.9 (H) 2.3 - 3.5 g/dL    A-G Ratio 0.8 (L) 1.2 - 3.5     LACTIC ACID    Collection Time: 02/10/21 11:07 AM   Result Value Ref Range    Lactic acid 2.0 0.4 - 2.0 MMOL/L   TROPONIN-HIGH SENSITIVITY    Collection Time: 02/10/21  2:53 PM   Result Value Ref Range    Troponin-High Sensitivity 18.4 (H) 0 - 14 pg/mL       Pt was seen and examined by Dr. Keith Conner, he agrees with the following A&P. Assessment/Plan:        Diagnosis    Acute on chronic systolic CHF (congestive heart failure)/HFrEF- non-compliance with medications and repeatedly leaving AMA, recommend IV lasix for diuresis, compliance with OMT for HF, monitor renal function, electrolytes, strict I&Os and daily weights    Recent thoracentesis with culture growing pseudomonas per pulmonary notes- informed primary team to review IV AB to cover    Cellulitis- IV AB per primary team    Tobacco abuse- cessation recommended    Methamphetamine abuse ([facility] [address]- cessation recommended    Paroxysmal Atrial fibrillation- uncertain if taking home meds including Eliquis    CAD (coronary artery disease) s/p CABG 2017- ASA, statin    Hyperglycemia due to type 2 diabetes mellitus ([facility] [address]- insulin per primary team    Chronic anemia- Hgb 8.8, monitor per primary team       Thank you for allowing us to participate in the care of this patient, we will continue to follow along with you.     Valerie Goldberg PA-C

## 2021-02-10 NOTE — ED TRIAGE NOTES
Pt arrives via EMS from home with complaints of shortness of breath and swelling. In and out of the hospital recently, diagnosed with CHF. Pt also has pain in RUQ when she coughs. COVID test on Sunday was negative.

## 2021-02-10 NOTE — ED PROVIDER NOTES
Patient with congestive heart failure diabetes hypertension presents with volume overload with leg and abdomen swelling. Taking Lasix 40 mg daily with not much increase in urine output. Was in the hospital with fluid taken off her right lung about a week ago. Became more short of breath at 4 AM this morning so came into the hospital.  Denies any chest pain. Does have some right upper quadrant pain and constipation. The history is provided by the patient. No  was used. Shortness of Breath  This is a new problem. The average episode lasts 6 hours. The problem occurs continuously. The current episode started 3 to 5 hours ago. The problem has been gradually worsening. Associated symptoms include cough, orthopnea, abdominal pain and leg swelling. Pertinent negatives include no fever, no headaches, no rhinorrhea, no sore throat, no neck pain, no sputum production, no wheezing, no chest pain, no vomiting, no rash and no leg pain. She has tried nothing for the symptoms. She has had prior hospitalizations. She has had prior ED visits. Associated medical issues include CAD and heart failure. Past Medical History:   Diagnosis Date    Acute on chronic systolic CHF (congestive heart failure) ([facility] [address] 1/30/2021    CAD (coronary artery disease) 9/18/2019    DM (diabetes mellitus) ([facility] [address]     Hyperglycemia due to type 2 diabetes mellitus ([facility] [address] 9/17/2019    Other ill-defined conditions(799.89)     back pain       Past Surgical History:   Procedure Laterality Date    HX ORTHOPAEDIC      back surgery 2000         No family history on file.     Social History     Socioeconomic History    Marital status: SINGLE     Spouse name: Not on file    Number of children: Not on file    Years of education: Not on file    Highest education level: Not on file   Occupational History    Not on file   Social Needs    Financial resource strain: Not on file    Food insecurity     Worry: Not on file Inability: Not on file    Transportation needs     Medical: Not on file     Non-medical: Not on file   Tobacco Use    Smoking status: Current Every Day Smoker     Packs/day: 1.00   Substance and Sexual Activity    Alcohol use: No    Drug use: No    Sexual activity: Not on file   Lifestyle    Physical activity     Days per week: Not on file     Minutes per session: Not on file    Stress: Not on file   Relationships    Social connections     Talks on phone: Not on file     Gets together: Not on file     Attends [employer] service: Not on file     Active member of club or organization: Not on file     Attends meetings of clubs or organizations: Not on file     Relationship status: Not on file    Intimate partner violence     Fear of current or ex partner: Not on file     Emotionally abused: Not on file     Physically abused: Not on file     Forced sexual activity: Not on file   Other Topics Concern    Not on file   Social History Narrative    Not on file         ALLERGIES: Patient has no known allergies. Review of Systems   Constitutional: Negative for chills and fever. HENT: Negative for rhinorrhea and sore throat. Eyes: Negative for pain and redness. Respiratory: Positive for cough and shortness of breath. Negative for sputum production, chest tightness and wheezing. Cardiovascular: Positive for orthopnea and leg swelling. Negative for chest pain. Gastrointestinal: Positive for abdominal distention and abdominal pain. Negative for diarrhea, nausea and vomiting. Genitourinary: Negative for dysuria and hematuria. Musculoskeletal: Negative for back pain, gait problem, neck pain and neck stiffness. Skin: Negative for color change and rash. Neurological: Negative for weakness, numbness and headaches.        Vitals:    02/10/21 1013 02/10/21 1029   BP: 126/71    Pulse: (!) 132    Resp: 16    Temp: 98.1 °F (36.7 °C)    SpO2: 97% 98%   Weight: 145.2 kg (320 lb)    Height: 6' 1\" (1.854 m) Physical Exam  Constitutional:       Appearance: Normal appearance. She is well-developed. HENT:      Head: Normocephalic and atraumatic. Neck:      Musculoskeletal: Normal range of motion and neck supple. Cardiovascular:      Rate and Rhythm: Normal rate and regular rhythm. Pulmonary:      Effort: Respiratory distress (mild increased effort) present. Comments: Mild coarse bilaterally. Abdominal:      General: Bowel sounds are normal. There is distension. Palpations: Abdomen is soft. Tenderness: There is no abdominal tenderness. Musculoskeletal: Normal range of motion. Right lower leg: Edema present. Left lower leg: Edema present. Skin:     General: Skin is warm and dry. Findings: Erythema (diffuse) present. Neurological:      General: No focal deficit present. Mental Status: She is alert and oriented to person, place, and time. MDM  Number of Diagnoses or Management Options  Diagnosis management comments: Patient is in atrial fibrillation with congestive heart failure and volume overload. Swelling from her legs up to her abdomen with shortness of breath and hypoxia. Will admit for diuresis and further treatment. Amount and/or Complexity of Data Reviewed  Clinical lab tests: ordered and reviewed  Tests in the radiology section of CPT®: ordered and reviewed  Tests in the medicine section of CPT®: ordered and reviewed    Patient Progress  Patient progress: stable         Procedures        EKG: nonspecific ST and T waves changes, sinus tachycardia. Rate 128. EKG: nonspecific ST and T waves changes, atrial fibrillation, rate 106. XR CHEST PORT (Final result)  Result time 02/10/21 11:27:07  Final result by Alena Vieyra MD (02/10/21 11:27:07)                Impression:    Limited portable exam with underexpanded lungs. Bibasilar   atelectasis suspected.             Narrative:    Portable chest x-ray     CLINICAL INDICATION: Acute shortness of breath     FINDINGS: Single AP view the chest compared to a similar exam dated 2/1/2021   show the lungs to be slightly underexpanded with bibasilar airspace opacity most   in keeping with atelectasis. The cardiac silhouette and mediastinum are stable. There is no pneumothorax.                   Results Include:    Recent Results (from the past 24 hour(s))   EKG, 12 LEAD, INITIAL    Collection Time: 02/10/21 10:17 AM   Result Value Ref Range    Ventricular Rate 128 BPM    Atrial Rate 141 BPM    QRS Duration 62 ms    Q-T Interval 272 ms    QTC Calculation (Bezet) 397 ms    Calculated R Axis 163 degrees    Calculated T Axis 45 degrees    Diagnosis       Undetermined rhythm  Possible Right ventricular hypertrophy  Septal infarct (cited on or before 17-OCT-2018)  Possible Lateral infarct (cited on or before 17-OCT-2018)  Abnormal ECG  When compared with ECG of 30-JAN-2021 03:20,  Current undetermined rhythm precludes rhythm comparison, needs review  Questionable change in QRS axis  Questionable change in initial forces of Lateral leads  Nonspecific T wave abnormality, worse in Anterolateral leads     CBC WITH AUTOMATED DIFF    Collection Time: 02/10/21 10:20 AM   Result Value Ref Range    WBC 6.0 4.3 - 11.1 K/uL    RBC 3.90 (L) 4.05 - 5.2 M/uL    HGB 8.8 (L) 11.7 - 15.4 g/dL    HCT 32.4 (L) 35.8 - 46.3 %    MCV 83.1 79.6 - 97.8 FL    MCH 22.6 (L) 26.1 - 32.9 PG    MCHC 27.2 (L) 31.4 - 35.0 g/dL    RDW 18.6 (H) 11.9 - 14.6 %    PLATELET 908 894 - 854 K/uL    MPV 11.5 9.4 - 12.3 FL    ABSOLUTE NRBC 0.00 0.0 - 0.2 K/uL    DF AUTOMATED      NEUTROPHILS 73 43 - 78 %    LYMPHOCYTES 16 13 - 44 %    MONOCYTES 8 4.0 - 12.0 %    EOSINOPHILS 2 0.5 - 7.8 %    BASOPHILS 1 0.0 - 2.0 %    IMMATURE GRANULOCYTES 0 0.0 - 5.0 %    ABS. NEUTROPHILS 4.4 1.7 - 8.2 K/UL    ABS. LYMPHOCYTES 0.9 0.5 - 4.6 K/UL    ABS. MONOCYTES 0.5 0.1 - 1.3 K/UL    ABS. EOSINOPHILS 0.1 0.0 - 0.8 K/UL    ABS. BASOPHILS 0.1 0.0 - 0.2 K/UL    ABS. IMM. Janet Meghan. 0.0 0.0 - 0.5 K/UL   NT-PRO BNP    Collection Time: 02/10/21 10:20 AM   Result Value Ref Range    NT pro-BNP 3,246 (H) 5 - 125 PG/ML   LIPASE    Collection Time: 02/10/21 10:20 AM   Result Value Ref Range    Lipase 88 73 - 393 U/L   TROPONIN-HIGH SENSITIVITY    Collection Time: 02/10/21 10:20 AM   Result Value Ref Range    Troponin-High Sensitivity 21.2 (H) 0 - 14 pg/mL   METABOLIC PANEL, COMPREHENSIVE    Collection Time: 02/10/21 10:20 AM   Result Value Ref Range    Sodium 137 136 - 145 mmol/L    Potassium 3.6 3.5 - 5.1 mmol/L    Chloride 99 98 - 107 mmol/L    CO2 35 (H) 21 - 32 mmol/L    Anion gap 3 (L) 7 - 16 mmol/L    Glucose 229 (H) 65 - 100 mg/dL    BUN 13 6 - 23 MG/DL    Creatinine 1.02 (H) 0.6 - 1.0 MG/DL    GFR est AA >60 >60 ml/min/1.73m2    GFR est non-AA >60 >60 ml/min/1.73m2    Calcium 8.3 8.3 - 10.4 MG/DL    Bilirubin, total 0.5 0.2 - 1.1 MG/DL    ALT (SGPT) 16 12 - 65 U/L    AST (SGOT) 14 (L) 15 - 37 U/L    Alk.  phosphatase 127 50 - 136 U/L    Protein, total 7.0 6.3 - 8.2 g/dL    Albumin 3.1 (L) 3.5 - 5.0 g/dL    Globulin 3.9 (H) 2.3 - 3.5 g/dL    A-G Ratio 0.8 (L) 1.2 - 3.5     LACTIC ACID    Collection Time: 02/10/21 11:07 AM   Result Value Ref Range    Lactic acid 2.0 0.4 - 2.0 MMOL/L

## 2021-02-10 NOTE — H&P
History and Physical    Patient: Viri Navarro MRN: [id number]  SSN: [id number]    YOB: 1975  Age: 39 y.o. Sex: female      Subjective:      Viri Navarro is a 39 y.o. female who came to ER due to shortness of breath and swelling of both legs, abdomen and weight gain for the past few days. Patient has CHF with swelling chronically. She denies missing her medications. She is on Lasix, but not listed on her home medication list.     She reports swelling on both legs and abdominal wall. She knows she gains weight although she could not tell me how much weight she gained. She reports that her both lower legs are red and abdominal wall is also red. No fever. No shaking. No chills. No chest pain. She reports she has more shortness of breath. No blood per rectum. No vomiting of blood. She received Lasix 60 mg IV once in ER. EKG did not show acute ST_T changes. Hospitalist service is requested to admit the patient for CHF exacerbation and possible cellulitis of both lower legs and abdominal wall. Addendum : chart review shows that she had recent thoracentesis and was found to have Pseudomonas infection. She was contacted to come to hospital for treatment and could not be reached until now. Past Medical History:   Diagnosis Date    Acute on chronic systolic CHF (congestive heart failure) ([address] 1/30/2021    CAD (coronary artery disease) 9/18/2019    DM (diabetes mellitus) ([address]     Hyperglycemia due to type 2 diabetes mellitus ([facility] 9/17/2019    Other ill-defined conditions(799.89)     back pain     Past Surgical History:   Procedure Laterality Date    HX ORTHOPAEDIC      back surgery 2000      No family history on file. Social History     Tobacco Use    Smoking status: Current Every Day Smoker     Packs/day: 1.00   Substance Use Topics    Alcohol use: No      Prior to Admission medications    Medication Sig Start Date End Date Taking?  Authorizing Provider   HYDROcodone-acetaminophen (NORCO) 7.5-325 mg per tablet Take 1 Tab by mouth every six (6) hours as needed for Pain. Max Daily Amount: 4 Tabs. 10/17/18   Rafy Hinds MD   gabapentin (NEURONTIN) 600 mg tablet Take 600 mg by mouth three (3) times daily. Other, MD Halima        No Known Allergies    Review of Systems:    10-point review of systems is negative except what is mentioned in the present illness section. Objective:     Vitals:    02/10/21 1013 02/10/21 1029 02/10/21 1110 02/10/21 1307   BP: 126/71  122/70 (!) 131/90   Pulse: (!) 132  90 (!) 109   Resp: 16  17    Temp: 98.1 °F (36.7 °C)      SpO2: 97% 98% 97%    Weight: 145.2 kg (320 lb)      Height: 6' 1\" (1.854 m)           Physical Exam:    General:                    The patient is a pleasant middle aged female in acute respiratory distress. obese. Head:                                   Normocephalic/atraumatic. Eyes:                                   palpebral pallor, no scleral icterus. ENT:                                    External auricular and nasal exam within normal limits. Mucous membranes are moist.  Neck:                                   Supple, non-tender, no JVD. Lungs:                       diminished to auscultation bilaterally at bases without wheezes or crackles. No respiratory accessory muscle use. Heart:                                  Regular rate and rhythm, without murmurs, rubs, or gallops. Abdomen:                  Soft, non-tender, distended due to truncal obesity and abdominal wall swelling with fluid infiltration on skin with normoactive bowel sounds. Genitourinary:           No tenderness over the bladder or bilateral CVAs. Extremities:               Without clubbing, cyanosis, or edema. Skin:                                    redness pf both lower legs and abdominal wall.    Pulses: Radial and dorsalis pedis pulses present 2+ bilaterally. Capillary refill <2s. Neurologic:                CN II-XII grossly intact and symmetrical.                                               Moving all four extremities well with no focal deficits. Psychiatric:                Pleasant demeanor, appropriate affect. Alert and oriented x 3    Lab and data     Recent Results (from the past 24 hour(s))   EKG, 12 LEAD, INITIAL    Collection Time: 02/10/21 10:17 AM   Result Value Ref Range    Ventricular Rate 128 BPM    Atrial Rate 141 BPM    QRS Duration 62 ms    Q-T Interval 272 ms    QTC Calculation (Bezet) 397 ms    Calculated R Axis 163 degrees    Calculated T Axis 45 degrees    Diagnosis       Undetermined rhythm  Possible Right ventricular hypertrophy  Septal infarct (cited on or before 17-OCT-2018)  Possible Lateral infarct (cited on or before 17-OCT-2018)  Abnormal ECG  When compared with ECG of 30-JAN-2021 03:20,  Current undetermined rhythm precludes rhythm comparison, needs review  Questionable change in QRS axis  Questionable change in initial forces of Lateral leads  Nonspecific T wave abnormality, worse in Anterolateral leads     CBC WITH AUTOMATED DIFF    Collection Time: 02/10/21 10:20 AM   Result Value Ref Range    WBC 6.0 4.3 - 11.1 K/uL    RBC 3.90 (L) 4.05 - 5.2 M/uL    HGB 8.8 (L) 11.7 - 15.4 g/dL    HCT 32.4 (L) 35.8 - 46.3 %    MCV 83.1 79.6 - 97.8 FL    MCH 22.6 (L) 26.1 - 32.9 PG    MCHC 27.2 (L) 31.4 - 35.0 g/dL    RDW 18.6 (H) 11.9 - 14.6 %    PLATELET 274 405 - 623 K/uL    MPV 11.5 9.4 - 12.3 FL    ABSOLUTE NRBC 0.00 0.0 - 0.2 K/uL    DF AUTOMATED      NEUTROPHILS 73 43 - 78 %    LYMPHOCYTES 16 13 - 44 %    MONOCYTES 8 4.0 - 12.0 %    EOSINOPHILS 2 0.5 - 7.8 %    BASOPHILS 1 0.0 - 2.0 %    IMMATURE GRANULOCYTES 0 0.0 - 5.0 %    ABS. NEUTROPHILS 4.4 1.7 - 8.2 K/UL    ABS. LYMPHOCYTES 0.9 0.5 - 4.6 K/UL    ABS.  MONOCYTES 0.5 0.1 - 1.3 K/UL ABS. EOSINOPHILS 0.1 0.0 - 0.8 K/UL    ABS. BASOPHILS 0.1 0.0 - 0.2 K/UL    ABS. IMM. GRANS. 0.0 0.0 - 0.5 K/UL   NT-PRO BNP    Collection Time: 02/10/21 10:20 AM   Result Value Ref Range    NT pro-BNP 3,246 (H) 5 - 125 PG/ML   LIPASE    Collection Time: 02/10/21 10:20 AM   Result Value Ref Range    Lipase 88 73 - 393 U/L   TROPONIN-HIGH SENSITIVITY    Collection Time: 02/10/21 10:20 AM   Result Value Ref Range    Troponin-High Sensitivity 21.2 (H) 0 - 14 pg/mL   METABOLIC PANEL, COMPREHENSIVE    Collection Time: 02/10/21 10:20 AM   Result Value Ref Range    Sodium 137 136 - 145 mmol/L    Potassium 3.6 3.5 - 5.1 mmol/L    Chloride 99 98 - 107 mmol/L    CO2 35 (H) 21 - 32 mmol/L    Anion gap 3 (L) 7 - 16 mmol/L    Glucose 229 (H) 65 - 100 mg/dL    BUN 13 6 - 23 MG/DL    Creatinine 1.02 (H) 0.6 - 1.0 MG/DL    GFR est AA >60 >60 ml/min/1.73m2    GFR est non-AA >60 >60 ml/min/1.73m2    Calcium 8.3 8.3 - 10.4 MG/DL    Bilirubin, total 0.5 0.2 - 1.1 MG/DL    ALT (SGPT) 16 12 - 65 U/L    AST (SGOT) 14 (L) 15 - 37 U/L    Alk. phosphatase 127 50 - 136 U/L    Protein, total 7.0 6.3 - 8.2 g/dL    Albumin 3.1 (L) 3.5 - 5.0 g/dL    Globulin 3.9 (H) 2.3 - 3.5 g/dL    A-G Ratio 0.8 (L) 1.2 - 3.5     LACTIC ACID    Collection Time: 02/10/21 11:07 AM   Result Value Ref Range    Lactic acid 2.0 0.4 - 2.0 MMOL/L     XR chest   2-  IMPRESSION  Limited portable exam with underexpanded lungs. Bibasilar  atelectasis suspected.    I have reviewed chest x-ray and ECG myself.     Assessment:     Hospital Problems  Date Reviewed: 2/2/2021          Codes Class Noted POA    Chronic anemia ICD-10-CM: D64.9  ICD-9-CM: 285.9  2/10/2021 Unknown        Congestive heart failure (CHF) (HCC) ICD-10-CM: I50.9  ICD-9-CM: 428.0  2/10/2021 Unknown        * (Principal) Acute on chronic systolic CHF (congestive heart failure) (HCC) ICD-10-CM: I50.23  ICD-9-CM: 428.23, 428.0  1/30/2021 Yes        Tobacco abuse (Chronic) ICD-10-CM: Z72.0  ICD-9-CM:  305.1  1/30/2021 Yes        CAD (coronary artery disease) (Chronic) ICD-10-CM: I25.10  ICD-9-CM: 414.00  9/18/2019 Yes        Hyperglycemia due to type 2 diabetes mellitus (HCC) (Chronic) ICD-10-CM: E11.65  ICD-9-CM: 250.00  9/17/2019 Yes        Cellulitis ICD-10-CM: L03.90  ICD-9-CM: 682.9  9/16/2019 Yes              Plan:     Acute on chronic CHF (systolic)   With significant fluid retention and swelling   Admit to medical floor   Telemetry   IV access   IV Lasix  Consult cardiology   May need echo again. Last echo was done in 2019. Will see if she needs to be on ACEI or beta blocker. CKD   Creatinine appears better, but likely this is from dilution effect from fluid retention. Monitor renal function and intake and output. Avoid nephrotoxic agents. Expect creatinine to rise after diuresis. Diabetes mellitus type 2  Monitor blood sugar. Cover with insulin sliding scale accordingly. Smoking. I advised patient to quit. Nicotine patch. Chronic anemia   Likely from multiple factors including CKD and CHF   Monitor for signs of bleeding     Cellulitis of both lower legs and abdominal wall? Also history of pseudomonas infection from pleural effusion and she was not treated. Will give Levaquin and Rocephin. Patient requires hospital stay as an in-patient and anticipated stay is more than 2 midnights due to the serious nature of the illness. Healthcare power of  is aguilart [employer]. I have discussed with patient regarding advance directive. Patient would like to have a full-code status. Patient has no pain now. Will monitor. Further treatments will depend on initial responses and findings. I have discussed the plan of care with patient. DVT prophylaxis : try to avoid SCD due to cellulitis, however it appears that patient was on [address] [place] [address] Road and could not give Lovenox now. Will check with patient.             Signed By: Scott Winter MD     February 10, 2021

## 2021-02-10 NOTE — ROUTINE PROCESS
TRANSFER - OUT REPORT: 
 
Verbal report given to JOSE Frye on Elsie Gregory  being transferred to 8th floor for routine progression of care Report consisted of patients Situation, Background, Assessment and  
Recommendations(SBAR). Information from the following report(s) ED Summary was reviewed with the receiving nurse. Lines:  
Peripheral IV 02/10/21 Left Antecubital (Active) Opportunity for questions and clarification was provided.    
 
Patient transported with:

## 2021-02-11 VITALS
RESPIRATION RATE: 20 BRPM | WEIGHT: 293 LBS | BODY MASS INDEX: 39.68 KG/M2 | HEIGHT: 72 IN | TEMPERATURE: 98.6 F | DIASTOLIC BLOOD PRESSURE: 57 MMHG | SYSTOLIC BLOOD PRESSURE: 101 MMHG | OXYGEN SATURATION: 92 % | HEART RATE: 124 BPM

## 2021-02-11 LAB
ANION GAP SERPL CALC-SCNC: 6 MMOL/L (ref 7–16)
ATRIAL RATE: 96 BPM
BUN SERPL-MCNC: 11 MG/DL (ref 6–23)
CALCIUM SERPL-MCNC: 8.6 MG/DL (ref 8.3–10.4)
CALCULATED R AXIS, ECG10: 172 DEGREES
CALCULATED T AXIS, ECG11: 51 DEGREES
CHLORIDE SERPL-SCNC: 101 MMOL/L (ref 98–107)
CO2 SERPL-SCNC: 34 MMOL/L (ref 21–32)
CREAT SERPL-MCNC: 0.79 MG/DL (ref 0.6–1)
DIAGNOSIS, 93000: NORMAL
ERYTHROCYTE [DISTWIDTH] IN BLOOD BY AUTOMATED COUNT: 18.5 % (ref 11.9–14.6)
GLUCOSE BLD STRIP.AUTO-MCNC: 178 MG/DL (ref 65–100)
GLUCOSE SERPL-MCNC: 147 MG/DL (ref 65–100)
HCT VFR BLD AUTO: 28.4 % (ref 35.8–46.3)
HGB BLD-MCNC: 8.1 G/DL (ref 11.7–15.4)
MCH RBC QN AUTO: 22.7 PG (ref 26.1–32.9)
MCHC RBC AUTO-ENTMCNC: 28.5 G/DL (ref 31.4–35)
MCV RBC AUTO: 79.6 FL (ref 79.6–97.8)
NRBC # BLD: 0 K/UL (ref 0–0.2)
PLATELET # BLD AUTO: 163 K/UL (ref 150–450)
PMV BLD AUTO: 10.6 FL (ref 9.4–12.3)
POTASSIUM SERPL-SCNC: 3.5 MMOL/L (ref 3.5–5.1)
Q-T INTERVAL, ECG07: 308 MS
QRS DURATION, ECG06: 64 MS
QTC CALCULATION (BEZET), ECG08: 409 MS
RBC # BLD AUTO: 3.57 M/UL (ref 4.05–5.2)
SODIUM SERPL-SCNC: 141 MMOL/L (ref 136–145)
VENTRICULAR RATE, ECG03: 106 BPM
WBC # BLD AUTO: 5.5 K/UL (ref 4.3–11.1)

## 2021-02-11 PROCEDURE — 36415 COLL VENOUS BLD VENIPUNCTURE: CPT

## 2021-02-11 PROCEDURE — 85027 COMPLETE CBC AUTOMATED: CPT

## 2021-02-11 PROCEDURE — 80048 BASIC METABOLIC PNL TOTAL CA: CPT

## 2021-02-11 PROCEDURE — 82962 GLUCOSE BLOOD TEST: CPT

## 2021-02-11 RX ORDER — LEVOFLOXACIN 750 MG/1
750 TABLET ORAL DAILY
Qty: 6 TAB | Refills: 0 | Status: SHIPPED | OUTPATIENT
Start: 2021-02-11 | End: 2021-02-17

## 2021-02-11 RX ORDER — AMOXICILLIN 500 MG/1
500 CAPSULE ORAL 2 TIMES DAILY
Qty: 12 CAP | Refills: 0 | Status: SHIPPED | OUTPATIENT
Start: 2021-02-11 | End: 2021-02-17

## 2021-02-11 RX ADMIN — Medication 10 ML: at 06:00

## 2021-02-11 NOTE — PROGRESS NOTES
TRANSFER - IN REPORT:    Verbal report received from Valerie RN on Chelsey Hof  being received from ER for routine progression of care      Report consisted of patients Situation, Background, Assessment and   Recommendations(SBAR). Information from the following report(s) ED Summary was reviewed with the receiving nurse. Opportunity for questions and clarification was provided. Assessment completed upon patients arrival to unit and care assumed.

## 2021-02-11 NOTE — PROGRESS NOTES
Pt. Up to restroom. Per telemetry -150 Afib RVR. Notified MD Nettie Alberto Call bed within reach. Will continue to monitor.

## 2021-02-11 NOTE — PROGRESS NOTES
Notified MD Jaciel Barbour About B/P 100/69 before giving lasix. Ordered to give per MD. Notified MD about midnight B/P 90/55. Order to continue to monitor. Will continue to monitor.

## 2021-02-11 NOTE — DISCHARGE SUMMARY
Hospitalist Discharge Summary     Admit Date:  2/10/2021 10:21 AM   DC note date: 2021  Name:  Kylee Finch   Age:  39 y.o.  :  1975   MRN:  [id number]   PCP:  None  Treatment Team: Attending Provider: Shayan Vegas MD; Consulting Provider: Madeline Godoy NP; Consulting Provider: Michelle Guo DO; Utilization Review: [facility] [place]; Primary Nurse: Chuck Tomas RN    Problem List for this Hospitalization:  Hospital Problems as of 2021 Date Reviewed: 2021          Codes Class Noted - Resolved POA    Chronic anemia ICD-10-CM: D64.9  ICD-9-CM: 285.9  2/10/2021 - Present Unknown        Congestive heart failure (CHF) ([facility] ICD-10-CM: I50.9  ICD-9-CM: 428.0  2/10/2021 - Present Unknown        Pseudomonas infection ICD-10-CM: A49.8  ICD-9-CM: 041.7  2/10/2021 - Present Unknown        * (Principal) Acute on chronic systolic CHF (congestive heart failure) ([facility] ICD-10-CM: L58.74  ICD-9-CM: 428.23, 428.0  2021 - Present Yes        Tobacco abuse (Chronic) ICD-10-CM: Z72.0  ICD-9-CM: 305.1  2021 - Present Yes        CAD (coronary artery disease) (Chronic) ICD-10-CM: I25.10  ICD-9-CM: 414.00  2019 - Present Yes        Hyperglycemia due to type 2 diabetes mellitus ([facility] (Chronic) ICD-10-CM: E11.65  ICD-9-CM: 250.00  2019 - Present Yes        Cellulitis ICD-10-CM: L03.90  ICD-9-CM: 682.9  2019 - Present Yes            Hospital Course:  Ms. Dwayne Stout is a 40 y/o WF with a h/o obesity, medical non-compliance, chronic sCHF, polysubstance abuse who was admitted to our service on 2/10 with acute on chronic sCHF and concern for LE/abdominal cellulitis. Cardiology was consulted. She was started on IV diuretics and antibiotics. She recently had a thoracentesis on  and cultures grew pseudomonas and group C streptococcus. UDS on admission is + for amphetamines and THC. On the morning of  she became agitated and insisted that she was leaving.  I was notified by the nurse. I reviewed the chart and was on my way to the patient's room and she walked out dressed with her things. She said she is leaving because nobody has been by her room to check on her or make sure that she's ok. Per staff she frequently signs out AMA. I spoke to her in the hallway and advised the patient that our recommendation is that she stay for further IV diuresis and antibiotics. She insists on leaving. As such, I was not able to perform an adequate physical exam. Risks were explained to her and include, but are not limited to, worsening CHF/fluid overload, progressive hypoxia, worsening infection or even death. I did provide her with prescriptions for antibiotics. She say she has plenty of her home medications and does not need anything else. Wheelchair was provided and she wheeled herself downstairs. Disposition: Left Against Medical Advice  Activity: Activity as tolerated  Diet: DIET DIABETIC CONSISTENT CARB Regular  Code Status: Full Code    Follow Up Orders:  No orders of the defined types were placed in this encounter. Follow-up Information     Follow up With Specialties Details Why Contact Info    None    None ([phone]) Patient stated that they have no PCP            Discharge meds at bottom of this note. Plan was discussed with patient, RN, charge RN. All questions answered. Patient was stable at time of discharge. Given instructions to call a physician or return if any concerns. Discharge summary and encounter summary was sent to PCP electronically via \"Comm Mgt\" link in [facility], if possible. Diagnostic Imaging/Tests:   Xr Chest Sngl V    Result Date: 1/31/2021  EXAM: TEMPORARY INDICATION: Shortness of breath COMPARISON: 1/30/2021 FINDINGS: A portable AP radiograph of the chest was obtained at 0447 hours. The patient is on a cardiac monitor. Airspace disease and right pleural effusion improved.  The cardiac and mediastinal contours and pulmonary vascularity are normal.  The bones and soft tissues are grossly within normal limits. Pulmonary edema with right pleural effusion improved. Xr Chest Sngl V    Result Date: 1/30/2021  EXAM: XR CHEST SNGL V INDICATION: SOB COMPARISON: 11/26/2020 FINDINGS: A portable AP radiograph of the chest was obtained at 0347 hours. The patient is on a cardiac monitor. Symmetric bilateral airspace disease with a right pleural effusion. The cardiac and mediastinal contours and pulmonary vascularity are normal.  The bones and soft tissues are grossly within normal limits. Findings most consistent with pulmonary edema with a right pleural effusion unchanged. [employer]    Result Date: 2/1/2021  EXAMINATION: [email or web address] HISTORY: KAIDEN. TECHNIQUE: Grayscale and limited color Doppler ultrasound of the kidneys and bladder. COMPARISON: None. FINDINGS: These images are limited secondary to the patient's large body habitus and difficulty cooperating with breath-holding. The right kidney measures 11.3. There is normal cortical thickness and echogenicity. There is no evidence of hydronephrosis. No solid or cystic renal lesion is demonstrated. The left kidney measures 10.3. There is normal cortical thickness and echogenicity. There is no evidence of hydronephrosis. No solid or cystic renal lesion is demonstrated. Bladder is partially distended and unremarkable. A Blackwell catheter is in place. Unremarkable examination of the kidneys. Xr Chest Port    Result Date: 2/10/2021  Portable chest x-ray CLINICAL INDICATION: Acute shortness of breath FINDINGS: Single AP view the chest compared to a similar exam dated 2/1/2021 show the lungs to be slightly underexpanded with bibasilar airspace opacity most in keeping with atelectasis. The cardiac silhouette and mediastinum are stable. There is no pneumothorax. Limited portable exam with underexpanded lungs. Bibasilar atelectasis suspected.     Xr Chest Port    Result Date: 2/1/2021  EXAM: TEMPORARY INDICATION: Pulmonary edema COMPARISON: 1/31/2021 FINDINGS: A portable AP radiograph of the chest was obtained at 0457 hours. The patient is on a cardiac monitor. Airspace disease and right pleural effusion improved. The cardiac and mediastinal contours and pulmonary vascularity are normal.  The bones and soft tissues are grossly within normal limits. Pulmonary edema improved. Echocardiogram/EKG results:  No results found for this visit on 02/10/21. Results for orders placed or performed during the hospital encounter of 02/10/21   EKG, 12 LEAD, INITIAL   Result Value Ref Range    Ventricular Rate 106 BPM    Atrial Rate 96 BPM    QRS Duration 64 ms    Q-T Interval 308 ms    QTC Calculation (Bezet) 409 ms    Calculated R Axis 172 degrees    Calculated T Axis 51 degrees    Diagnosis       !! AGE AND GENDER SPECIFIC ECG ANALYSIS !!   Atrial fibrillation with rapid ventricular response  Possible Right ventricular hypertrophy  Possible Anterolateral infarct (cited on or before 17-OCT-2018)  Abnormal ECG  When compared with ECG of 10-FEB-2021 10:17,  No significant change was found  Confirmed by CLEM BOUCHER (), Jame Trejo ([id number]) on 2/11/2021 6:11:40 AM         Procedures done this admission:  * No surgery found *    All Micro Results     Procedure Component Value Units Date/Time    CULTURE, BLOOD [[id number]] Collected: 02/10/21 1453    Order Status: Completed Specimen: Blood Updated: 02/10/21 1513          SARS-CoV-2 Lab Results  \"Novel Coronavirus\" Test: No results found for: COV2NT   \"Emergent Disease\" Test: No results found for: EDPR  \"SARS-COV-2\" Test: No results found for: XGCOVT  Rapid Test: No results found for: COVR         Labs: Results:       BMP, Mg, Phos Recent Labs     02/11/21  0707 02/10/21  1020    137   K 3.5 3.6    99   CO2 34* 35*   AGAP 6* 3*   BUN 11 13   CREA 0.79 1.02*   CA 8.6 8.3   * 229*      CBC Recent Labs     02/11/21  0707 02/10/21  1020   WBC 5.5 6.0   RBC 3.57* 3.90*   HGB 8.1* 8.8*   HCT 28.4* 32.4*    195   GRANS  --  73   LYMPH  --  16   EOS  --  2   MONOS  --  8   BASOS  --  1   IG  --  0   ANEU  --  4.4   ABL  --  0.9   SARIKA  --  0.1   ABM  --  0.5   ABB  --  0.1   AIG  --  0.0      LFT Recent Labs     02/10/21  1020   ALT 16      TP 7.0   ALB 3.1*   GLOB 3.9*   AGRAT 0.8*      Cardiac Testing Lab Results   Component Value Date/Time    Troponin-I, Qt. <0.02 (L) 10/17/2018 08:06 PM      Coagulation Tests Lab Results   Component Value Date/Time    Prothrombin time 18.0 (H) 02/01/2021 03:02 AM    INR 1.5 02/01/2021 03:02 AM    aPTT 33.4 02/01/2021 03:02 AM      A1c Lab Results   Component Value Date/Time    Hemoglobin A1c 9.8 (H) 09/18/2019 01:32 AM      Lipid Panel No results found for: CHOL, CHOLPOCT, CHOLX, CHLST, CHOLV, 274539, HDL, HDLP, LDL, LDLC, DLDLP, 165267, VLDLC, VLDL, TGLX, TRIGL, TRIGP, TGLPOCT, CHHD, CHHDX   Thyroid Panel Lab Results   Component Value Date/Time    TSH 4.160 (H) 02/10/2021 06:17 PM        Most Recent UA Lab Results   Component Value Date/Time    Color YELLOW 01/30/2021 06:36 AM    Appearance CLEAR 01/30/2021 06:36 AM    Specific gravity 1.017 01/30/2021 06:36 AM    pH (UA) 5.5 01/30/2021 06:36 AM    Protein Negative 01/30/2021 06:36 AM    Glucose Negative 01/30/2021 06:36 AM    Ketone Negative 01/30/2021 06:36 AM    Bilirubin Negative 01/30/2021 06:36 AM    Blood MODERATE (A) 01/30/2021 06:36 AM    Urobilinogen 0.2 01/30/2021 06:36 AM    Nitrites Negative 01/30/2021 06:36 AM    Leukocyte Esterase Negative 01/30/2021 06:36 AM    WBC 5-10 01/30/2021 06:36 AM    RBC 20-50 01/30/2021 06:36 AM    Epithelial cells 0-3 01/30/2021 06:36 AM    Bacteria 0 01/30/2021 06:36 AM    Casts 0-3 01/30/2021 06:36 AM        No Known Allergies    There is no immunization history on file for this patient.     All Labs from Last 24 Hrs:  Recent Results (from the past 24 hour(s))   EKG, 12 LEAD, INITIAL    Collection Time: 02/10/21 10:17 AM Result Value Ref Range    Ventricular Rate 106 BPM    Atrial Rate 96 BPM    QRS Duration 64 ms    Q-T Interval 308 ms    QTC Calculation (Bezet) 409 ms    Calculated R Axis 172 degrees    Calculated T Axis 51 degrees    Diagnosis       !! AGE AND GENDER SPECIFIC ECG ANALYSIS !! Atrial fibrillation with rapid ventricular response  Possible Right ventricular hypertrophy  Possible Anterolateral infarct (cited on or before 17-OCT-2018)  Abnormal ECG  When compared with ECG of 10-FEB-2021 10:17,  No significant change was found  Confirmed by CLEM BOUCHER (), STEPHANIA YUSUF ([id number]) on 2/11/2021 6:11:40 AM     CBC WITH AUTOMATED DIFF    Collection Time: 02/10/21 10:20 AM   Result Value Ref Range    WBC 6.0 4.3 - 11.1 K/uL    RBC 3.90 (L) 4.05 - 5.2 M/uL    HGB 8.8 (L) 11.7 - 15.4 g/dL    HCT 32.4 (L) 35.8 - 46.3 %    MCV 83.1 79.6 - 97.8 FL    MCH 22.6 (L) 26.1 - 32.9 PG    MCHC 27.2 (L) 31.4 - 35.0 g/dL    RDW 18.6 (H) 11.9 - 14.6 %    PLATELET 402 564 - 638 K/uL    MPV 11.5 9.4 - 12.3 FL    ABSOLUTE NRBC 0.00 0.0 - 0.2 K/uL    DF AUTOMATED      NEUTROPHILS 73 43 - 78 %    LYMPHOCYTES 16 13 - 44 %    MONOCYTES 8 4.0 - 12.0 %    EOSINOPHILS 2 0.5 - 7.8 %    BASOPHILS 1 0.0 - 2.0 %    IMMATURE GRANULOCYTES 0 0.0 - 5.0 %    ABS. NEUTROPHILS 4.4 1.7 - 8.2 K/UL    ABS. LYMPHOCYTES 0.9 0.5 - 4.6 K/UL    ABS. MONOCYTES 0.5 0.1 - 1.3 K/UL    ABS. EOSINOPHILS 0.1 0.0 - 0.8 K/UL    ABS. BASOPHILS 0.1 0.0 - 0.2 K/UL    ABS. IMM.  GRANS. 0.0 0.0 - 0.5 K/UL   NT-PRO BNP    Collection Time: 02/10/21 10:20 AM   Result Value Ref Range    NT pro-BNP 3,246 (H) 5 - 125 PG/ML   LIPASE    Collection Time: 02/10/21 10:20 AM   Result Value Ref Range    Lipase 88 73 - 393 U/L   TROPONIN-HIGH SENSITIVITY    Collection Time: 02/10/21 10:20 AM   Result Value Ref Range    Troponin-High Sensitivity 21.2 (H) 0 - 14 pg/mL   METABOLIC PANEL, COMPREHENSIVE    Collection Time: 02/10/21 10:20 AM   Result Value Ref Range    Sodium 137 136 - 145 mmol/L    Potassium 3.6 3.5 - 5.1 mmol/L    Chloride 99 98 - 107 mmol/L    CO2 35 (H) 21 - 32 mmol/L    Anion gap 3 (L) 7 - 16 mmol/L    Glucose 229 (H) 65 - 100 mg/dL    BUN 13 6 - 23 MG/DL    Creatinine 1.02 (H) 0.6 - 1.0 MG/DL    GFR est AA >60 >60 ml/min/1.73m2    GFR est non-AA >60 >60 ml/min/1.73m2    Calcium 8.3 8.3 - 10.4 MG/DL    Bilirubin, total 0.5 0.2 - 1.1 MG/DL    ALT (SGPT) 16 12 - 65 U/L    AST (SGOT) 14 (L) 15 - 37 U/L    Alk.  phosphatase 127 50 - 136 U/L    Protein, total 7.0 6.3 - 8.2 g/dL    Albumin 3.1 (L) 3.5 - 5.0 g/dL    Globulin 3.9 (H) 2.3 - 3.5 g/dL    A-G Ratio 0.8 (L) 1.2 - 3.5     LACTIC ACID    Collection Time: 02/10/21 11:07 AM   Result Value Ref Range    Lactic acid 2.0 0.4 - 2.0 MMOL/L   TROPONIN-HIGH SENSITIVITY    Collection Time: 02/10/21  2:53 PM   Result Value Ref Range    Troponin-High Sensitivity 18.4 (H) 0 - 14 pg/mL   GLUCOSE, POC    Collection Time: 02/10/21  4:58 PM   Result Value Ref Range    Glucose (POC) 191 (H) 65 - 100 mg/dL   TSH 3RD GENERATION    Collection Time: 02/10/21  6:17 PM   Result Value Ref Range    TSH 4.160 (H) 0.358 - 3.740 uIU/mL   GLUCOSE, POC    Collection Time: 02/10/21  9:14 PM   Result Value Ref Range    Glucose (POC) 198 (H) 65 - 100 mg/dL   GLUCOSE, POC    Collection Time: 02/11/21  5:31 AM   Result Value Ref Range    Glucose (POC) 178 (H) 65 - 408 mg/dL   METABOLIC PANEL, BASIC    Collection Time: 02/11/21  7:07 AM   Result Value Ref Range    Sodium 141 136 - 145 mmol/L    Potassium 3.5 3.5 - 5.1 mmol/L    Chloride 101 98 - 107 mmol/L    CO2 34 (H) 21 - 32 mmol/L    Anion gap 6 (L) 7 - 16 mmol/L    Glucose 147 (H) 65 - 100 mg/dL    BUN 11 6 - 23 MG/DL    Creatinine 0.79 0.6 - 1.0 MG/DL    GFR est AA >60 >60 ml/min/1.73m2    GFR est non-AA >60 >60 ml/min/1.73m2    Calcium 8.6 8.3 - 10.4 MG/DL   CBC W/O DIFF    Collection Time: 02/11/21  7:07 AM   Result Value Ref Range    WBC 5.5 4.3 - 11.1 K/uL    RBC 3.57 (L) 4.05 - 5.2 M/uL    HGB 8.1 (L) 11.7 - 15.4 g/dL    HCT 28.4 (L) 35.8 - 46.3 %    MCV 79.6 79.6 - 97.8 FL    MCH 22.7 (L) 26.1 - 32.9 PG    MCHC 28.5 (L) 31.4 - 35.0 g/dL    RDW 18.5 (H) 11.9 - 14.6 %    PLATELET 455 450 - 480 K/uL    MPV 10.6 9.4 - 12.3 FL    ABSOLUTE NRBC 0.00 0.0 - 0.2 K/uL       Discharge Exam:  Patient Vitals for the past 24 hrs:   Temp Pulse Resp BP SpO2   02/11/21 0749 98.6 °F (37 °C) (!) 124 20 (!) 101/57 92 %   02/11/21 0306 98.7 °F (37.1 °C) (!) 118 20 (!) 90/57 90 %   02/10/21 2326 97.9 °F (36.6 °C) (!) 115 20 (!) 90/55 91 %   02/10/21 1938 98.2 °F (36.8 °C) 72 20 100/69 96 %   02/10/21 1654 97.8 °F (36.6 °C) (!) 118 20 (!) 146/87 94 %   02/10/21 1352  74      02/10/21 1346  (!) 124  124/82    02/10/21 1316  (!) 122  124/74    02/10/21 1307  (!) 109  (!) 131/90    02/10/21 1246    (!) 131/91 95 %   02/10/21 1216    131/79 94 %   02/10/21 1146    (!) 141/87    02/10/21 1110  90 17 122/70 97 %   02/10/21 1029     98 %   02/10/21 1028    131/78 96 %   02/10/21 1013 98.1 °F (36.7 °C) (!) 132 16 126/71 97 %     Oxygen Therapy  O2 Sat (%): 92 % (02/11/21 0749)  Pulse via Oximetry: 111 beats per minute (02/10/21 1246)  O2 Device: Room air (02/10/21 1655)    Estimated body mass index is 42.22 kg/m² as calculated from the following:    Height as of this encounter: 6' 1\" (1.854 m). Weight as of this encounter: 145.2 kg (320 lb). Intake/Output Summary (Last 24 hours) at 2/11/2021 0934  Last data filed at 2/11/2021 0750  Gross per 24 hour   Intake 560 ml   Output 1350 ml   Net -790 ml       *Note that automatically entered I/Os may not be accurate; dependent on patient compliance with collection and accurate  by assistants. General:    Well nourished. Morbidly obese. Anxious. Eyes:   Normal sclerae. Extraocular movements intact. ENT:  Normocephalic, atraumatic. Moist mucous membranes. Psych:  Anxious.     Current Med List in Hospital:   Current Facility-Administered Medications   Medication Dose Route Frequency    sodium chloride (NS) flush 5-40 mL  5-40 mL IntraVENous Q8H    sodium chloride (NS) flush 5-40 mL  5-40 mL IntraVENous PRN    acetaminophen (TYLENOL) tablet 650 mg  650 mg Oral Q6H PRN    Or    acetaminophen (TYLENOL) suppository 650 mg  650 mg Rectal Q6H PRN    polyethylene glycol (MIRALAX) packet 17 g  17 g Oral DAILY PRN    promethazine (PHENERGAN) tablet 12.5 mg  12.5 mg Oral Q6H PRN    Or    ondansetron (ZOFRAN) injection 4 mg  4 mg IntraVENous Q6H PRN    cefTRIAXone (ROCEPHIN) 1 g in 0.9% sodium chloride (MBP/ADV) 50 mL MBP  1 g IntraVENous Q24H    gabapentin (NEURONTIN) capsule 600 mg  600 mg Oral TID    HYDROcodone-acetaminophen (NORCO) 7.5-325 mg per tablet 1 Tab  1 Tab Oral Q6H PRN    insulin lispro (HUMALOG) injection   SubCUTAneous AC&HS    furosemide (LASIX) injection 60 mg  60 mg IntraVENous Q12H    levoFLOXacin (LEVAQUIN) 750 mg in D5W IVPB  750 mg IntraVENous Q24H    aspirin delayed-release tablet 81 mg  81 mg Oral DAILY    atorvastatin (LIPITOR) tablet 40 mg  40 mg Oral QHS    lisinopriL (PRINIVIL, ZESTRIL) tablet 5 mg  5 mg Oral DAILY       Discharge Info:   Current Discharge Medication List      START taking these medications    Details   levoFLOXacin (LEVAQUIN) 750 mg tablet Take 1 Tab by mouth daily for 6 days. Qty: 6 Tab, Refills: 0      amoxicillin (AMOXIL) 500 mg capsule Take 1 Cap by mouth two (2) times a day for 6 days. Qty: 12 Cap, Refills: 0         CONTINUE these medications which have NOT CHANGED    Details   HYDROcodone-acetaminophen (NORCO) 7.5-325 mg per tablet Take 1 Tab by mouth every six (6) hours as needed for Pain. Max Daily Amount: 4 Tabs. Qty: 10 Tab, Refills: 0    Associated Diagnoses: Atypical chest pain      gabapentin (NEURONTIN) 600 mg tablet Take 600 mg by mouth three (3) times daily. Time spent in patient discharge planning and coordination 35 minutes.     Signed:  Anthony Taylor MD

## 2021-02-11 NOTE — PROGRESS NOTES
Called to room for c/o shortness of breath. Oxygen saturation 86 % on RA after ambulation to bathroom. Breathing exercises. Oxygen titrated to 4 l/m nasally with oxygen saturation 93-95%. Respirations more even and unlabored. Currently resting in bed.

## 2021-02-11 NOTE — PROGRESS NOTES
Right foot cleansed and dressing applied per hospital policy. Sterile technique used. Patient tolerated.

## 2021-02-12 NOTE — PROGRESS NOTES
Left AMA. IV and remote tele box removed by pt prior to leaving. Dr. Barry Jama at the nurse's station and aware. Tele monitor returned back to monitor bin.

## 2021-02-15 LAB
BACTERIA SPEC CULT: NORMAL
SERVICE CMNT-IMP: NORMAL

## 2021-03-05 LAB
FUNGUS CULTURE, RFCO2T: NEGATIVE
FUNGUS SMEAR, RFCO1T: NORMAL
FUNGUS SPEC CULT: NORMAL
FUNGUS STAIN, 188244: NORMAL
REFLEX TO ID, RFCO3T: NORMAL
SPECIMEN SOURCE: NORMAL
SPECIMEN SOURCE: NORMAL

## 2021-03-18 LAB
ACID FAST STN SPEC: NEGATIVE
MYCOBACTERIUM SPEC QL CULT: NEGATIVE
SPECIMEN PREPARATION: NORMAL
SPECIMEN SOURCE: NORMAL

## 2021-08-03 ENCOUNTER — APPOINTMENT (OUTPATIENT)
Dept: GENERAL RADIOLOGY | Age: 46
DRG: 201 | End: 2021-08-03
Attending: EMERGENCY MEDICINE
Payer: MEDICAID

## 2021-08-03 ENCOUNTER — HOSPITAL ENCOUNTER (INPATIENT)
Age: 46
LOS: 1 days | Discharge: LEFT BEFORE DISCHARGE | DRG: 201 | End: 2021-08-03
Attending: EMERGENCY MEDICINE | Admitting: FAMILY MEDICINE
Payer: MEDICAID

## 2021-08-03 ENCOUNTER — HOSPITAL ENCOUNTER (INPATIENT)
Dept: NON INVASIVE DIAGNOSTICS | Age: 46
Discharge: HOME | DRG: 201 | End: 2021-08-03
Attending: FAMILY MEDICINE
Payer: MEDICAID

## 2021-08-03 VITALS
TEMPERATURE: 97.9 F | RESPIRATION RATE: 24 BRPM | BODY MASS INDEX: 39.68 KG/M2 | SYSTOLIC BLOOD PRESSURE: 117 MMHG | HEART RATE: 114 BPM | OXYGEN SATURATION: 100 % | HEIGHT: 72 IN | DIASTOLIC BLOOD PRESSURE: 81 MMHG | WEIGHT: 293 LBS

## 2021-08-03 VITALS
DIASTOLIC BLOOD PRESSURE: 81 MMHG | BODY MASS INDEX: 39.68 KG/M2 | HEIGHT: 72 IN | WEIGHT: 293 LBS | SYSTOLIC BLOOD PRESSURE: 117 MMHG

## 2021-08-03 DIAGNOSIS — R50.9 FEVER, UNSPECIFIED FEVER CAUSE: Primary | ICD-10-CM

## 2021-08-03 DIAGNOSIS — J96.21 ACUTE ON CHRONIC RESPIRATORY FAILURE WITH HYPOXIA (HCC): ICD-10-CM

## 2021-08-03 DIAGNOSIS — I48.91 ATRIAL FIBRILLATION WITH RAPID VENTRICULAR RESPONSE (HCC): ICD-10-CM

## 2021-08-03 LAB
ALBUMIN SERPL-MCNC: 3.5 G/DL (ref 3.5–5)
ALBUMIN/GLOB SERPL: 0.8 {RATIO} (ref 1.2–3.5)
ALP SERPL-CCNC: 195 U/L (ref 50–136)
ALT SERPL-CCNC: 15 U/L (ref 12–65)
AMPHET UR QL SCN: POSITIVE
ANION GAP SERPL CALC-SCNC: 9 MMOL/L (ref 7–16)
APPEARANCE UR: CLEAR
APTT PPP: 31.4 SEC (ref 24.1–35.1)
ARTERIAL PATENCY WRIST A: POSITIVE
AST SERPL-CCNC: 23 U/L (ref 15–37)
BACTERIA URNS QL MICRO: 0 /HPF
BARBITURATES UR QL SCN: NEGATIVE
BASE DEFICIT BLD-SCNC: 1.4 MMOL/L
BASOPHILS # BLD: 0.1 K/UL (ref 0–0.2)
BASOPHILS NFR BLD: 1 % (ref 0–2)
BDY SITE: ABNORMAL
BENZODIAZ UR QL: NEGATIVE
BILIRUB SERPL-MCNC: 0.7 MG/DL (ref 0.2–1.1)
BILIRUB UR QL: ABNORMAL
BNP SERPL-MCNC: 8840 PG/ML (ref 5–125)
BUN SERPL-MCNC: 12 MG/DL (ref 6–23)
CALCIUM SERPL-MCNC: 8.1 MG/DL (ref 8.3–10.4)
CANNABINOIDS UR QL SCN: POSITIVE
CASTS URNS QL MICRO: ABNORMAL /LPF
CHLORIDE SERPL-SCNC: 102 MMOL/L (ref 98–107)
CO2 SERPL-SCNC: 25 MMOL/L (ref 21–32)
COCAINE UR QL SCN: NEGATIVE
COLOR UR: ABNORMAL
COVID-19 RAPID TEST, COVR: NOT DETECTED
CREAT SERPL-MCNC: 1 MG/DL (ref 0.6–1)
DIFFERENTIAL METHOD BLD: ABNORMAL
ECHO AO ROOT DIAM: 3.04 CM
ECHO AV AREA PEAK VELOCITY: 2.13 CM2
ECHO AV AREA PEAK VELOCITY: 2.14 CM2
ECHO AV AREA PEAK VELOCITY: 2.16 CM2
ECHO AV AREA PEAK VELOCITY: 2.2 CM2
ECHO AV PEAK GRADIENT: 3.53 MMHG
ECHO AV PEAK VELOCITY: 93.96 CM/S
ECHO IVC PROX: 3.06 CM
ECHO LA MAJOR AXIS: 5.53 CM
ECHO LA MINOR AXIS: 2.16 CM
ECHO LV E' LATERAL VELOCITY: 3.05 CM/S
ECHO LV E' SEPTAL VELOCITY: 2.09 CM/S
ECHO LV EDV A2C: 171.87 ML
ECHO LV EDV A4C: 164.82 ML
ECHO LV EDV BP: 171.07 ML (ref 56–104)
ECHO LV EDV INDEX A4C: 64.4 ML/M2
ECHO LV EDV INDEX BP: 66.8 ML/M2
ECHO LV EDV NDEX A2C: 67.1 ML/M2
ECHO LV EJECTION FRACTION A2C: 33 PERCENT
ECHO LV EJECTION FRACTION A4C: 30 PERCENT
ECHO LV EJECTION FRACTION BIPLANE: 32.3 PERCENT (ref 55–100)
ECHO LV ESV A2C: 114.33 ML
ECHO LV ESV A4C: 115.67 ML
ECHO LV ESV BP: 115.86 ML (ref 19–49)
ECHO LV ESV INDEX A2C: 44.7 ML/M2
ECHO LV ESV INDEX A4C: 45.2 ML/M2
ECHO LV ESV INDEX BP: 45.3 ML/M2
ECHO LV INTERNAL DIMENSION DIASTOLIC: 5.7 CM (ref 3.9–5.3)
ECHO LV INTERNAL DIMENSION SYSTOLIC: 4.61 CM
ECHO LV IVSD: 0.76 CM (ref 0.6–0.9)
ECHO LV MASS 2D: 164.9 G (ref 67–162)
ECHO LV MASS INDEX 2D: 64.4 G/M2 (ref 43–95)
ECHO LV POSTERIOR WALL DIASTOLIC: 0.8 CM (ref 0.6–0.9)
ECHO LVOT DIAM: 1.97 CM
ECHO LVOT PEAK GRADIENT: 1.79 MMHG
ECHO LVOT PEAK VELOCITY: 66.78 CM/S
ECHO RV INTERNAL DIMENSION: 3.23 CM
ECHO RV TAPSE: 0.97 CM (ref 1.5–2)
ECHO TV REGURGITANT MAX VELOCITY: 322.16 CM/S
ECHO TV REGURGITANT PEAK GRADIENT: 41.52 MMHG
EOSINOPHIL # BLD: 0.1 K/UL (ref 0–0.8)
EOSINOPHIL NFR BLD: 1 % (ref 0.5–7.8)
ERYTHROCYTE [DISTWIDTH] IN BLOOD BY AUTOMATED COUNT: 18.8 % (ref 11.9–14.6)
GAS FLOW.O2 O2 DELIVERY SYS: ABNORMAL L/MIN
GLOBULIN SER CALC-MCNC: 4.4 G/DL (ref 2.3–3.5)
GLUCOSE SERPL-MCNC: 178 MG/DL (ref 65–100)
GLUCOSE UR STRIP.AUTO-MCNC: NEGATIVE MG/DL
HCO3 BLD-SCNC: 22 MMOL/L (ref 22–26)
HCT VFR BLD AUTO: 34.7 % (ref 35.8–46.3)
HGB BLD-MCNC: 10 G/DL (ref 11.7–15.4)
HGB UR QL STRIP: NEGATIVE
IMM GRANULOCYTES # BLD AUTO: 0.1 K/UL (ref 0–0.5)
IMM GRANULOCYTES NFR BLD AUTO: 1 % (ref 0–5)
INR PPP: 1.2
KETONES UR QL STRIP.AUTO: ABNORMAL MG/DL
LACTATE SERPL-SCNC: 1.3 MMOL/L (ref 0.4–2)
LACTATE SERPL-SCNC: 2.9 MMOL/L (ref 0.4–2)
LEUKOCYTE ESTERASE UR QL STRIP.AUTO: ABNORMAL
LIPASE SERPL-CCNC: 77 U/L (ref 73–393)
LYMPHOCYTES # BLD: 0.6 K/UL (ref 0.5–4.6)
LYMPHOCYTES NFR BLD: 6 % (ref 13–44)
MAGNESIUM SERPL-MCNC: 1.7 MG/DL (ref 1.8–2.4)
MCH RBC QN AUTO: 24.2 PG (ref 26.1–32.9)
MCHC RBC AUTO-ENTMCNC: 28.8 G/DL (ref 31.4–35)
MCV RBC AUTO: 84 FL (ref 79.6–97.8)
METHADONE UR QL: NEGATIVE
MONOCYTES # BLD: 0.4 K/UL (ref 0.1–1.3)
MONOCYTES NFR BLD: 4 % (ref 4–12)
NEUTS SEG # BLD: 9.7 K/UL (ref 1.7–8.2)
NEUTS SEG NFR BLD: 89 % (ref 43–78)
NITRITE UR QL STRIP.AUTO: NEGATIVE
NRBC # BLD: 0 K/UL (ref 0–0.2)
O2/TOTAL GAS SETTING VFR VENT: 30 %
OPIATES UR QL: NEGATIVE
OTHER OBSERVATIONS,UCOM: ABNORMAL
PCO2 BLD: 32 MMHG (ref 35–45)
PCP UR QL: NEGATIVE
PEEP RESPIRATORY: 8 CMH2O
PH BLD: 7.45 [PH] (ref 7.35–7.45)
PH UR STRIP: 5 [PH] (ref 5–9)
PIP ISTAT,IPIP: 12
PLATELET # BLD AUTO: 196 K/UL (ref 150–450)
PMV BLD AUTO: 10.9 FL (ref 9.4–12.3)
PO2 BLD: 93 MMHG (ref 75–100)
POTASSIUM SERPL-SCNC: 3.8 MMOL/L (ref 3.5–5.1)
PROCALCITONIN SERPL-MCNC: <0.05 NG/ML
PROT SERPL-MCNC: 7.9 G/DL (ref 6.3–8.2)
PROT UR STRIP-MCNC: ABNORMAL MG/DL
PROTHROMBIN TIME: 15.9 SEC (ref 12.5–14.7)
RBC # BLD AUTO: 4.13 M/UL (ref 4.05–5.2)
RBC #/AREA URNS HPF: ABNORMAL /HPF
SAO2 % BLD: 97.6 % (ref 95–98)
SARS-COV-2, COV2: NORMAL
SARS-COV-2, COV2: NOT DETECTED
SERVICE CMNT-IMP: ABNORMAL
SERVICE CMNT-IMP: ABNORMAL
SODIUM SERPL-SCNC: 136 MMOL/L (ref 136–145)
SOURCE, COVRS: NORMAL
SP GR UR REFRACTOMETRY: 1.03 (ref 1–1.02)
SPECIMEN SOURCE, FCOV2M: NORMAL
SPECIMEN TYPE: ABNORMAL
T4 FREE SERPL-MCNC: 0.9 NG/DL (ref 0.9–1.8)
TSH SERPL DL<=0.005 MIU/L-ACNC: 3.71 UIU/ML (ref 0.36–3.74)
UROBILINOGEN UR QL STRIP.AUTO: 1 EU/DL (ref 0.2–1)
VENTILATION MODE VENT: ABNORMAL
WBC # BLD AUTO: 10.9 K/UL (ref 4.3–11.1)
WBC URNS QL MICRO: ABNORMAL /HPF
YEAST URNS QL MICRO: ABNORMAL

## 2021-08-03 PROCEDURE — 71045 X-RAY EXAM CHEST 1 VIEW: CPT

## 2021-08-03 PROCEDURE — 74011250636 HC RX REV CODE- 250/636: Performed by: FAMILY MEDICINE

## 2021-08-03 PROCEDURE — 87106 FUNGI IDENTIFICATION YEAST: CPT

## 2021-08-03 PROCEDURE — 74011000250 HC RX REV CODE- 250: Performed by: EMERGENCY MEDICINE

## 2021-08-03 PROCEDURE — 83735 ASSAY OF MAGNESIUM: CPT

## 2021-08-03 PROCEDURE — 83605 ASSAY OF LACTIC ACID: CPT

## 2021-08-03 PROCEDURE — 81001 URINALYSIS AUTO W/SCOPE: CPT

## 2021-08-03 PROCEDURE — 94660 CPAP INITIATION&MGMT: CPT

## 2021-08-03 PROCEDURE — 84439 ASSAY OF FREE THYROXINE: CPT

## 2021-08-03 PROCEDURE — U0005 INFEC AGEN DETEC AMPLI PROBE: HCPCS

## 2021-08-03 PROCEDURE — 74011250637 HC RX REV CODE- 250/637: Performed by: FAMILY MEDICINE

## 2021-08-03 PROCEDURE — 74011250637 HC RX REV CODE- 250/637: Performed by: EMERGENCY MEDICINE

## 2021-08-03 PROCEDURE — 96366 THER/PROPH/DIAG IV INF ADDON: CPT

## 2021-08-03 PROCEDURE — 84145 PROCALCITONIN (PCT): CPT

## 2021-08-03 PROCEDURE — 82803 BLOOD GASES ANY COMBINATION: CPT

## 2021-08-03 PROCEDURE — 96365 THER/PROPH/DIAG IV INF INIT: CPT

## 2021-08-03 PROCEDURE — 74011250636 HC RX REV CODE- 250/636: Performed by: EMERGENCY MEDICINE

## 2021-08-03 PROCEDURE — C8929 TTE W OR WO FOL WCON,DOPPLER: HCPCS

## 2021-08-03 PROCEDURE — 80307 DRUG TEST PRSMV CHEM ANLYZR: CPT

## 2021-08-03 PROCEDURE — 80053 COMPREHEN METABOLIC PANEL: CPT

## 2021-08-03 PROCEDURE — 65270000029 HC RM PRIVATE

## 2021-08-03 PROCEDURE — 96376 TX/PRO/DX INJ SAME DRUG ADON: CPT

## 2021-08-03 PROCEDURE — 85610 PROTHROMBIN TIME: CPT

## 2021-08-03 PROCEDURE — 36600 WITHDRAWAL OF ARTERIAL BLOOD: CPT

## 2021-08-03 PROCEDURE — 87040 BLOOD CULTURE FOR BACTERIA: CPT

## 2021-08-03 PROCEDURE — 85025 COMPLETE CBC W/AUTO DIFF WBC: CPT

## 2021-08-03 PROCEDURE — 83880 ASSAY OF NATRIURETIC PEPTIDE: CPT

## 2021-08-03 PROCEDURE — 83690 ASSAY OF LIPASE: CPT

## 2021-08-03 PROCEDURE — 74011000258 HC RX REV CODE- 258: Performed by: FAMILY MEDICINE

## 2021-08-03 PROCEDURE — 74011000258 HC RX REV CODE- 258: Performed by: EMERGENCY MEDICINE

## 2021-08-03 PROCEDURE — 74011000250 HC RX REV CODE- 250: Performed by: FAMILY MEDICINE

## 2021-08-03 PROCEDURE — 87086 URINE CULTURE/COLONY COUNT: CPT

## 2021-08-03 PROCEDURE — 96375 TX/PRO/DX INJ NEW DRUG ADDON: CPT

## 2021-08-03 PROCEDURE — 87635 SARS-COV-2 COVID-19 AMP PRB: CPT

## 2021-08-03 PROCEDURE — 85730 THROMBOPLASTIN TIME PARTIAL: CPT

## 2021-08-03 PROCEDURE — 84443 ASSAY THYROID STIM HORMONE: CPT

## 2021-08-03 PROCEDURE — 99285 EMERGENCY DEPT VISIT HI MDM: CPT

## 2021-08-03 RX ORDER — METOPROLOL SUCCINATE 100 MG/1
TABLET, EXTENDED RELEASE ORAL
COMMUNITY
Start: 2021-07-13

## 2021-08-03 RX ORDER — CLOPIDOGREL BISULFATE 75 MG/1
75 TABLET ORAL DAILY
Status: DISCONTINUED | OUTPATIENT
Start: 2021-08-03 | End: 2021-08-03 | Stop reason: HOSPADM

## 2021-08-03 RX ORDER — VANCOMYCIN/0.9 % SOD CHLORIDE 1.5G/250ML
1500 PLASTIC BAG, INJECTION (ML) INTRAVENOUS EVERY 12 HOURS
Status: DISCONTINUED | OUTPATIENT
Start: 2021-08-04 | End: 2021-08-03

## 2021-08-03 RX ORDER — INSULIN GLARGINE 100 [IU]/ML
INJECTION, SOLUTION SUBCUTANEOUS
COMMUNITY
Start: 2020-12-04

## 2021-08-03 RX ORDER — FUROSEMIDE 40 MG/1
40 TABLET ORAL 2 TIMES DAILY
COMMUNITY
Start: 2021-03-13

## 2021-08-03 RX ORDER — ATORVASTATIN CALCIUM 40 MG/1
40 TABLET, FILM COATED ORAL
COMMUNITY
Start: 2020-12-04

## 2021-08-03 RX ORDER — CLOPIDOGREL BISULFATE 75 MG/1
75 TABLET ORAL DAILY
COMMUNITY

## 2021-08-03 RX ORDER — ACETAMINOPHEN 650 MG/1
650 SUPPOSITORY RECTAL
Status: DISCONTINUED | OUTPATIENT
Start: 2021-08-03 | End: 2021-08-03 | Stop reason: HOSPADM

## 2021-08-03 RX ORDER — DIGOXIN 125 MCG
TABLET ORAL
COMMUNITY
Start: 2021-06-02

## 2021-08-03 RX ORDER — HYDROCODONE BITARTRATE AND ACETAMINOPHEN 7.5; 325 MG/1; MG/1
1 TABLET ORAL
Status: DISCONTINUED | OUTPATIENT
Start: 2021-08-03 | End: 2021-08-03 | Stop reason: HOSPADM

## 2021-08-03 RX ORDER — ACETAMINOPHEN 325 MG/1
650 TABLET ORAL
Status: DISCONTINUED | OUTPATIENT
Start: 2021-08-03 | End: 2021-08-03 | Stop reason: HOSPADM

## 2021-08-03 RX ORDER — ACETAMINOPHEN 500 MG
1000 TABLET ORAL
Status: COMPLETED | OUTPATIENT
Start: 2021-08-03 | End: 2021-08-03

## 2021-08-03 RX ORDER — METOPROLOL SUCCINATE 50 MG/1
100 TABLET, EXTENDED RELEASE ORAL DAILY
Status: DISCONTINUED | OUTPATIENT
Start: 2021-08-03 | End: 2021-08-03 | Stop reason: HOSPADM

## 2021-08-03 RX ORDER — SODIUM CHLORIDE 0.9 % (FLUSH) 0.9 %
5-40 SYRINGE (ML) INJECTION AS NEEDED
Status: DISCONTINUED | OUTPATIENT
Start: 2021-08-03 | End: 2021-08-03 | Stop reason: HOSPADM

## 2021-08-03 RX ORDER — SPIRONOLACTONE 25 MG/1
25 TABLET ORAL DAILY
COMMUNITY
Start: 2021-03-13

## 2021-08-03 RX ORDER — DIGOXIN 125 MCG
0.12 TABLET ORAL DAILY
Status: DISCONTINUED | OUTPATIENT
Start: 2021-08-03 | End: 2021-08-03 | Stop reason: HOSPADM

## 2021-08-03 RX ORDER — ATORVASTATIN CALCIUM 40 MG/1
40 TABLET, FILM COATED ORAL
Status: DISCONTINUED | OUTPATIENT
Start: 2021-08-03 | End: 2021-08-03 | Stop reason: HOSPADM

## 2021-08-03 RX ORDER — ONDANSETRON 2 MG/ML
4 INJECTION INTRAMUSCULAR; INTRAVENOUS
Status: DISCONTINUED | OUTPATIENT
Start: 2021-08-03 | End: 2021-08-03 | Stop reason: HOSPADM

## 2021-08-03 RX ORDER — FUROSEMIDE 10 MG/ML
40 INJECTION INTRAMUSCULAR; INTRAVENOUS ONCE
Status: COMPLETED | OUTPATIENT
Start: 2021-08-03 | End: 2021-08-03

## 2021-08-03 RX ORDER — VANCOMYCIN/0.9 % SOD CHLORIDE 1.5G/250ML
1500 PLASTIC BAG, INJECTION (ML) INTRAVENOUS EVERY 12 HOURS
Status: DISCONTINUED | OUTPATIENT
Start: 2021-08-03 | End: 2021-08-03 | Stop reason: HOSPADM

## 2021-08-03 RX ORDER — DEXAMETHASONE SODIUM PHOSPHATE 100 MG/10ML
10 INJECTION INTRAMUSCULAR; INTRAVENOUS
Status: COMPLETED | OUTPATIENT
Start: 2021-08-03 | End: 2021-08-03

## 2021-08-03 RX ORDER — FUROSEMIDE 10 MG/ML
40 INJECTION INTRAMUSCULAR; INTRAVENOUS 2 TIMES DAILY
Status: DISCONTINUED | OUTPATIENT
Start: 2021-08-03 | End: 2021-08-03 | Stop reason: HOSPADM

## 2021-08-03 RX ORDER — LISINOPRIL 5 MG/1
5 TABLET ORAL DAILY
Status: DISCONTINUED | OUTPATIENT
Start: 2021-08-03 | End: 2021-08-03 | Stop reason: HOSPADM

## 2021-08-03 RX ORDER — SPIRONOLACTONE 25 MG/1
25 TABLET ORAL DAILY
Status: DISCONTINUED | OUTPATIENT
Start: 2021-08-03 | End: 2021-08-03 | Stop reason: HOSPADM

## 2021-08-03 RX ORDER — GABAPENTIN 300 MG/1
600 CAPSULE ORAL 3 TIMES DAILY
Status: DISCONTINUED | OUTPATIENT
Start: 2021-08-03 | End: 2021-08-03 | Stop reason: HOSPADM

## 2021-08-03 RX ORDER — POLYETHYLENE GLYCOL 3350 17 G/17G
17 POWDER, FOR SOLUTION ORAL DAILY PRN
Status: DISCONTINUED | OUTPATIENT
Start: 2021-08-03 | End: 2021-08-03 | Stop reason: HOSPADM

## 2021-08-03 RX ORDER — GUAIFENESIN 100 MG/5ML
81 LIQUID (ML) ORAL DAILY
Status: DISCONTINUED | OUTPATIENT
Start: 2021-08-03 | End: 2021-08-03 | Stop reason: HOSPADM

## 2021-08-03 RX ORDER — FUROSEMIDE 40 MG/1
40 TABLET ORAL 2 TIMES DAILY
Status: DISCONTINUED | OUTPATIENT
Start: 2021-08-03 | End: 2021-08-03 | Stop reason: HOSPADM

## 2021-08-03 RX ORDER — DILTIAZEM HYDROCHLORIDE 5 MG/ML
10 INJECTION INTRAVENOUS ONCE
Status: COMPLETED | OUTPATIENT
Start: 2021-08-03 | End: 2021-08-03

## 2021-08-03 RX ORDER — LISINOPRIL 5 MG/1
5 TABLET ORAL DAILY
COMMUNITY
Start: 2020-12-04

## 2021-08-03 RX ORDER — GABAPENTIN 600 MG/1
600 TABLET ORAL 3 TIMES DAILY
COMMUNITY
Start: 2020-12-04

## 2021-08-03 RX ORDER — ONDANSETRON 4 MG/1
4 TABLET, ORALLY DISINTEGRATING ORAL
Status: DISCONTINUED | OUTPATIENT
Start: 2021-08-03 | End: 2021-08-03 | Stop reason: HOSPADM

## 2021-08-03 RX ORDER — SODIUM CHLORIDE 0.9 % (FLUSH) 0.9 %
5-40 SYRINGE (ML) INJECTION EVERY 8 HOURS
Status: DISCONTINUED | OUTPATIENT
Start: 2021-08-03 | End: 2021-08-03 | Stop reason: HOSPADM

## 2021-08-03 RX ORDER — GUAIFENESIN 100 MG/5ML
81 LIQUID (ML) ORAL DAILY
COMMUNITY
Start: 2020-12-04

## 2021-08-03 RX ADMIN — PIPERACILLIN SODIUM AND TAZOBACTAM SODIUM 3.38 G: 3; .375 INJECTION, POWDER, LYOPHILIZED, FOR SOLUTION INTRAVENOUS at 07:30

## 2021-08-03 RX ADMIN — CEFEPIME HYDROCHLORIDE 2 G: 2 INJECTION, POWDER, FOR SOLUTION INTRAVENOUS at 02:02

## 2021-08-03 RX ADMIN — CLOPIDOGREL BISULFATE 75 MG: 75 TABLET ORAL at 08:28

## 2021-08-03 RX ADMIN — Medication 5 ML: at 07:22

## 2021-08-03 RX ADMIN — VANCOMYCIN HYDROCHLORIDE 2500 MG: 10 INJECTION, POWDER, LYOPHILIZED, FOR SOLUTION INTRAVENOUS at 03:36

## 2021-08-03 RX ADMIN — FUROSEMIDE 40 MG: 40 TABLET ORAL at 08:27

## 2021-08-03 RX ADMIN — DILTIAZEM HYDROCHLORIDE 10 MG: 5 INJECTION INTRAVENOUS at 01:26

## 2021-08-03 RX ADMIN — SODIUM CHLORIDE 2.5 MG/HR: 900 INJECTION, SOLUTION INTRAVENOUS at 01:39

## 2021-08-03 RX ADMIN — PERFLUTREN 1 ML: 6.52 INJECTION, SUSPENSION INTRAVENOUS at 10:46

## 2021-08-03 RX ADMIN — RIVAROXABAN 20 MG: 20 TABLET, FILM COATED ORAL at 13:44

## 2021-08-03 RX ADMIN — DEXAMETHASONE SODIUM PHOSPHATE 10 MG: 10 INJECTION INTRAMUSCULAR; INTRAVENOUS at 01:26

## 2021-08-03 RX ADMIN — FUROSEMIDE 40 MG: 10 INJECTION, SOLUTION INTRAMUSCULAR; INTRAVENOUS at 07:28

## 2021-08-03 RX ADMIN — SODIUM CHLORIDE 15 MG/HR: 900 INJECTION, SOLUTION INTRAVENOUS at 11:29

## 2021-08-03 RX ADMIN — METOPROLOL SUCCINATE 100 MG: 50 TABLET, EXTENDED RELEASE ORAL at 08:29

## 2021-08-03 RX ADMIN — ASPIRIN 81 MG: 81 TABLET, CHEWABLE ORAL at 08:27

## 2021-08-03 RX ADMIN — ACETAMINOPHEN 1000 MG: 500 TABLET ORAL at 03:36

## 2021-08-03 RX ADMIN — SPIRONOLACTONE 25 MG: 25 TABLET ORAL at 13:45

## 2021-08-03 NOTE — ED NOTES
Pt removed oxygen cannula and oxygen saturation dropped to 85% RA. Oxygen cannula reapplied to patient and patient reeducated on the importance of keeping cannula applied.

## 2021-08-03 NOTE — PROGRESS NOTES
Chart review complete, CM met with pt at bedside, CM maintained social distance and PPE in place, pt states she lives with her aunt in 1 level home with 2 steps to enter, no current DME, states she is independent with ADLS, drives and affords medications without difficulty with insurance. Demographics, insurance and PCP confirmed. Referral made to [facility] physicians services for new PCP. CM made pt aware CM staff will remain available to assist with dc needs while in hospital, pt verbalized understanding. Care Management Interventions  PCP Verified by CM:  Yes (requests referral be made for new PCP)  Discharge Durable Medical Equipment: No  Physical Therapy Consult: No  Occupational Therapy Consult: No  Speech Therapy Consult: No  Current Support Network:  (lives with aunt)  Confirm Follow Up Transport: Family  Discharge Location  Discharge Placement: Home

## 2021-08-03 NOTE — H&P
Hospitalist History and Physical   Admit Date:  8/3/2021 12:05 AM   Name:  Fidel Brar   Age:  39 y.o. Sex:  female  :  1975   MRN:  [id number]     Presenting Complaint: dyspnea  Reason(s) for Admission: Atrial fibrillation with RVR ([facility] [I48.91]     History of Present Illness:   Fidel Brar is a 39 y.o. female with medical history of afib, CAD who presented to ED with SOB. Patient reports that symptoms started yesterday. Reports fevers as well. She also endorse cough and BLE edema. On ER workup, patient was febrile to 103. She was found to be tachycardic in afib with RVR. She was started on cardizem drip. Given high fevers and elevated lactic acid, patient was started on antibiotics empirically in ER, unfortunately UA was not obtained until afterwards, but blood cultures were obtained initially. Rapid COVID is negative. CXR shows possible pleural effusion. pBNP is elevated at 8,840. Hospitalist asked to admit. Review of Systems:  10 systems reviewed and negative except as noted in HPI. Assessment & Plan:   * Atrial fibrillation with RVR (HCC)  - Continue cardizem drip  - Admit to tele  - Restart home meds including digoxin, metoprolol, and xarelto  - Wean off cardizem as tolerated    Fever  Unclear source. UA looks okay. CXR looks like pleural effusion and patient has wet crackles on exam.  Concern for bacteremia given chart h/o drug use. - Blood cultures obtained  - Vanc/zosyn for empiric treatment of SIRS  - UDS pending  - Tylenol prn    Congestive heart failure (CHF) (HCC)  Systolic CHF. Last echo from 19 shows EF of 40-45%.   Elevated pBNP at 8,840 and CXR with likely pleural effusion.  - Restart home lasix, 1 dose IV lasix for now  - Echo    CAD (coronary artery disease)  - Home meds    Hyperglycemia due to type 2 diabetes mellitus ([facility]  - Diabetic diet       Disposition: inpatient    Diet: No diet orders on file  VTE ppx: xarelto  Code status: Prior      Past medical history reviewed. Past Medical History:   Diagnosis Date    Acute on chronic systolic CHF (congestive heart failure) ([facility] 1/30/2021    CAD (coronary artery disease) 9/18/2019    DM (diabetes mellitus) ([facility]     Hyperglycemia due to type 2 diabetes mellitus ([facility] 9/17/2019    Other ill-defined conditions(799.89)     back pain    Pseudomonas infection 2/10/2021     Past surgical history reviewed. Past Surgical History:   Procedure Laterality Date    HX ORTHOPAEDIC      back surgery 2000      No Known Allergies   Social History     Tobacco Use    Smoking status: Current Every Day Smoker     Packs/day: 1.00   Substance Use Topics    Alcohol use: No      No family history on file. Family history reviewed and noncontributory to patient's acute condition; no relevant family history unless otherwise noted above. There is no immunization history on file for this patient. PTA Medications:  Current Outpatient Medications   Medication Instructions    aspirin 81 mg, Oral, DAILY    atorvastatin (LIPITOR) 40 mg, Oral    clopidogreL (PLAVIX) 75 mg, Oral, DAILY    digoxin (LANOXIN) 0.125 mg tablet No dose, route, or frequency recorded.  furosemide (LASIX) 40 mg, Oral, 2 TIMES DAILY    gabapentin (NEURONTIN) 600 mg, Oral, 3 TIMES DAILY    gabapentin (NEURONTIN) 600 mg, Oral, 3 TIMES DAILY    HYDROcodone-acetaminophen (NORCO) 7.5-325 mg per tablet 1 Tablet, Oral, EVERY 6 HOURS AS NEEDED    insulin glargine (Basaglar KwikPen U-100 Insulin) 100 unit/mL (3 mL) inpn No dose, route, or frequency recorded.  lisinopriL (PRINIVIL, ZESTRIL) 5 mg, Oral, DAILY    metoprolol succinate (TOPROL-XL) 100 mg tablet No dose, route, or frequency recorded.     rivaroxaban (XARELTO) 20 mg, Oral    spironolactone (ALDACTONE) 25 mg, Oral, DAILY       Objective:     Patient Vitals for the past 24 hrs:   Temp Pulse Resp BP SpO2   08/03/21 0605  91  117/81 94 %   08/03/21 0549 97.9 °F (36.6 °C) (!) 121 24 114/65 94 %   08/03/21 0547  (!) 119  114/65 94 %   08/03/21 0543  (!) 106  119/76 94 %   08/03/21 0534  (!) 128  120/80 94 %   08/03/21 0531    110/82    08/03/21 0440 99.1 °F (37.3 °C)       08/03/21 0436  (!) 45  108/61 94 %   08/03/21 0432  65 21     08/03/21 0426  (!) 133 18  95 %   08/03/21 0357    (!) 117/59    08/03/21 0348  (!) 139   95 %   08/03/21 0345  99   94 %   08/03/21 0340  (!) 133 23  (!) 89 %   08/03/21 0336  92 11 120/78    08/03/21 0333  (!) 142 20  94 %   08/03/21 0316  (!) 129 19 117/80 93 %   08/03/21 0256  (!) 105 21 120/73 95 %   08/03/21 0236  78 21 111/74 94 %   08/03/21 0216  92 21 120/78 94 %   08/03/21 0156  96 (!) 32 120/80 95 %   08/03/21 0141     98 %   08/03/21 0136  62 24 120/81 95 %   08/03/21 0126  (!) 132  116/80    08/03/21 0124  90 23 116/80 97 %   08/03/21 0118  (!) 147 21  98 %   08/03/21 0040  (!) 166 25  (!) 81 %   08/03/21 0039  (!) 164 26  (!) 80 %   08/03/21 0035  (!) 165 (!) 6     08/03/21 0022  (!) 56 28 (!) 115/90 100 %   08/03/21 0019  74 27 (!) 120/92    08/03/21 0013  (!) 168  135/82 100 %   08/03/21 0011 (!) 103 °F (39.4 °C) 66 16 (!) 120/92 100 %   08/03/21 0008     100 %     Oxygen Therapy  O2 Sat (%): 94 % (08/03/21 0605)  Pulse via Oximetry: 119 beats per minute (08/03/21 0605)  O2 Device: Nasal cannula (08/03/21 0549)  O2 Flow Rate (L/min): 4 l/min (08/03/21 0549)  FIO2 (%): 30 % (08/03/21 0008)    Estimated body mass index is 39.58 kg/m² as calculated from the following:    Height as of this encounter: 6' 1\" (1.854 m). Weight as of this encounter: 136.1 kg (300 lb). No intake or output data in the 24 hours ending 08/03/21 0706      Physical Exam:  General:    Well nourished. No overt distress  Head:  Normocephalic, atraumatic  Eyes:  Sclerae appear normal.  Pupils equally round. HENT:  Nares appear normal, no drainage. Moist mucous membranes  Neck:  No restricted ROM.   Trachea midline  CV:   Tachycardic  S1/S2 auscultated  Lungs:   Diffuse wet crackles. Appears even, unlabored  Abdomen: Bowel sounds present. Soft, nontender, nondistended. Extremities: Warm and dry. No cyanosis or clubbing. No edema. Skin:     No rashes. Normal turgor. Normal coloration  Neuro:  Cranial nerves II-XII grossly intact. Sensation intact  Psych:  Normal mood and affect. Alert and oriented x3    Data Ordered and Personally Reviewed:    Last 24hr Labs:  Recent Results (from the past 24 hour(s))   BLOOD GAS, ARTERIAL POC    Collection Time: 08/03/21 12:15 AM   Result Value Ref Range    Device: BIPAP MASK      FIO2 (POC) 30 %    pH (POC) 7.45 7.35 - 7.45      pCO2 (POC) 32.0 (L) 35 - 45 MMHG    pO2 (POC) 93 75 - 100 MMHG    HCO3 (POC) 22.0 22 - 26 MMOL/L    sO2 (POC) 97.6 95 - 98 %    Base deficit (POC) 1.4 mmol/L    Mode BILEVEL      PEEP/CPAP (POC) 8 cmH2O    PIP (POC) 12      Allens test (POC) Positive      Site RIGHT RADIAL      Specimen type (POC) ARTERIAL      Performed by Juana     Respiratory comment: [id number]    CBC WITH AUTOMATED DIFF    Collection Time: 08/03/21 12:27 AM   Result Value Ref Range    WBC 10.9 4.3 - 11.1 K/uL    RBC 4.13 4.05 - 5.2 M/uL    HGB 10.0 (L) 11.7 - 15.4 g/dL    HCT 34.7 (L) 35.8 - 46.3 %    MCV 84.0 79.6 - 97.8 FL    MCH 24.2 (L) 26.1 - 32.9 PG    MCHC 28.8 (L) 31.4 - 35.0 g/dL    RDW 18.8 (H) 11.9 - 14.6 %    PLATELET 436 886 - 014 K/uL    MPV 10.9 9.4 - 12.3 FL    ABSOLUTE NRBC 0.00 0.0 - 0.2 K/uL    DF AUTOMATED      NEUTROPHILS 89 (H) 43 - 78 %    LYMPHOCYTES 6 (L) 13 - 44 %    MONOCYTES 4 4.0 - 12.0 %    EOSINOPHILS 1 0.5 - 7.8 %    BASOPHILS 1 0.0 - 2.0 %    IMMATURE GRANULOCYTES 1 0.0 - 5.0 %    ABS. NEUTROPHILS 9.7 (H) 1.7 - 8.2 K/UL    ABS. LYMPHOCYTES 0.6 0.5 - 4.6 K/UL    ABS. MONOCYTES 0.4 0.1 - 1.3 K/UL    ABS. EOSINOPHILS 0.1 0.0 - 0.8 K/UL    ABS. BASOPHILS 0.1 0.0 - 0.2 K/UL    ABS. IMM.  GRANS. 0.1 0.0 - 0.5 K/UL   PROTHROMBIN TIME + INR Collection Time: 08/03/21 12:27 AM   Result Value Ref Range    Prothrombin time 15.9 (H) 12.5 - 14.7 sec    INR 1.2     PTT    Collection Time: 08/03/21 12:27 AM   Result Value Ref Range    aPTT 31.4 24.1 - 35.1 SEC   LIPASE    Collection Time: 08/03/21 12:27 AM   Result Value Ref Range    Lipase 77 73 - 393 U/L   MAGNESIUM    Collection Time: 08/03/21 12:27 AM   Result Value Ref Range    Magnesium 1.7 (L) 1.8 - 2.4 mg/dL   METABOLIC PANEL, COMPREHENSIVE    Collection Time: 08/03/21 12:27 AM   Result Value Ref Range    Sodium 136 136 - 145 mmol/L    Potassium 3.8 3.5 - 5.1 mmol/L    Chloride 102 98 - 107 mmol/L    CO2 25 21 - 32 mmol/L    Anion gap 9 7 - 16 mmol/L    Glucose 178 (H) 65 - 100 mg/dL    BUN 12 6 - 23 MG/DL    Creatinine 1.00 0.6 - 1.0 MG/DL    GFR est AA >60 >60 ml/min/1.73m2    GFR est non-AA >60 >60 ml/min/1.73m2    Calcium 8.1 (L) 8.3 - 10.4 MG/DL    Bilirubin, total 0.7 0.2 - 1.1 MG/DL    ALT (SGPT) 15 12 - 65 U/L    AST (SGOT) 23 15 - 37 U/L    Alk.  phosphatase 195 (H) 50 - 136 U/L    Protein, total 7.9 6.3 - 8.2 g/dL    Albumin 3.5 3.5 - 5.0 g/dL    Globulin 4.4 (H) 2.3 - 3.5 g/dL    A-G Ratio 0.8 (L) 1.2 - 3.5     NT-PRO BNP    Collection Time: 08/03/21 12:27 AM   Result Value Ref Range    NT pro-BNP 8,840 (H) 5 - 125 PG/ML   LACTIC ACID    Collection Time: 08/03/21 12:31 AM   Result Value Ref Range    Lactic acid 2.9 (H) 0.4 - 2.0 MMOL/L   PROCALCITONIN    Collection Time: 08/03/21 12:31 AM   Result Value Ref Range    Procalcitonin <0.05 ng/mL   TSH 3RD GENERATION    Collection Time: 08/03/21 12:31 AM   Result Value Ref Range    TSH 3.710 0.358 - 3.740 uIU/mL   T4, FREE    Collection Time: 08/03/21 12:31 AM   Result Value Ref Range    T4, Free 0.9 0.9 - 1.8 NG/DL   COVID-19 RAPID TEST    Collection Time: 08/03/21 12:39 AM   Result Value Ref Range    Specimen source NASAL      COVID-19 rapid test Not detected NOTD     SARS-COV-2    Collection Time: 08/03/21 12:39 AM   Result Value Ref Range SARS-CoV-2 Please find results under separate order     URINALYSIS W/ RFLX MICROSCOPIC    Collection Time: 08/03/21  3:10 AM   Result Value Ref Range    Color ORANGE      Appearance CLEAR      Specific gravity 1.033 (H) 1.001 - 1.023      pH (UA) 5.0 5.0 - 9.0      Protein TRACE (A) NEG mg/dL    Glucose Negative mg/dL    Ketone TRACE (A) NEG mg/dL    Bilirubin SMALL (A) NEG      Blood Negative NEG      Urobilinogen 1.0 0.2 - 1.0 EU/dL    Nitrites Negative NEG      Leukocyte Esterase SMALL (A) NEG      WBC 5-10 0 /hpf    RBC 0-3 0 /hpf    Bacteria 0 0 /hpf    Casts 3-5 0 /lpf    Yeast OCCASIONAL      Other observations RESULTS VERIFIED MANUALLY     CULTURE, URINE    Collection Time: 08/03/21  3:10 AM    Specimen: Cath Urine    URINE   Result Value Ref Range    Special Requests: NO SPECIAL REQUESTS      Culture result:        NO GROWTH AFTER SHORT PERIOD OF INCUBATION. FURTHER RESULTS TO FOLLOW AFTER OVERNIGHT INCUBATION. All Micro Results     Procedure Component Value Units Date/Time    CULTURE, URINE [[id number]] Collected: 08/03/21 0310    Order Status: Completed Specimen: Cath Urine Updated: 08/03/21 0643     Special Requests: NO SPECIAL REQUESTS        Culture result:       NO GROWTH AFTER SHORT PERIOD OF INCUBATION. FURTHER RESULTS TO FOLLOW AFTER OVERNIGHT INCUBATION. CULTURE, BLOOD [[id number]] Collected: 08/03/21 0156    Order Status: Completed Specimen: Blood Updated: 08/03/21 0222    COVID-19 RAPID TEST [[id number]] Collected: 08/03/21 0039    Order Status: Completed Specimen: Nasopharyngeal Updated: 08/03/21 0112     Specimen source NASAL        COVID-19 rapid test Not detected        Comment:      The specimen is NEGATIVE for SARS-CoV-2, the novel coronavirus associated with COVID-19. A negative result does not rule out COVID-19. This test has been authorized by the FDA under an Emergency Use Authorization (EUA) for use by authorized laboratories.         Fact sheet for Healthcare Providers: Desiree.jaleel  Fact sheet for Patients: Desiree.co.nz       Methodology: Isothermal Nucleic Acid Amplification         SARS-COV-2, PCR [[id number]] Collected: 08/03/21 0039    Order Status: Completed Updated: 08/03/21 0111    CULTURE, BLOOD [[id number]] Collected: 08/03/21 0037    Order Status: Completed Specimen: Blood Updated: 08/03/21 0042          Other Studies:  XR CHEST PORT    Result Date: 8/3/2021  Portable chest xray  COMPARISON: February 2021 INDICATION: Shortness of breath FINDINGS: Heart is enlarged. Mediastinal contour is within normal limits. There is mild right lung base opacity. No pneumothorax. No evidence of significant pulmonary edema. Surrounding bones are intact. 1. Mild right lung base opacity, likely atelectasis, consolidation or small pleural effusion. 2. Stable cardiomegaly.         Medications Administered     acetaminophen (TYLENOL) tablet 1,000 mg     Admin Date  08/03/2021 Action  Given Dose  1,000 mg Route  Oral Administered By  Ritesh Lee RN          cefepime (MAXIPIME) 2 g in 0.9% sodium chloride (MBP/ADV) 100 mL MBP     Admin Date  08/03/2021 Action  New Bag Dose  2 g Rate  200 mL/hr Route  IntraVENous Administered By  Ritesh Lee RN          dexamethasone (DECADRON) 10 mg/mL injection 10 mg     Admin Date  08/03/2021 Action  Given Dose  10 mg Route  IntraVENous Administered By  Ritesh Lee RN          dilTIAZem (CARDIZEM) 100 mg in 0.9% sodium chloride (MBP/ADV) 100 mL infusion     Admin Date  08/03/2021 Action  New Bag Dose  2.5 mg/hr Rate  2.5 mL/hr Route  IntraVENous Administered By  Ritesh Lee RN           Admin Date  08/03/2021 Action  Rate Change Dose  5 mg/hr Rate  5 mL/hr Route  IntraVENous Administered By  Ritesh Lee RN           Admin Date  08/03/2021 Action  Rate Change Dose  7.5 mg/hr Rate  7.5 mL/hr Route  IntraVENous Administered By  Ritesh Lee RN          dilTIAZem (CARDIZEM) injection 10 mg     Admin Date  08/03/2021 Action  Given Dose  10 mg Route  IntraVENous Administered By  Lalit Larson RN          vancomycin (VANCOCIN) 2500 mg in  mL infusion     Admin Date  08/03/2021 Action  New Bag Dose  2,500 mg Rate  200 mL/hr Route  IntraVENous Administered By  Lalit Larson RN                  Signed:  Johnny Castleman, MD 19-Mar-2018 14:41

## 2021-08-03 NOTE — ED NOTES
This RN entered pt room to find patient up out of bed disconnecting all monitoring equipment and pulling at IV's. Pt stated \"I'm leaving! I'm not staying here I'm going home now! \" This RN with x2 other RN's attempted to encourage patient to stay and educate the importance of staying to finish treatment. Pt was adamant that she would not listen and was leaving. Pt states, \"I'm gonna pull these IV\"s out if you don't take them out right now! \". PIVs removed, MD notified, informed refusal form signed by pt.

## 2021-08-03 NOTE — PROGRESS NOTES
Initial visit in the ER, a spiritual presence, emotional presence and prayer were provided for the patient. The patient is under precautions, therefore, I did not enter her room.       Caleb Godoy, [address] [facility], [facility]

## 2021-08-03 NOTE — ED PROVIDER NOTES
77-year-old female history of drug abuse coronary artery disease and congestive heart failure presenting for worsening shortness of breath. The history is provided by the patient. Shortness of Breath  This is a recurrent problem. The problem occurs rarely. The current episode started yesterday. The problem has been gradually worsening. Associated symptoms include a fever, cough and leg swelling. Pertinent negatives include no headaches, no coryza, no rhinorrhea, no sore throat, no swollen glands, no ear pain, no neck pain, no sputum production, no hemoptysis, no wheezing, no PND, no orthopnea, no chest pain, no syncope, no vomiting, no abdominal pain, no rash, no leg pain and no claudication. It is unknown what precipitated the problem. She has had prior hospitalizations. She has had prior ED visits. She has had prior ICU admissions. Associated medical issues include chronic lung disease, CAD and heart failure. Past Medical History:   Diagnosis Date    Acute on chronic systolic CHF (congestive heart failure) ([facility] [address] 1/30/2021    CAD (coronary artery disease) 9/18/2019    DM (diabetes mellitus) ([facility] [address]     Hyperglycemia due to type 2 diabetes mellitus ([facility] [address] 9/17/2019    Other ill-defined conditions(799.89)     back pain    Pseudomonas infection 2/10/2021       Past Surgical History:   Procedure Laterality Date    HX ORTHOPAEDIC      back surgery 2000         No family history on file.     Social History     Socioeconomic History    Marital status: SINGLE     Spouse name: Not on file    Number of children: Not on file    Years of education: Not on file    Highest education level: Not on file   Occupational History    Not on file   Tobacco Use    Smoking status: Current Every Day Smoker     Packs/day: 1.00   Substance and Sexual Activity    Alcohol use: No    Drug use: No    Sexual activity: Not on file   Other Topics Concern    Not on file   Social History Narrative    Not on file     Social Determinants of Health     Financial Resource Strain:     Difficulty of Paying Living Expenses:    Food Insecurity:     Worried About Running Out of Food in the Last Year:     [address] in the Last Year:    Transportation Needs:     Lack of Transportation (Medical):  Lack of Transportation (Non-Medical):    Physical Activity:     Days of Exercise per Week:     Minutes of Exercise per Session:    Stress:     Feeling of Stress :    Social Connections:     Frequency of Communication with Friends and Family:     Frequency of Social Gatherings with Friends and Family:     Attends [employer] Services:     Active Member of Clubs or Organizations:     Attends Club or Organization Meetings:     Marital Status:    Intimate Partner Violence:     Fear of Current or Ex-Partner:     Emotionally Abused:     Physically Abused:     Sexually Abused: ALLERGIES: Patient has no known allergies. Review of Systems   Constitutional: Positive for fever. HENT: Negative for ear pain, rhinorrhea and sore throat. Respiratory: Positive for cough and shortness of breath. Negative for hemoptysis, sputum production and wheezing. Cardiovascular: Positive for leg swelling. Negative for chest pain, orthopnea, claudication, syncope and PND. Gastrointestinal: Negative for abdominal pain and vomiting. Musculoskeletal: Negative for neck pain. Skin: Negative for rash. Neurological: Negative for headaches. All other systems reviewed and are negative. Vitals:    08/03/21 0011   BP: (!) 120/92   Pulse: 66   Resp: 16   Temp: (!) 103 °F (39.4 °C)   SpO2: 100%   Weight: 136.1 kg (300 lb)   Height: 6' 1\" (1.854 m)            Physical Exam  Vitals and nursing note reviewed. Constitutional:       General: She is in acute distress. Appearance: She is well-developed. She is obese. She is ill-appearing. HENT:      Head: Normocephalic and atraumatic.    Eyes:      Conjunctiva/sclera: Conjunctivae normal.      Pupils: Pupils are equal, round, and reactive to light. Cardiovascular:      Rate and Rhythm: Regular rhythm. Tachycardia present. Pulmonary:      Effort: Pulmonary effort is normal. Tachypnea present. Breath sounds: Examination of the right-upper field reveals wheezing. Examination of the left-upper field reveals wheezing. Examination of the right-middle field reveals wheezing. Examination of the left-middle field reveals wheezing. Examination of the right-lower field reveals wheezing. Examination of the left-lower field reveals wheezing. Wheezing present. Abdominal:      General: Bowel sounds are normal.      Palpations: Abdomen is soft. Musculoskeletal:         General: Normal range of motion. Cervical back: Normal range of motion and neck supple. Right lower leg: Edema present. Left lower leg: Edema present. Skin:     General: Skin is warm and dry. Neurological:      Mental Status: She is alert and oriented to person, place, and time. MDM  Number of Diagnoses or Management Options  Diagnosis management comments: 51-year-old female presenting for acute respiratory distress. Patient just from the bedside looks like she is fluid overloaded she has edema up the abdomen.        Amount and/or Complexity of Data Reviewed  Clinical lab tests: ordered and reviewed (Results for orders placed or performed during the hospital encounter of 08/03/21  -COVID-19 RAPID TEST       Result                      Value             Ref Range           Specimen source             NASAL                                 COVID-19 rapid test         Not detected      NOTD           -CBC WITH AUTOMATED DIFF       Result                      Value             Ref Range           WBC                         10.9              4.3 - 11.1 K*       RBC                         4.13              4.05 - 5.2 M*       HGB                         10.0 (L)          11.7 - 15.4 *       HCT 34.7 (L)          35.8 - 46.3 %       MCV                         84.0              79.6 - 97.8 *       MCH                         24.2 (L)          26.1 - 32.9 *       MCHC                        28.8 (L)          31.4 - 35.0 *       RDW                         18.8 (H)          11.9 - 14.6 %       PLATELET                    196               150 - 450 K/*       MPV                         10.9              9.4 - 12.3 FL       ABSOLUTE NRBC               0.00              0.0 - 0.2 K/*       DF                          AUTOMATED                             NEUTROPHILS                 89 (H)            43 - 78 %           LYMPHOCYTES                 6 (L)             13 - 44 %           MONOCYTES                   4                 4.0 - 12.0 %        EOSINOPHILS                 1                 0.5 - 7.8 %         BASOPHILS                   1                 0.0 - 2.0 %         IMMATURE GRANULOCYTES       1                 0.0 - 5.0 %         ABS. NEUTROPHILS            9.7 (H)           1.7 - 8.2 K/*       ABS. LYMPHOCYTES            0.6               0.5 - 4.6 K/*       ABS. MONOCYTES              0.4               0.1 - 1.3 K/*       ABS. EOSINOPHILS            0.1               0.0 - 0.8 K/*       ABS. BASOPHILS              0.1               0.0 - 0.2 K/*       ABS. IMM.  GRANS.            0.1               0.0 - 0.5 K/*  -PROTHROMBIN TIME + INR       Result                      Value             Ref Range           Prothrombin time            15.9 (H)          12.5 - 14.7 *       INR                         1.2                              -PTT       Result                      Value             Ref Range           aPTT                        31.4              24.1 - 35.1 *  -LIPASE       Result                      Value             Ref Range           Lipase                      77                73 - 393 U/L   -MAGNESIUM       Result                      Value             Ref Range Magnesium                   1.7 (L)           1.8 - 2.4 mg*  -METABOLIC PANEL, COMPREHENSIVE       Result                      Value             Ref Range           Sodium                      136               136 - 145 mm*       Potassium                   3.8               3.5 - 5.1 mm*       Chloride                    102               98 - 107 mmo*       CO2                         25                21 - 32 mmol*       Anion gap                   9                 7 - 16 mmol/L       Glucose                     178 (H)           65 - 100 mg/*       BUN                         12                6 - 23 MG/DL        Creatinine                  1.00              0.6 - 1.0 MG*       GFR est AA                  >60               >60 ml/min/1*       GFR est non-AA              >60               >60 ml/min/1*       Calcium                     8.1 (L)           8.3 - 10.4 M*       Bilirubin, total            0.7               0.2 - 1.1 MG*       ALT (SGPT)                  15                12 - 65 U/L         AST (SGOT)                  23                15 - 37 U/L         Alk.  phosphatase            195 (H)           50 - 136 U/L        Protein, total              7.9               6.3 - 8.2 g/*       Albumin                     3.5               3.5 - 5.0 g/*       Globulin                    4.4 (H)           2.3 - 3.5 g/*       A-G Ratio                   0.8 (L)           1.2 - 3.5      -LACTIC ACID       Result                      Value             Ref Range           Lactic acid                 2.9 (H)           0.4 - 2.0 MM*  -PROCALCITONIN       Result                      Value             Ref Range           Procalcitonin               <0.05             ng/mL          -SARS-COV-2       Result                      Value             Ref Range           SARS-CoV-2                                                    Please find results under separate order  -NT-PRO BNP       Result                      Value Ref Range           NT pro-BNP                  8,840 (H)         5 - 125 PG/ML  -TSH 3RD GENERATION       Result                      Value             Ref Range           TSH                         3.710             0.358 - 3.74*  -T4, FREE       Result                      Value             Ref Range           T4, Free                    0.9               0.9 - 1.8 NG*  -BLOOD GAS, ARTERIAL POC       Result                      Value             Ref Range           Device:                     BIPAP MASK                            FIO2 (POC)                  30                %                   pH (POC)                    7.45              7.35 - 7.45         pCO2 (POC)                  32.0 (L)          35 - 45 MMHG        pO2 (POC)                   93                75 - 100 MMHG       HCO3 (POC)                  22.0              22 - 26 MMOL*       sO2 (POC)                   97.6              95 - 98 %           Base deficit (POC)          1.4               mmol/L              Mode                        BILEVEL                               PEEP/CPAP (POC)             8                 cmH2O               PIP (POC)                   12                                    Allens test (POC)           Positive                              Site                        RIGHT RADIAL                          Specimen type (POC)         ARTERIAL                              Performed by                                                  Juana       Respiratory comment:        [id number]                             )  Tests in the radiology section of CPT®: ordered and reviewed (XR CHEST PORT    Result Date: 8/3/2021  Portable chest xray  COMPARISON: February 2021 INDICATION: Shortness of breath FINDINGS: Heart is enlarged. Mediastinal contour is within normal limits. There is mild right lung base opacity. No pneumothorax. No evidence of significant pulmonary edema. Surrounding bones are intact.      1. Mild right lung base opacity, likely atelectasis, consolidation or small pleural effusion. 2. Stable cardiomegaly.    )  Tests in the medicine section of CPT®: ordered and reviewed  Decide to obtain previous medical records or to obtain history from someone other than the patient: yes  Discuss the patient with other providers: yes  Independent visualization of images, tracings, or specimens: yes    Risk of Complications, Morbidity, and/or Mortality  Presenting problems: high  Diagnostic procedures: high  Management options: high  General comments: Patient seems to have settled into a decent state with a heart rate near 100 to, solid blood pressures, O2 sats in the mid 90s on 4 L on a diltiazem drip after broad-spectrum antibiotics. Holding fluids because the patient already looks fluid overloaded. I personally reviewed the patient's vital signs, laboratory tests, and/or radiological findings. I discussed these findings with the patient and their significance. I answered all questions and explained that given these findings there is significant concern for increased morbidity and/or mortality without immediate intervention. As a result, I recommended admission to the hospital, consulted the appropriate service, and transitioned care to that service in improved condition      Patient Progress  Patient progress: improved    ED Course as of Aug 03 0226   Tue Aug 03, 2021   0044 EKG performed shows A. fib with rapid ventricular response, no acute ischemic change, QRS is 66 and QTc is 424    [JS]   0054 Starting diltiazem for what looks like A. fib with RVR    [JS]   0133 Adding a TSH as this may explain the patient's tachycardia and hyperthermia. [JS]   3098 Patient was switched from BiPAP to high flow nasal cannula sometime ago and seems to be doing well with this. [JS]   0148 Patient seems to be much more comfortable. The monitor is picking up heart rates in the 60s occasionally but this is incorrect. The heart rate based on the pulse oximeter is in the 130s right now.     [JS]      ED Course User Index  [JS] Pratik Hernandez MD       Critical Care  Performed by: Pratik Hernandez MD  Authorized by: Pratik Hernandez MD     Critical care provider statement:     Critical care time (minutes):  60    Critical care was necessary to treat or prevent imminent or life-threatening deterioration of the following conditions:  Respiratory failure and circulatory failure    Critical care was time spent personally by me on the following activities:  Blood draw for specimens, ordering and performing treatments and interventions, development of treatment plan with patient or surrogate, ordering and review of laboratory studies, discussions with consultants, ordering and review of radiographic studies, discussions with primary provider, pulse oximetry, evaluation of patient's response to treatment, re-evaluation of patient's condition, examination of patient, review of old charts, obtaining history from patient or surrogate and ventilator management    I assumed direction of critical care for this patient from another provider in my specialty: no    Comments:      Acute on chronic respiratory failure, A. fib with RVR, BiPAP management and diltiazem drip

## 2021-08-03 NOTE — ED TRIAGE NOTES
Pt. Arrived EMS from home, pt. C/o of SOB, initial O2 sat of 80%, pt. Placed on CPAP via EMS. Pt. O2 sat 99% on arrival to ER.

## 2021-08-03 NOTE — Clinical Note
Status[de-identified] INPATIENT [101]   Type of Bed: Telemetry [19]   Cardiac Monitoring Required?: Yes   Inpatient Hospitalization Certified Necessary for the Following Reasons: 3.  Patient receiving treatment that can only be provided in an inpatient setting (further clarification in H&P documentation)   Admitting Diagnosis: Atrial fibrillation with RVR [facility]) [[id number]]   Admitting Physician: Madhuri Sarmiento   Attending Physician: Madhuri Sarmiento   Estimated Length of Stay: 2 Midnights   Discharge Plan[de-identified] Home with Office Follow-up

## 2021-08-03 NOTE — PROGRESS NOTES
Pharmacokinetic Consult to Pharmacist    Eva Norman is a 39 y.o. female being treated for sepsis with vancomycin and pip/tazo. Height: 6' 1\" (185.4 cm)  Weight: 136.1 kg (300 lb)  Lab Results   Component Value Date/Time    BUN 12 08/03/2021 12:27 AM    Creatinine 1.00 08/03/2021 12:27 AM    WBC 10.9 08/03/2021 12:27 AM    Procalcitonin <0.05 08/03/2021 12:31 AM    Lactic acid 2.9 (H) 08/03/2021 12:31 AM    Lactic Acid (POC) 2.44 (H) 09/16/2019 08:23 PM      Estimated Creatinine Clearance: 111.8 mL/min (based on SCr of 1 mg/dL). Day 1 of vancomycin. Goal trough is 15-20. Vancomycin dose initiated at 2500 mg IV x 1 followed by 1500 mg IV q12h. Levels will be ordered as clinically indicated. Pharmacy will continue to follow. Please call with any questions.     Thank you,  Guevara Leon, PharmD, BCPS  Clinical Pharmacist  [phone]

## 2021-08-03 NOTE — ASSESSMENT & PLAN NOTE
Unclear source. UA looks okay. CXR looks like pleural effusion and patient has wet crackles on exam.  Concern for bacteremia given chart h/o drug use.   - Blood cultures obtained  - Vanc/zosyn for empiric treatment of SIRS  - UDS pending  - Tylenol prn

## 2021-08-03 NOTE — ASSESSMENT & PLAN NOTE
Systolic CHF. Last echo from 9/17/19 shows EF of 40-45%.   Elevated pBNP at 8,840 and CXR with likely pleural effusion.  - Restart home lasix, 1 dose IV lasix for now  - Echo

## 2021-08-03 NOTE — ASSESSMENT & PLAN NOTE
- Continue cardizem drip  - Admit to tele  - Restart home meds including digoxin, metoprolol, and xarelto  - Wean off cardizem as tolerated

## 2021-08-03 NOTE — DISCHARGE SUMMARY
Hospitalist Discharge Summary     Admit Date:  8/3/2021 12:05 AM   DC note date: 8/3/2021  Name:  Fidel Brar   Age:  39 y.o.  :  1975   MRN:  [id number]   PCP:  None    Presenting Complaint: Shortness of Breath    Initial Admission Diagnosis: Atrial fibrillation with RVR ([facility] [I48.91]     Problem List for this Hospitalization:  Hospital Problems as of 8/3/2021 Date Reviewed: 2021        Codes Class Noted - Resolved POA    Fever ICD-10-CM: R50.9  ICD-9-CM: 780.60  8/3/2021 - Present Unknown        Congestive heart failure (CHF) ([facility] ICD-10-CM: I50.9  ICD-9-CM: 428.0  2/10/2021 - Present Yes        Methamphetamine abuse ([facility] (Chronic) ICD-10-CM: F15.10  ICD-9-CM: 305.70  2021 - Present Yes        * (Principal) Atrial fibrillation with RVR ([facility] ICD-10-CM: I48.91  ICD-9-CM: 427.31  2021 - Present Unknown        CAD (coronary artery disease) (Chronic) ICD-10-CM: I25.10  ICD-9-CM: 414.00  2019 - Present Yes        Hyperglycemia due to type 2 diabetes mellitus ([facility] (Chronic) ICD-10-CM: E11.65  ICD-9-CM: 250.00  2019 - Present Yes            Hospital Course:  Ms. Caroline Sauceda is a 38 y/o WF with a h/o chronic sCHF (previous LVEF 40-45%), methamphetamine abuse, tobacco abuse, AFIB who presented to the ER on 8/3 with c/o SOB. Her initial RA O2 saturation was in the low 80s. This improved with 2L NC O2. She was found to be febrile to 103F and was in rapid atrial fibrillation. CXR with RLL consolidation vs atelectasis. Cultures were collected and she was started on empiric IV antibiotics along with a Cardizem drip for rate control. She had previously run out of all her medications so these were resumed. TTE was repeated and showed worsened LVEF at 30-35%, grade 2 DD, reduced R sided systolic function, NANCY and mild PAH. She was started on IV Lasix with a good response. I examined her in ER bed 5 and explained the care plan to which she seemed very agreeable.  After I left the RN found her up in the room, dressed and packing her things because she was insisting on leaving. Per my discharge in Feb. 2021 she frequently signs out AMA. She signed AMA papers and left before I could return to the ER to speak with her. Disposition: AMA  Diet: ADULT DIET Regular; 3 carb choices (45 gm/meal)  Code Status: Full Code    Follow Up Orders:  No orders of the defined types were placed in this encounter. Follow-up Information     Follow up With Specialties Details Why Contact Info    [address]    please contact them if you have not heard from them within 2 business days. [phone]          Time spent in patient discharge and coordination 35 minutes. Given instructions to call a physician or return if any concerns. Discharge Info:   Current Discharge Medication List          Procedures done this admission:  * No surgery found *    Consults this admission:  None    Echocardiogram/EKG results:  No results found for this visit on 08/03/21. EKG Results     None          Diagnostic Imaging/Tests:   XR CHEST PORT    Result Date: 8/3/2021  Portable chest xray  COMPARISON: February 2021 INDICATION: Shortness of breath FINDINGS: Heart is enlarged. Mediastinal contour is within normal limits. There is mild right lung base opacity. No pneumothorax. No evidence of significant pulmonary edema. Surrounding bones are intact. 1. Mild right lung base opacity, likely atelectasis, consolidation or small pleural effusion. 2. Stable cardiomegaly. ECHO ADULT COMPLETE    Result Date: 8/3/2021  · Image quality for this study was technically difficult. · LV: Estimated LVEF is 30 - 35%. Normal wall thickness. Moderately dilated left ventricle. Severely reduced systolic function. Moderate (grade 2) left ventricular diastolic dysfunction. · MV: Mild to moderate mitral valve regurgitation is present. · TV: Severe tricuspid valve regurgitation is present.  Right Ventricular Arterial Pressure (RVSP) is 44 mmHg. Pulmonary hypertension found to be mild. · LA: Moderately dilated left atrium. · RV: Moderately dilated right ventricle. Reduced systolic function. · RA: Moderately dilated right atrium. · PV: Mild pulmonic valve regurgitation is present. All Micro Results     Procedure Component Value Units Date/Time    SARS-COV-2, PCR [[id number]] Collected: 08/03/21 0039    Order Status: Completed Specimen: Nasopharyngeal Updated: 08/03/21 1116     Specimen source Nasopharyngeal        SARS-CoV-2 Not detected        Comment:      The specimen is NEGATIVE for SARS-CoV-2, the novel coronavirus associated with COVID-19. This test has been authorized by the FDA under an Emergency Use Authorization (EUA) for use by authorized laboratories. Fact sheet for Healthcare Providers: [employer]       Fact sheet for Patients: [employer]nz       Methodology: RT-PCR         CULTURE, URINE [[id number]] Collected: 08/03/21 0310    Order Status: Completed Specimen: Cath Urine Updated: 08/03/21 0643     Special Requests: NO SPECIAL REQUESTS        Culture result:       NO GROWTH AFTER SHORT PERIOD OF INCUBATION. FURTHER RESULTS TO FOLLOW AFTER OVERNIGHT INCUBATION. CULTURE, BLOOD [[id number]] Collected: 08/03/21 0156    Order Status: Completed Specimen: Blood Updated: 08/03/21 0222    COVID-19 RAPID TEST [[id number]] Collected: 08/03/21 0039    Order Status: Completed Specimen: Nasopharyngeal Updated: 08/03/21 0112     Specimen source NASAL        COVID-19 rapid test Not detected        Comment:      The specimen is NEGATIVE for SARS-CoV-2, the novel coronavirus associated with COVID-19. A negative result does not rule out COVID-19. This test has been authorized by the FDA under an Emergency Use Authorization (EUA) for use by authorized laboratories.         Fact sheet for Healthcare Providers: [employer]nz  Fact sheet for Patients: [facility]nz       Methodology: Isothermal Nucleic Acid Amplification         CULTURE, BLOOD [[id number]] Collected: 08/03/21 0037    Order Status: Completed Specimen: Blood Updated: 08/03/21 0042          Labs: Results:       BMP, Mg, Phos Recent Labs     08/03/21 0027      K 3.8      CO2 25   AGAP 9   BUN 12   CREA 1.00   CA 8.1*   *   MG 1.7*      CBC Recent Labs     08/03/21 0027   WBC 10.9   RBC 4.13   HGB 10.0*   HCT 34.7*      GRANS 89*   LYMPH 6*   EOS 1   MONOS 4   BASOS 1   IG 1   ANEU 9.7*   ABL 0.6   SARIKA 0.1   ABM 0.4   ABB 0.1   AIG 0.1      LFT Recent Labs     08/03/21 0027   ALT 15   *   TP 7.9   ALB 3.5   GLOB 4.4*   AGRAT 0.8*      Cardiac Testing Lab Results   Component Value Date/Time    Troponin-I, Qt. <0.02 (L) 10/17/2018 08:06 PM      Coagulation Tests Lab Results   Component Value Date/Time    Prothrombin time 15.9 (H) 08/03/2021 12:27 AM    Prothrombin time 18.0 (H) 02/01/2021 03:02 AM    INR 1.2 08/03/2021 12:27 AM    INR 1.5 02/01/2021 03:02 AM    aPTT 31.4 08/03/2021 12:27 AM    aPTT 33.4 02/01/2021 03:02 AM      A1c Lab Results   Component Value Date/Time    Hemoglobin A1c 9.8 (H) 09/18/2019 01:32 AM      Lipid Panel No results found for: CHOL, CHOLPOCT, CHOLX, CHLST, CHOLV, 142069, HDL, HDLP, LDL, LDLC, DLDLP, 528973, VLDLC, VLDL, TGLX, TRIGL, TRIGP, TGLPOCT, [facility], [facility]   Thyroid Panel Lab Results   Component Value Date/Time    TSH 3.710 08/03/2021 12:31 AM    TSH 4.160 (H) 02/10/2021 06:17 PM    T4, Free 0.9 08/03/2021 12:31 AM        Most Recent UA Lab Results   Component Value Date/Time    Color ORANGE 08/03/2021 03:10 AM    Appearance CLEAR 08/03/2021 03:10 AM    Specific gravity 1.017 01/30/2021 06:36 AM    pH (UA) 5.0 08/03/2021 03:10 AM    Protein TRACE (A) 08/03/2021 03:10 AM    Glucose Negative 08/03/2021 03:10 AM    Ketone TRACE (A) 08/03/2021 03:10 AM    Bilirubin SMALL (A) 08/03/2021 03:10 AM    Blood Negative 08/03/2021 03:10 AM    Urobilinogen 1.0 08/03/2021 03:10 AM    Nitrites Negative 08/03/2021 03:10 AM    Leukocyte Esterase SMALL (A) 08/03/2021 03:10 AM    WBC 5-10 08/03/2021 03:10 AM    RBC 0-3 08/03/2021 03:10 AM    Epithelial cells 0-3 01/30/2021 06:36 AM    Bacteria 0 08/03/2021 03:10 AM    Casts 3-5 08/03/2021 03:10 AM    Other observations RESULTS VERIFIED MANUALLY 08/03/2021 03:10 AM          All Labs from Last 24 Hrs:  Recent Results (from the past 24 hour(s))   BLOOD GAS, ARTERIAL POC    Collection Time: 08/03/21 12:15 AM   Result Value Ref Range    Device: BIPAP MASK      FIO2 (POC) 30 %    pH (POC) 7.45 7.35 - 7.45      pCO2 (POC) 32.0 (L) 35 - 45 MMHG    pO2 (POC) 93 75 - 100 MMHG    HCO3 (POC) 22.0 22 - 26 MMOL/L    sO2 (POC) 97.6 95 - 98 %    Base deficit (POC) 1.4 mmol/L    Mode BILEVEL      PEEP/CPAP (POC) 8 cmH2O    PIP (POC) 12      Allens test (POC) Positive      Site RIGHT RADIAL      Specimen type (POC) ARTERIAL      Performed by Juana     Respiratory comment: [id number]    CBC WITH AUTOMATED DIFF    Collection Time: 08/03/21 12:27 AM   Result Value Ref Range    WBC 10.9 4.3 - 11.1 K/uL    RBC 4.13 4.05 - 5.2 M/uL    HGB 10.0 (L) 11.7 - 15.4 g/dL    HCT 34.7 (L) 35.8 - 46.3 %    MCV 84.0 79.6 - 97.8 FL    MCH 24.2 (L) 26.1 - 32.9 PG    MCHC 28.8 (L) 31.4 - 35.0 g/dL    RDW 18.8 (H) 11.9 - 14.6 %    PLATELET 308 683 - 006 K/uL    MPV 10.9 9.4 - 12.3 FL    ABSOLUTE NRBC 0.00 0.0 - 0.2 K/uL    DF AUTOMATED      NEUTROPHILS 89 (H) 43 - 78 %    LYMPHOCYTES 6 (L) 13 - 44 %    MONOCYTES 4 4.0 - 12.0 %    EOSINOPHILS 1 0.5 - 7.8 %    BASOPHILS 1 0.0 - 2.0 %    IMMATURE GRANULOCYTES 1 0.0 - 5.0 %    ABS. NEUTROPHILS 9.7 (H) 1.7 - 8.2 K/UL    ABS. LYMPHOCYTES 0.6 0.5 - 4.6 K/UL    ABS. MONOCYTES 0.4 0.1 - 1.3 K/UL    ABS. EOSINOPHILS 0.1 0.0 - 0.8 K/UL    ABS. BASOPHILS 0.1 0.0 - 0.2 K/UL    ABS. IMM.  GRANS. 0.1 0.0 - 0.5 K/UL   PROTHROMBIN TIME + INR    Collection Time: 08/03/21 12:27 AM Result Value Ref Range    Prothrombin time 15.9 (H) 12.5 - 14.7 sec    INR 1.2     PTT    Collection Time: 08/03/21 12:27 AM   Result Value Ref Range    aPTT 31.4 24.1 - 35.1 SEC   LIPASE    Collection Time: 08/03/21 12:27 AM   Result Value Ref Range    Lipase 77 73 - 393 U/L   MAGNESIUM    Collection Time: 08/03/21 12:27 AM   Result Value Ref Range    Magnesium 1.7 (L) 1.8 - 2.4 mg/dL   METABOLIC PANEL, COMPREHENSIVE    Collection Time: 08/03/21 12:27 AM   Result Value Ref Range    Sodium 136 136 - 145 mmol/L    Potassium 3.8 3.5 - 5.1 mmol/L    Chloride 102 98 - 107 mmol/L    CO2 25 21 - 32 mmol/L    Anion gap 9 7 - 16 mmol/L    Glucose 178 (H) 65 - 100 mg/dL    BUN 12 6 - 23 MG/DL    Creatinine 1.00 0.6 - 1.0 MG/DL    GFR est AA >60 >60 ml/min/1.73m2    GFR est non-AA >60 >60 ml/min/1.73m2    Calcium 8.1 (L) 8.3 - 10.4 MG/DL    Bilirubin, total 0.7 0.2 - 1.1 MG/DL    ALT (SGPT) 15 12 - 65 U/L    AST (SGOT) 23 15 - 37 U/L    Alk.  phosphatase 195 (H) 50 - 136 U/L    Protein, total 7.9 6.3 - 8.2 g/dL    Albumin 3.5 3.5 - 5.0 g/dL    Globulin 4.4 (H) 2.3 - 3.5 g/dL    A-G Ratio 0.8 (L) 1.2 - 3.5     NT-PRO BNP    Collection Time: 08/03/21 12:27 AM   Result Value Ref Range    NT pro-BNP 8,840 (H) 5 - 125 PG/ML   LACTIC ACID    Collection Time: 08/03/21 12:31 AM   Result Value Ref Range    Lactic acid 2.9 (H) 0.4 - 2.0 MMOL/L   PROCALCITONIN    Collection Time: 08/03/21 12:31 AM   Result Value Ref Range    Procalcitonin <0.05 ng/mL   TSH 3RD GENERATION    Collection Time: 08/03/21 12:31 AM   Result Value Ref Range    TSH 3.710 0.358 - 3.740 uIU/mL   T4, FREE    Collection Time: 08/03/21 12:31 AM   Result Value Ref Range    T4, Free 0.9 0.9 - 1.8 NG/DL   COVID-19 RAPID TEST    Collection Time: 08/03/21 12:39 AM   Result Value Ref Range    Specimen source NASAL      COVID-19 rapid test Not detected NOTD     SARS-COV-2    Collection Time: 08/03/21 12:39 AM   Result Value Ref Range    SARS-CoV-2 Please find results under separate order     SARS-COV-2, PCR    Collection Time: 08/03/21 12:39 AM    Specimen: Nasopharyngeal   Result Value Ref Range    Specimen source Nasopharyngeal      SARS-CoV-2 Not detected NOTD     URINALYSIS W/ RFLX MICROSCOPIC    Collection Time: 08/03/21  3:10 AM   Result Value Ref Range    Color ORANGE      Appearance CLEAR      Specific gravity 1.033 (H) 1.001 - 1.023      pH (UA) 5.0 5.0 - 9.0      Protein TRACE (A) NEG mg/dL    Glucose Negative mg/dL    Ketone TRACE (A) NEG mg/dL    Bilirubin SMALL (A) NEG      Blood Negative NEG      Urobilinogen 1.0 0.2 - 1.0 EU/dL    Nitrites Negative NEG      Leukocyte Esterase SMALL (A) NEG      WBC 5-10 0 /hpf    RBC 0-3 0 /hpf    Bacteria 0 0 /hpf    Casts 3-5 0 /lpf    Yeast OCCASIONAL      Other observations RESULTS VERIFIED MANUALLY     CULTURE, URINE    Collection Time: 08/03/21  3:10 AM    Specimen: Cath Urine    URINE   Result Value Ref Range    Special Requests: NO SPECIAL REQUESTS      Culture result:        NO GROWTH AFTER SHORT PERIOD OF INCUBATION. FURTHER RESULTS TO FOLLOW AFTER OVERNIGHT INCUBATION. DRUG SCREEN, URINE    Collection Time: 08/03/21  8:58 AM   Result Value Ref Range    PCP(PHENCYCLIDINE) Negative      BENZODIAZEPINES Negative      COCAINE Negative      AMPHETAMINES Positive      METHADONE Negative      THC (TH-CANNABINOL) Positive      OPIATES Negative      BARBITURATES Negative     LACTIC ACID    Collection Time: 08/03/21  9:00 AM   Result Value Ref Range    Lactic acid 1.3 0.4 - 2.0 MMOL/L   ECHO ADULT COMPLETE    Collection Time: 08/03/21 10:46 AM   Result Value Ref Range    IVSd 0.76 0.60 - 0.90 cm    LVIDd 5.70 (A) 3.90 - 5.30 cm    LVIDs 4.61 cm    LVOT d 1.97 cm    LVPWd 0.80 0.60 - 0.90 cm    BP EF 32.3 (A) 55.0 - 100.0 percent    LV Ejection Fraction MOD 2C 33 percent    LV Ejection Fraction MOD 4C 30 percent    LV ED Vol A2C 171. 87 mL    LV ED Vol A4C 164.82 mL    LV ED Vol .07 (A) 56.0 - 104.0 mL    LV ES Vol A2C 114.33 mL LV ES Vol A4C 115.67 mL    LV ES Vol .86 (A) 19.0 - 49.0 mL    LVOT Peak Gradient 1.79 mmHg    LVOT Peak Velocity 66.78 cm/s    RVIDd 3.23 cm    Left Atrium Major Axis 5.53 cm    Aortic Valve Area by Continuity of Peak Velocity 2.16 cm2    Aortic Valve Area by Continuity of Peak Velocity 2.13 cm2    Aortic Valve Area by Continuity of Peak Velocity 2.14 cm2    Aortic Valve Area by Continuity of Peak Velocity 2.20 cm2    AoV PG 3.53 mmHg    Aortic Valve Systolic Peak Velocity 49.81 cm/s    LV E' Lateral Velocity 3.05 cm/s    LV E' Septal Velocity 2.09 cm/s    Tapse 0.97 (A) 1.50 - 2.00 cm    Triscuspid Valve Regurgitation Peak Gradient 41.52 mmHg    TR Max Velocity 322.16 cm/s    Ao Root D 3.04 cm    IVC proximal 3.06 cm    LV Mass .9 67.0 - 162.0 g    LV Mass AL Index 64.4 43.0 - 95.0 g/m2    LVES Vol Index BP 45.3 mL/m2    LVED Vol Index BP 66.8 mL/m2    Left Atrium Minor Axis 2.16 cm    LVED Vol Index A4C 64.4 mL/m2    LVED Vol Index A2C 67.1 mL/m2    LVES Vol Index A4C 45.2 mL/m2    LVES Vol Index A2C 44.7 mL/m2       Recent Vital Data:  Patient Vitals for the past 24 hrs:   Temp Pulse Resp BP SpO2   08/03/21 1402  (!) 114  117/81    08/03/21 1345  (!) 107  (!) 118/56    08/03/21 1343  (!) 113  (!) 118/56 100 %   08/03/21 1342  (!) 107   96 %   08/03/21 1322  (!) 101  (!) 118/55 95 %   08/03/21 1303  90  101/73    08/03/21 1242  98  101/73    08/03/21 1136  (!) 109  129/76 96 %   08/03/21 1036  (!) 108   99 %   08/03/21 1023  (!) 115  99/66 97 %   08/03/21 1014  (!) 115   97 %   08/03/21 1007  (!) 116   97 %   08/03/21 1002  (!) 132  130/74 96 %   08/03/21 0942  (!) 130  130/74 97 %   08/03/21 0938  (!) 119   94 %   08/03/21 0921  (!) 131  (!) 135/91 95 %   08/03/21 0902  (!) 118  (!) 136/93 96 %   08/03/21 0852  (!) 118  132/78 100 %   08/03/21 0846  (!) 131  136/84 98 %   08/03/21 0827  (!) 130  136/84    08/03/21 0826  (!) 130  136/84 97 % 08/03/21 0725  91  (!) 102/54 95 %   08/03/21 0717  90  (!) 154/77 98 %   08/03/21 0605  91  117/81 94 %   08/03/21 0549 97.9 °F (36.6 °C) (!) 121 24 114/65 94 %   08/03/21 0547  (!) 119  114/65 94 %   08/03/21 0543  (!) 106  119/76 94 %   08/03/21 0534  (!) 128  120/80 94 %   08/03/21 0531    110/82    08/03/21 0440 99.1 °F (37.3 °C)       08/03/21 0436  (!) 45  108/61 94 %   08/03/21 0432  65 21     08/03/21 0426  (!) 133 18  95 %   08/03/21 0357    (!) 117/59    08/03/21 0348  (!) 139   95 %   08/03/21 0345  99   94 %   08/03/21 0340  (!) 133 23  (!) 89 %   08/03/21 0336  92 11 120/78    08/03/21 0333  (!) 142 20  94 %   08/03/21 0316  (!) 129 19 117/80 93 %   08/03/21 0256  (!) 105 21 120/73 95 %   08/03/21 0236  78 21 111/74 94 %   08/03/21 0216  92 21 120/78 94 %   08/03/21 0156  96 (!) 32 120/80 95 %   08/03/21 0141     98 %   08/03/21 0136  62 24 120/81 95 %   08/03/21 0126  (!) 132  116/80    08/03/21 0124  90 23 116/80 97 %   08/03/21 0118  (!) 147 21  98 %   08/03/21 0040  (!) 166 25  (!) 81 %   08/03/21 0039  (!) 164 26  (!) 80 %   08/03/21 0035  (!) 165 (!) 6     08/03/21 0022  (!) 56 28 (!) 115/90 100 %   08/03/21 0019  74 27 (!) 120/92    08/03/21 0013  (!) 168  135/82 100 %   08/03/21 0011 (!) 103 °F (39.4 °C) 66 16 (!) 120/92 100 %   08/03/21 0008     100 %     Oxygen Therapy  O2 Sat (%): 100 % (08/03/21 1343)  Pulse via Oximetry: 119 beats per minute (08/03/21 1343)  O2 Device: Nasal cannula (08/03/21 0549)  O2 Flow Rate (L/min): 4 l/min (08/03/21 0549)  FIO2 (%): 30 % (08/03/21 0008)    Estimated body mass index is 39.58 kg/m² as calculated from the following:    Height as of this encounter: 6' 1\" (1.854 m). Weight as of this encounter: 136.1 kg (300 lb).     Intake/Output Summary (Last 24 hours) at 8/3/2021 1423  Last data filed at 8/3/2021 1002  Gross per 24 hour   Intake    Output 1000 ml   Net -1000 ml Physical Exam:  General:    Well nourished. No overt distress. Obese, appears older than stated age. Head:  Normocephalic, atraumatic  Eyes:  Sclerae appear normal.  Pupils equally round. HENT:  Nares appear normal, no drainage. Moist mucous membranes  Neck:  No restricted ROM. Trachea midline  CV:   RRR. No m/r/g. No JVD  Lungs:   Decreased at bases but b/l expiratory wheezes are noted. 2L NC O2 with sats high 90s at rest.   Abdomen:   Soft, nontender, nondistended. Extremities: Warm and dry. No cyanosis or clubbing. 2-3+ tight pitting edema b/l LEs to mid shins, almost to knees. Skin:     No rashes. Normal turgor. Normal coloration  Neuro:  Cranial nerves II-XII grossly intact. Sensation intact  Psych:  Normal mood and affect. Current Med List in Hospital:   Current Facility-Administered Medications   Medication Dose Route Frequency    dilTIAZem (CARDIZEM) 100 mg in 0.9% sodium chloride (MBP/ADV) 100 mL infusion  0-15 mg/hr IntraVENous TITRATE    gabapentin (NEURONTIN) capsule 600 mg  600 mg Oral TID    HYDROcodone-acetaminophen (NORCO) 7.5-325 mg per tablet 1 Tablet  1 Tablet Oral Q6H PRN    aspirin chewable tablet 81 mg  81 mg Oral DAILY    atorvastatin (LIPITOR) tablet 40 mg  40 mg Oral QHS    clopidogreL (PLAVIX) tablet 75 mg  75 mg Oral DAILY    digoxin (LANOXIN) tablet 0.125 mg  0.125 mg Oral DAILY    [Held by provider] furosemide (LASIX) tablet 40 mg  40 mg Oral BID    . PHARMACY TO SUBSTITUTE PER PROTOCOL (Reordered from: insulin glargine (Basaglar KwikPen U-100 Insulin) 100 unit/mL (3 mL) in)    Per Protocol    lisinopriL (PRINIVIL, ZESTRIL) tablet 5 mg  5 mg Oral DAILY    metoprolol succinate (TOPROL-XL) XL tablet 100 mg  100 mg Oral DAILY    rivaroxaban (XARELTO) tablet 20 mg  20 mg Oral DAILY    spironolactone (ALDACTONE) tablet 25 mg  25 mg Oral DAILY    sodium chloride (NS) flush 5-40 mL  5-40 mL IntraVENous Q8H    sodium chloride (NS) flush 5-40 mL  5-40 mL IntraVENous PRN    acetaminophen (TYLENOL) tablet 650 mg  650 mg Oral Q6H PRN    Or    acetaminophen (TYLENOL) suppository 650 mg  650 mg Rectal Q6H PRN    polyethylene glycol (MIRALAX) packet 17 g  17 g Oral DAILY PRN    ondansetron (ZOFRAN ODT) tablet 4 mg  4 mg Oral Q8H PRN    Or    ondansetron (ZOFRAN) injection 4 mg  4 mg IntraVENous Q6H PRN    piperacillin-tazobactam (ZOSYN) 3.375 g in 0.9% sodium chloride (MBP/ADV) 100 mL MBP  3.375 g IntraVENous Q8H    vancomycin (VANCOCIN) 1500 mg in  ml infusion  1,500 mg IntraVENous Q12H    furosemide (LASIX) injection 40 mg  40 mg IntraVENous BID     Current Outpatient Medications   Medication Sig    aspirin 81 mg chewable tablet Take 81 mg by mouth daily.  atorvastatin (LIPITOR) 40 mg tablet Take 40 mg by mouth.  furosemide (LASIX) 40 mg tablet Take 40 mg by mouth two (2) times a day.  insulin glargine (Basaglar KwikPen U-100 Insulin) 100 unit/mL (3 mL) inpn     lisinopriL (PRINIVIL, ZESTRIL) 5 mg tablet Take 5 mg by mouth daily.  spironolactone (ALDACTONE) 25 mg tablet Take 25 mg by mouth daily.  gabapentin (NEURONTIN) 600 mg tablet Take 600 mg by mouth three (3) times daily.  clopidogreL (PLAVIX) 75 mg tab Take 75 mg by mouth daily.  digoxin (LANOXIN) 0.125 mg tablet     metoprolol succinate (TOPROL-XL) 100 mg tablet     rivaroxaban (XARELTO) 20 mg tab tablet Take 20 mg by mouth.  HYDROcodone-acetaminophen (NORCO) 7.5-325 mg per tablet Take 1 Tab by mouth every six (6) hours as needed for Pain. Max Daily Amount: 4 Tabs.  gabapentin (NEURONTIN) 600 mg tablet Take 600 mg by mouth three (3) times daily. No Known Allergies    There is no immunization history on file for this patient. Signed:  Taiwo Dow MD    Part of this note may have been written by using a voice dictation software. The note has been proof read but may still contain some grammatical/other typographical errors.

## 2021-08-07 LAB
BACTERIA SPEC CULT: ABNORMAL
SERVICE CMNT-IMP: ABNORMAL

## 2021-08-30 NOTE — PROGRESS NOTES
Attempted to Perfect serve Siva Calhoun MD but system would not select his name this message sent to Ag Green NP:  Please pass this message to CHAD Presley: Cardizem gtt was turned off at 0200 this am due to low BP. Current BP is 93/78. HR 90's. I have not given Metoprolol 100 mg yet? Do you want me to give the Metoprolol and Cardizem both? Flor responded with a phone call addressing to hold Lopressor for now and give Cardizem PO since it was short acting. Recheck BP at 1500 and then may possibly give Lopressor. Dr. Pantera Hill updated to new instructions about meds I asked if he wished to alterate orders he stated no I'm going to order more lasix. Jesus Reid is a 28 y.o. female who presents today for her medical conditions/ complaints as noted below. Jesus Reid is c/o of Establish Care and Annual Exam        HPI   New pt presents for annual exam and pap smear. Has had BTL 4 years ago after she had her last baby. Started having irregular periods about 1 year ago. Sometimes will miss a month, sometimes has 3 a month. Having more acne. Questions if she could be going through menopause. Last mammogram:  never  Last pap smear:    Contraception:  tubal  :  3  Para:  3  AB:  0  Last bone density:  never  Last colonoscopy: never  Patient's last menstrual period was 2021 (exact date). [id number]    Past Medical History:   Diagnosis Date    Anxiety     Hx of partial seizures     UTI (urinary tract infection)     PT states she gets constant UTI's \"That is pretty much undercontrol now. \"     Past Surgical History:   Procedure Laterality Date     SECTION      x2    TUBAL LIGATION       Family History   Problem Relation Age of Onset    Heart Attack Mother     Stroke Mother     High Blood Pressure Mother     High Cholesterol Mother     Diabetes Mother     Cancer Mother         thyroid    No Known Problems Father     Alcohol Abuse Brother         recently out of rehab from what she was told    Diabetes Maternal Grandmother     Cancer Maternal Grandmother         Pancreatic     ADHD Son         with [employer]    No Known Problems Son      Social History     Tobacco Use    Smoking status: Current Some Day Smoker     Packs/day: 0.25    Smokeless tobacco: Never Used    Tobacco comment: working on quitting as of 19   Substance Use Topics    Alcohol use:  Yes     Alcohol/week: 4.0 standard drinks     Types: 2 Cans of beer, 2 Shots of liquor per week       Current Outpatient Medications   Medication Sig Dispense Refill    amphetamine-dextroamphetamine (ADDERALL XR) 20 MG extended release capsule Take 1 capsule by mouth every morning for 30 days. 30 capsule 0    amphetamine-dextroamphetamine (ADDERALL, 10MG,) 10 MG tablet Take 1 tablet by mouth daily for 30 doses. 30 tablet 0    ibuprofen (ADVIL;MOTRIN) 800 MG tablet Take 800 mg by mouth every 6 hours as needed for Pain      acetaminophen (TYLENOL) 500 MG tablet Take 500 mg by mouth every 6 hours as needed for Pain       No current facility-administered medications for this visit. Allergies   Allergen Reactions    Penicillins Shortness Of Breath    Zoloft [Sertraline Hcl] Itching     Vitals:    08/30/21 1333   BP: 126/81   Pulse: 116     Body mass index is 27.14 kg/m². Review of Systems   Constitutional: Negative. HENT: Negative. Eyes: Negative. Respiratory: Negative. Cardiovascular: Negative. Gastrointestinal: Negative. Negative for constipation and diarrhea. Endocrine: Negative. Genitourinary: Positive for menstrual problem. Negative for frequency and urgency. Musculoskeletal: Negative. Skin: Negative. Allergic/Immunologic: Negative. Neurological: Negative. Hematological: Negative. Psychiatric/Behavioral: Negative. All other systems reviewed and are negative. Physical Exam  Vitals and nursing note reviewed. Constitutional:       Appearance: She is well-developed. HENT:      Head: Normocephalic. Neck:      Thyroid: No thyroid mass or thyromegaly. Cardiovascular:      Rate and Rhythm: Normal rate and regular rhythm. Pulmonary:      Effort: Pulmonary effort is normal.      Breath sounds: Normal breath sounds. Chest:      Breasts:         Right: No inverted nipple, mass, nipple discharge or skin change. Left: No inverted nipple, mass, nipple discharge or skin change. Abdominal:      Palpations: Abdomen is soft. There is no mass. Tenderness: There is no abdominal tenderness. Genitourinary:     General: Normal vulva. Vagina: Bleeding (brown spotting) present.       Cervix: No cervical motion tenderness. Uterus: Normal. Not enlarged. Adnexa:         Right: Tenderness and fullness present. No mass. Left: Tenderness and fullness present. No mass. Comments: Pap collected  Musculoskeletal:         General: Normal range of motion. Cervical back: Normal range of motion and neck supple. Skin:     General: Skin is warm and dry. Neurological:      Mental Status: She is alert and oriented to person, place, and time. Psychiatric:         Attention and Perception: Attention normal.         Mood and Affect: Mood normal.         Speech: Speech normal.         Behavior: Behavior normal.         Thought Content: Thought content normal.         Cognition and Memory: Cognition normal.         Judgment: Judgment normal.          Diagnosis Orders   1. Well woman exam  PAP SMEAR    Human papillomavirus (HPV) DNA probe thin prep high risk   2. Screening for cervical cancer  PAP SMEAR   3. Screening for HPV (human papillomavirus)  Human papillomavirus (HPV) DNA probe thin prep high risk   4. Irregular menses         MEDICATIONS:  No orders of the defined types were placed in this encounter. ORDERS:  Orders Placed This Encounter   Procedures    PAP SMEAR    Human papillomavirus (HPV) DNA probe thin prep high risk       PLAN:  Pap collected  Ordered labs and u/s  F/u for treatment plan- may need Micronor    Patient Instructions     Patient Education        Breast Self-Exam: Care Instructions  Your Care Instructions     A breast self-exam is when you check your breasts for lumps or changes. This regular exam helps you learn how your breasts normally look and feel. Most breast problems or changes are not because of cancer. Breast self-exam is not a substitute for a mammogram. Having regular breast exams by your doctor and regular mammograms improve your chances of finding any problems with your breasts.   Some women set a time each month to do a step-by-step breast self-exam. Other women like a less formal system. They might look at their breasts as they brush their teeth, or feel their breasts once in a while in the shower. If you notice a change in your breast, tell your doctor. Follow-up care is a key part of your treatment and safety. Be sure to make and go to all appointments, and call your doctor if you are having problems. It's also a good idea to know your test results and keep a list of the medicines you take. How do you do a breast self-exam?  · The best time to examine your breasts is usually one week after your menstrual period begins. Your breasts should not be tender then. If you do not have periods, you might do your exam on a day of the month that is easy to remember. · To examine your breasts:  ? Remove all your clothes above the waist and lie down. When you are lying down, your breast tissue spreads evenly over your chest wall, which makes it easier to feel all your breast tissue. ? Use the padsnot the fingertipsof the 3 middle fingers of your left hand to check your right breast. Move your fingers slowly in small coin-sized circles that overlap. ? Use three levels of pressure to feel of all your breast tissue. Use light pressure to feel the tissue close to the skin surface. Use medium pressure to feel a little deeper. Use firm pressure to feel your tissue close to your breastbone and ribs. Use each pressure level to feel your breast tissue before moving on to the next spot. ? Check your entire breast, moving up and down as if following a strip from the collarbone to the bra line, and from the armpit to the ribs. Repeat until you have covered the entire breast.  ? Repeat this procedure for your left breast, using the pads of the 3 middle fingers of your right hand. · To examine your breasts while in the shower:  ? Place one arm over your head and lightly soap your breast on that side. ?  Using the pads of your fingers, gently move your hand over your breast (in the strip pattern described above), feeling carefully for any lumps or changes. ? Repeat for the other breast.  · Have your doctor inspect anything you notice to see if you need further testing. Where can you learn more? Go to https://nyla.GL 2ours. org and sign in to your [employer] account. Enter P148 in the [facility] box to learn more about \"Breast Self-Exam: Care Instructions. \"     If you do not have an account, please click on the \"Sign Up Now\" link. Current as of: December 17, 2020               Content Version: 12.9  © 2006-2021 [employer]. Care instructions adapted under license by [facility] ([facility]). If you have questions about a medical condition or this instruction, always ask your healthcare professional. [address] any warranty or liability for your use of this information. Patient Education        Well Visit, Ages 25 to 48: Care Instructions  Overview     Well visits can help you stay healthy. Your doctor has checked your overall health and may have suggested ways to take good care of yourself. Your doctor also may have recommended tests. At home, you can help prevent illness with healthy eating, regular exercise, and other steps. Follow-up care is a key part of your treatment and safety. Be sure to make and go to all appointments, and call your doctor if you are having problems. It's also a good idea to know your test results and keep a list of the medicines you take. How can you care for yourself at home? · Get screening tests that you and your doctor decide on. Screening helps find diseases before any symptoms appear. · Eat healthy foods. Choose fruits, vegetables, whole grains, protein, and low-fat dairy foods. Limit fat, especially saturated fat. Reduce salt in your diet. · Limit alcohol. If you are a man, have no more than 2 drinks a day or 14 drinks a week.  If you are a woman, have no more than 1 drink a day or 7 drinks a week.  · Get at least 30 minutes of physical activity on most days of the week. Walking is a good choice. You also may want to do other activities, such as running, swimming, cycling, or playing tennis or team sports. Discuss any changes in your exercise program with your doctor. · Reach and stay at a healthy weight. This will lower your risk for many problems, such as obesity, diabetes, heart disease, and high blood pressure. · Do not smoke or allow others to smoke around you. If you need help quitting, talk to your doctor about stop-smoking programs and medicines. These can increase your chances of quitting for good. · Care for your mental health. It is easy to get weighed down by worry and stress. Learn strategies to manage stress, like deep breathing and mindfulness, and stay connected with your family and community. If you find you often feel sad or hopeless, talk with your doctor. Treatment can help. · Talk to your doctor about whether you have any risk factors for sexually transmitted infections (STIs). You can help prevent STIs if you wait to have sex with a new partner (or partners) until you've each been tested for STIs. It also helps if you use condoms (male or female condoms) and if you limit your sex partners to one person who only has sex with you. Vaccines are available for some STIs, such as HPV. · Use birth control if it's important to you to prevent pregnancy. Talk with your doctor about the choices available and what might be best for you. · If you think you may have a problem with alcohol or drug use, talk to your doctor. This includes prescription medicines (such as amphetamines and opioids) and illegal drugs (such as cocaine and methamphetamine). Your doctor can help you figure out what type of treatment is best for you. · Protect your skin from too much sun. When you're outdoors from 10 a.m. to 4 p.m., stay in the shade or cover up with clothing and a hat with a wide brim.  Wear sunglasses that block UV rays. Even when it's cloudy, put broad-spectrum sunscreen (SPF 30 or higher) on any exposed skin. · See a dentist one or two times a year for checkups and to have your teeth cleaned. · Wear a seat belt in the car. When should you call for help? Watch closely for changes in your health, and be sure to contact your doctor if you have any problems or symptoms that concern you. Where can you learn more? Go to https://VirtuaGym.SunModular. org and sign in to your [employer] account. Enter P072 in the [facility] box to learn more about \"Well Visit, Ages 25 to 48: Care Instructions. \"     If you do not have an account, please click on the \"Sign Up Now\" link. Current as of: May 27, 2020               Content Version: 12.9  © 2006-2021 [employer]it. Care instructions adapted under license by [facility] ([facility]). If you have questions about a medical condition or this instruction, always ask your healthcare professional. [place] [address] any warranty or liability for your use of this information. Patient Education        Human Papillomavirus (HPV): Care Instructions  Overview  The human papillomavirus (HPV) is a very common virus. There are many types of HPV. Some types cause the common skin wart. Other types cause genital warts, which can be spread by sexual contact. Some types can increase the risk for certain cancers, such as cervical or anal cancer. Having one type of HPV doesn't lead to having another type. Many women who have HPV may not know that they're infected until it's found with a cervical cancer screening test, such as an HPV test.  If an HPV screening test finds that you have the types of HPV that might lead to cancer, your doctor may suggest more tests. This doesn't mean you'll get cancer. But it means that you may have an increased risk. Abnormal cell changes caused by HPV often go away on their own.  If they don't, they can be treated. Follow-up care is a key part of your treatment and safety. Be sure to make and go to all appointments, and call your doctor if you are having problems. It's also a good idea to know your test results and keep a list of the medicines you take. How can you care for yourself at home? · Do not smoke. Smoking increases the risk for cervical problems and cervical cancer. If you need help quitting, talk to your doctor about stop-smoking programs and medicines. These can increase your chances of quitting for good. · Use condoms every time you have sex. Use them from the beginning to the end of sexual contact. · Be sure to tell your sexual partner or partners that you have HPV. Even if you do not have symptoms, you can still pass HPV to others. · Having one sex partner (who does not have STIs and does not have sex with anyone else) can decrease your risk of getting STIs. When should you call for help? Watch closely for changes in your health, and be sure to contact your doctor if:    · You have vaginal pain during or after sex.     · You have vaginal bleeding when you are not in your menstrual period. Where can you learn more? Go to https://varinodepepiceweb.healthScotrenewables Tidal Powerpartners. org and sign in to your [employer] account. Enter F690 in the [place] box to learn more about \"Human Papillomavirus (HPV): Care Instructions. \"     If you do not have an account, please click on the \"Sign Up Now\" link. Current as of: February 11, 2021               Content Version: 12.9  © 2006-2021 Healthwise, Incorporated. Care instructions adapted under license by [facility] ([facility]). If you have questions about a medical condition or this instruction, always ask your healthcare professional. [place] [address] any warranty or liability for your use of this information.

## 2022-02-24 ENCOUNTER — NURSE TRIAGE (OUTPATIENT)
Dept: OTHER | Facility: CLINIC | Age: 47
End: 2022-02-24

## 2022-02-24 NOTE — TELEPHONE ENCOUNTER
Received call from [place] at [facility] with [employer]. Subjective: \"It's been over a year, I have fluid on my stomach and I can't hardly walk\"    Current Symptoms: Shortness of breath, intermittent wheezing, fluid around abdomen     No new or worse symptoms in over a year    Onset: 1 year ago;     Speaking in complete sentences    Denies - cough / nausea / vomiting / diarrhea    Pain Severity: Denies; Temperature: denies      What has been tried: Nothing    Recommended disposition: See in Office Within 2 Weeks     Patient does not have a PCP. Patient advised to go to [address] [place] [address] for current issues. Care advice provided, patient verbalizes understanding; denies any other questions or concerns; instructed to call back for any new or worsening symptoms. Patient/Caller agrees with recommended disposition; writer provided warm transfer to [employer] [facility] for appointment scheduling   For new patient scheduling    Attention Provider: Thank you for allowing me to participate in the care of your patient. The patient was connected to triage in response to information provided to the [facility]. Please do not respond through this encounter as the response is not directed to a shared pool.     Reason for Disposition   [1] MILD longstanding difficulty breathing AND [2]  SAME as normal    Protocols used: BREATHING DIFFICULTY-ADULT-AH

## 2022-03-08 ENCOUNTER — APPOINTMENT (OUTPATIENT)
Dept: GENERAL RADIOLOGY | Age: 47
DRG: 720 | End: 2022-03-08
Attending: EMERGENCY MEDICINE
Payer: MEDICAID

## 2022-03-08 ENCOUNTER — HOSPITAL ENCOUNTER (INPATIENT)
Age: 47
LOS: 5 days | Discharge: LEFT BEFORE DISCHARGE | DRG: 720 | End: 2022-03-13
Attending: EMERGENCY MEDICINE | Admitting: INTERNAL MEDICINE
Payer: MEDICAID

## 2022-03-08 ENCOUNTER — APPOINTMENT (OUTPATIENT)
Dept: INTERVENTIONAL RADIOLOGY/VASCULAR | Age: 47
DRG: 720 | End: 2022-03-08
Attending: INTERNAL MEDICINE
Payer: MEDICAID

## 2022-03-08 DIAGNOSIS — K74.60 CIRRHOSIS OF LIVER WITH ASCITES, UNSPECIFIED HEPATIC CIRRHOSIS TYPE (HCC): ICD-10-CM

## 2022-03-08 DIAGNOSIS — N17.9 AKI (ACUTE KIDNEY INJURY) (HCC): ICD-10-CM

## 2022-03-08 DIAGNOSIS — R18.8 CIRRHOSIS OF LIVER WITH ASCITES, UNSPECIFIED HEPATIC CIRRHOSIS TYPE (HCC): ICD-10-CM

## 2022-03-08 DIAGNOSIS — I48.91 ATRIAL FIBRILLATION WITH RVR (HCC): ICD-10-CM

## 2022-03-08 DIAGNOSIS — R60.1 ANASARCA: Primary | ICD-10-CM

## 2022-03-08 DIAGNOSIS — F19.10 POLYSUBSTANCE ABUSE (HCC): ICD-10-CM

## 2022-03-08 DIAGNOSIS — R29.818 SUSPECTED SLEEP APNEA: ICD-10-CM

## 2022-03-08 DIAGNOSIS — I27.20 PULMONARY HYPERTENSION (HCC): ICD-10-CM

## 2022-03-08 DIAGNOSIS — F15.10 METHAMPHETAMINE ABUSE (HCC): ICD-10-CM

## 2022-03-08 DIAGNOSIS — R06.03 RESPIRATORY DIFFICULTY: ICD-10-CM

## 2022-03-08 DIAGNOSIS — J44.1 COPD EXACERBATION (HCC): ICD-10-CM

## 2022-03-08 PROBLEM — F17.210 DEPENDENCE ON NICOTINE FROM CIGARETTES: Status: ACTIVE | Noted: 2022-03-08

## 2022-03-08 PROBLEM — K65.2 SBP (SPONTANEOUS BACTERIAL PERITONITIS) (HCC): Status: ACTIVE | Noted: 2022-03-08

## 2022-03-08 PROBLEM — J96.91 RESPIRATORY FAILURE WITH HYPOXIA (HCC): Status: ACTIVE | Noted: 2022-03-08

## 2022-03-08 PROBLEM — E87.1 HYPONATREMIA: Status: ACTIVE | Noted: 2022-03-08

## 2022-03-08 LAB
ALBUMIN SERPL-MCNC: 3.6 G/DL (ref 3.5–5)
ALBUMIN/GLOB SERPL: 0.8 {RATIO} (ref 1.2–3.5)
ALP SERPL-CCNC: 299 U/L (ref 50–136)
ALT SERPL-CCNC: 13 U/L (ref 12–65)
AMMONIA PLAS-SCNC: 45 UMOL/L (ref 11–32)
AMYLASE FLD-CCNC: 13 U/L
ANION GAP SERPL CALC-SCNC: 7 MMOL/L (ref 7–16)
APPEARANCE FLD: CLEAR
APPEARANCE UR: ABNORMAL
APTT PPP: 30.5 SEC (ref 24.1–35.1)
ARTERIAL PATENCY WRIST A: POSITIVE
AST SERPL-CCNC: 19 U/L (ref 15–37)
BACTERIA URNS QL MICRO: 0 /HPF
BASE DEFICIT BLD-SCNC: 2.2 MMOL/L
BASOPHILS # BLD: 0.1 K/UL (ref 0–0.2)
BASOPHILS NFR BLD: 1 % (ref 0–2)
BDY SITE: ABNORMAL
BILIRUB SERPL-MCNC: 0.9 MG/DL (ref 0.2–1.1)
BILIRUB UR QL: NEGATIVE
BNP SERPL-MCNC: 9226 PG/ML (ref 5–125)
BUN SERPL-MCNC: 19 MG/DL (ref 6–23)
CALCIUM SERPL-MCNC: 8.9 MG/DL (ref 8.3–10.4)
CHLORIDE SERPL-SCNC: 102 MMOL/L (ref 98–107)
CO2 SERPL-SCNC: 24 MMOL/L (ref 21–32)
COLOR FLD: YELLOW
COLOR UR: ABNORMAL
CREAT SERPL-MCNC: 1.2 MG/DL (ref 0.6–1)
DIFFERENTIAL METHOD BLD: ABNORMAL
DIGOXIN SERPL-MCNC: 0.1 NG/ML (ref 0.9–2.1)
EOSINOPHIL # BLD: 0.2 K/UL (ref 0–0.8)
EOSINOPHIL NFR BLD: 2 % (ref 0.5–7.8)
EPI CELLS #/AREA URNS HPF: ABNORMAL /HPF
ERYTHROCYTE [DISTWIDTH] IN BLOOD BY AUTOMATED COUNT: 18.6 % (ref 11.9–14.6)
GAS FLOW.O2 O2 DELIVERY SYS: ABNORMAL L/MIN
GLOBULIN SER CALC-MCNC: 4.5 G/DL (ref 2.3–3.5)
GLUCOSE FLD-MCNC: 158 MG/DL
GLUCOSE SERPL-MCNC: 206 MG/DL (ref 65–100)
GLUCOSE UR STRIP.AUTO-MCNC: NEGATIVE MG/DL
HCO3 BLD-SCNC: 23.1 MMOL/L (ref 22–26)
HCT VFR BLD AUTO: 36.5 % (ref 35.8–46.3)
HGB BLD-MCNC: 10.3 G/DL (ref 11.7–15.4)
HGB UR QL STRIP: ABNORMAL
IMM GRANULOCYTES # BLD AUTO: 0 K/UL (ref 0–0.5)
IMM GRANULOCYTES NFR BLD AUTO: 0 % (ref 0–5)
KETONES UR QL STRIP.AUTO: NEGATIVE MG/DL
LACTATE SERPL-SCNC: 1.5 MMOL/L (ref 0.4–2)
LACTATE SERPL-SCNC: 4.7 MMOL/L (ref 0.4–2)
LDH FLD L TO P-CCNC: 120 U/L
LEUKOCYTE ESTERASE UR QL STRIP.AUTO: ABNORMAL
LIPASE SERPL-CCNC: 86 U/L (ref 73–393)
LYMPHOCYTES # BLD: 1 K/UL (ref 0.5–4.6)
LYMPHOCYTES NFR BLD: 8 % (ref 13–44)
MAGNESIUM SERPL-MCNC: 2.1 MG/DL (ref 1.8–2.4)
MCH RBC QN AUTO: 26.8 PG (ref 26.1–32.9)
MCHC RBC AUTO-ENTMCNC: 28.2 G/DL (ref 31.4–35)
MCV RBC AUTO: 94.8 FL (ref 79.6–97.8)
MONOCYTES # BLD: 0.6 K/UL (ref 0.1–1.3)
MONOCYTES NFR BLD: 5 % (ref 4–12)
NEUTS SEG # BLD: 10.3 K/UL (ref 1.7–8.2)
NEUTS SEG NFR BLD: 84 % (ref 43–78)
NITRITE UR QL STRIP.AUTO: POSITIVE
NRBC # BLD: 0 K/UL (ref 0–0.2)
NUC CELL # FLD: <100 /CU MM
O2/TOTAL GAS SETTING VFR VENT: 100 %
OTHER OBSERVATIONS,UCOM: ABNORMAL
PCO2 BLD: 40.7 MMHG (ref 35–45)
PH BLD: 7.36 [PH] (ref 7.35–7.45)
PH UR STRIP: 6 [PH] (ref 5–9)
PLATELET # BLD AUTO: 219 K/UL (ref 150–450)
PMV BLD AUTO: 10.7 FL (ref 9.4–12.3)
PO2 BLD: 346 MMHG (ref 75–100)
POTASSIUM SERPL-SCNC: 4.3 MMOL/L (ref 3.5–5.1)
PROT FLD-MCNC: 4.7 G/DL
PROT SERPL-MCNC: 8.1 G/DL (ref 6.3–8.2)
PROT UR STRIP-MCNC: ABNORMAL MG/DL
RBC # BLD AUTO: 3.85 M/UL (ref 4.05–5.2)
RBC # FLD: <1000 /CU MM
SAO2 % BLD: 99.9 % (ref 95–98)
SERVICE CMNT-IMP: ABNORMAL
SODIUM SERPL-SCNC: 133 MMOL/L (ref 136–145)
SP GR UR REFRACTOMETRY: 1.03 (ref 1–1.02)
SPECIMEN SOURCE FLD: NORMAL
SPECIMEN TYPE: ABNORMAL
UROBILINOGEN UR QL STRIP.AUTO: 1 EU/DL (ref 0.2–1)
WBC # BLD AUTO: 12.2 K/UL (ref 4.3–11.1)
YEAST URNS QL MICRO: ABNORMAL

## 2022-03-08 PROCEDURE — 87086 URINE CULTURE/COLONY COUNT: CPT

## 2022-03-08 PROCEDURE — 74011250636 HC RX REV CODE- 250/636: Performed by: RADIOLOGY

## 2022-03-08 PROCEDURE — 82150 ASSAY OF AMYLASE: CPT

## 2022-03-08 PROCEDURE — 96374 THER/PROPH/DIAG INJ IV PUSH: CPT

## 2022-03-08 PROCEDURE — 83880 ASSAY OF NATRIURETIC PEPTIDE: CPT

## 2022-03-08 PROCEDURE — 74011000250 HC RX REV CODE- 250: Performed by: INTERNAL MEDICINE

## 2022-03-08 PROCEDURE — 0W9G3ZZ DRAINAGE OF PERITONEAL CAVITY, PERCUTANEOUS APPROACH: ICD-10-PCS | Performed by: RADIOLOGY

## 2022-03-08 PROCEDURE — 80162 ASSAY OF DIGOXIN TOTAL: CPT

## 2022-03-08 PROCEDURE — 96376 TX/PRO/DX INJ SAME DRUG ADON: CPT

## 2022-03-08 PROCEDURE — 94640 AIRWAY INHALATION TREATMENT: CPT

## 2022-03-08 PROCEDURE — P9047 ALBUMIN (HUMAN), 25%, 50ML: HCPCS | Performed by: RADIOLOGY

## 2022-03-08 PROCEDURE — 2709999900 HC NON-CHARGEABLE SUPPLY

## 2022-03-08 PROCEDURE — 80307 DRUG TEST PRSMV CHEM ANLYZR: CPT

## 2022-03-08 PROCEDURE — 87186 SC STD MICRODIL/AGAR DIL: CPT

## 2022-03-08 PROCEDURE — 65610000006 HC RM INTENSIVE CARE

## 2022-03-08 PROCEDURE — 74011250636 HC RX REV CODE- 250/636: Performed by: INTERNAL MEDICINE

## 2022-03-08 PROCEDURE — 99285 EMERGENCY DEPT VISIT HI MDM: CPT

## 2022-03-08 PROCEDURE — 74011000258 HC RX REV CODE- 258: Performed by: EMERGENCY MEDICINE

## 2022-03-08 PROCEDURE — 89050 BODY FLUID CELL COUNT: CPT

## 2022-03-08 PROCEDURE — 94664 DEMO&/EVAL PT USE INHALER: CPT

## 2022-03-08 PROCEDURE — 87040 BLOOD CULTURE FOR BACTERIA: CPT

## 2022-03-08 PROCEDURE — 85025 COMPLETE CBC W/AUTO DIFF WBC: CPT

## 2022-03-08 PROCEDURE — 82140 ASSAY OF AMMONIA: CPT

## 2022-03-08 PROCEDURE — 80053 COMPREHEN METABOLIC PANEL: CPT

## 2022-03-08 PROCEDURE — 82247 BILIRUBIN TOTAL: CPT

## 2022-03-08 PROCEDURE — 82803 BLOOD GASES ANY COMBINATION: CPT

## 2022-03-08 PROCEDURE — 93005 ELECTROCARDIOGRAM TRACING: CPT | Performed by: EMERGENCY MEDICINE

## 2022-03-08 PROCEDURE — 84157 ASSAY OF PROTEIN OTHER: CPT

## 2022-03-08 PROCEDURE — 87088 URINE BACTERIA CULTURE: CPT

## 2022-03-08 PROCEDURE — 81025 URINE PREGNANCY TEST: CPT

## 2022-03-08 PROCEDURE — 77030010507 HC ADH SKN DERMBND J&J -B

## 2022-03-08 PROCEDURE — 83605 ASSAY OF LACTIC ACID: CPT

## 2022-03-08 PROCEDURE — 74011250637 HC RX REV CODE- 250/637: Performed by: INTERNAL MEDICINE

## 2022-03-08 PROCEDURE — 82945 GLUCOSE OTHER FLUID: CPT

## 2022-03-08 PROCEDURE — 71045 X-RAY EXAM CHEST 1 VIEW: CPT

## 2022-03-08 PROCEDURE — 74011250636 HC RX REV CODE- 250/636: Performed by: EMERGENCY MEDICINE

## 2022-03-08 PROCEDURE — 36415 COLL VENOUS BLD VENIPUNCTURE: CPT

## 2022-03-08 PROCEDURE — 93005 ELECTROCARDIOGRAM TRACING: CPT | Performed by: INTERNAL MEDICINE

## 2022-03-08 PROCEDURE — 87205 SMEAR GRAM STAIN: CPT

## 2022-03-08 PROCEDURE — 96375 TX/PRO/DX INJ NEW DRUG ADDON: CPT

## 2022-03-08 PROCEDURE — 36600 WITHDRAWAL OF ARTERIAL BLOOD: CPT

## 2022-03-08 PROCEDURE — 85730 THROMBOPLASTIN TIME PARTIAL: CPT

## 2022-03-08 PROCEDURE — 49083 ABD PARACENTESIS W/IMAGING: CPT

## 2022-03-08 PROCEDURE — 81003 URINALYSIS AUTO W/O SCOPE: CPT

## 2022-03-08 PROCEDURE — 77030014146 HC TY THORCENT/PARACENT BD -B

## 2022-03-08 PROCEDURE — 74011000250 HC RX REV CODE- 250: Performed by: EMERGENCY MEDICINE

## 2022-03-08 PROCEDURE — 83735 ASSAY OF MAGNESIUM: CPT

## 2022-03-08 PROCEDURE — 74011000258 HC RX REV CODE- 258: Performed by: INTERNAL MEDICINE

## 2022-03-08 PROCEDURE — 83615 LACTATE (LD) (LDH) ENZYME: CPT

## 2022-03-08 PROCEDURE — 77010033678 HC OXYGEN DAILY

## 2022-03-08 PROCEDURE — 83690 ASSAY OF LIPASE: CPT

## 2022-03-08 RX ORDER — SODIUM CHLORIDE 0.9 % (FLUSH) 0.9 %
5-40 SYRINGE (ML) INJECTION AS NEEDED
Status: DISCONTINUED | OUTPATIENT
Start: 2022-03-08 | End: 2022-03-10 | Stop reason: SDUPTHER

## 2022-03-08 RX ORDER — ONDANSETRON 2 MG/ML
4 INJECTION INTRAMUSCULAR; INTRAVENOUS
Status: DISCONTINUED | OUTPATIENT
Start: 2022-03-08 | End: 2022-03-13 | Stop reason: HOSPADM

## 2022-03-08 RX ORDER — ALBUMIN HUMAN 250 G/1000ML
12.5 SOLUTION INTRAVENOUS ONCE
Status: COMPLETED | OUTPATIENT
Start: 2022-03-08 | End: 2022-03-08

## 2022-03-08 RX ORDER — GUAIFENESIN 100 MG/5ML
81 LIQUID (ML) ORAL DAILY
COMMUNITY

## 2022-03-08 RX ORDER — HYDROMORPHONE HYDROCHLORIDE 1 MG/ML
0.2 INJECTION, SOLUTION INTRAMUSCULAR; INTRAVENOUS; SUBCUTANEOUS
Status: DISCONTINUED | OUTPATIENT
Start: 2022-03-08 | End: 2022-03-13 | Stop reason: HOSPADM

## 2022-03-08 RX ORDER — SPIRONOLACTONE 25 MG/1
25 TABLET ORAL DAILY
COMMUNITY

## 2022-03-08 RX ORDER — DILTIAZEM HYDROCHLORIDE 5 MG/ML
10 INJECTION INTRAVENOUS
Status: COMPLETED | OUTPATIENT
Start: 2022-03-08 | End: 2022-03-08

## 2022-03-08 RX ORDER — ONDANSETRON 8 MG/1
4 TABLET, ORALLY DISINTEGRATING ORAL
Status: DISCONTINUED | OUTPATIENT
Start: 2022-03-08 | End: 2022-03-13 | Stop reason: HOSPADM

## 2022-03-08 RX ORDER — ONDANSETRON 2 MG/ML
4 INJECTION INTRAMUSCULAR; INTRAVENOUS
Status: COMPLETED | OUTPATIENT
Start: 2022-03-08 | End: 2022-03-08

## 2022-03-08 RX ORDER — INSULIN LISPRO 100 [IU]/ML
10 INJECTION, SOLUTION INTRAVENOUS; SUBCUTANEOUS
COMMUNITY

## 2022-03-08 RX ORDER — HYDROMORPHONE HYDROCHLORIDE 1 MG/ML
0.5 INJECTION, SOLUTION INTRAMUSCULAR; INTRAVENOUS; SUBCUTANEOUS
Status: DISCONTINUED | OUTPATIENT
Start: 2022-03-08 | End: 2022-03-13 | Stop reason: HOSPADM

## 2022-03-08 RX ORDER — POLYETHYLENE GLYCOL 3350 17 G/17G
17 POWDER, FOR SOLUTION ORAL DAILY PRN
Status: DISCONTINUED | OUTPATIENT
Start: 2022-03-08 | End: 2022-03-13 | Stop reason: HOSPADM

## 2022-03-08 RX ORDER — ACETAMINOPHEN 650 MG/1
650 SUPPOSITORY RECTAL
Status: DISCONTINUED | OUTPATIENT
Start: 2022-03-08 | End: 2022-03-13 | Stop reason: HOSPADM

## 2022-03-08 RX ORDER — SODIUM CHLORIDE 0.9 % (FLUSH) 0.9 %
5-40 SYRINGE (ML) INJECTION AS NEEDED
Status: DISCONTINUED | OUTPATIENT
Start: 2022-03-08 | End: 2022-03-13 | Stop reason: HOSPADM

## 2022-03-08 RX ORDER — SODIUM CHLORIDE 0.9 % (FLUSH) 0.9 %
5-40 SYRINGE (ML) INJECTION EVERY 8 HOURS
Status: DISCONTINUED | OUTPATIENT
Start: 2022-03-08 | End: 2022-03-10 | Stop reason: SDUPTHER

## 2022-03-08 RX ORDER — MIDODRINE HYDROCHLORIDE 5 MG/1
10 TABLET ORAL
Status: DISCONTINUED | OUTPATIENT
Start: 2022-03-08 | End: 2022-03-13 | Stop reason: HOSPADM

## 2022-03-08 RX ORDER — SODIUM CHLORIDE 0.9 % (FLUSH) 0.9 %
5-40 SYRINGE (ML) INJECTION EVERY 8 HOURS
Status: DISCONTINUED | OUTPATIENT
Start: 2022-03-08 | End: 2022-03-13 | Stop reason: HOSPADM

## 2022-03-08 RX ORDER — GUAIFENESIN 100 MG/5ML
81 LIQUID (ML) ORAL DAILY
Status: DISCONTINUED | OUTPATIENT
Start: 2022-03-09 | End: 2022-03-13 | Stop reason: HOSPADM

## 2022-03-08 RX ORDER — INSULIN GLARGINE 100 [IU]/ML
20 INJECTION, SOLUTION SUBCUTANEOUS
COMMUNITY

## 2022-03-08 RX ORDER — IPRATROPIUM BROMIDE AND ALBUTEROL SULFATE 2.5; .5 MG/3ML; MG/3ML
3 SOLUTION RESPIRATORY (INHALATION)
Status: COMPLETED | OUTPATIENT
Start: 2022-03-08 | End: 2022-03-08

## 2022-03-08 RX ORDER — FENTANYL CITRATE 50 UG/ML
50 INJECTION, SOLUTION INTRAMUSCULAR; INTRAVENOUS ONCE
Status: COMPLETED | OUTPATIENT
Start: 2022-03-08 | End: 2022-03-08

## 2022-03-08 RX ORDER — SODIUM CHLORIDE 9 MG/ML
25 INJECTION, SOLUTION INTRAVENOUS ONCE
Status: COMPLETED | OUTPATIENT
Start: 2022-03-09 | End: 2022-03-09

## 2022-03-08 RX ORDER — BUMETANIDE 1 MG/1
1 TABLET ORAL 2 TIMES DAILY
COMMUNITY

## 2022-03-08 RX ORDER — IBUPROFEN 200 MG
1 TABLET ORAL DAILY
Status: DISCONTINUED | OUTPATIENT
Start: 2022-03-09 | End: 2022-03-13 | Stop reason: HOSPADM

## 2022-03-08 RX ORDER — IPRATROPIUM BROMIDE 0.5 MG/2.5ML
0.5 SOLUTION RESPIRATORY (INHALATION)
Status: DISCONTINUED | OUTPATIENT
Start: 2022-03-08 | End: 2022-03-13 | Stop reason: HOSPADM

## 2022-03-08 RX ORDER — MAGNESIUM SULFATE HEPTAHYDRATE 40 MG/ML
2 INJECTION, SOLUTION INTRAVENOUS AS NEEDED
Status: DISCONTINUED | OUTPATIENT
Start: 2022-03-08 | End: 2022-03-13 | Stop reason: HOSPADM

## 2022-03-08 RX ORDER — FENTANYL CITRATE 50 UG/ML
50 INJECTION, SOLUTION INTRAMUSCULAR; INTRAVENOUS ONCE
Status: DISCONTINUED | OUTPATIENT
Start: 2022-03-08 | End: 2022-03-08

## 2022-03-08 RX ORDER — ENOXAPARIN SODIUM 100 MG/ML
40 INJECTION SUBCUTANEOUS DAILY
Status: DISCONTINUED | OUTPATIENT
Start: 2022-03-09 | End: 2022-03-11

## 2022-03-08 RX ORDER — FAMOTIDINE 20 MG/1
20 TABLET, FILM COATED ORAL 2 TIMES DAILY
Status: DISCONTINUED | OUTPATIENT
Start: 2022-03-08 | End: 2022-03-13 | Stop reason: HOSPADM

## 2022-03-08 RX ORDER — POTASSIUM CHLORIDE 7.45 MG/ML
10 INJECTION INTRAVENOUS AS NEEDED
Status: DISCONTINUED | OUTPATIENT
Start: 2022-03-08 | End: 2022-03-13 | Stop reason: HOSPADM

## 2022-03-08 RX ORDER — GABAPENTIN 300 MG/1
300 CAPSULE ORAL 3 TIMES DAILY
COMMUNITY

## 2022-03-08 RX ORDER — METOPROLOL SUCCINATE 200 MG/1
200 TABLET, EXTENDED RELEASE ORAL DAILY
COMMUNITY

## 2022-03-08 RX ORDER — ACETAMINOPHEN 325 MG/1
650 TABLET ORAL
Status: DISCONTINUED | OUTPATIENT
Start: 2022-03-08 | End: 2022-03-13 | Stop reason: HOSPADM

## 2022-03-08 RX ORDER — ALBUMIN HUMAN 250 G/1000ML
25 SOLUTION INTRAVENOUS ONCE
Status: COMPLETED | OUTPATIENT
Start: 2022-03-08 | End: 2022-03-08

## 2022-03-08 RX ORDER — NYSTATIN 100000 [USP'U]/G
POWDER TOPICAL 2 TIMES DAILY
Status: DISCONTINUED | OUTPATIENT
Start: 2022-03-09 | End: 2022-03-13 | Stop reason: HOSPADM

## 2022-03-08 RX ORDER — LEVALBUTEROL INHALATION SOLUTION 0.63 MG/3ML
0.63 SOLUTION RESPIRATORY (INHALATION)
Status: DISCONTINUED | OUTPATIENT
Start: 2022-03-08 | End: 2022-03-13 | Stop reason: HOSPADM

## 2022-03-08 RX ORDER — FLUTICASONE PROPIONATE AND SALMETEROL 100; 50 UG/1; UG/1
1 POWDER RESPIRATORY (INHALATION) 2 TIMES DAILY
COMMUNITY

## 2022-03-08 RX ORDER — FUROSEMIDE 10 MG/ML
80 INJECTION INTRAMUSCULAR; INTRAVENOUS ONCE
Status: COMPLETED | OUTPATIENT
Start: 2022-03-08 | End: 2022-03-08

## 2022-03-08 RX ORDER — DM/P-EPHED/ACETAMINOPH/DOXYLAM 30-7.5/3
2 LIQUID (ML) ORAL
Status: DISCONTINUED | OUTPATIENT
Start: 2022-03-08 | End: 2022-03-13 | Stop reason: HOSPADM

## 2022-03-08 RX ORDER — DILTIAZEM HYDROCHLORIDE 5 MG/ML
15 INJECTION INTRAVENOUS
Status: COMPLETED | OUTPATIENT
Start: 2022-03-08 | End: 2022-03-08

## 2022-03-08 RX ORDER — ALBUTEROL SULFATE 90 UG/1
2 AEROSOL, METERED RESPIRATORY (INHALATION)
COMMUNITY

## 2022-03-08 RX ORDER — BUDESONIDE 0.5 MG/2ML
500 INHALANT ORAL
Status: DISCONTINUED | OUTPATIENT
Start: 2022-03-08 | End: 2022-03-13 | Stop reason: HOSPADM

## 2022-03-08 RX ADMIN — ALBUMIN (HUMAN) 12.5 G: 0.25 INJECTION, SOLUTION INTRAVENOUS at 17:14

## 2022-03-08 RX ADMIN — LEVALBUTEROL HYDROCHLORIDE 0.63 MG: 0.63 SOLUTION RESPIRATORY (INHALATION) at 20:25

## 2022-03-08 RX ADMIN — SODIUM CHLORIDE 25 ML/HR: 900 INJECTION, SOLUTION INTRAVENOUS at 23:26

## 2022-03-08 RX ADMIN — BUDESONIDE 500 MCG: 0.5 INHALANT RESPIRATORY (INHALATION) at 20:25

## 2022-03-08 RX ADMIN — ALBUMIN (HUMAN) 25 G: 0.25 INJECTION, SOLUTION INTRAVENOUS at 16:55

## 2022-03-08 RX ADMIN — MIDODRINE HYDROCHLORIDE 10 MG: 5 TABLET ORAL at 23:18

## 2022-03-08 RX ADMIN — SODIUM CHLORIDE 5 MG/HR: 900 INJECTION, SOLUTION INTRAVENOUS at 16:03

## 2022-03-08 RX ADMIN — SODIUM CHLORIDE, PRESERVATIVE FREE 10 ML: 5 INJECTION INTRAVENOUS at 21:43

## 2022-03-08 RX ADMIN — CEFTRIAXONE 2 G: 2 INJECTION, POWDER, FOR SOLUTION INTRAMUSCULAR; INTRAVENOUS at 23:27

## 2022-03-08 RX ADMIN — FENTANYL CITRATE 50 MCG: 50 INJECTION, SOLUTION INTRAMUSCULAR; INTRAVENOUS at 13:53

## 2022-03-08 RX ADMIN — FUROSEMIDE 80 MG: 10 INJECTION, SOLUTION INTRAMUSCULAR; INTRAVENOUS at 13:49

## 2022-03-08 RX ADMIN — IPRATROPIUM BROMIDE 0.5 MG: 0.5 SOLUTION RESPIRATORY (INHALATION) at 20:25

## 2022-03-08 RX ADMIN — ALBUMIN (HUMAN) 12.5 G: 0.25 INJECTION, SOLUTION INTRAVENOUS at 16:59

## 2022-03-08 RX ADMIN — IPRATROPIUM BROMIDE AND ALBUTEROL SULFATE 3 ML: .5; 3 SOLUTION RESPIRATORY (INHALATION) at 14:05

## 2022-03-08 RX ADMIN — ONDANSETRON 4 MG: 2 INJECTION INTRAMUSCULAR; INTRAVENOUS at 14:48

## 2022-03-08 RX ADMIN — DILTIAZEM HYDROCHLORIDE 10 MG: 5 INJECTION INTRAVENOUS at 13:51

## 2022-03-08 RX ADMIN — DILTIAZEM HYDROCHLORIDE 15 MG: 5 INJECTION INTRAVENOUS at 14:32

## 2022-03-08 NOTE — PROGRESS NOTES
TRANSFER - IN REPORT:    Verbal report received from [facility]) on Shauna Del Rosario  being received from IR (unit) for routine progression of care      Report consisted of patients Situation, Background, Assessment and   Recommendations(SBAR). Information from the following report(s) SBAR, Kardex, ED Summary, Procedure Summary, Intake/Output, MAR, Recent Results, Med Rec Status and Cardiac Rhythm a fib was reviewed with the receiving nurse. Opportunity for questions and clarification was provided. Assessment completed upon patients arrival to unit and care assumed.

## 2022-03-08 NOTE — H&P
Hospitalist History and Physical   Admit Date:  3/8/2022  1:48 PM   Name:  Gail Prajapati   Age:  55 y.o. Sex:  female  :  1975   MRN:  [id number]   [facility]:  [facility]    Presenting Complaint: Abdominal Pain    Reason(s) for Admission:  Atrial fibrillation with RVR ([facility] [I48.91]  Respiratory failure with hypoxia ([facility] [J96.91]  Anasarca [R60.1]  Cirrhosis ([facility] [K74.60]  SBP (spontaneous bacterial peritonitis) ([facility] [K65.2]     History of Present Illness:   Gail Prajapati is a 55 y.o. female with a past medical history of cardiomyopathy, CHF hypertension, diabetes, smoker of 1 pack of cigarettes per day, atrial fibrillation, COPD and drug abuse per the patient's mom who presents with severe shortness of breath and abdominal distention and medication noncompliance. Symptoms have been ongoing for about a week acutely worse today per her partner at the bedside. She was seen and evaluated in the ER found to be in A. fib with RVR with a heart rate in the 160s. A grossly distended abdomen. Likely the major factor in her shortness of breath. She also complains of abdominal pain. Writhing around in pain. Poor historian. History is taken from ER physician and staff and patient's chart and partner at bedside. Review of Systems:  10 systems reviewed and negative except as noted in HPI. Assessment & Plan: Active Problems:  CHF and cardiomyopathy with anasarca, ascites and volume overload  -We will admit to the ICU  -Interventional radiology consulted for emergent paracentesis  -Coags are pending  -Diuretics as tolerated-blood pressure significantly low at this time in the 90s to 100s. Respiratory failure with hypoxia  Patient does not wear oxygen at home. She is wheezing and short of breath came air on 8 L via nasal cannula  Respiratory rate of 26. Use of accessory muscles.   Unable to speak in full sentences   although she is agreeable to go over the floor respiratory failure secondary to gross anasarca and ascites she would not be a good if IR can pull off some of the fluid of her belly    Abdominal pain-suspect SBP, ? cirrhosis  ER did preliminary bedside ultrasound concerning for cirrhotic liver  -We will treat empirically for SBP  -Follow-up ascitic fluid  -Will not be too aggressive with pain management secondary to the need to maintain her blood pressures. Atrial fibrillation with RVR (HCC) (3/8/2022)  Continue Cardizem drip    History of polysubstance abuse  Patient endorses smoking and using marijuana  She smokes about 1 pack of cigarettes per day  Patient denies any use of methamphetamines or other drugs  However this was the history given by her mother to the ER  We will get a urine drug screen unable    Morbid obesity  -Unable to get her weight at this time secondary to her respiratory distress, abdominal pain and A. fib with RVR        Hyponatremia  Likely secondary to SIADH in the setting of CHF and COPD      Elevated lactic acid  Patient concerning for sepsis and was  Elevation could also be secondary to cirrhosis  Will cover her for SBP as above  However given her volume overloaded status will hold off on IV fluids    KAIDEN versus chronic kidney disease  No prior labs available for comparison  We will avoid nephrotoxins  And dose medications for her renal function      COPD exacerbation  -As needed nebs with Xopenex and ipratropium  -Wean O2 as tolerated    History of cardiomyopathy and volume overload  -There may be a component of CHF exacerbation but the majority of the patient's symptomatology seems to be secondary to her gross anasarca and ascites  -We will get a 2D echo  -Hold off diuresis at this time secondary to tenuous blood pressures      Dispo/Discharge Planning:   Admit to the medicine inpatient ICU  Anticipate at least a 3-4 midnight stay. Further work-up and management based on clinical course.   Anticipate home at discharge    Patient is high risk for morbidity and mortality as well as cardiovascular collapse and demise; high risk for intubation. She will be admitted to the intensive care unit at this time for further management. She is a full code. I spent 50 minutes in critical care time stabilizing the patient and organizing her care.     Diet: N.p.o.  VTE ppx: SCDs   code status: Full    Hospital Problems as of 3/8/2022 Never Reviewed          Codes Class Noted - Resolved POA    Anasarca ICD-10-CM: R60.1  ICD-9-CM: 782.3  3/8/2022 - Present Unknown        Cirrhosis ([facility] ICD-10-CM: K74.60  ICD-9-CM: 571.5  3/8/2022 - Present Unknown        SBP (spontaneous bacterial peritonitis) ([place] [id number] ICD-10-CM: K65.2  ICD-9-CM: 567.23  3/8/2022 - Present Unknown        New onset a-fib ([place] [id number] ICD-10-CM: I48.91  ICD-9-CM: 427.31  3/8/2022 - Present Unknown        Atrial fibrillation with RVR ([place] [id number] ICD-10-CM: I48.91  ICD-9-CM: 427.31  3/8/2022 - Present Unknown        Respiratory failure with hypoxia ([place] [id number] ICD-10-CM: J96.91  ICD-9-CM: 518.81  3/8/2022 - Present Unknown        Hyponatremia ICD-10-CM: E87.1  ICD-9-CM: 276.1  3/8/2022 - Present Yes        COPD exacerbation ([facility] ICD-10-CM: J44.1  ICD-9-CM: 491.21  3/8/2022 - Present Yes        KAIDEN (acute kidney injury) ([facility] ICD-10-CM: N17.9  ICD-9-CM: 584.9  3/8/2022 - Present Yes        Dependence on nicotine from cigarettes ICD-10-CM: F17.210  ICD-9-CM: 305.1  3/8/2022 - Present Yes        Polysubstance abuse ([facility] ICD-10-CM: F19.10  ICD-9-CM: 305.90  3/8/2022 - Present Yes              Past History:  Past medical history:  A. 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 Prior to Admit Medications:  No current outpatient medications    Objective:     Patient Vitals for the past 24 hrs:   Temp Pulse Resp BP SpO2   03/08/22 1526 -- (!) 128 23 -- 98 %   03/08/22 1523 -- (!) 133 20 (!) 129/102 --   03/08/22 1520 -- 99 -- -- --   03/08/22 1518 -- (!) 112 19 -- --   03/08/22 1455 -- -- -- -- 100 %   03/08/22 1450 -- (!) 107 18 (!) 144/104 --   03/08/22 1432 -- (!) 124 -- (!) 153/116 --   03/08/22 1418 -- (!) 131 20 118/79 --   03/08/22 1415 -- -- -- -- 100 %   03/08/22 1405 -- 84 20 (!) 77/27 --   03/08/22 1351 -- (!) 142 -- -- --   03/08/22 1339 98.6 °F (37 °C) (!) 159 26 109/85 --     Oxygen Therapy  O2 Sat (%): 98 % (03/08/22 1526)  Pulse via Oximetry: 106 beats per minute (03/08/22 1415)  O2 Device: Nasal cannula (03/08/22 1455)  O2 Flow Rate (L/min): 5 l/min (03/08/22 1455)    There is no height or weight on file to calculate BMI. No intake or output data in the 24 hours ending 03/08/22 1612      Physical Exam:    Blood pressure (!) 90/62, pulse (!) 133, temperature 98.6 °F (37 °C), resp. rate 23, SpO2 98 %. General:    Morbidly obese in obvious cardiopulmonary and painful distress anxious; appears chronically ill  head:  Normocephalic, atraumatic  Eyes:  Sclerae appear normal.  Pupils equally round. ENT:  Nares appear normal, no drainage. Moist oral mucosa  Neck:  No restricted ROM. Trachea midline   CV:   RRR. No m/r/g. No jugular venous distension. Lungs:   Air entry equal bilaterally diminished in the bases gross wheezing. Use of accessory muscles . Speaking in short sentences   Abdomen: Bowel sounds present. Soft, tender; grossly distended   Extremities: No cyanosis or clubbing. Bilateral edema 3+. Not pitting  Skin:     No rashes and normal coloration. Warm and dry. Neuro:  CN II-XII grossly intact. Sensation intact.   A&Ox3  Psych:  Agitated and aggravated mood and affect    I have reviewed ordered lab tests and independently visualized imaging below:    Last 24hr Labs:  Recent Results (from the past 24 hour(s))   CBC WITH AUTOMATED DIFF    Collection Time: 03/08/22  1:45 PM   Result Value Ref Range    WBC 12.2 (H) 4.3 - 11.1 K/uL    RBC 3.85 (L) 4.05 - 5.2 M/uL    HGB 10.3 (L) 11.7 - 15.4 g/dL    HCT 36.5 35.8 - 46.3 %    MCV 94.8 79.6 - 97.8 FL    MCH 26.8 26.1 - 32.9 PG    MCHC 28.2 (L) 31.4 - 35.0 g/dL    RDW 18.6 (H) 11.9 - 14.6 %    PLATELET 758 937 - 597 K/uL    MPV 10.7 9.4 - 12.3 FL    ABSOLUTE NRBC 0.00 0.0 - 0.2 K/uL    DF AUTOMATED      NEUTROPHILS 84 (H) 43 - 78 %    LYMPHOCYTES 8 (L) 13 - 44 %    MONOCYTES 5 4.0 - 12.0 %    EOSINOPHILS 2 0.5 - 7.8 %    BASOPHILS 1 0.0 - 2.0 %    IMMATURE GRANULOCYTES 0 0.0 - 5.0 %    ABS. NEUTROPHILS 10.3 (H) 1.7 - 8.2 K/UL    ABS. LYMPHOCYTES 1.0 0.5 - 4.6 K/UL    ABS. MONOCYTES 0.6 0.1 - 1.3 K/UL    ABS. EOSINOPHILS 0.2 0.0 - 0.8 K/UL    ABS. BASOPHILS 0.1 0.0 - 0.2 K/UL    ABS. IMM. GRANS. 0.0 0.0 - 0.5 K/UL   LACTIC ACID    Collection Time: 03/08/22  1:45 PM   Result Value Ref Range    Lactic acid 4.7 (HH) 0.4 - 2.0 MMOL/L   METABOLIC PANEL, COMPREHENSIVE    Collection Time: 03/08/22  1:45 PM   Result Value Ref Range    Sodium 133 (L) 136 - 145 mmol/L    Potassium 4.3 3.5 - 5.1 mmol/L    Chloride 102 98 - 107 mmol/L    CO2 24 21 - 32 mmol/L    Anion gap 7 7 - 16 mmol/L    Glucose 206 (H) 65 - 100 mg/dL    BUN 19 6 - 23 MG/DL    Creatinine 1.20 (H) 0.6 - 1.0 MG/DL    GFR est AA >60 >60 ml/min/1.73m2    GFR est non-AA 51 (L) >60 ml/min/1.73m2    Calcium 8.9 8.3 - 10.4 MG/DL    Bilirubin, total 0.9 0.2 - 1.1 MG/DL    ALT (SGPT) 13 12 - 65 U/L    AST (SGOT) 19 15 - 37 U/L    Alk.  phosphatase 299 (H) 50 - 136 U/L    Protein, total 8.1 6.3 - 8.2 g/dL    Albumin 3.6 3.5 - 5.0 g/dL    Globulin 4.5 (H) 2.3 - 3.5 g/dL    A-G Ratio 0.8 (L) 1.2 - 3.5     MAGNESIUM    Collection Time: 03/08/22  1:45 PM   Result Value Ref Range Magnesium 2.1 1.8 - 2.4 mg/dL   LIPASE    Collection Time: 03/08/22  1:45 PM   Result Value Ref Range    Lipase 86 73 - 393 U/L   NT-PRO BNP    Collection Time: 03/08/22  1:45 PM   Result Value Ref Range    NT pro-BNP 9,226 (H) 5 - 125 PG/ML   BLOOD GAS, ARTERIAL POC    Collection Time: 03/08/22  1:58 PM   Result Value Ref Range    Device: Non rebreather      FIO2 (POC) 100 %    pH (POC) 7.36 7.35 - 7.45      pCO2 (POC) 40.7 35 - 45 MMHG    pO2 (POC) 346 (H) 75 - 100 MMHG    HCO3 (POC) 23.1 22 - 26 MMOL/L    sO2 (POC) 99.9 (H) 95 - 98 %    Base deficit (POC) 2.2 mmol/L    Allens test (POC) Positive      Site RIGHT RADIAL      Specimen type (POC) ARTERIAL      Performed by Trevor Corbett    URINALYSIS W/ RFLX MICROSCOPIC    Collection Time: 03/08/22  2:16 PM   Result Value Ref Range    Color MARIE      Appearance CLOUDY      Specific gravity 1.026 (H) 1.001 - 1.023      pH (UA) 6.0 5.0 - 9.0      Protein TRACE (A) NEG mg/dL    Glucose Negative NEG mg/dL    Ketone Negative NEG mg/dL    Bilirubin Negative NEG      Blood SMALL (A) NEG      Urobilinogen 1.0 0.2 - 1.0 EU/dL    Nitrites Positive (A) NEG      Leukocyte Esterase MODERATE (A) NEG      Epithelial cells 0-3 0 /hpf    Bacteria 0 0 /hpf    Yeast MODERATE      Other observations RESULTS VERIFIED MANUALLY         All Micro Results     Procedure Component Value Units Date/Time    CULTURE, BODY FLUID Maddie Armas STAIN [[id number]]     Order Status: Sent Specimen: Body Fluid from Abdominal Fluid           Other Studies:  XR CHEST PORT    Result Date: 3/8/2022  EXAMINATION: CHEST RADIOGRAPH 3/8/2022 2:11 PM ACCESSION NUMBER: [id number] INDICATION: volume overload COMPARISON: None available TECHNIQUE: A single view of the chest was obtained. FINDINGS: Underpenetrated exam. Support Lines and Tubes: None Cardiac Silhouette: Enlarged. Lungs: Interstitial and alveolar pulmonary edema. Pleura: Small bilateral pleural effusions. No pneumothorax. Osseous Structures: Unremarkable. Upper Abdomen: Unremarkable. 1. Enlarged cardiac silhouette. 2. Interstitial and alveolar pulmonary edema and small bilateral pleural effusions. 3. Underpenetrated exam. All findings within that limitation. Medications Administered     albuterol-ipratropium (DUO-NEB) 2.5 MG-0.5 MG/3 ML     Admin Date  03/08/2022 Action  Given Dose  3 mL Route  Nebulization Administered By  Dorys Soriano          dilTIAZem (CARDIZEM) 100 mg in 0.9% sodium chloride (MBP/ADV) 100 mL infusion     Admin Date  03/08/2022 Action  New Bag Dose  5 mg/hr Rate  5 mL/hr Route  IntraVENous Administered By  Juan A Shaw RN          dilTIAZem (CARDIZEM) injection 10 mg     Admin Date  03/08/2022 Action  Given Dose  10 mg Route  IntraVENous Administered By  Randy Knox          dilTIAZem (CARDIZEM) injection 15 mg     Admin Date  03/08/2022 Action  Given Dose  15 mg Route  IntraVENous Administered By  Tomi Morin RN          fentaNYL citrate (PF) injection 50 mcg     Admin Date  03/08/2022 Action  Given Dose  50 mcg Route  IntraVENous Administered By  Randy Knox          furosemide (LASIX) injection 80 mg     Admin Date  03/08/2022 Action  Given Dose  80 mg Route  IntraVENous Administered By  Randy Knox          ondansetron [facility]) injection 4 mg     Admin Date  03/08/2022 Action  Given Dose  4 mg Route  IntraVENous Administered By  Tomi Morin RN                Signed:  Prince Romero MD    Part of this note may have been written by using a voice dictation software. The note has been proof read but may still contain some grammatical/other typographical errors.

## 2022-03-08 NOTE — ED PROVIDER NOTES
42-year-old chronically unhealthy lady with a history of cardiomyopathy, A. fib, CHF, and COPD presents with severe abdominal distention and shortness of breath. She rarely sees a healthcare provider. She had a home health person of some kind go out to her in October 2021 and I am able to see that note. That appears to be her only recent health care visit in the [place] for the last year or more. Says she does take some of her medications. Patient was in moderate respiratory distress on arrival.    Elements of this note were created using speech recognition software. As such, errors of speech recognition may be present. No past medical history on file. No past surgical history on file. No family history on file. Social History     Socioeconomic History    Marital status:      Spouse name: Not on file    Number of children: Not on file    Years of education: Not on file    Highest education level: Not on file   Occupational History    Not on file   Tobacco Use    Smoking status: Not on file    Smokeless tobacco: Not on file   Substance and Sexual Activity    Alcohol use: Not on file    Drug use: Not on file    Sexual activity: Not on file   Other Topics Concern    Not on file   Social History Narrative    Not on file     Social Determinants of Health     Financial Resource Strain:     Difficulty of Paying Living Expenses: Not on file   Food Insecurity:     Worried About Running Out of Food in the Last Year: Not on file    Miladys of Food in the Last Year: Not on file   Transportation Needs:     Lack of Transportation (Medical): Not on file    Lack of Transportation (Non-Medical):  Not on file   Physical Activity:     Days of Exercise per Week: Not on file    Minutes of Exercise per Session: Not on file   Stress:     Feeling of Stress : Not on file   Social Connections:     Frequency of Communication with Friends and Family: Not on file    Frequency of Social Gatherings with Friends and Family: Not on file    Attends [employer] Services: Not on file    Active Member of Clubs or Organizations: Not on file    Attends Club or Organization Meetings: Not on file    Marital Status: Not on file   Intimate Partner Violence:     Fear of Current or Ex-Partner: Not on file    Emotionally Abused: Not on file    Physically Abused: Not on file    Sexually Abused: Not on file   Housing Stability:     Unable to Pay for Housing in the Last Year: Not on file    Number of [place] in the Last Year: Not on file    Unstable Housing in the Last Year: Not on file         ALLERGIES: Patient has no known allergies. Review of Systems   Constitutional: Positive for activity change, appetite change and fatigue. HENT: Negative for congestion and rhinorrhea. Eyes: Negative for pain and discharge. Respiratory: Positive for shortness of breath. Negative for cough. Cardiovascular: Positive for leg swelling. Negative for chest pain and palpitations. Gastrointestinal: Positive for abdominal distention. Negative for abdominal pain, nausea and vomiting. Genitourinary: Negative for dysuria and hematuria. Musculoskeletal: Negative for arthralgias and myalgias. Skin: Positive for color change. Chronic venous stasis changes   Allergic/Immunologic: Negative for environmental allergies and immunocompromised state. Neurological: Negative for dizziness and headaches. Hematological: Negative for adenopathy. Does not bruise/bleed easily. Psychiatric/Behavioral: Negative for confusion. Vitals:    03/08/22 1405 03/08/22 1415 03/08/22 1418 03/08/22 1432   BP: (!) 77/27  118/79 (!) 153/116   Pulse: 84  (!) 131 (!) 124   Resp: 20  20    Temp:       SpO2:  100%              Physical Exam  Vitals and nursing note reviewed. Constitutional:       Appearance: She is ill-appearing. Comments: Patient moderate respiratory distress.   She is exceptionally edematous with a very large edematous abdomen   HENT:      Head: Normocephalic and atraumatic. Nose: Nose normal.      Mouth/Throat:      Mouth: Mucous membranes are moist.   Eyes:      General:         Right eye: No discharge. Left eye: No discharge. Cardiovascular:      Comments: Tachycardic with an irregularly irregular rhythm  Pulmonary:      Effort: Pulmonary effort is normal.      Breath sounds: Normal breath sounds. Abdominal:      General: Bowel sounds are normal.      Palpations: Abdomen is soft. Musculoskeletal:      Comments: 4+ edema bilaterally with edema up above her breasts   Neurological:      General: No focal deficit present. Mental Status: She is oriented to person, place, and time. MDM  Number of Diagnoses or Management Options  Diagnosis management comments: CRITICAL CARE Documentation: This patient is critically ill and there is a high probability of of imminent or life threatening deterioration in the patient's condition without immediate management. The nature of the patient's clinical problem is: respiratory distress, volume overload, a.fib with RVR    I have spent 60 minutes in direct patient care, documentation, review of labs/xrays/old records, discussion with EMS, family, patient, hospitalist.     The time involved in the performance of separately reportable procedures was not counted toward critical care time.      Iván Brooks MD; 3/8/2022 @3:41 PM      Orders Placed This Encounter      XR CHEST PORT      CBC WITH AUTOMATED DIFF      LACTIC ACID      LACTIC ACID 2 HOUR REPEAT      METABOLIC PANEL, COMPREHENSIVE      MAGNESIUM      LIPASE      BLOOD GAS, ARTERIAL      URINALYSIS      NT-PRO BNP      DIGOXIN      AMMONIA      PROTHROMBIN TIME + INR      PTT      DRUG SCREEN, URINE      BLOOD GAS, ARTERIAL POC      EKG, 12 LEAD, INITIAL      furosemide (LASIX) injection 80 mg      DISCONTD: fentaNYL citrate (PF) injection 50 mcg      sodium chloride (NS) flush 5-40 mL      sodium chloride (NS) flush 5-40 mL      dilTIAZem (CARDIZEM) injection 10 mg      fentaNYL citrate (PF) injection 50 mcg      albuterol-ipratropium (DUO-NEB) 2.5 MG-0.5 MG/3 ML      dilTIAZem (CARDIZEM) injection 15 mg      ondansetron (ZOFRAN) injection 4 mg      dilTIAZem (CARDIZEM) 100 mg in 0.9% sodium chloride (MBP/ADV) 100 mL infusion         No diagnosis found. Results for orders placed or performed during the hospital encounter of 03/08/22  1. CBC WITH AUTOMATED DIFF:        Result                      Value             Ref Range           WBC                         12.2 (H)          4.3 - 11.1 K*       RBC                         3.85 (L)          4.05 - 5.2 M*       HGB                         10.3 (L)          11.7 - 15.4 *       HCT                         36.5              35.8 - 46.3 %       MCV                         94.8              79.6 - 97.8 *       MCH                         26.8              26.1 - 32.9 *       MCHC                        28.2 (L)          31.4 - 35.0 *       RDW                         18.6 (H)          11.9 - 14.6 %       PLATELET                    219               150 - 450 K/*       MPV                         10.7              9.4 - 12.3 FL       ABSOLUTE NRBC               0.00              0.0 - 0.2 K/*       DF                          AUTOMATED                             NEUTROPHILS                 84 (H)            43 - 78 %           LYMPHOCYTES                 8 (L)             13 - 44 %           MONOCYTES                   5                 4.0 - 12.0 %        EOSINOPHILS                 2                 0.5 - 7.8 %         BASOPHILS                   1                 0.0 - 2.0 %         IMMATURE GRANULOCYTES       0                 0.0 - 5.0 %         ABS. NEUTROPHILS            10.3 (H)          1.7 - 8.2 K/*       ABS. LYMPHOCYTES            1.0               0.5 - 4.6 K/*       ABS.  MONOCYTES              0.6               0.1 - 1.3 K/* ABS. EOSINOPHILS            0.2               0.0 - 0.8 K/*       ABS. BASOPHILS              0.1               0.0 - 0.2 K/*       ABS. IMM. GRANS.            0.0               0.0 - 0.5 K/*  2. LACTIC ACID:        Result                      Value             Ref Range           Lactic acid                 4.7 (HH)          0.4 - 2.0 MM*  3. METABOLIC PANEL, COMPREHENSIVE:        Result                      Value             Ref Range           Sodium                      133 (L)           136 - 145 mm*       Potassium                   4.3               3.5 - 5.1 mm*       Chloride                    102               98 - 107 mmo*       CO2                         24                21 - 32 mmol*       Anion gap                   7                 7 - 16 mmol/L       Glucose                     206 (H)           65 - 100 mg/*       BUN                         19                6 - 23 MG/DL        Creatinine                  1.20 (H)          0.6 - 1.0 MG*       GFR est AA                  >60               >60 ml/min/1*       GFR est non-AA              51 (L)            >60 ml/min/1*       Calcium                     8.9               8.3 - 10.4 M*       Bilirubin, total            0.9               0.2 - 1.1 MG*       ALT (SGPT)                  13                12 - 65 U/L         AST (SGOT)                  19                15 - 37 U/L         Alk. phosphatase            299 (H)           50 - 136 U/L        Protein, total              8.1               6.3 - 8.2 g/*       Albumin                     3.6               3.5 - 5.0 g/*       Globulin                    4.5 (H)           2.3 - 3.5 g/*       A-G Ratio                   0.8 (L)           1.2 - 3.5      4. MAGNESIUM:        Result                      Value             Ref Range           Magnesium                   2.1               1.8 - 2.4 mg*  5.  LIPASE:        Result                      Value             Ref Range           Lipase 86                73 - 393 U/L   6. URINALYSIS W/ RFLX MICROSCOPIC:        Result                      Value             Ref Range           Color                       MARIE                                 Appearance                  CLOUDY                                Specific gravity            1.026 (H)         1.001 - 1.02*       pH (UA)                     6.0               5.0 - 9.0           Protein                     TRACE (A)         NEG mg/dL           Glucose                     Negative          NEG mg/dL           Ketone                      Negative          NEG mg/dL           Bilirubin                   Negative          NEG                 Blood                       SMALL (A)         NEG                 Urobilinogen                1.0               0.2 - 1.0 EU*       Nitrites                    Positive (A)      NEG                 Leukocyte Esterase          MODERATE (A)      NEG                 Epithelial cells            0-3               0 /hpf              Bacteria                    0                 0 /hpf              Yeast                       MODERATE                              Other observations                                            RESULTS VERIFIED MANUALLY  7. NT-PRO BNP:        Result                      Value             Ref Range           NT pro-BNP                  9,226 (H)         5 - 125 PG/ML  8.  BLOOD GAS, ARTERIAL POC:        Result                      Value             Ref Range           Device:                                                       Non rebreather       FIO2 (POC)                  100               %                   pH (POC)                    7.36              7.35 - 7.45         pCO2 (POC)                  40.7              35 - 45 MMHG        pO2 (POC)                   346 (H)           75 - 100 MMHG       HCO3 (POC)                  23.1              22 - 26 MMOL*       sO2 (POC)                   99.9 (H)          95 - 98 %           Base deficit (POC)          2.2               mmol/L              Allens test (POC)           Positive                              Site                        RIGHT RADIAL                          Specimen type (POC)         ARTERIAL                              Performed by                JORDEN ZAMAN [facility]                        ED Course as of 03/08/22 1541   Tue Mar 08, 2022   1408 After the Lasix and Cardizem the patient's blood pressure did decline but her heart rate has come down to the 120 range. We will see how much urine she is able to put out with that Lasix and see what the rest of her blood work looks like. I did discuss the possibility of needing to intubate her for her respiratory difficulty. She said that she would be agreeable to that if she deteriorates more. [AC]   1434 Patient's blood pressure has recovered well so I will give her a second dose of Cardizem to try to slow her heart rate down. [AC]   1453 Patient's lactic acid was elevated to 4.7 [AC]   1512 Patient's mother came by and implied that the patient was using meth and heroin. I will add a drug screen. Patient initially denied using any drugs.  [AC]      ED Course User Index  [AC] Jacek Finn MD       Procedures

## 2022-03-08 NOTE — ED TRIAGE NOTES
Patient arrives to ED via EMS from home. Patient woke up x 2 hours ago with extreme pain in her abdomen. Patient reports she has had fluid on her stomach for \"over a year\". Patient presents with distended abdomen. Patient irritable and unable to console.

## 2022-03-09 ENCOUNTER — APPOINTMENT (OUTPATIENT)
Dept: ULTRASOUND IMAGING | Age: 47
DRG: 720 | End: 2022-03-09
Attending: INTERNAL MEDICINE
Payer: MEDICAID

## 2022-03-09 ENCOUNTER — APPOINTMENT (OUTPATIENT)
Dept: NON INVASIVE DIAGNOSTICS | Age: 47
DRG: 720 | End: 2022-03-09
Attending: INTERNAL MEDICINE
Payer: MEDICAID

## 2022-03-09 PROBLEM — I50.20 SYSTOLIC CONGESTIVE HEART FAILURE (HCC): Status: ACTIVE | Noted: 2022-03-09

## 2022-03-09 LAB
ALBUMIN SERPL-MCNC: 2.7 G/DL (ref 3.5–5)
ALBUMIN/GLOB SERPL: 0.8 {RATIO} (ref 1.2–3.5)
ALP SERPL-CCNC: 329 U/L (ref 50–136)
ALT SERPL-CCNC: 38 U/L (ref 12–65)
AMPHET UR QL SCN: POSITIVE
ANION GAP SERPL CALC-SCNC: 6 MMOL/L (ref 7–16)
AST SERPL-CCNC: 139 U/L (ref 15–37)
ATRIAL RATE: 129 BPM
BARBITURATES UR QL SCN: NEGATIVE
BASOPHILS # BLD: 0.1 K/UL (ref 0–0.2)
BASOPHILS NFR BLD: 0 % (ref 0–2)
BENZODIAZ UR QL: NEGATIVE
BILIRUB SERPL-MCNC: 2.9 MG/DL (ref 0.2–1.1)
BUN SERPL-MCNC: 26 MG/DL (ref 6–23)
CALCIUM SERPL-MCNC: 8.3 MG/DL (ref 8.3–10.4)
CALCULATED R AXIS, ECG10: -148 DEGREES
CALCULATED T AXIS, ECG11: 13 DEGREES
CANNABINOIDS UR QL SCN: POSITIVE
CHLORIDE SERPL-SCNC: 104 MMOL/L (ref 98–107)
CO2 SERPL-SCNC: 26 MMOL/L (ref 21–32)
COCAINE UR QL SCN: NEGATIVE
CREAT SERPL-MCNC: 1.6 MG/DL (ref 0.6–1)
CREAT UR-MCNC: 156 MG/DL
CREAT UR-MCNC: 157 MG/DL
DIAGNOSIS, 93000: NORMAL
DIFFERENTIAL METHOD BLD: ABNORMAL
ECHO AO ASC DIAM: 2.8 CM
ECHO AO ASCENDING AORTA INDEX: 1.12 CM/M2
ECHO AO ROOT DIAM: 3 CM
ECHO AO ROOT INDEX: 1.2 CM/M2
ECHO AV AREA PEAK VELOCITY: 2.3 CM2
ECHO AV AREA VTI: 2.3 CM2
ECHO AV AREA/BSA PEAK VELOCITY: 0.9 CM2/M2
ECHO AV AREA/BSA VTI: 0.9 CM2/M2
ECHO AV MEAN GRADIENT: 8 MMHG
ECHO AV MEAN VELOCITY: 1.3 M/S
ECHO AV PEAK GRADIENT: 14 MMHG
ECHO AV PEAK VELOCITY: 1.8 M/S
ECHO AV VELOCITY RATIO: 0.67
ECHO AV VTI: 31.2 CM
ECHO IVC PROX: 2.6 CM
ECHO LA AREA 4C: 26.5 CM2
ECHO LA DIAMETER INDEX: 2.08 CM/M2
ECHO LA DIAMETER: 5.2 CM
ECHO LA MAJOR AXIS: 6.3 CM
ECHO LA TO AORTIC ROOT RATIO: 1.73
ECHO LV E' LATERAL VELOCITY: 11 CM/S
ECHO LV E' SEPTAL VELOCITY: 9 CM/S
ECHO LV EDV A2C: 189 ML
ECHO LV EDV A4C: 182 ML
ECHO LV EDV INDEX A4C: 73 ML/M2
ECHO LV EDV NDEX A2C: 76 ML/M2
ECHO LV EJECTION FRACTION A2C: 50 %
ECHO LV EJECTION FRACTION A4C: 60 %
ECHO LV EJECTION FRACTION BIPLANE: 56 % (ref 55–100)
ECHO LV ESV A2C: 94 ML
ECHO LV ESV A4C: 72 ML
ECHO LV ESV INDEX A2C: 38 ML/M2
ECHO LV ESV INDEX A4C: 29 ML/M2
ECHO LV FRACTIONAL SHORTENING: 28 % (ref 28–44)
ECHO LV INTERNAL DIMENSION DIASTOLE INDEX: 2.12 CM/M2
ECHO LV INTERNAL DIMENSION DIASTOLIC: 5.3 CM (ref 3.9–5.3)
ECHO LV INTERNAL DIMENSION SYSTOLIC INDEX: 1.52 CM/M2
ECHO LV INTERNAL DIMENSION SYSTOLIC: 3.8 CM
ECHO LV IVSD: 0.8 CM (ref 0.6–0.9)
ECHO LV MASS 2D: 150.1 G (ref 67–162)
ECHO LV MASS INDEX 2D: 60 G/M2 (ref 43–95)
ECHO LV POSTERIOR WALL DIASTOLIC: 0.8 CM (ref 0.6–0.9)
ECHO LV RELATIVE WALL THICKNESS RATIO: 0.3
ECHO LVOT AREA: 3.5 CM2
ECHO LVOT AV VTI INDEX: 0.67
ECHO LVOT DIAM: 2.1 CM
ECHO LVOT MEAN GRADIENT: 4 MMHG
ECHO LVOT PEAK GRADIENT: 6 MMHG
ECHO LVOT PEAK VELOCITY: 1.2 M/S
ECHO LVOT STROKE VOLUME INDEX: 29.1 ML/M2
ECHO LVOT SV: 72.7 ML
ECHO LVOT VTI: 21 CM
ECHO MV AREA VTI: 2.7 CM2
ECHO MV E DECELERATION TIME (DT): 158 MS
ECHO MV E VELOCITY: 1.29 M/S
ECHO MV E/E' LATERAL: 11.73
ECHO MV E/E' RATIO (AVERAGED): 13.03
ECHO MV E/E' SEPTAL: 14.33
ECHO MV LVOT VTI INDEX: 1.26
ECHO MV MAX VELOCITY: 1.4 M/S
ECHO MV MEAN GRADIENT: 4 MMHG
ECHO MV MEAN VELOCITY: 1 M/S
ECHO MV PEAK GRADIENT: 8 MMHG
ECHO MV REGURGITANT PEAK GRADIENT: 52 MMHG
ECHO MV REGURGITANT PEAK VELOCITY: 3.6 M/S
ECHO MV REGURGITANT VTIA: 78.2 CM
ECHO MV VTI: 26.5 CM
ECHO PV ACCELERATION TIME (AT): 103 MS
ECHO PV MAX VELOCITY: 1.2 M/S
ECHO PV PEAK GRADIENT: 6 MMHG
ECHO RV BASAL DIMENSION: 4.9 CM
ECHO RV INTERNAL DIMENSION: 4.2 CM
ECHO RV LONGITUDINAL DIMENSION: 8.4 CM
ECHO RV MID DIMENSION: 4.2 CM
ECHO RV TAPSE: 1.3 CM (ref 1.5–2)
ECHO TV REGURGITANT MAX VELOCITY: 3.07 M/S
ECHO TV REGURGITANT PEAK GRADIENT: 38 MMHG
EOSINOPHIL # BLD: 0 K/UL (ref 0–0.8)
EOSINOPHIL NFR BLD: 0 % (ref 0.5–7.8)
ERYTHROCYTE [DISTWIDTH] IN BLOOD BY AUTOMATED COUNT: 18.4 % (ref 11.9–14.6)
GLOBULIN SER CALC-MCNC: 3.2 G/DL (ref 2.3–3.5)
GLUCOSE BLD STRIP.AUTO-MCNC: 101 MG/DL (ref 65–100)
GLUCOSE BLD STRIP.AUTO-MCNC: 112 MG/DL (ref 65–100)
GLUCOSE SERPL-MCNC: 102 MG/DL (ref 65–100)
HAV IGM SER QL: NONREACTIVE
HBV CORE IGM SER QL: NONREACTIVE
HBV SURFACE AG SER QL: NONREACTIVE
HCG UR QL: NEGATIVE
HCT VFR BLD AUTO: 33.2 % (ref 35.8–46.3)
HCV AB SER QL: NONREACTIVE
HGB BLD-MCNC: 9.9 G/DL (ref 11.7–15.4)
IMM GRANULOCYTES # BLD AUTO: 0.1 K/UL (ref 0–0.5)
IMM GRANULOCYTES NFR BLD AUTO: 0 % (ref 0–5)
INR PPP: 1.5
LACTATE SERPL-SCNC: 2 MMOL/L (ref 0.4–2)
LYMPHOCYTES # BLD: 0.4 K/UL (ref 0.5–4.6)
LYMPHOCYTES NFR BLD: 2 % (ref 13–44)
MCH RBC QN AUTO: 27.1 PG (ref 26.1–32.9)
MCHC RBC AUTO-ENTMCNC: 29.8 G/DL (ref 31.4–35)
MCV RBC AUTO: 91 FL (ref 79.6–97.8)
METHADONE UR QL: NEGATIVE
MONOCYTES # BLD: 0.2 K/UL (ref 0.1–1.3)
MONOCYTES NFR BLD: 2 % (ref 4–12)
NEUTS SEG # BLD: 14.1 K/UL (ref 1.7–8.2)
NEUTS SEG NFR BLD: 95 % (ref 43–78)
NRBC # BLD: 0 K/UL (ref 0–0.2)
OPIATES UR QL: NEGATIVE
OSMOLALITY UR: 469 MOSM/KG H2O (ref 50–1400)
PCP UR QL: NEGATIVE
PLATELET # BLD AUTO: 134 K/UL (ref 150–450)
PMV BLD AUTO: 11 FL (ref 9.4–12.3)
POTASSIUM SERPL-SCNC: 4.8 MMOL/L (ref 3.5–5.1)
PROT SERPL-MCNC: 5.9 G/DL (ref 6.3–8.2)
PROT UR-MCNC: 58 MG/DL
PROT/CREAT UR-RTO: 0.4
PROTHROMBIN TIME: 18.1 SEC (ref 12.6–14.5)
Q-T INTERVAL, ECG07: 276 MS
QRS DURATION, ECG06: 72 MS
QTC CALCULATION (BEZET), ECG08: 410 MS
RBC # BLD AUTO: 3.65 M/UL (ref 4.05–5.2)
SERVICE CMNT-IMP: ABNORMAL
SERVICE CMNT-IMP: ABNORMAL
SODIUM SERPL-SCNC: 136 MMOL/L (ref 136–145)
SODIUM UR-SCNC: 5 MMOL/L
VENTRICULAR RATE, ECG03: 133 BPM
WBC # BLD AUTO: 14.8 K/UL (ref 4.3–11.1)

## 2022-03-09 PROCEDURE — 74011250636 HC RX REV CODE- 250/636: Performed by: INTERNAL MEDICINE

## 2022-03-09 PROCEDURE — 80074 ACUTE HEPATITIS PANEL: CPT

## 2022-03-09 PROCEDURE — 85025 COMPLETE CBC W/AUTO DIFF WBC: CPT

## 2022-03-09 PROCEDURE — 36415 COLL VENOUS BLD VENIPUNCTURE: CPT

## 2022-03-09 PROCEDURE — 83605 ASSAY OF LACTIC ACID: CPT

## 2022-03-09 PROCEDURE — 85610 PROTHROMBIN TIME: CPT

## 2022-03-09 PROCEDURE — 74011000250 HC RX REV CODE- 250: Performed by: EMERGENCY MEDICINE

## 2022-03-09 PROCEDURE — 74011000258 HC RX REV CODE- 258: Performed by: EMERGENCY MEDICINE

## 2022-03-09 PROCEDURE — 84300 ASSAY OF URINE SODIUM: CPT

## 2022-03-09 PROCEDURE — C8929 TTE W OR WO FOL WCON,DOPPLER: HCPCS

## 2022-03-09 PROCEDURE — 94640 AIRWAY INHALATION TREATMENT: CPT

## 2022-03-09 PROCEDURE — 74011000258 HC RX REV CODE- 258: Performed by: INTERNAL MEDICINE

## 2022-03-09 PROCEDURE — 80053 COMPREHEN METABOLIC PANEL: CPT

## 2022-03-09 PROCEDURE — 82962 GLUCOSE BLOOD TEST: CPT

## 2022-03-09 PROCEDURE — 74011250637 HC RX REV CODE- 250/637: Performed by: INTERNAL MEDICINE

## 2022-03-09 PROCEDURE — 77010033678 HC OXYGEN DAILY

## 2022-03-09 PROCEDURE — P9047 ALBUMIN (HUMAN), 25%, 50ML: HCPCS | Performed by: INTERNAL MEDICINE

## 2022-03-09 PROCEDURE — 74011000258 HC RX REV CODE- 258: Performed by: HOSPITALIST

## 2022-03-09 PROCEDURE — 74011000250 HC RX REV CODE- 250: Performed by: INTERNAL MEDICINE

## 2022-03-09 PROCEDURE — 74011250636 HC RX REV CODE- 250/636: Performed by: EMERGENCY MEDICINE

## 2022-03-09 PROCEDURE — 74011000250 HC RX REV CODE- 250: Performed by: HOSPITALIST

## 2022-03-09 PROCEDURE — 65610000006 HC RM INTENSIVE CARE

## 2022-03-09 PROCEDURE — 2709999900 HC NON-CHARGEABLE SUPPLY

## 2022-03-09 PROCEDURE — 76705 ECHO EXAM OF ABDOMEN: CPT

## 2022-03-09 PROCEDURE — 82570 ASSAY OF URINE CREATININE: CPT

## 2022-03-09 PROCEDURE — 84156 ASSAY OF PROTEIN URINE: CPT

## 2022-03-09 PROCEDURE — 83935 ASSAY OF URINE OSMOLALITY: CPT

## 2022-03-09 RX ORDER — NOREPINEPHRINE BITARTRATE/D5W 4MG/250ML
.5-16 PLASTIC BAG, INJECTION (ML) INTRAVENOUS
Status: DISCONTINUED | OUTPATIENT
Start: 2022-03-09 | End: 2022-03-11

## 2022-03-09 RX ORDER — ALBUMIN HUMAN 250 G/1000ML
25 SOLUTION INTRAVENOUS EVERY 6 HOURS
Status: DISCONTINUED | OUTPATIENT
Start: 2022-03-09 | End: 2022-03-10

## 2022-03-09 RX ADMIN — SODIUM CHLORIDE, PRESERVATIVE FREE 10 ML: 5 INJECTION INTRAVENOUS at 13:37

## 2022-03-09 RX ADMIN — LEVALBUTEROL HYDROCHLORIDE 0.63 MG: 0.63 SOLUTION RESPIRATORY (INHALATION) at 19:08

## 2022-03-09 RX ADMIN — IPRATROPIUM BROMIDE 0.5 MG: 0.5 SOLUTION RESPIRATORY (INHALATION) at 07:29

## 2022-03-09 RX ADMIN — PERFLUTREN 3 ML: 6.52 INJECTION, SUSPENSION INTRAVENOUS at 09:25

## 2022-03-09 RX ADMIN — NYSTATIN: 100000 POWDER TOPICAL at 08:23

## 2022-03-09 RX ADMIN — ENOXAPARIN SODIUM 40 MG: 100 INJECTION SUBCUTANEOUS at 08:14

## 2022-03-09 RX ADMIN — SODIUM CHLORIDE 5 MG/HR: 900 INJECTION, SOLUTION INTRAVENOUS at 23:44

## 2022-03-09 RX ADMIN — ACETAMINOPHEN 650 MG: 325 TABLET ORAL at 08:24

## 2022-03-09 RX ADMIN — ASPIRIN 81 MG: 81 TABLET, CHEWABLE ORAL at 08:15

## 2022-03-09 RX ADMIN — NOREPINEPHRINE-DEXTROSE IV SOLUTION 4 MG/250ML-5% 14 MCG/MIN: 4-5/250 SOLUTION at 14:45

## 2022-03-09 RX ADMIN — SODIUM CHLORIDE, PRESERVATIVE FREE 10 ML: 5 INJECTION INTRAVENOUS at 13:36

## 2022-03-09 RX ADMIN — SODIUM CHLORIDE, PRESERVATIVE FREE 10 ML: 5 INJECTION INTRAVENOUS at 05:37

## 2022-03-09 RX ADMIN — ALBUMIN (HUMAN) 25 G: 0.25 INJECTION, SOLUTION INTRAVENOUS at 23:45

## 2022-03-09 RX ADMIN — LEVALBUTEROL HYDROCHLORIDE 0.63 MG: 0.63 SOLUTION RESPIRATORY (INHALATION) at 02:59

## 2022-03-09 RX ADMIN — SODIUM CHLORIDE, PRESERVATIVE FREE 10 ML: 5 INJECTION INTRAVENOUS at 21:44

## 2022-03-09 RX ADMIN — IPRATROPIUM BROMIDE 0.5 MG: 0.5 SOLUTION RESPIRATORY (INHALATION) at 19:08

## 2022-03-09 RX ADMIN — NOREPINEPHRINE-DEXTROSE IV SOLUTION 4 MG/250ML-5% 11 MCG/MIN: 4-5/250 SOLUTION at 16:34

## 2022-03-09 RX ADMIN — NOREPINEPHRINE-DEXTROSE IV SOLUTION 4 MG/250ML-5% 7 MCG/MIN: 4-5/250 SOLUTION at 22:04

## 2022-03-09 RX ADMIN — IPRATROPIUM BROMIDE 0.5 MG: 0.5 SOLUTION RESPIRATORY (INHALATION) at 02:59

## 2022-03-09 RX ADMIN — IPRATROPIUM BROMIDE 0.5 MG: 0.5 SOLUTION RESPIRATORY (INHALATION) at 13:57

## 2022-03-09 RX ADMIN — ACETAMINOPHEN 650 MG: 325 TABLET ORAL at 01:27

## 2022-03-09 RX ADMIN — MIDODRINE HYDROCHLORIDE 10 MG: 5 TABLET ORAL at 16:34

## 2022-03-09 RX ADMIN — NOREPINEPHRINE-DEXTROSE IV SOLUTION 4 MG/250ML-5% 12 MCG/MIN: 4-5/250 SOLUTION at 16:13

## 2022-03-09 RX ADMIN — FAMOTIDINE 20 MG: 20 TABLET, FILM COATED ORAL at 08:15

## 2022-03-09 RX ADMIN — NOREPINEPHRINE-DEXTROSE IV SOLUTION 4 MG/250ML-5% 10 MCG/MIN: 4-5/250 SOLUTION at 16:41

## 2022-03-09 RX ADMIN — MIDODRINE HYDROCHLORIDE 10 MG: 5 TABLET ORAL at 11:30

## 2022-03-09 RX ADMIN — VANCOMYCIN HYDROCHLORIDE 2500 MG: 500 INJECTION, POWDER, LYOPHILIZED, FOR SOLUTION INTRAVENOUS at 08:36

## 2022-03-09 RX ADMIN — MIDODRINE HYDROCHLORIDE 10 MG: 5 TABLET ORAL at 08:15

## 2022-03-09 RX ADMIN — NYSTATIN: 100000 POWDER TOPICAL at 00:55

## 2022-03-09 RX ADMIN — NYSTATIN: 100000 POWDER TOPICAL at 17:25

## 2022-03-09 RX ADMIN — SODIUM CHLORIDE 5 MG/HR: 900 INJECTION, SOLUTION INTRAVENOUS at 07:21

## 2022-03-09 RX ADMIN — BUDESONIDE 500 MCG: 0.5 INHALANT RESPIRATORY (INHALATION) at 07:29

## 2022-03-09 RX ADMIN — NOREPINEPHRINE-DEXTROSE IV SOLUTION 4 MG/250ML-5% 4 MCG/MIN: 4-5/250 SOLUTION at 02:59

## 2022-03-09 RX ADMIN — PERFLUTREN 1 ML: 6.52 INJECTION, SUSPENSION INTRAVENOUS at 10:00

## 2022-03-09 RX ADMIN — BUDESONIDE 500 MCG: 0.5 INHALANT RESPIRATORY (INHALATION) at 19:08

## 2022-03-09 RX ADMIN — SODIUM CHLORIDE 1000 ML: 900 INJECTION, SOLUTION INTRAVENOUS at 11:16

## 2022-03-09 RX ADMIN — LEVALBUTEROL HYDROCHLORIDE 0.63 MG: 0.63 SOLUTION RESPIRATORY (INHALATION) at 13:57

## 2022-03-09 RX ADMIN — ACETAMINOPHEN 650 MG: 325 TABLET ORAL at 15:58

## 2022-03-09 RX ADMIN — CEFEPIME HYDROCHLORIDE 2 G: 2 INJECTION, POWDER, FOR SOLUTION INTRAVENOUS at 23:44

## 2022-03-09 RX ADMIN — FAMOTIDINE 20 MG: 20 TABLET, FILM COATED ORAL at 17:25

## 2022-03-09 RX ADMIN — SODIUM CHLORIDE 500 ML: 900 INJECTION, SOLUTION INTRAVENOUS at 14:42

## 2022-03-09 RX ADMIN — CEFEPIME HYDROCHLORIDE 2 G: 2 INJECTION, POWDER, FOR SOLUTION INTRAVENOUS at 09:53

## 2022-03-09 RX ADMIN — NOREPINEPHRINE-DEXTROSE IV SOLUTION 4 MG/250ML-5% 13 MCG/MIN: 4-5/250 SOLUTION at 09:17

## 2022-03-09 RX ADMIN — CEFEPIME HYDROCHLORIDE 2 G: 2 INJECTION, POWDER, FOR SOLUTION INTRAVENOUS at 15:58

## 2022-03-09 RX ADMIN — ALBUMIN (HUMAN) 25 G: 0.25 INJECTION, SOLUTION INTRAVENOUS at 17:24

## 2022-03-09 RX ADMIN — ALBUMIN (HUMAN) 25 G: 0.25 INJECTION, SOLUTION INTRAVENOUS at 11:17

## 2022-03-09 RX ADMIN — LEVALBUTEROL HYDROCHLORIDE 0.63 MG: 0.63 SOLUTION RESPIRATORY (INHALATION) at 07:29

## 2022-03-09 NOTE — PROGRESS NOTES
Today's Events:  · Weaned O2 from 4L to 2L nc  · Weaned Levophed gtt to 9mg/min after being up to 14mcg/min earlier in day  · Frequent turns to prevent further decubital skin deterioration  · Advanced to clear liquid diet  · ECHO, ABD US  · Urine lytes        ROS    Gtts:   norepinephrine 9 mcg/min        N- lethargic until 1600, A&Ox4 since 1600, PERRLA 3mm, TMAX 103.1F, spontaneous BUE BLE movements, weak but assists with turns    CV- Afib 100-110s, MAP goal >65, on norepinephrine gtt, 1.5 L NS bolus given, 50g albumin given, radial pulses 2+, DP/PT pulses doppler; significant generalized and pitting edema    P- 2L nc SpO2 96%; LS coarse; strong congested non-productive cough    GI- clear liquid diet, BS hypoactive, LBM unknown; ascites +    - echols present patent and draining minimal amount of brown-brent urine    INT- see wound LDAs, induration/cellulitis on BLE    ACCESS- L ha 22g, R fa 20g, R ac 20g    SOCIAL- multiple family members visited today and updated in person and via phone    PLAN- wean off pressors and diltiazem

## 2022-03-09 NOTE — PROGRESS NOTES
Hospitalist Progress Note   Admit Date:  3/8/2022  1:48 PM   Name:  Celia Smalls   Age:  55 y.o. Sex:  female  :  1975   MRN:  [id number]   Room:  [facility]/    Presenting Complaint: Abdominal Pain    Reason(s) for Admission: New onset a-fib [facility]) [I48.91]  Atrial fibrillation with RVR ([facility] [I48.91]  Respiratory failure with hypoxia ([facility] [J96.91]  Anasarca [R60.1]  Cirrhosis ([facility] [K74.60]  SBP (spontaneous bacterial peritonitis) [facility]) [facility] Course & Interval History:   Celia Smalls is a 55 y.o. female with a past medical history of cardiomyopathy, CHF hypertension, diabetes, smoker of 1 pack of cigarettes per day, atrial fibrillation, COPD and drug abuse per the patient's mom who presents with severe shortness of breath and abdominal distention and medication noncompliance. Symptoms have been ongoing for about a week acutely worse today per her partner at the bedside. She was seen and evaluated in the ER found to be in A. fib with RVR with a heart rate in the 160s. A grossly distended abdomen. Likely the major factor in her shortness of breath.      She also complains of abdominal pain. Writhing around in pain. Poor historian. History is taken from ER physician and staff and patient's chart and partner at bedside. Subjective/24hr Events (22): Patient seen and examined. Patient hypersomnolent and could not keep awake alert and oriented. Patient unable to participate my examination. Nurse at bedside during my evaluation. ROS:  10 systems reviewed and negative except as noted above. Assessment & Plan: Active Problems:  CHF and cardiomyopathy with anasarca, ascites and volume overload  -We will admit to the ICU  -Interventional radiology consulted for emergent paracentesis  -Coags are pending  -Diuretics as tolerated-blood pressure significantly low at this time in the 90s to 100s.   3/9-EF noted to be 50 to 25% with diastolic dysfunction and mildly elevated RVSP. Given patient's hypotension and tachycardia and KAIDEN will hold diuresis at this time. Patient too unstable to undergo further cardiac evaluation including potential left and right heart catheterizations. Once patient clinically stabilizes will consult cardiology. Will get urine micro creatinine protein ratio and will get right upper quadrant ultrasound to evaluate liver.     Respiratory failure with hypoxia  Patient does not wear oxygen at home. She is wheezing and short of breath came air on 8 L via nasal cannula  Respiratory rate of 26. Use of accessory muscles. Unable to speak in full sentences   although she is agreeable to go over the floor respiratory failure secondary to gross anasarca and ascites she would not be a good if IR can pull off some of the fluid of her belly  3/9-likely some combination of obstructive sleep apnea as well as pulmonary edema. Holding diuresis at this time due to hypotension and tachycardia. Will monitor closely       Sepsis-suspect SBP, ? cirrhosis/elevated bilirubin  ER did preliminary bedside ultrasound concerning for cirrhotic liver  -We will treat empirically for SBP  -Follow-up ascitic fluid  -Will not be too aggressive with pain management secondary to the need to maintain her blood pressures. 3/9-clinically worsening. Patient meets sepsis criteria with leukocytosis tachycardia and fever. Will discontinue ceftriaxone and start patient on vancomycin and cefepime. Source unclear at this time. Urine cultures and fluid cultures from paracentesis pending blood cultures pending with no growth to date. Continue Levophed at this time and we will bolus the patient 1 L of normal saline as patient did have 15 L of fluid removed from abdomen yesterday and I suspect ongoing fluid shifts.       Supraventricular tachycardia-May be secondary to ongoing sepsis.   Patient on Levophed for blood pressure as well as Midrin and we will bolus the patient 1 L of normal saline at this time and see if this improves patient's tachycardia. Anemia, thrombocytopenia-no evidence of bleeding at this time will monitor.         History of polysubstance abuse  Patient endorses smoking and using marijuana  She smokes about 1 pack of cigarettes per day  Patient denies any use of methamphetamines or other drugs  However this was the history given by her mother to the ER  We will get a urine drug screen unable  3/9-the patient does deny IV drug use but freely admits to frequent smoking of methamphetamines. Patient's urine drug screen positive for marijuana and methamphetamines. Will check hepatitis panel          Morbid obesity  -Unable to get her weight at this time secondary to her respiratory distress, abdominal pain and A. fib with RVR         Hyponatremia  Likely secondary to SIADH in the setting of CHF and COPD  3/9-resolved     Elevated lactic acid  Patient concerning for sepsis and was  Elevation could also be secondary to cirrhosis  Will cover her for SBP as above  However given her volume overloaded status will hold off on IV fluids  3/9-resolved        KAIDEN versus chronic kidney disease  No prior labs available for comparison  We will avoid nephrotoxins  And dose medications for her renal function  3/9-worsening with removal of 15 L of fluid from abdomen and diuresis. We will hold diuresis and bolus the patient 1 L of normal saline and will administer albumin and monitor. Patient will likely need continued ongoing diuresis however this is difficult in the face of profound hypotension requiring Levophed. Will ask nephrology to evaluate patient.     COPD exacerbation  -As needed nebs with Xopenex and ipratropium  -Wean O2 as tolerated           Dispo/Discharge Planning:   Admit to the medicine inpatient ICU  Anticipate at least a 3-4 midnight stay. Further work-up and management based on clinical course.   Anticipate home at discharge        Critical care interventions: IV pressors  Total critical care time spent: 40 minutes. Time is indicative of direct patient attendance at bedside and on the patient's floor nearby. Includes time spent at bedside performing history and exam, performing chart review, discussing findings and treatment plan with patient and/or family, discussing patient with consultants and colleagues, ordering and reviewing pertinent laboratory and radiographic evaluations, and discussing patient with nursing staff.   Time excludes procedures.          Diet: N.p.o.  VTE ppx: SCDs   code status: Full      Hospital Problems as of 3/9/2022 Never Reviewed          Codes Class Noted - Resolved POA    * (Principal) Systolic congestive heart failure (HCC) ICD-10-CM: I50.20  ICD-9-CM: 428.20, 428.0  3/9/2022 - Present Unknown        Anasarca ICD-10-CM: R60.1  ICD-9-CM: 782.3  3/8/2022 - Present Yes        Cirrhosis ([facility] ICD-10-CM: K74.60  ICD-9-CM: 571.5  3/8/2022 - Present Unknown        SBP (spontaneous bacterial peritonitis) ([facility] ICD-10-CM: G62.1  ICD-9-CM: 567.23  3/8/2022 - Present Unknown        Atrial fibrillation with RVR (HCC) ICD-10-CM: I48.91  ICD-9-CM: 427.31  3/8/2022 - Present Yes        Respiratory failure with hypoxia ([facility] ICD-10-CM: J96.91  ICD-9-CM: 518.81  3/8/2022 - Present Yes        Hyponatremia ICD-10-CM: E87.1  ICD-9-CM: 276.1  3/8/2022 - Present Yes        COPD exacerbation ([facility] ICD-10-CM: J44.1  ICD-9-CM: 491.21  3/8/2022 - Present Yes        KAIDEN (acute kidney injury) ([facility] ICD-10-CM: N17.9  ICD-9-CM: 584.9  3/8/2022 - Present Yes        Dependence on nicotine from cigarettes ICD-10-CM: F17.210  ICD-9-CM: 305.1  3/8/2022 - Present Yes        Polysubstance abuse ([address] ICD-10-CM: F19.10  ICD-9-CM: 305.90  3/8/2022 - Present Yes              Objective:     Patient Vitals for the past 24 hrs:   Temp Pulse Resp BP SpO2   03/09/22 1000 -- (!) 121 9 (!) 101/57 100 %   03/09/22 0930 -- (!) 110 17 (!) 88/52 99 %   03/09/22 0915 -- (!) 104 21 (!) 79/50 98 %   03/09/22 0900 -- (!) 107 26 (!) 88/53 95 %   03/09/22 0845 -- (!) 105 17 (!) 77/53 96 %   03/09/22 0841 -- (!) 106 17 (!) 77/53 95 %   03/09/22 0835 -- (!) 114 17 (!) 73/46 96 %   03/09/22 0830 -- (!) 112 18 (!) 79/46 92 %   03/09/22 0815 -- (!) 114 17 (!) 94/52 95 %   03/09/22 0800 -- (!) 117 23 (!) 90/53 95 %   03/09/22 0747 (!) 102.5 °F (39.2 °C) -- -- -- --   03/09/22 0745 (!) 102.5 °F (39.2 °C) (!) 111 29 (!) 98/53 100 %   03/09/22 0730 -- (!) 105 16 (!) 83/50 94 %   03/09/22 0729 -- -- -- -- 94 %   03/09/22 0715 -- (!) 113 17 (!) 92/53 94 %   03/09/22 0700 -- (!) 116 26 (!) 87/51 93 %   03/09/22 0650 -- (!) 115 13 (!) 94/53 96 %   03/09/22 0640 -- (!) 127 19 (!) 89/51 96 %   03/09/22 0630 -- (!) 109 19 (!) 91/51 97 %   03/09/22 0620 -- (!) 106 22 (!) 86/50 96 %   03/09/22 0610 -- (!) 113 18 (!) 96/53 97 %   03/09/22 0600 -- (!) 121 18 (!) 103/59 94 %   03/09/22 0550 -- (!) 107 15 (!) 91/56 97 %   03/09/22 0540 -- (!) 101 18 (!) 89/51 97 %   03/09/22 0532 -- (!) 103 19 (!) 89/50 96 %   03/09/22 0530 -- (!) 104 12 (!) 84/50 97 %   03/09/22 0526 -- (!) 104 15 (!) 84/50 97 %   03/09/22 0520 -- (!) 108 20 (!) 81/49 94 %   03/09/22 0510 -- (!) 104 11 (!) 88/53 91 %   03/09/22 0507 -- (!) 107 18 (!) 80/50 91 %   03/09/22 0500 -- (!) 109 17 -- 92 %   03/09/22 0450 (!) 100.7 °F (38.2 °C) (!) 107 16 (!) 76/48 92 %   03/09/22 0440 -- (!) 110 13 (!) 74/44 93 %   03/09/22 0430 -- (!) 110 25 (!) 74/42 92 %   03/09/22 0420 -- (!) 114 21 (!) 86/52 92 %   03/09/22 0410 -- (!) 117 16 (!) 90/52 96 %   03/09/22 0400 -- (!) 111 17 (!) 87/54 96 %   03/09/22 0350 -- (!) 115 20 (!) 86/52 97 %   03/09/22 0340 -- (!) 112 23 (!) 90/51 95 %   03/09/22 0330 -- (!) 114 23 (!) 94/50 96 %   03/09/22 0324 -- (!) 111 23 (!) 89/54 95 %   03/09/22 0315 -- (!) 107 22 (!) 77/49 100 %   03/09/22 0300 (!) 102.2 °F (39 °C) (!) 108 23 (!) 79/49 93 %   03/09/22 0259 -- -- -- -- 97 %   03/09/22 0245 -- (!) 108 (!) 5 (!) 77/47 93 %   03/09/22 0230 -- (!) 118 18 (!) 76/49 93 %   03/09/22 0215 -- (!) 119 23 (!) 83/53 97 %   03/09/22 0200 -- (!) 119 22 (!) 86/52 95 %   03/09/22 0145 -- (!) 122 21 (!) 81/51 94 %   03/09/22 0130 -- (!) 117 23 (!) 80/51 93 %   03/09/22 0115 (!) 103.1 °F (39.5 °C) (!) 117 29 (!) 81/50 93 %   03/09/22 0100 -- (!) 119 25 (!) 84/51 92 %   03/09/22 0045 -- (!) 118 21 (!) 80/49 92 %   03/09/22 0030 -- (!) 120 23 (!) 83/47 92 %   03/09/22 0015 -- (!) 118 26 (!) 84/52 93 %   03/09/22 0000 -- (!) 122 24 (!) 81/49 92 %   03/08/22 2345 -- (!) 118 20 (!) 86/51 93 %   03/08/22 2330 -- (!) 134 23 (!) 87/54 92 %   03/08/22 2315 -- (!) 124 23 (!) 87/53 94 %   03/08/22 2300 98.9 °F (37.2 °C) (!) 117 20 (!) 88/50 93 %   03/08/22 2245 -- (!) 117 25 (!) 89/53 93 %   03/08/22 2230 -- (!) 121 (!) 7 (!) 96/54 92 %   03/08/22 2215 -- (!) 119 28 (!) 90/51 92 %   03/08/22 2200 -- (!) 118 22 (!) 90/53 92 %   03/08/22 2145 -- (!) 118 22 (!) 92/50 91 %   03/08/22 2130 -- (!) 122 25 (!) 87/54 92 %   03/08/22 2115 -- (!) 123 23 (!) 90/52 91 %   03/08/22 2100 -- (!) 128 24 (!) 91/52 92 %   03/08/22 2045 -- (!) 120 19 (!) 90/51 93 %   03/08/22 2030 -- (!) 121 (!) 6 (!) 90/55 99 %   03/08/22 2025 -- -- -- -- 96 %   03/08/22 2015 -- (!) 121 23 (!) 90/54 94 %   03/08/22 2000 -- (!) 115 10 (!) 88/54 95 %   03/08/22 1945 -- (!) 121 21 (!) 99/56 97 %   03/08/22 1930 -- (!) 132 19 (!) 101/55 94 %   03/08/22 1915 -- (!) 131 14 (!) 100/59 96 %   03/08/22 1901 98.8 °F (37.1 °C) (!) 130 12 (!) 95/54 99 %   03/08/22 1845 -- (!) 130 20 (!) 95/54 100 %   03/08/22 1830 -- (!) 128 14 (!) 98/58 99 %   03/08/22 1815 -- (!) 128 22 99/60 100 %   03/08/22 1800 -- (!) 130 22 (!) 100/58 100 %   03/08/22 1745 -- (!) 131 14 104/64 --   03/08/22 1741 98.4 °F (36.9 °C) (!) 132 15 104/64 --   03/08/22 1714 -- (!) 135 -- 119/69 --   03/08/22 1658 -- (!) 132 -- 120/76 (!) 80 %   03/08/22 1642 -- (!) 124 -- 136/87 (!) 84 %   03/08/22 1615 -- (!) 133 20 (!) 135/105 98 %   03/08/22 1612 -- Peter Whitehead 133 16 (!) 120/94 100 %   03/08/22 1602 -- 96 -- 103/79 100 %   03/08/22 1526 -- (!) 128 23 -- 98 %   03/08/22 1523 -- (!) 133 20 (!) 129/102 --   03/08/22 1520 -- 99 -- -- --   03/08/22 1518 -- (!) 112 19 -- --   03/08/22 1455 -- -- -- -- 100 %   03/08/22 1450 -- (!) 107 18 (!) 144/104 --   03/08/22 1432 -- (!) 124 -- (!) 153/116 --   03/08/22 1418 -- (!) 131 20 118/79 --   03/08/22 1415 -- -- -- -- 100 %   03/08/22 1405 -- 84 20 (!) 77/27 --   03/08/22 1351 -- (!) 142 -- -- --   03/08/22 1339 98.6 °F (37 °C) (!) 159 26 109/85 --     Oxygen Therapy  O2 Sat (%): 100 % (03/09/22 1000)  Pulse via Oximetry: 113 beats per minute (03/09/22 1000)  O2 Device: Nasal cannula (03/09/22 1000)  Skin Assessment: Clean, dry, & intact (03/09/22 0800)  Skin Protection for O2 Device: N/A (03/09/22 0800)  O2 Flow Rate (L/min): 4 l/min (03/09/22 1000)    Estimated body mass index is 37.61 kg/m² as calculated from the following:    Height as of this encounter: 6' 1\" (1.854 m). Weight as of this encounter: 129.3 kg (285 lb 0.9 oz). Intake/Output Summary (Last 24 hours) at 3/9/2022 1102  Last data filed at 3/9/2022 0747  Gross per 24 hour   Intake 162.1 ml   Output 665 ml   Net -502.9 ml         Physical Exam:     Blood pressure (!) 101/57, pulse (!) 121, temperature (!) 102.5 °F (39.2 °C), resp. rate 9, height 6' 1\" (1.854 m), weight 129.3 kg (285 lb 0.9 oz), SpO2 100 %, not currently breastfeeding. General:    Well nourished. No overt distress, hypersomnolent  Head:  Normocephalic, atraumatic  Eyes:  Sclerae appear normal.  Pupils equally round. ENT:  Nares appear normal, no drainage. Moist oral mucosa  Neck:  No restricted ROM. Trachea midline   CV:   RRR. No m/r/g. No jugular venous distension. Lungs:   Wheezing diffusely  Abdomen: Bowel sounds present. Soft, nontender, distended  Extremities: No cyanosis or clubbing.  +3-4 bilateral edema with erythema  Skin:     No rashes and normal coloration. Warm and dry. Numerous homemade tattoos  Neuro:  CN II-XII grossly intact. Sensation intact. A&Ox3  Psych:  Normal mood and affect. I have reviewed ordered lab tests and independently visualized imaging below:    Recent Labs:  Recent Results (from the past 48 hour(s))   CBC WITH AUTOMATED DIFF    Collection Time: 03/08/22  1:45 PM   Result Value Ref Range    WBC 12.2 (H) 4.3 - 11.1 K/uL    RBC 3.85 (L) 4.05 - 5.2 M/uL    HGB 10.3 (L) 11.7 - 15.4 g/dL    HCT 36.5 35.8 - 46.3 %    MCV 94.8 79.6 - 97.8 FL    MCH 26.8 26.1 - 32.9 PG    MCHC 28.2 (L) 31.4 - 35.0 g/dL    RDW 18.6 (H) 11.9 - 14.6 %    PLATELET 898 432 - 204 K/uL    MPV 10.7 9.4 - 12.3 FL    ABSOLUTE NRBC 0.00 0.0 - 0.2 K/uL    DF AUTOMATED      NEUTROPHILS 84 (H) 43 - 78 %    LYMPHOCYTES 8 (L) 13 - 44 %    MONOCYTES 5 4.0 - 12.0 %    EOSINOPHILS 2 0.5 - 7.8 %    BASOPHILS 1 0.0 - 2.0 %    IMMATURE GRANULOCYTES 0 0.0 - 5.0 %    ABS. NEUTROPHILS 10.3 (H) 1.7 - 8.2 K/UL    ABS. LYMPHOCYTES 1.0 0.5 - 4.6 K/UL    ABS. MONOCYTES 0.6 0.1 - 1.3 K/UL    ABS. EOSINOPHILS 0.2 0.0 - 0.8 K/UL    ABS. BASOPHILS 0.1 0.0 - 0.2 K/UL    ABS. IMM. GRANS. 0.0 0.0 - 0.5 K/UL   LACTIC ACID    Collection Time: 03/08/22  1:45 PM   Result Value Ref Range    Lactic acid 4.7 (HH) 0.4 - 2.0 MMOL/L   METABOLIC PANEL, COMPREHENSIVE    Collection Time: 03/08/22  1:45 PM   Result Value Ref Range    Sodium 133 (L) 136 - 145 mmol/L    Potassium 4.3 3.5 - 5.1 mmol/L    Chloride 102 98 - 107 mmol/L    CO2 24 21 - 32 mmol/L    Anion gap 7 7 - 16 mmol/L    Glucose 206 (H) 65 - 100 mg/dL    BUN 19 6 - 23 MG/DL    Creatinine 1.20 (H) 0.6 - 1.0 MG/DL    GFR est AA >60 >60 ml/min/1.73m2    GFR est non-AA 51 (L) >60 ml/min/1.73m2    Calcium 8.9 8.3 - 10.4 MG/DL    Bilirubin, total 0.9 0.2 - 1.1 MG/DL    ALT (SGPT) 13 12 - 65 U/L    AST (SGOT) 19 15 - 37 U/L    Alk.  phosphatase 299 (H) 50 - 136 U/L    Protein, total 8.1 6.3 - 8.2 g/dL    Albumin 3.6 3.5 - 5.0 g/dL    Globulin 4.5 (H) 2.3 - 3.5 g/dL    A-G Ratio 0.8 (L) 1.2 - 3.5     MAGNESIUM    Collection Time: 03/08/22  1:45 PM   Result Value Ref Range    Magnesium 2.1 1.8 - 2.4 mg/dL   LIPASE    Collection Time: 03/08/22  1:45 PM   Result Value Ref Range    Lipase 86 73 - 393 U/L   NT-PRO BNP    Collection Time: 03/08/22  1:45 PM   Result Value Ref Range    NT pro-BNP 9,226 (H) 5 - 125 PG/ML   DIGOXIN    Collection Time: 03/08/22  1:45 PM   Result Value Ref Range    Digoxin level 0.1 (L) 0.90 - 2.10 NG/ML   BLOOD GAS, ARTERIAL POC    Collection Time: 03/08/22  1:58 PM   Result Value Ref Range    Device: Non rebreather      FIO2 (POC) 100 %    pH (POC) 7.36 7.35 - 7.45      pCO2 (POC) 40.7 35 - 45 MMHG    pO2 (POC) 346 (H) 75 - 100 MMHG    HCO3 (POC) 23.1 22 - 26 MMOL/L    sO2 (POC) 99.9 (H) 95 - 98 %    Base deficit (POC) 2.2 mmol/L    Allens test (POC) Positive      Site RIGHT RADIAL      Specimen type (POC) ARTERIAL      Performed by Peggi Bosworth    URINALYSIS W/ RFLX MICROSCOPIC    Collection Time: 03/08/22  2:16 PM   Result Value Ref Range    Color MARIE      Appearance CLOUDY      Specific gravity 1.026 (H) 1.001 - 1.023      pH (UA) 6.0 5.0 - 9.0      Protein TRACE (A) NEG mg/dL    Glucose Negative NEG mg/dL    Ketone Negative NEG mg/dL    Bilirubin Negative NEG      Blood SMALL (A) NEG      Urobilinogen 1.0 0.2 - 1.0 EU/dL    Nitrites Positive (A) NEG      Leukocyte Esterase MODERATE (A) NEG      Epithelial cells 0-3 0 /hpf    Bacteria 0 0 /hpf    Yeast MODERATE      Other observations RESULTS VERIFIED MANUALLY     DRUG SCREEN, URINE    Collection Time: 03/08/22  2:16 PM   Result Value Ref Range    PCP(PHENCYCLIDINE) Negative      BENZODIAZEPINES Negative      COCAINE Negative      AMPHETAMINES Positive      METHADONE Negative      THC (TH-CANNABINOL) Positive      OPIATES Negative      BARBITURATES Negative     CULTURE, URINE    Collection Time: 03/08/22  2:16 PM    Specimen: Clean catch; Urine   Result Value Ref Range    Special Requests: NO SPECIAL REQUESTS      Culture result:        NO GROWTH AFTER SHORT PERIOD OF INCUBATION. FURTHER RESULTS TO FOLLOW AFTER OVERNIGHT INCUBATION. HCG URINE, QL    Collection Time: 03/08/22  2:16 PM   Result Value Ref Range    HCG urine, QL Negative NEG     LACTIC ACID    Collection Time: 03/08/22  3:56 PM   Result Value Ref Range    Lactic acid 1.5 0.4 - 2.0 MMOL/L   AMMONIA    Collection Time: 03/08/22  3:56 PM   Result Value Ref Range    Ammonia, plasma 45 (H) 11 - 32 UMOL/L   PTT    Collection Time: 03/08/22  3:56 PM   Result Value Ref Range    aPTT 30.5 24.1 - 35.1 SEC   PROTEIN TOTAL, FLUID    Collection Time: 03/08/22  4:37 PM   Result Value Ref Range    Fluid Type: ABDOMINAL FLUID      Protein total, body fld. 4.7 g/dL   GLUCOSE, FLUID    Collection Time: 03/08/22  4:37 PM   Result Value Ref Range    Fluid Type: ABDOMINAL FLUID      Glucose, body fld. 158 MG/DL   LDH, BODY FLUID    Collection Time: 03/08/22  4:37 PM   Result Value Ref Range    Fluid Type: ABDOMINAL FLUID      LD, body fld. 120 U/L   CELL COUNT, BODY FLUID    Collection Time: 03/08/22  4:37 PM   Result Value Ref Range    BODY FLUID TYPE ABDOMINAL FLUID      FLUID COLOR YELLOW      FLUID APPEARANCE CLEAR      FLUID RBC CT. <1,000 /cu mm    FLUID WBC COUNT <100 /cu mm   CULTURE, BODY FLUID W GRAM STAIN    Collection Time: 03/08/22  4:37 PM    Specimen: Abdominal Fluid; Body Fluid   Result Value Ref Range    Special Requests: NO SPECIAL REQUESTS      GRAM STAIN PENDING     Culture result:        NO GROWTH AFTER SHORT PERIOD OF INCUBATION. FURTHER RESULTS TO FOLLOW AFTER OVERNIGHT INCUBATION. AMYLASE, FLUID    Collection Time: 03/08/22  4:37 PM   Result Value Ref Range    Fluid Type: ABDOMINAL FLUID      Amylase, body fld.  13 U/L   EKG, 12 LEAD, SUBSEQUENT    Collection Time: 03/08/22  7:26 PM   Result Value Ref Range    Ventricular Rate 133 BPM    Atrial Rate 129 BPM    QRS Duration 72 ms    Q-T Interval 276 ms    QTC Calculation (Bezet) 410 ms Calculated R Axis -148 degrees    Calculated T Axis 13 degrees    Diagnosis       Supraventricular tachycardia  Low voltage QRS  Poor R wave progression  Abnormal ECG  No previous ECGs available  Confirmed by Brady Gentile ([id number]) on 3/9/2022 6:53:59 AM     PROTHROMBIN TIME + INR    Collection Time: 03/09/22  3:45 AM   Result Value Ref Range    Prothrombin time 18.1 (H) 12.6 - 14.5 sec    INR 1.5     METABOLIC PANEL, COMPREHENSIVE    Collection Time: 03/09/22  3:45 AM   Result Value Ref Range    Sodium 136 136 - 145 mmol/L    Potassium 4.8 3.5 - 5.1 mmol/L    Chloride 104 98 - 107 mmol/L    CO2 26 21 - 32 mmol/L    Anion gap 6 (L) 7 - 16 mmol/L    Glucose 102 (H) 65 - 100 mg/dL    BUN 26 (H) 6 - 23 MG/DL    Creatinine 1.60 (H) 0.6 - 1.0 MG/DL    GFR est AA 45 (L) >60 ml/min/1.73m2    GFR est non-AA 37 (L) >60 ml/min/1.73m2    Calcium 8.3 8.3 - 10.4 MG/DL    Bilirubin, total 2.9 (H) 0.2 - 1.1 MG/DL    ALT (SGPT) 38 12 - 65 U/L    AST (SGOT) 139 (H) 15 - 37 U/L    Alk. phosphatase 329 (H) 50 - 136 U/L    Protein, total 5.9 (L) 6.3 - 8.2 g/dL    Albumin 2.7 (L) 3.5 - 5.0 g/dL    Globulin 3.2 2.3 - 3.5 g/dL    A-G Ratio 0.8 (L) 1.2 - 3.5     LACTIC ACID    Collection Time: 03/09/22  3:45 AM   Result Value Ref Range    Lactic acid 2.0 0.4 - 2.0 MMOL/L   CBC WITH AUTOMATED DIFF    Collection Time: 03/09/22  3:45 AM   Result Value Ref Range    WBC 14.8 (H) 4.3 - 11.1 K/uL    RBC 3.65 (L) 4.05 - 5.2 M/uL    HGB 9.9 (L) 11.7 - 15.4 g/dL    HCT 33.2 (L) 35.8 - 46.3 %    MCV 91.0 79.6 - 97.8 FL    MCH 27.1 26.1 - 32.9 PG    MCHC 29.8 (L) 31.4 - 35.0 g/dL    RDW 18.4 (H) 11.9 - 14.6 %    PLATELET 908 (L) 629 - 450 K/uL    MPV 11.0 9.4 - 12.3 FL    ABSOLUTE NRBC 0.00 0.0 - 0.2 K/uL    DF AUTOMATED      NEUTROPHILS 95 (H) 43 - 78 %    LYMPHOCYTES 2 (L) 13 - 44 %    MONOCYTES 2 (L) 4.0 - 12.0 %    EOSINOPHILS 0 (L) 0.5 - 7.8 %    BASOPHILS 0 0.0 - 2.0 %    IMMATURE GRANULOCYTES 0 0.0 - 5.0 %    ABS.  NEUTROPHILS 14.1 (H) 1.7 - 8.2 K/UL    ABS. LYMPHOCYTES 0.4 (L) 0.5 - 4.6 K/UL    ABS. MONOCYTES 0.2 0.1 - 1.3 K/UL    ABS. EOSINOPHILS 0.0 0.0 - 0.8 K/UL    ABS. BASOPHILS 0.1 0.0 - 0.2 K/UL    ABS. IMM.  GRANS. 0.1 0.0 - 0.5 K/UL   GLUCOSE, POC    Collection Time: 03/09/22  9:15 AM   Result Value Ref Range    Glucose (POC) 101 (H) 65 - 100 mg/dL    Performed by Suraj    ECHO ADULT COMPLETE    Collection Time: 03/09/22  9:30 AM   Result Value Ref Range    LV EDV A2C 189 mL    LV EDV A4C 182 mL    LV ESV A2C 94 mL    LV ESV A4C 72 mL    IVSd 0.8 0.6 - 0.9 cm    LVIDd 5.3 3.9 - 5.3 cm    LVIDs 3.8 cm    LVOT Diameter 2.1 cm    LVOT Mean Gradient 4 mmHg    LVOT VTI 21.0 cm    LVOT Peak Velocity 1.2 m/s    LVOT Peak Gradient 6 mmHg    LVPWd 0.8 0.6 - 0.9 cm    LV E' Lateral Velocity 11 cm/s    LV E' Septal Velocity 9 cm/s    LV Ejection Fraction A2C 50 %    LV Ejection Fraction A4C 60 %    EF BP 56 55 - 100 %    LVOT Area 3.5 cm2    LVOT SV 72.7 ml    LA Major [place] 6.3 cm    LA Area 4C 26.5 cm2    LA Diameter 5.2 cm    AV Mean Velocity 1.3 m/s    AV Mean Gradient 8 mmHg    AV VTI 31.2 cm    AV Peak Velocity 1.8 m/s    AV Peak Gradient 14 mmHg    AV Area by VTI 2.3 cm2    AV Area by Peak Velocity 2.3 cm2    Aortic Root 3.0 cm    Ascending Aorta 2.8 cm    IVC Proxmal 2.6 cm    MR VTI 78.2 cm    MV Mean Gradient 4 mmHg    MV VTI 26.5 cm    MV Mean Velocity 1.0 m/s    MR Peak Velocity 3.6 m/s    MR Peak Gradient 52 mmHg    MV Max Velocity 1.4 m/s    MV Peak Gradient 8 mmHg    MV E Wave Deceleration Time 158.0 ms    MV E Velocity 1.29 m/s    MV Area by VTI 2.7 cm2    PV .0 ms    PV Max Velocity 1.2 m/s    PV Peak Gradient 6 mmHg    RVIDd 4.2 cm    RV Basal Dimension 4.9 cm    RV Longitudinal Dimension 8.4 cm    RV Mid Dimension 4.2 cm    TAPSE 1.3 (A) 1.5 - 2.0 cm    TR Max Velocity 3.07 m/s    TR Peak Gradient 38 mmHg    Fractional Shortening 2D 28 28 - 44 %    LV ESV Index A4C 29 mL/m2    LV EDV Index A4C 73 mL/m2    LV ESV Index A2C 38 mL/m2    LV EDV Index A2C 76 mL/m2    LVIDd Index 2.12 cm/m2    LVIDs Index 1.52 cm/m2    LV RWT Ratio 0.30     LV Mass 2D 150.1 67 - 162 g    LV Mass 2D Index 60.0 43 - 95 g/m2    E/E' Ratio (Averaged) 13.03     E/E' Lateral 11.73     E/E' Septal 14.33     LVOT Stroke Volume Index 29.1 mL/m2    LA Size Index 2.08 cm/m2    LA/AO Root Ratio 1.73     Ao Root Index 1.20 cm/m2    Ascending Aorta Index 1.12 cm/m2    AV Velocity Ratio 0.67     LVOT:AV VTI Index 0.67     ELOISE/BSA VTI 0.9 cm2/m2    ELOISE/BSA Peak Velocity 0.9 cm2/m2    MV:LVOT VTI Index 1.26        All Micro Results     Procedure Component Value Units Date/Time    CULTURE, URINE [[id number]] Collected: 03/08/22 1416    Order Status: Completed Specimen: Urine from Clean catch Updated: 03/09/22 0918     Special Requests: NO SPECIAL REQUESTS        Culture result:       NO GROWTH AFTER SHORT PERIOD OF INCUBATION. FURTHER RESULTS TO FOLLOW AFTER OVERNIGHT INCUBATION. CULTURE, BODY FLUID Amye Chroman [[id number]] Collected: 03/08/22 1637    Order Status: Completed Specimen: Body Fluid from Abdominal Fluid Updated: 03/09/22 0754     Special Requests: NO SPECIAL REQUESTS        GRAM STAIN PENDING     Culture result:       NO GROWTH AFTER SHORT PERIOD OF INCUBATION. FURTHER RESULTS TO FOLLOW AFTER OVERNIGHT INCUBATION.           CULTURE, BLOOD #2 [[id number]] Collected: 03/08/22 2241    Order Status: Completed Specimen: Blood Updated: 03/08/22 2255    CULTURE, BLOOD #1 [[id number]] Collected: 03/08/22 2156    Order Status: Completed Specimen: Blood Updated: 03/08/22 2205    CULTURE, BLOOD [[id number]] Collected: 03/08/22 1745    Order Status: Canceled Specimen: Blood     CULTURE, BLOOD [[id number]] Collected: 03/08/22 1745    Order Status: Canceled Specimen: Blood           Other Studies:  XR CHEST PORT    Result Date: 3/8/2022  EXAMINATION: CHEST RADIOGRAPH 3/8/2022 2:11 PM ACCESSION NUMBER: [id number] INDICATION: volume overload COMPARISON: None available TECHNIQUE: A single view of the chest was obtained. FINDINGS: Underpenetrated exam. Support Lines and Tubes: None Cardiac Silhouette: Enlarged. Lungs: Interstitial and alveolar pulmonary edema. Pleura: Small bilateral pleural effusions. No pneumothorax. Osseous Structures: Unremarkable. Upper Abdomen: Unremarkable. 1. Enlarged cardiac silhouette. 2. Interstitial and alveolar pulmonary edema and small bilateral pleural effusions. 3. Underpenetrated exam. All findings within that limitation. ECHO ADULT COMPLETE    Result Date: 3/9/2022    Left Ventricle: Left ventricle is mildly dilated. Normal wall thickness. Normal wall motion. Low normal left ventricular systolic function with a visually estimated EF of 50 - 55%. Abnormal diastolic function.   Right Ventricle: Right ventricle is moderately dilated. Mildly reduced systolic function.   Aortic Valve: Mild sclerosis of the aortic valve cusp.   Mitral Valve: Mild transvalvular regurgitation.   Tricuspid Valve: Moderate transvalvular regurgitation. Mildly elevated RVSP.   Left Atrium: Left atrium is moderately dilated.   Right Atrium: Right atrium is moderately dilated.   Contrast used: Definity. IR PARACENTESIS ABD W IMAGE    Result Date: 3/9/2022  Title: Ultrasound guided Paracentesis. Indication: New onset a-fib ([address] Atrial fibrillation with RVR ([address] Respiratory failure with hypoxia ([address] Anasarca, Cirrhosis ([address] SBP (spontaneous bacterial peritonitis) ([address] Consent:  Informed written and oral consent was obtained from the patient after explanation of benefits and risks of procedure (including, but not limited to: infection, hemorrhage, visceral injury). The patient's questions were answered to her satisfaction. The patient stated understanding and requested that we proceed. Procedure:  Sterile barrier technique (including:  cap, mask, sterile gloves, sterile sheet, hand hygiene, and chlorhexidene for cutaneous antisepsis) were used. Following routine prep and drape of the abdomen, a local field block with 1% lidocaine was achieved. Ultrasound evaluation was performed. Fluid was identified in the right lower quadrant. The anticipated needle tract was interrogated using Doppler ultrasound. Using real-time ultrasound guidance, the peritoneal cavity was accessed with a 8 Fr Safety Centesis Catheter. A total of 15,000 cc of thin yellow fluid was removed. The catheter was removed and a bandage was applied. Complications: None. Specimen: Cytopathology/Microbiology Findings: Large volume ascites. Uncomplicated ultrasound guided paracentesis with removal of 15,000 mL of clear yellow ascites.       Current Meds:  Current Facility-Administered Medications   Medication Dose Route Frequency    NOREPINephrine (LEVOPHED) 4 mg in 5% dextrose 250 mL infusion  0.5-16 mcg/min IntraVENous TITRATE    lip protectant (BLISTEX) ointment 1 Each  1 Each Topical PRN    vancomycin (VANCOCIN) 2500 mg in  mL infusion  2,500 mg IntraVENous ONCE    Vancomycin Intermittent Dosing Per Pharmacy   Other Rx Dosing/Monitoring    cefepime (MAXIPIME) 2 g in 0.9% sodium chloride (MBP/ADV) 100 mL MBP  2 g IntraVENous Q8H    albumin human 25% (BUMINATE) solution 25 g  25 g IntraVENous Q6H    sodium chloride (NS) flush 5-40 mL  5-40 mL IntraVENous Q8H    sodium chloride (NS) flush 5-40 mL  5-40 mL IntraVENous PRN    dilTIAZem (CARDIZEM) 100 mg in 0.9% sodium chloride (MBP/ADV) 100 mL infusion  5 mg/hr IntraVENous TITRATE    sodium chloride (NS) flush 5-40 mL  5-40 mL IntraVENous Q8H    sodium chloride (NS) flush 5-40 mL  5-40 mL IntraVENous PRN    acetaminophen (TYLENOL) tablet 650 mg  650 mg Oral Q6H PRN    Or    acetaminophen (TYLENOL) suppository 650 mg  650 mg Rectal Q6H PRN    polyethylene glycol (MIRALAX) packet 17 g  17 g Oral DAILY PRN    ondansetron (ZOFRAN ODT) tablet 4 mg  4 mg Oral Q8H PRN    Or    ondansetron (ZOFRAN) injection 4 mg  4 mg IntraVENous Q6H PRN    enoxaparin (LOVENOX) injection 40 mg  40 mg SubCUTAneous DAILY    potassium chloride 10 mEq in 100 ml IVPB  10 mEq IntraVENous PRN    magnesium sulfate 2 g/50 ml IVPB (premix or compounded)  2 g IntraVENous PRN    famotidine (PEPCID) tablet 20 mg  20 mg Oral BID    nicotine buccal (POLACRILEX) lozenge 2 mg  2 mg Oral Q4H PRN    nicotine (NICODERM CQ) 21 mg/24 hr patch 1 Patch  1 Patch TransDERmal DAILY    levalbuterol (XOPENEX) nebulizer soln 0.63 mg/3 mL  0.63 mg Nebulization Q6H RT    ipratropium (ATROVENT) 0.02 % nebulizer solution 0.5 mg  0.5 mg Nebulization Q6H RT    budesonide (PULMICORT) 500 mcg/2 ml nebulizer suspension  500 mcg Nebulization BID RT    HYDROmorphone (DILAUDID) injection 0.5 mg  0.5 mg IntraVENous Q4H PRN    HYDROmorphone (DILAUDID) injection 0.2 mg  0.2 mg IntraVENous Q4H PRN    midodrine (PROAMATINE) tablet 10 mg  10 mg Oral TID WITH MEALS    aspirin chewable tablet 81 mg  81 mg Oral DAILY    nystatin (MYCOSTATIN) 100,000 unit/gram powder   Topical BID       Signed:  Zenon Diaz DO    Part of this note may have been written by using a voice dictation software. The note has been proof read but may still contain some grammatical/other typographical errors.

## 2022-03-09 NOTE — PROGRESS NOTES
VANCO RENAL INSUFFICIENCY NOTE [facility] Pharmacokinetic Monitoring Service - Vancomycin    Fidel Seaman is a 55 y.o. female starting on vancomycin therapy for bloodstream infection. Pharmacy consulted by Dr. Balbir Yee for monitoring and adjustment. Target Concentration: Random level ? 15 mg/L  Additional Antimicrobials: cefepime    Pertinent Laboratory Values: Wt Readings from Last 1 Encounters:   03/09/22 129.3 kg (285 lb 0.9 oz)     Temp Readings from Last 1 Encounters:   03/09/22 (!) 100.7 °F (38.2 °C)     Recent Labs     03/09/22  0345 03/08/22  1556 03/08/22  1345   BUN 26*  --  19   CREA 1.60*  --  1.20*   WBC 14.8*  --  12.2*   LAC 2.0 1.5 4.7*       No results found for: Pedro Powell    MRSA Nasal Swab: N/A. Non-respiratory infection.     Plan:  Concentration-guided dosing due to renal impairment (33 % increase in Scr over 14 hours)  Start vancomycin 2500 mg x 1   Vancomycin concentration ordered for 3/10 @ 0400    Pharmacy will continue to monitor patient and adjust therapy as indicated    Thank you for the consult,  DUYEN Shaffer

## 2022-03-09 NOTE — PROGRESS NOTES
Problem: Pressure Injury - Risk of  Goal: *Prevention of pressure injury  Description: Document Be Scale and appropriate interventions in the flowsheet. Outcome: Progressing Towards Goal  Note: Pressure Injury Interventions:  Sensory Interventions: Assess changes in LOC,Assess need for specialty bed,Avoid rigorous massage over bony prominences,Check visual cues for pain,Maintain/enhance activity level,Minimize linen layers,Pressure redistribution bed/mattress (bed type),Turn and reposition approx. every two hours (pillows and wedges if needed)    Moisture Interventions: Absorbent underpads,Assess need for specialty bed,Check for incontinence Q2 hours and as needed,Limit adult briefs,Maintain skin hydration (lotion/cream),Minimize layers,Moisture barrier    Activity Interventions: Increase time out of bed,Pressure redistribution bed/mattress(bed type)    Mobility Interventions: HOB 30 degrees or less,Pressure redistribution bed/mattress (bed type),PT/OT evaluation,Turn and reposition approx. every two hours(pillow and wedges)    Nutrition Interventions: Document food/fluid/supplement intake,Discuss nutritional consult with provider,Offer support with meals,snacks and hydration    Friction and Shear Interventions: Apply protective barrier, creams and emollients,Foam dressings/transparent film/skin sealants,HOB 30 degrees or less,Lift sheet,Minimize layers,Transferring/repositioning devices                Problem: Patient Education: Go to Patient Education Activity  Goal: Patient/Family Education  Outcome: Progressing Towards Goal     Problem: Falls - Risk of  Goal: *Absence of Falls  Description: Document Lolis Fall Risk and appropriate interventions in the flowsheet.   Outcome: Progressing Towards Goal  Note: Fall Risk Interventions:       Mentation Interventions: Adequate sleep, hydration, pain control,Bed/chair exit alarm,Door open when patient unattended,Evaluate medications/consider consulting pharmacy,Increase mobility,Reorient patient,Toileting rounds    Medication Interventions: Bed/chair exit alarm,Evaluate medications/consider consulting pharmacy,Patient to call before getting OOB    Elimination Interventions: Bed/chair exit alarm,Call light in reach,Patient to call for help with toileting needs,Toileting schedule/hourly rounds              Problem: Patient Education: Go to Patient Education Activity  Goal: Patient/Family Education  Outcome: Progressing Towards Goal     Problem: Patient Education: Go to Patient Education Activity  Goal: Patient/Family Education  Outcome: Progressing Towards Goal     Problem: Afib Pathway: Day 1  Goal: Activity/Safety  Outcome: Progressing Towards Goal  Goal: Consults, if ordered  Outcome: Progressing Towards Goal  Goal: Diagnostic Test/Procedures  Outcome: Progressing Towards Goal  Goal: Nutrition/Diet  Outcome: Progressing Towards Goal  Goal: Discharge Planning  Outcome: Progressing Towards Goal  Goal: Medications  Outcome: Progressing Towards Goal  Goal: Respiratory  Outcome: Progressing Towards Goal  Goal: Treatments/Interventions/Procedures  Outcome: Progressing Towards Goal  Goal: Psychosocial  Outcome: Progressing Towards Goal  Goal: *Optimal pain control at patient's stated goal  Outcome: Progressing Towards Goal  Goal: *Hemodynamically stable  Outcome: Progressing Towards Goal  Goal: *Stable cardiac rhythm  Outcome: Progressing Towards Goal  Goal: *Lungs clear or at baseline  Outcome: Progressing Towards Goal  Goal: *Labs within defined limits  Outcome: Progressing Towards Goal  Goal: *Describes available resources and support systems  Outcome: Progressing Towards Goal     Problem: Patient Education: Go to Patient Education Activity  Goal: Patient/Family Education  Outcome: Progressing Towards Goal     Problem: Acute Renal Failure: Day 1  Goal: Activity/Safety  Outcome: Progressing Towards Goal  Goal: Consults, if ordered  Outcome: Progressing Towards Goal  Goal: Diagnostic Test/Procedures  Outcome: Progressing Towards Goal  Goal: Nutrition/Diet  Outcome: Progressing Towards Goal  Goal: Discharge Planning  Outcome: Progressing Towards Goal  Goal: Medications  Outcome: Progressing Towards Goal  Goal: Respiratory  Outcome: Progressing Towards Goal  Goal: Treatments/Interventions/Procedures  Outcome: Progressing Towards Goal  Goal: Psychosocial  Outcome: Progressing Towards Goal  Goal: *Optimal pain control at patient's stated goal  Outcome: Progressing Towards Goal  Goal: *Urinary output within identified parameters  Outcome: Progressing Towards Goal  Goal: *Hemodynamically stable  Outcome: Progressing Towards Goal  Goal: *Tolerating diet  Outcome: Progressing Towards Goal

## 2022-03-09 NOTE — PROGRESS NOTES
Dual skin assessment with Yvette Gupta RN. Patient is missing 5th toe on right foot. Scabbing to the lateral side of the foot. Scars to sternum and LLE from previous heart surgery. Sacrum with some blanchable redness. Right buttock with dime sized scabbed area. Left groin fold has yeast related irritation and excoriation to it. No other acute skin issues noted.

## 2022-03-09 NOTE — CONSULTS
GEN GENERIC H&P/CONSULT    Subjective:     55 y.o. female with a past medical history of CHF, hypertension, diabetes, atrial fibrillation, COPD and drug abuse, and medication noncompliance  She presented with worsening shortness of breath, abdominal pain and distention for about a week. Pt was found to be in rapid A. Fib with large ascites, fever. She was started on Diltiazem, underwent a paracentesis with 15 L fluid removal. Pt became hypotensive, worsening AMS and decreasing UOP with rising S. Creat and LFT's. Currently, she is getting IVF bolus, alb, ATB with cefepime and vanco., and pressors, Levo, midodrine. Renal consult requested for KAIDEN with decreasing UOP:     Ref. Range 3/8/2022 13:45   Creatinine Latest Ref Range: 0.6 - 1.0 MG/DL 1.20 (H)      Ref. Range 3/9/2022 03:45   Sodium Latest Ref Range: 136 - 145 mmol/L 136   Potassium Latest Ref Range: 3.5 - 5.1 mmol/L 4.8   Chloride Latest Ref Range: 98 - 107 mmol/L 104   CO2 Latest Ref Range: 21 - 32 mmol/L 26   Anion gap Latest Ref Range: 7 - 16 mmol/L 6 (L)   Glucose Latest Ref Range: 65 - 100 mg/dL 102 (H)   BUN Latest Ref Range: 6 - 23 MG/DL 26 (H)   Creatinine Latest Ref Range: 0.6 - 1.0 MG/DL 1.60 (H)   Calcium Latest Ref Range: 8.3 - 10.4 MG/DL 8.3   GFR est non-AA Latest Ref Range: >60 ml/min/1.73m2 37 (L)   GFR est AA Latest Ref Range: >60 ml/min/1.73m2 45 (L)   Bilirubin, total Latest Ref Range: 0.2 - 1.1 MG/DL 2.9 (H)   Protein, total Latest Ref Range: 6.3 - 8.2 g/dL 5.9 (L)   Albumin Latest Ref Range: 3.5 - 5.0 g/dL 2.7 (L)   Globulin Latest Ref Range: 2.3 - 3.5 g/dL 3.2   A-G Ratio Latest Ref Range: 1.2 - 3.5   0.8 (L)   ALT Latest Ref Range: 12 - 65 U/L 38   AST Latest Ref Range: 15 - 37 U/L 139 (H)   Alk. phosphatase Latest Ref Range: 50 - 136 U/L 329 (H)   Lactic acid Latest Ref Range: 0.4 - 2.0 MMOL/L 2.0      Ref.  Range 3/8/2022 14:16   Color Latest Units:   MARIE   Appearance Latest Units:   CLOUDY   Specific gravity Latest Ref Range: 1.001 - 1.023   1.026 (H)   pH (UA) Latest Ref Range: 5.0 - 9.0   6.0   Protein Latest Ref Range: NEG mg/dL TRACE (A)   Glucose Latest Ref Range: NEG mg/dL Negative   Ketone Latest Ref Range: NEG mg/dL Negative   Blood Latest Ref Range: NEG   SMALL (A)   Bilirubin Latest Ref Range: NEG   Negative   Urobilinogen Latest Ref Range: 0.2 - 1.0 EU/dL 1.0   Nitrites Latest Ref Range: NEG   Positive (A)   Leukocyte Esterase Latest Ref Range: NEG   MODERATE (A)   Epithelial cells Latest Ref Range: 0 /hpf 0-3   Bacteria Latest Ref Range: 0 /hpf 0   Yeast Latest Units:   MODERATE   Other observations Latest Units:   RESULTS VERIFIED MANUALLY        Ref. Range 3/9/2022 03:45   WBC Latest Ref Range: 4.3 - 11.1 K/uL 14.8 (H)   RBC Latest Ref Range: 4.05 - 5.2 M/uL 3.65 (L)   HGB Latest Ref Range: 11.7 - 15.4 g/dL 9.9 (L)   HCT Latest Ref Range: 35.8 - 46.3 % 33.2 (L)   MCV Latest Ref Range: 79.6 - 97.8 FL 91.0   MCH Latest Ref Range: 26.1 - 32.9 PG 27.1   MCHC Latest Ref Range: 31.4 - 35.0 g/dL 29.8 (L)   RDW Latest Ref Range: 11.9 - 14.6 % 18.4 (H)   PLATELET Latest Ref Range: 150 - 450 K/uL 134 (L)   MPV Latest Ref Range: 9.4 - 12.3 FL 11.0   NEUTROPHILS Latest Ref Range: 43 - 78 % 95 (H)   LYMPHOCYTES Latest Ref Range: 13 - 44 % 2 (L)   MONOCYTES Latest Ref Range: 4.0 - 12.0 % 2 (L)   EOSINOPHILS Latest Ref Range: 0.5 - 7.8 % 0 (L)   BASOPHILS Latest Ref Range: 0.0 - 2.0 % 0   IMMATURE GRANULOCYTES Latest Ref Range: 0.0 - 5.0 % 0   DF Latest Units:   AUTOMATED   ABSOLUTE NRBC Latest Ref Range: 0.0 - 0.2 K/uL 0.00   ABS. NEUTROPHILS Latest Ref Range: 1.7 - 8.2 K/UL 14.1 (H)   ABS. IMM. GRANS. Latest Ref Range: 0.0 - 0.5 K/UL 0.1   ABS. LYMPHOCYTES Latest Ref Range: 0.5 - 4.6 K/UL 0.4 (L)   ABS. MONOCYTES Latest Ref Range: 0.1 - 1.3 K/UL 0.2   ABS. EOSINOPHILS Latest Ref Range: 0.0 - 0.8 K/UL 0.0   ABS. BASOPHILS Latest Ref Range: 0.0 - 0.2 K/UL 0.1      Ref.  Range 3/8/2022 13:45   NT pro-BNP Latest Ref Range: 5 - 125 PG/ML 9226 (H)          No past medical history on file. Past Surgical History:   Procedure Laterality Date    IR PARACENTESIS ABD W IMAGE  3/8/2022      Prior to Admission medications    Medication Sig Start Date End Date Taking? Authorizing Provider   bumetanide (BUMEX) 1 mg tablet Take 1 mg by mouth two (2) times a day. Yes Provider, Historical   spironolactone (ALDACTONE) 25 mg tablet Take 25 mg by mouth daily. Yes Provider, Historical   fluticasone propionate (FLOVENT DISKUS) 100 mcg/actuation dsdv inhaler Take 1 Puff by inhalation two (2) times a day. Yes Provider, Historical   fluticasone propion-salmeteroL (ADVAIR/WIXELA) 100-50 mcg/dose diskus inhaler Take 1 Puff by inhalation two (2) times a day. Indications: controller medication for asthma   Yes Provider, Historical   metoprolol succinate (TOPROL-XL) 200 mg XL tablet Take 200 mg by mouth daily. Yes Provider, Historical   apixaban (Eliquis) 5 mg tablet Take 5 mg by mouth two (2) times a day. Yes Provider, Historical   gabapentin (Neurontin) 300 mg capsule Take 300 mg by mouth three (3) times daily. Yes Provider, Historical   albuterol (PROVENTIL HFA, VENTOLIN HFA, PROAIR HFA) 90 mcg/actuation inhaler Take 2 Puffs by inhalation every four (4) hours as needed for Wheezing. Yes Provider, Historical   aspirin 81 mg chewable tablet Take 81 mg by mouth daily. Indications: treatment to prevent a heart attack   Yes Provider, Historical   insulin lispro (HumaLOG U-100 Insulin) 100 unit/mL injection 10 Units by SubCUTAneous route Before breakfast, lunch, and dinner. Indications: type 2 diabetes mellitus   Yes Provider, Historical   insulin glargine (Lantus U-100 Insulin) 100 unit/mL injection 20 Units by SubCUTAneous route nightly.  Indications: type 2 diabetes mellitus   Yes Provider, Historical     No Known Allergies   Social History     Tobacco Use    Smoking status: Not on file    Smokeless tobacco: Not on file   Substance Use Topics    Alcohol use: Not on file      No family history on file. Review of Systems  Pt is obtunded  ROS: unobtainable     Objective:       Visit Vitals  BP (!) 73/51   Pulse (!) 112   Temp (!) 102.5 °F (39.2 °C)   Resp 9   Ht 6' 1\" (1.854 m)   Wt 129.3 kg (285 lb 0.9 oz)   SpO2 100%   Breastfeeding No   BMI 37.61 kg/m²       03/09 0701 - 03/09 1900  In: -   Out: 125 [Urine:125]  03/07 1901 - 03/09 0700  In: 162.1 [I.V.:162.1]  Out: 540 [Urine:540]    PE:   Obtunded  Not in RUDY  Mildly jaundice   Lung: poor inspiration, decreased BS   CV: IR, no rub. Abd: mildly distended, no rebound. Ext: lower: erythema, ++ edema with increased local Tp. Data Review:     Recent Results (from the past 24 hour(s))   CBC WITH AUTOMATED DIFF    Collection Time: 03/08/22  1:45 PM   Result Value Ref Range    WBC 12.2 (H) 4.3 - 11.1 K/uL    RBC 3.85 (L) 4.05 - 5.2 M/uL    HGB 10.3 (L) 11.7 - 15.4 g/dL    HCT 36.5 35.8 - 46.3 %    MCV 94.8 79.6 - 97.8 FL    MCH 26.8 26.1 - 32.9 PG    MCHC 28.2 (L) 31.4 - 35.0 g/dL    RDW 18.6 (H) 11.9 - 14.6 %    PLATELET 641 480 - 011 K/uL    MPV 10.7 9.4 - 12.3 FL    ABSOLUTE NRBC 0.00 0.0 - 0.2 K/uL    DF AUTOMATED      NEUTROPHILS 84 (H) 43 - 78 %    LYMPHOCYTES 8 (L) 13 - 44 %    MONOCYTES 5 4.0 - 12.0 %    EOSINOPHILS 2 0.5 - 7.8 %    BASOPHILS 1 0.0 - 2.0 %    IMMATURE GRANULOCYTES 0 0.0 - 5.0 %    ABS. NEUTROPHILS 10.3 (H) 1.7 - 8.2 K/UL    ABS. LYMPHOCYTES 1.0 0.5 - 4.6 K/UL    ABS. MONOCYTES 0.6 0.1 - 1.3 K/UL    ABS. EOSINOPHILS 0.2 0.0 - 0.8 K/UL    ABS. BASOPHILS 0.1 0.0 - 0.2 K/UL    ABS. IMM.  GRANS. 0.0 0.0 - 0.5 K/UL   LACTIC ACID    Collection Time: 03/08/22  1:45 PM   Result Value Ref Range    Lactic acid 4.7 (HH) 0.4 - 2.0 MMOL/L   METABOLIC PANEL, COMPREHENSIVE    Collection Time: 03/08/22  1:45 PM   Result Value Ref Range    Sodium 133 (L) 136 - 145 mmol/L    Potassium 4.3 3.5 - 5.1 mmol/L    Chloride 102 98 - 107 mmol/L    CO2 24 21 - 32 mmol/L    Anion gap 7 7 - 16 mmol/L    Glucose 206 (H) 65 - 100 mg/dL    BUN 19 6 - 23 MG/DL    Creatinine 1.20 (H) 0.6 - 1.0 MG/DL    GFR est AA >60 >60 ml/min/1.73m2    GFR est non-AA 51 (L) >60 ml/min/1.73m2    Calcium 8.9 8.3 - 10.4 MG/DL    Bilirubin, total 0.9 0.2 - 1.1 MG/DL    ALT (SGPT) 13 12 - 65 U/L    AST (SGOT) 19 15 - 37 U/L    Alk.  phosphatase 299 (H) 50 - 136 U/L    Protein, total 8.1 6.3 - 8.2 g/dL    Albumin 3.6 3.5 - 5.0 g/dL    Globulin 4.5 (H) 2.3 - 3.5 g/dL    A-G Ratio 0.8 (L) 1.2 - 3.5     MAGNESIUM    Collection Time: 03/08/22  1:45 PM   Result Value Ref Range    Magnesium 2.1 1.8 - 2.4 mg/dL   LIPASE    Collection Time: 03/08/22  1:45 PM   Result Value Ref Range    Lipase 86 73 - 393 U/L   NT-PRO BNP    Collection Time: 03/08/22  1:45 PM   Result Value Ref Range    NT pro-BNP 9,226 (H) 5 - 125 PG/ML   DIGOXIN    Collection Time: 03/08/22  1:45 PM   Result Value Ref Range    Digoxin level 0.1 (L) 0.90 - 2.10 NG/ML   BLOOD GAS, ARTERIAL POC    Collection Time: 03/08/22  1:58 PM   Result Value Ref Range    Device: Non rebreather      FIO2 (POC) 100 %    pH (POC) 7.36 7.35 - 7.45      pCO2 (POC) 40.7 35 - 45 MMHG    pO2 (POC) 346 (H) 75 - 100 MMHG    HCO3 (POC) 23.1 22 - 26 MMOL/L    sO2 (POC) 99.9 (H) 95 - 98 %    Base deficit (POC) 2.2 mmol/L    Allens test (POC) Positive      Site RIGHT RADIAL      Specimen type (POC) ARTERIAL      Performed by Arlet Kolb    URINALYSIS W/ RFLX MICROSCOPIC    Collection Time: 03/08/22  2:16 PM   Result Value Ref Range    Color MARIE      Appearance CLOUDY      Specific gravity 1.026 (H) 1.001 - 1.023      pH (UA) 6.0 5.0 - 9.0      Protein TRACE (A) NEG mg/dL    Glucose Negative NEG mg/dL    Ketone Negative NEG mg/dL    Bilirubin Negative NEG      Blood SMALL (A) NEG      Urobilinogen 1.0 0.2 - 1.0 EU/dL    Nitrites Positive (A) NEG      Leukocyte Esterase MODERATE (A) NEG      Epithelial cells 0-3 0 /hpf    Bacteria 0 0 /hpf    Yeast MODERATE      Other observations RESULTS VERIFIED MANUALLY     DRUG SCREEN, URINE    Collection Time: 03/08/22  2:16 PM   Result Value Ref Range    PCP(PHENCYCLIDINE) Negative      BENZODIAZEPINES Negative      COCAINE Negative      AMPHETAMINES Positive      METHADONE Negative      THC (TH-CANNABINOL) Positive      OPIATES Negative      BARBITURATES Negative     CULTURE, URINE    Collection Time: 03/08/22  2:16 PM    Specimen: Clean catch; Urine   Result Value Ref Range    Special Requests: NO SPECIAL REQUESTS      Culture result:        NO GROWTH AFTER SHORT PERIOD OF INCUBATION. FURTHER RESULTS TO FOLLOW AFTER OVERNIGHT INCUBATION. HCG URINE, QL    Collection Time: 03/08/22  2:16 PM   Result Value Ref Range    HCG urine, QL Negative NEG     LACTIC ACID    Collection Time: 03/08/22  3:56 PM   Result Value Ref Range    Lactic acid 1.5 0.4 - 2.0 MMOL/L   AMMONIA    Collection Time: 03/08/22  3:56 PM   Result Value Ref Range    Ammonia, plasma 45 (H) 11 - 32 UMOL/L   PTT    Collection Time: 03/08/22  3:56 PM   Result Value Ref Range    aPTT 30.5 24.1 - 35.1 SEC   PROTEIN TOTAL, FLUID    Collection Time: 03/08/22  4:37 PM   Result Value Ref Range    Fluid Type: ABDOMINAL FLUID      Protein total, body fld. 4.7 g/dL   GLUCOSE, FLUID    Collection Time: 03/08/22  4:37 PM   Result Value Ref Range    Fluid Type: ABDOMINAL FLUID      Glucose, body fld. 158 MG/DL   LDH, BODY FLUID    Collection Time: 03/08/22  4:37 PM   Result Value Ref Range    Fluid Type: ABDOMINAL FLUID      LD, body fld. 120 U/L   CELL COUNT, BODY FLUID    Collection Time: 03/08/22  4:37 PM   Result Value Ref Range    BODY FLUID TYPE ABDOMINAL FLUID      FLUID COLOR YELLOW      FLUID APPEARANCE CLEAR      FLUID RBC CT. <1,000 /cu mm    FLUID WBC COUNT <100 /cu mm   CULTURE, BODY FLUID W GRAM STAIN    Collection Time: 03/08/22  4:37 PM    Specimen: Abdominal Fluid;  Body Fluid   Result Value Ref Range    Special Requests: NO SPECIAL REQUESTS      GRAM STAIN PENDING     Culture result:        NO GROWTH AFTER SHORT PERIOD OF INCUBATION. FURTHER RESULTS TO FOLLOW AFTER OVERNIGHT INCUBATION. AMYLASE, FLUID    Collection Time: 03/08/22  4:37 PM   Result Value Ref Range    Fluid Type: ABDOMINAL FLUID      Amylase, body fld. 13 U/L   EKG, 12 LEAD, SUBSEQUENT    Collection Time: 03/08/22  7:26 PM   Result Value Ref Range    Ventricular Rate 133 BPM    Atrial Rate 129 BPM    QRS Duration 72 ms    Q-T Interval 276 ms    QTC Calculation (Bezet) 410 ms    Calculated R Axis -148 degrees    Calculated T Axis 13 degrees    Diagnosis       Supraventricular tachycardia  Low voltage QRS  Poor R wave progression  Abnormal ECG  No previous ECGs available  Confirmed by Fátima Pelayo ([id number]) on 3/9/2022 6:53:59 AM     PROTHROMBIN TIME + INR    Collection Time: 03/09/22  3:45 AM   Result Value Ref Range    Prothrombin time 18.1 (H) 12.6 - 14.5 sec    INR 1.5     METABOLIC PANEL, COMPREHENSIVE    Collection Time: 03/09/22  3:45 AM   Result Value Ref Range    Sodium 136 136 - 145 mmol/L    Potassium 4.8 3.5 - 5.1 mmol/L    Chloride 104 98 - 107 mmol/L    CO2 26 21 - 32 mmol/L    Anion gap 6 (L) 7 - 16 mmol/L    Glucose 102 (H) 65 - 100 mg/dL    BUN 26 (H) 6 - 23 MG/DL    Creatinine 1.60 (H) 0.6 - 1.0 MG/DL    GFR est AA 45 (L) >60 ml/min/1.73m2    GFR est non-AA 37 (L) >60 ml/min/1.73m2    Calcium 8.3 8.3 - 10.4 MG/DL    Bilirubin, total 2.9 (H) 0.2 - 1.1 MG/DL    ALT (SGPT) 38 12 - 65 U/L    AST (SGOT) 139 (H) 15 - 37 U/L    Alk.  phosphatase 329 (H) 50 - 136 U/L    Protein, total 5.9 (L) 6.3 - 8.2 g/dL    Albumin 2.7 (L) 3.5 - 5.0 g/dL    Globulin 3.2 2.3 - 3.5 g/dL    A-G Ratio 0.8 (L) 1.2 - 3.5     LACTIC ACID    Collection Time: 03/09/22  3:45 AM   Result Value Ref Range    Lactic acid 2.0 0.4 - 2.0 MMOL/L   CBC WITH AUTOMATED DIFF    Collection Time: 03/09/22  3:45 AM   Result Value Ref Range    WBC 14.8 (H) 4.3 - 11.1 K/uL    RBC 3.65 (L) 4.05 - 5.2 M/uL    HGB 9.9 (L) 11.7 - 15.4 g/dL    HCT 33.2 (L) 35.8 - 46.3 %    MCV 91.0 79.6 - 97.8 FL MCH 27.1 26.1 - 32.9 PG    MCHC 29.8 (L) 31.4 - 35.0 g/dL    RDW 18.4 (H) 11.9 - 14.6 %    PLATELET 621 (L) 051 - 450 K/uL    MPV 11.0 9.4 - 12.3 FL    ABSOLUTE NRBC 0.00 0.0 - 0.2 K/uL    DF AUTOMATED      NEUTROPHILS 95 (H) 43 - 78 %    LYMPHOCYTES 2 (L) 13 - 44 %    MONOCYTES 2 (L) 4.0 - 12.0 %    EOSINOPHILS 0 (L) 0.5 - 7.8 %    BASOPHILS 0 0.0 - 2.0 %    IMMATURE GRANULOCYTES 0 0.0 - 5.0 %    ABS. NEUTROPHILS 14.1 (H) 1.7 - 8.2 K/UL    ABS. LYMPHOCYTES 0.4 (L) 0.5 - 4.6 K/UL    ABS. MONOCYTES 0.2 0.1 - 1.3 K/UL    ABS. EOSINOPHILS 0.0 0.0 - 0.8 K/UL    ABS. BASOPHILS 0.1 0.0 - 0.2 K/UL    ABS. IMM.  GRANS. 0.1 0.0 - 0.5 K/UL   GLUCOSE, POC    Collection Time: 03/09/22  9:15 AM   Result Value Ref Range    Glucose (POC) 101 (H) 65 - 100 mg/dL    Performed by Suraj    ECHO ADULT COMPLETE    Collection Time: 03/09/22  9:30 AM   Result Value Ref Range    LV EDV A2C 189 mL    LV EDV A4C 182 mL    LV ESV A2C 94 mL    LV ESV A4C 72 mL    IVSd 0.8 0.6 - 0.9 cm    LVIDd 5.3 3.9 - 5.3 cm    LVIDs 3.8 cm    LVOT Diameter 2.1 cm    LVOT Mean Gradient 4 mmHg    LVOT VTI 21.0 cm    LVOT Peak Velocity 1.2 m/s    LVOT Peak Gradient 6 mmHg    LVPWd 0.8 0.6 - 0.9 cm    LV E' Lateral Velocity 11 cm/s    LV E' Septal Velocity 9 cm/s    LV Ejection Fraction A2C 50 %    LV Ejection Fraction A4C 60 %    EF BP 56 55 - 100 %    LVOT Area 3.5 cm2    LVOT SV 72.7 ml    LA Major Franklin 6.3 cm    LA Area 4C 26.5 cm2    LA Diameter 5.2 cm    AV Mean Velocity 1.3 m/s    AV Mean Gradient 8 mmHg    AV VTI 31.2 cm    AV Peak Velocity 1.8 m/s    AV Peak Gradient 14 mmHg    AV Area by VTI 2.3 cm2    AV Area by Peak Velocity 2.3 cm2    Aortic Root 3.0 cm    Ascending Aorta 2.8 cm    IVC Proxmal 2.6 cm    MR VTI 78.2 cm    MV Mean Gradient 4 mmHg    MV VTI 26.5 cm    MV Mean Velocity 1.0 m/s    MR Peak Velocity 3.6 m/s    MR Peak Gradient 52 mmHg    MV Max Velocity 1.4 m/s    MV Peak Gradient 8 mmHg    MV E Wave Deceleration Time 158.0 ms MV E Velocity 1.29 m/s    MV Area by VTI 2.7 cm2    PV .0 ms    PV Max Velocity 1.2 m/s    PV Peak Gradient 6 mmHg    RVIDd 4.2 cm    RV Basal Dimension 4.9 cm    RV Longitudinal Dimension 8.4 cm    RV Mid Dimension 4.2 cm    TAPSE 1.3 (A) 1.5 - 2.0 cm    TR Max Velocity 3.07 m/s    TR Peak Gradient 38 mmHg    Fractional Shortening 2D 28 28 - 44 %    LV ESV Index A4C 29 mL/m2    LV EDV Index A4C 73 mL/m2    LV ESV Index A2C 38 mL/m2    LV EDV Index A2C 76 mL/m2    LVIDd Index 2.12 cm/m2    LVIDs Index 1.52 cm/m2    LV RWT Ratio 0.30     LV Mass 2D 150.1 67 - 162 g    LV Mass 2D Index 60.0 43 - 95 g/m2    E/E' Ratio (Averaged) 13.03     E/E' Lateral 11.73     E/E' Septal 14.33     LVOT Stroke Volume Index 29.1 mL/m2    LA Size Index 2.08 cm/m2    LA/AO Root Ratio 1.73     Ao Root Index 1.20 cm/m2    Ascending Aorta Index 1.12 cm/m2    AV Velocity Ratio 0.67     LVOT:AV VTI Index 0.67     ELOISE/BSA VTI 0.9 cm2/m2    ELOISE/BSA Peak Velocity 0.9 cm2/m2    MV:LVOT VTI Index 1.26        Assessment:     Principal Problem:    Systolic congestive heart failure (HCC) (3/9/2022)    Active Problems:    Anasarca (3/8/2022)      [facility] ([facility] (3/8/2022)      SBP (spontaneous bacterial peritonitis) (HCC) (3/8/2022)      Atrial fibrillation with RVR (HCC) (3/8/2022)      Respiratory failure with hypoxia (HCC) (3/8/2022)      Hyponatremia (3/8/2022)      COPD exacerbation (HCC) (3/8/2022)      KAIDEN (acute kidney injury) ([facility] (3/8/2022)      Dependence on nicotine from cigarettes (3/8/2022)      Polysubstance abuse ([facility] (3/8/2022)        ASS/Rec:     KAIDEN: concern for HRS due to large volume paracentesis, hypotension. Check abd US. Urine lytes   IV alb. Pressors. Hypoxic resp. Failure, pulmonary edema? Shock. Sepsis  Cirrhosis? DM, CHF, polysubstance abuse.     Thanks   Dr Bree Lawler

## 2022-03-09 NOTE — PROCEDURES
Department of Interventional Radiology  [phone]        Interventional Radiology Brief Procedure Note    Patient: Kimberly Shields MRN: [id number]  SSN: [id number]    YOB: 1975  Age: 55 y.o. Sex: female      Date of Procedure: 3/8/2022    Pre-Procedure Diagnosis: Large volume ascites    Post-Procedure Diagnosis: SAME    Procedure(s): US guided paracentesis    Brief Description of Procedure: RLQ US guided paracentesis with removal of 15 L clear yellow ascites. 50 grams IV albumin given.     Performed By: Sterling Atkinson MD     Assistants: None    Anesthesia:Lidocaine     Estimated Blood Loss: Less than 10 mL    Specimens: Sent to lab    Implants: None    Findings: As above    Complications: None      Signed By: Sterling Atkinson MD     March 8, 2022

## 2022-03-09 NOTE — PROGRESS NOTES
Bedside report to [facility] RN, pt calm, no distress noted. VSS. Chart and labs reviewed with on-coming nurse.

## 2022-03-09 NOTE — PROGRESS NOTES
Physician Progress Note      Rey Ha  [facility] #:                  [id number]  :                       1975  ADMIT DATE:       3/8/2022 1:48 PM  DISCH DATE:  RESPONDING  PROVIDER #:        ANASTACIO CHATTERJEE DO          QUERY TEXT:    Pt admitted with sepsis and has respiratory failure with hypoxia documented. If possible, please document in progress notes and discharge summary further specificity regarding the type and acuity of respiratory failure: The medical record reflects the following:  Risk Factors: copde, chf  Clinical Indicators: Documentation of respiratory failure with hypoxia. Respiratory rate of 26. Use of accessory muscles, unable to speak in full sentences. Documented O2 sats of 84, 80  Treatment: Supplemental oxygen  Options provided:  -- Acute respiratory failure with hypoxia  -- Other - I will add my own diagnosis  -- Disagree - Not applicable / Not valid  -- Disagree - Clinically unable to determine / Unknown  -- Refer to Clinical Documentation Reviewer    PROVIDER RESPONSE TEXT:    This patient is in acute respiratory failure with hypoxia. Query created by: Sheree Hatfield on 3/9/2022 11:48 AM      QUERY TEXT:    Pt admitted with sepsis and has systolic CHF documented. If possible, please document in progress notes and discharge summary further specificity regarding the  acuity of systolic  CHF:    The medical record reflects the following:  Risk Factors: chf  Clinical Indicators: BNP 9226. EF 50 to 55 percent. Documentation of systolic CHF. Treatment: Lasix 80mg IVP x1. Options provided:  -- Acute on Chronic Systolic CHF/HFrEF  -- Acute systolic  CHF  -- Chronic Systolic CHF/HFrEF  -- Other - I will add my own diagnosis  -- Disagree - Not applicable / Not valid  -- Disagree - Clinically unable to determine / Unknown  -- Refer to Clinical Documentation Reviewer    PROVIDER RESPONSE TEXT:    This patient is in acute systolic CHF.     Query created by: Km Lora on 3/9/2022 11:58 AM      Electronically signed by:  Ely Orozco DO 3/9/2022 12:43 PM

## 2022-03-09 NOTE — PROGRESS NOTES
PT was in bed but alert and interactive. PT asked for prayer. PT expressed she was ready to go home. [address] [place] [address] and PT talked briefly about importance of self-care and giving the body time to heal. PT is [facility]. [address] [place] [address] offered prayer and additional support if needed. Rev. Osbaldo Rodriguez M.Div.

## 2022-03-10 PROBLEM — R29.818 SUSPECTED SLEEP APNEA: Status: ACTIVE | Noted: 2022-03-10

## 2022-03-10 PROBLEM — I27.20 PULMONARY HYPERTENSION (HCC): Status: ACTIVE | Noted: 2022-03-10

## 2022-03-10 PROBLEM — F15.10 METHAMPHETAMINE ABUSE (HCC): Status: ACTIVE | Noted: 2022-03-10

## 2022-03-10 LAB
ALBUMIN SERPL-MCNC: 2.8 G/DL (ref 3.5–5)
ALBUMIN/GLOB SERPL: 0.9 {RATIO} (ref 1.2–3.5)
ALP SERPL-CCNC: 203 U/L (ref 50–136)
ALT SERPL-CCNC: 23 U/L (ref 12–65)
ANION GAP SERPL CALC-SCNC: 5 MMOL/L (ref 7–16)
AST SERPL-CCNC: 46 U/L (ref 15–37)
ATRIAL RATE: 120 BPM
BASOPHILS # BLD: 0.1 K/UL (ref 0–0.2)
BASOPHILS NFR BLD: 0 % (ref 0–2)
BILIRUB SERPL-MCNC: 2.6 MG/DL (ref 0.2–1.1)
BNP SERPL-MCNC: ABNORMAL PG/ML (ref 5–125)
BUN SERPL-MCNC: 28 MG/DL (ref 6–23)
CALCIUM SERPL-MCNC: 7.8 MG/DL (ref 8.3–10.4)
CALCULATED R AXIS, ECG10: -110 DEGREES
CALCULATED T AXIS, ECG11: 43 DEGREES
CHLORIDE SERPL-SCNC: 103 MMOL/L (ref 98–107)
CO2 SERPL-SCNC: 25 MMOL/L (ref 21–32)
CREAT SERPL-MCNC: 1.2 MG/DL (ref 0.6–1)
DIAGNOSIS, 93000: NORMAL
DIFFERENTIAL METHOD BLD: ABNORMAL
EOSINOPHIL # BLD: 0 K/UL (ref 0–0.8)
EOSINOPHIL NFR BLD: 0 % (ref 0.5–7.8)
ERYTHROCYTE [DISTWIDTH] IN BLOOD BY AUTOMATED COUNT: 18.2 % (ref 11.9–14.6)
GLOBULIN SER CALC-MCNC: 3 G/DL (ref 2.3–3.5)
GLUCOSE BLD STRIP.AUTO-MCNC: 131 MG/DL (ref 65–100)
GLUCOSE BLD STRIP.AUTO-MCNC: 139 MG/DL (ref 65–100)
GLUCOSE BLD STRIP.AUTO-MCNC: 158 MG/DL (ref 65–100)
GLUCOSE BLD STRIP.AUTO-MCNC: 164 MG/DL (ref 65–100)
GLUCOSE SERPL-MCNC: 122 MG/DL (ref 65–100)
HCT VFR BLD AUTO: 28 % (ref 35.8–46.3)
HGB BLD-MCNC: 8.5 G/DL (ref 11.7–15.4)
HIV 1+2 AB+HIV1 P24 AG SERPL QL IA: NONREACTIVE
HIV12 RESULT COMMENT, HHIVC: ABNORMAL
IMM GRANULOCYTES # BLD AUTO: 0 K/UL (ref 0–0.5)
IMM GRANULOCYTES NFR BLD AUTO: 0 % (ref 0–5)
LYMPHOCYTES # BLD: 0.4 K/UL (ref 0.5–4.6)
LYMPHOCYTES NFR BLD: 3 % (ref 13–44)
MCH RBC QN AUTO: 27.5 PG (ref 26.1–32.9)
MCHC RBC AUTO-ENTMCNC: 30.4 G/DL (ref 31.4–35)
MCV RBC AUTO: 90.6 FL (ref 79.6–97.8)
MONOCYTES # BLD: 0.3 K/UL (ref 0.1–1.3)
MONOCYTES NFR BLD: 2 % (ref 4–12)
NEUTS SEG # BLD: 12.2 K/UL (ref 1.7–8.2)
NEUTS SEG NFR BLD: 94 % (ref 43–78)
NRBC # BLD: 0 K/UL (ref 0–0.2)
PLATELET # BLD AUTO: 112 K/UL (ref 150–450)
PMV BLD AUTO: 11.3 FL (ref 9.4–12.3)
POTASSIUM SERPL-SCNC: 4.4 MMOL/L (ref 3.5–5.1)
PROT SERPL-MCNC: 5.8 G/DL (ref 6.3–8.2)
Q-T INTERVAL, ECG07: 334 MS
QRS DURATION, ECG06: 80 MS
QTC CALCULATION (BEZET), ECG08: 464 MS
RBC # BLD AUTO: 3.09 M/UL (ref 4.05–5.2)
SERVICE CMNT-IMP: ABNORMAL
SODIUM SERPL-SCNC: 133 MMOL/L (ref 136–145)
VANCOMYCIN SERPL-MCNC: 14.6 UG/ML
VENTRICULAR RATE, ECG03: 116 BPM
WBC # BLD AUTO: 13 K/UL (ref 4.3–11.1)

## 2022-03-10 PROCEDURE — 74011250637 HC RX REV CODE- 250/637: Performed by: INTERNAL MEDICINE

## 2022-03-10 PROCEDURE — 85025 COMPLETE CBC W/AUTO DIFF WBC: CPT

## 2022-03-10 PROCEDURE — 74011000250 HC RX REV CODE- 250: Performed by: HOSPITALIST

## 2022-03-10 PROCEDURE — 2709999900 HC NON-CHARGEABLE SUPPLY

## 2022-03-10 PROCEDURE — 74011250636 HC RX REV CODE- 250/636: Performed by: EMERGENCY MEDICINE

## 2022-03-10 PROCEDURE — 36415 COLL VENOUS BLD VENIPUNCTURE: CPT

## 2022-03-10 PROCEDURE — 74011250637 HC RX REV CODE- 250/637: Performed by: FAMILY MEDICINE

## 2022-03-10 PROCEDURE — 82962 GLUCOSE BLOOD TEST: CPT

## 2022-03-10 PROCEDURE — P9047 ALBUMIN (HUMAN), 25%, 50ML: HCPCS | Performed by: INTERNAL MEDICINE

## 2022-03-10 PROCEDURE — 83880 ASSAY OF NATRIURETIC PEPTIDE: CPT

## 2022-03-10 PROCEDURE — 74011000258 HC RX REV CODE- 258: Performed by: EMERGENCY MEDICINE

## 2022-03-10 PROCEDURE — 74011000258 HC RX REV CODE- 258: Performed by: INTERNAL MEDICINE

## 2022-03-10 PROCEDURE — 74011250637 HC RX REV CODE- 250/637: Performed by: EMERGENCY MEDICINE

## 2022-03-10 PROCEDURE — 77010033678 HC OXYGEN DAILY

## 2022-03-10 PROCEDURE — 74011250636 HC RX REV CODE- 250/636: Performed by: INTERNAL MEDICINE

## 2022-03-10 PROCEDURE — 93005 ELECTROCARDIOGRAM TRACING: CPT | Performed by: INTERNAL MEDICINE

## 2022-03-10 PROCEDURE — 74011000250 HC RX REV CODE- 250: Performed by: EMERGENCY MEDICINE

## 2022-03-10 PROCEDURE — 82103 ALPHA-1-ANTITRYPSIN TOTAL: CPT

## 2022-03-10 PROCEDURE — 87389 HIV-1 AG W/HIV-1&-2 AB AG IA: CPT

## 2022-03-10 PROCEDURE — 65610000001 HC ROOM ICU GENERAL

## 2022-03-10 PROCEDURE — 74011000258 HC RX REV CODE- 258: Performed by: HOSPITALIST

## 2022-03-10 PROCEDURE — 99223 1ST HOSP IP/OBS HIGH 75: CPT | Performed by: INTERNAL MEDICINE

## 2022-03-10 PROCEDURE — 80202 ASSAY OF VANCOMYCIN: CPT

## 2022-03-10 PROCEDURE — 80053 COMPREHEN METABOLIC PANEL: CPT

## 2022-03-10 PROCEDURE — 86038 ANTINUCLEAR ANTIBODIES: CPT

## 2022-03-10 PROCEDURE — 74011636637 HC RX REV CODE- 636/637: Performed by: INTERNAL MEDICINE

## 2022-03-10 PROCEDURE — 74011000250 HC RX REV CODE- 250: Performed by: INTERNAL MEDICINE

## 2022-03-10 PROCEDURE — 94640 AIRWAY INHALATION TREATMENT: CPT

## 2022-03-10 RX ORDER — AMIODARONE HYDROCHLORIDE 200 MG/1
200 TABLET ORAL 2 TIMES DAILY
Status: DISCONTINUED | OUTPATIENT
Start: 2022-03-10 | End: 2022-03-13 | Stop reason: HOSPADM

## 2022-03-10 RX ORDER — SPIRONOLACTONE 25 MG/1
25 TABLET ORAL DAILY
Status: DISCONTINUED | OUTPATIENT
Start: 2022-03-10 | End: 2022-03-11

## 2022-03-10 RX ORDER — METOPROLOL TARTRATE 50 MG/1
50 TABLET ORAL 2 TIMES DAILY
Status: DISCONTINUED | OUTPATIENT
Start: 2022-03-10 | End: 2022-03-11

## 2022-03-10 RX ORDER — GABAPENTIN 300 MG/1
300 CAPSULE ORAL 3 TIMES DAILY
Status: DISCONTINUED | OUTPATIENT
Start: 2022-03-10 | End: 2022-03-10

## 2022-03-10 RX ORDER — CHOLECALCIFEROL (VITAMIN D3) 125 MCG
5 CAPSULE ORAL
Status: DISCONTINUED | OUTPATIENT
Start: 2022-03-10 | End: 2022-03-13 | Stop reason: HOSPADM

## 2022-03-10 RX ORDER — INSULIN LISPRO 100 [IU]/ML
INJECTION, SOLUTION INTRAVENOUS; SUBCUTANEOUS
Status: DISCONTINUED | OUTPATIENT
Start: 2022-03-10 | End: 2022-03-13 | Stop reason: HOSPADM

## 2022-03-10 RX ORDER — VANCOMYCIN/0.9 % SOD CHLORIDE 1.5G/250ML
1500 PLASTIC BAG, INJECTION (ML) INTRAVENOUS
Status: DISCONTINUED | OUTPATIENT
Start: 2022-03-10 | End: 2022-03-10

## 2022-03-10 RX ORDER — FLUCONAZOLE 2 MG/ML
200 INJECTION, SOLUTION INTRAVENOUS EVERY 24 HOURS
Status: COMPLETED | OUTPATIENT
Start: 2022-03-10 | End: 2022-03-12

## 2022-03-10 RX ORDER — GABAPENTIN 300 MG/1
600 CAPSULE ORAL 3 TIMES DAILY
Status: DISCONTINUED | OUTPATIENT
Start: 2022-03-10 | End: 2022-03-13 | Stop reason: HOSPADM

## 2022-03-10 RX ORDER — FUROSEMIDE 40 MG/1
40 TABLET ORAL DAILY
Status: DISCONTINUED | OUTPATIENT
Start: 2022-03-10 | End: 2022-03-11

## 2022-03-10 RX ORDER — VANCOMYCIN/0.9 % SOD CHLORIDE 1.5G/250ML
1500 PLASTIC BAG, INJECTION (ML) INTRAVENOUS EVERY 24 HOURS
Status: DISCONTINUED | OUTPATIENT
Start: 2022-03-10 | End: 2022-03-11

## 2022-03-10 RX ADMIN — MIDODRINE HYDROCHLORIDE 10 MG: 5 TABLET ORAL at 11:17

## 2022-03-10 RX ADMIN — Medication 2 UNITS: at 21:54

## 2022-03-10 RX ADMIN — SODIUM CHLORIDE, PRESERVATIVE FREE 10 ML: 5 INJECTION INTRAVENOUS at 06:44

## 2022-03-10 RX ADMIN — MIDODRINE HYDROCHLORIDE 10 MG: 5 TABLET ORAL at 16:31

## 2022-03-10 RX ADMIN — SODIUM CHLORIDE 7.5 MG/HR: 900 INJECTION, SOLUTION INTRAVENOUS at 04:11

## 2022-03-10 RX ADMIN — SODIUM CHLORIDE 12.5 MG/HR: 900 INJECTION, SOLUTION INTRAVENOUS at 18:28

## 2022-03-10 RX ADMIN — SODIUM CHLORIDE, PRESERVATIVE FREE 10 ML: 5 INJECTION INTRAVENOUS at 21:39

## 2022-03-10 RX ADMIN — NOREPINEPHRINE-DEXTROSE IV SOLUTION 4 MG/250ML-5% 2 MCG/MIN: 4-5/250 SOLUTION at 09:34

## 2022-03-10 RX ADMIN — AMIODARONE HYDROCHLORIDE 150 MG: 50 INJECTION, SOLUTION INTRAVENOUS at 10:35

## 2022-03-10 RX ADMIN — FAMOTIDINE 20 MG: 20 TABLET, FILM COATED ORAL at 17:29

## 2022-03-10 RX ADMIN — SODIUM CHLORIDE, PRESERVATIVE FREE 10 ML: 5 INJECTION INTRAVENOUS at 13:20

## 2022-03-10 RX ADMIN — GABAPENTIN 600 MG: 300 CAPSULE ORAL at 13:18

## 2022-03-10 RX ADMIN — MIDODRINE HYDROCHLORIDE 10 MG: 5 TABLET ORAL at 07:42

## 2022-03-10 RX ADMIN — LEVALBUTEROL HYDROCHLORIDE 0.63 MG: 0.63 SOLUTION RESPIRATORY (INHALATION) at 19:55

## 2022-03-10 RX ADMIN — LEVALBUTEROL HYDROCHLORIDE 0.63 MG: 0.63 SOLUTION RESPIRATORY (INHALATION) at 13:33

## 2022-03-10 RX ADMIN — ALBUMIN (HUMAN) 25 G: 0.25 INJECTION, SOLUTION INTRAVENOUS at 06:39

## 2022-03-10 RX ADMIN — ACETAMINOPHEN 650 MG: 325 TABLET ORAL at 08:55

## 2022-03-10 RX ADMIN — BUDESONIDE 500 MCG: 0.5 INHALANT RESPIRATORY (INHALATION) at 19:55

## 2022-03-10 RX ADMIN — IPRATROPIUM BROMIDE 0.5 MG: 0.5 SOLUTION RESPIRATORY (INHALATION) at 13:33

## 2022-03-10 RX ADMIN — SPIRONOLACTONE 25 MG: 25 TABLET ORAL at 14:51

## 2022-03-10 RX ADMIN — IPRATROPIUM BROMIDE 0.5 MG: 0.5 SOLUTION RESPIRATORY (INHALATION) at 08:25

## 2022-03-10 RX ADMIN — ACETAMINOPHEN 650 MG: 325 TABLET ORAL at 16:31

## 2022-03-10 RX ADMIN — AMIODARONE HYDROCHLORIDE 200 MG: 200 TABLET ORAL at 17:29

## 2022-03-10 RX ADMIN — FLUCONAZOLE IN SODIUM CHLORIDE 200 MG: 2 INJECTION, SOLUTION INTRAVENOUS at 11:17

## 2022-03-10 RX ADMIN — CEFTRIAXONE 2 G: 2 INJECTION, POWDER, FOR SOLUTION INTRAMUSCULAR; INTRAVENOUS at 16:28

## 2022-03-10 RX ADMIN — Medication 2 UNITS: at 11:32

## 2022-03-10 RX ADMIN — NOREPINEPHRINE-DEXTROSE IV SOLUTION 4 MG/250ML-5% 2 MCG/MIN: 4-5/250 SOLUTION at 19:17

## 2022-03-10 RX ADMIN — FUROSEMIDE 40 MG: 40 TABLET ORAL at 14:51

## 2022-03-10 RX ADMIN — SODIUM CHLORIDE 1000 ML: 900 INJECTION, SOLUTION INTRAVENOUS at 07:42

## 2022-03-10 RX ADMIN — GABAPENTIN 600 MG: 300 CAPSULE ORAL at 04:25

## 2022-03-10 RX ADMIN — IPRATROPIUM BROMIDE 0.5 MG: 0.5 SOLUTION RESPIRATORY (INHALATION) at 19:55

## 2022-03-10 RX ADMIN — Medication 5 MG: at 22:11

## 2022-03-10 RX ADMIN — AMIODARONE HYDROCHLORIDE 200 MG: 200 TABLET ORAL at 10:52

## 2022-03-10 RX ADMIN — FAMOTIDINE 20 MG: 20 TABLET, FILM COATED ORAL at 08:55

## 2022-03-10 RX ADMIN — GABAPENTIN 600 MG: 300 CAPSULE ORAL at 21:39

## 2022-03-10 RX ADMIN — CEFEPIME HYDROCHLORIDE 2 G: 2 INJECTION, POWDER, FOR SOLUTION INTRAVENOUS at 07:42

## 2022-03-10 RX ADMIN — VANCOMYCIN HYDROCHLORIDE 1500 MG: 10 INJECTION, POWDER, LYOPHILIZED, FOR SOLUTION INTRAVENOUS at 12:49

## 2022-03-10 RX ADMIN — SODIUM CHLORIDE, PRESERVATIVE FREE 10 ML: 5 INJECTION INTRAVENOUS at 06:40

## 2022-03-10 RX ADMIN — SODIUM CHLORIDE 5 MG/HR: 900 INJECTION, SOLUTION INTRAVENOUS at 12:49

## 2022-03-10 RX ADMIN — ENOXAPARIN SODIUM 40 MG: 100 INJECTION SUBCUTANEOUS at 08:37

## 2022-03-10 RX ADMIN — ALBUMIN (HUMAN) 25 G: 0.25 INJECTION, SOLUTION INTRAVENOUS at 11:17

## 2022-03-10 RX ADMIN — METOPROLOL TARTRATE 50 MG: 50 TABLET, FILM COATED ORAL at 16:31

## 2022-03-10 RX ADMIN — NYSTATIN: 100000 POWDER TOPICAL at 08:56

## 2022-03-10 RX ADMIN — BUDESONIDE 500 MCG: 0.5 INHALANT RESPIRATORY (INHALATION) at 08:25

## 2022-03-10 RX ADMIN — ACETAMINOPHEN 650 MG: 325 TABLET ORAL at 03:09

## 2022-03-10 RX ADMIN — LEVALBUTEROL HYDROCHLORIDE 0.63 MG: 0.63 SOLUTION RESPIRATORY (INHALATION) at 03:14

## 2022-03-10 RX ADMIN — ASPIRIN 81 MG: 81 TABLET, CHEWABLE ORAL at 08:55

## 2022-03-10 RX ADMIN — IPRATROPIUM BROMIDE 0.5 MG: 0.5 SOLUTION RESPIRATORY (INHALATION) at 03:14

## 2022-03-10 RX ADMIN — NYSTATIN: 100000 POWDER TOPICAL at 17:29

## 2022-03-10 RX ADMIN — LEVALBUTEROL HYDROCHLORIDE 0.63 MG: 0.63 SOLUTION RESPIRATORY (INHALATION) at 08:25

## 2022-03-10 NOTE — ROUTINE PROCESS
Verbal bedside report given to Karla Loya, oncoming RN. Patient's situation, background, assessment and recommendations provided. Opportunity for questions provided. Oncoming RN assumed care of patient.

## 2022-03-10 NOTE — CONSULTS
History and Physical Initial Visit NOTE           3/10/2022    Marely Stockton                        Date of Admission:  3/8/2022    The patient's chart is reviewed and the patient is discussed with the staff. Subjective:     Patient is a 55 y.o.  female with a PMH of 10pkyr smoking history (1/2ppd currently), COPD, a fib on eliquis seen and evaluated at the request of Dr. Gustavo Abbott for possible [place] [address] based on TTE with TPRV of 3.0m/s, moderate RV dilation and reduced systolic function. She reports methamphetamine use 1 week ago. She has been taking eliquis since her heart attack 5 years ago. She also reports daytime sleepiness. During her hospitalization she has had paracentesis with removal of 15L of ascitic fluid. Her Ultrasound appears that she may have cirrhosis and INR is slightly elevated. Platelets were normal on admission. FH of COPD. She is now being started on amiodarone for rapid atrial fibrillation. Risk factors for PH  Obesity and possible HOLLEY  Methamphetamine use  Smoking  Possible cirrhosis      REVIEW OF SYSTEMS:  CONSTITUTIONAL:  There is no history of fever, chills, night sweats, weight loss  EYES:  Denies problems with eye pain, erythema, blurred vision, or visual field loss. ENTM:  Denies history of tinnitus, epistaxis, sore throat, hoarseness, or dysphonia. LYMPH:  Denies swollen glands. CARDIAC:  No chest pain, pressure, discomfort, palpitations, orthopnea, murmurs. + edema  GI:  No dysphagia, heartburn reflux, nausea/vomiting, diarrhea, abdominal pain, or bleeding. :Denies history of dysuria, hematuria, polyuria, or decreased urine output. MS:  No history of myalgias, arthralgias, bone pain, or muscle cramps. SKIN:  No history of rashes, jaundice, cyanosis, nodules, or ulcers. ENDO:  Negative for heat or cold intolerance. PSYCH:  Negative for anxiety, depression, insomnia, hallucinations.   NEURO:  There is no history of AMS, persistent headache, decreased level of consciousness, seizures, or motor or sensory deficits. Prior to Admission Medications   Prescriptions Last Dose Informant Patient Reported? Taking? albuterol (PROVENTIL HFA, VENTOLIN HFA, PROAIR HFA) 90 mcg/actuation inhaler   Yes Yes   Sig: Take 2 Puffs by inhalation every four (4) hours as needed for Wheezing. apixaban (Eliquis) 5 mg tablet   Yes Yes   Sig: Take 5 mg by mouth two (2) times a day. aspirin 81 mg chewable tablet   Yes Yes   Sig: Take 81 mg by mouth daily. Indications: treatment to prevent a heart attack   bumetanide (BUMEX) 1 mg tablet   Yes Yes   Sig: Take 1 mg by mouth two (2) times a day. fluticasone propion-salmeteroL (ADVAIR/WIXELA) 100-50 mcg/dose diskus inhaler   Yes Yes   Sig: Take 1 Puff by inhalation two (2) times a day. Indications: controller medication for asthma   fluticasone propionate (FLOVENT DISKUS) 100 mcg/actuation dsdv inhaler   Yes Yes   Sig: Take 1 Puff by inhalation two (2) times a day.   gabapentin (Neurontin) 300 mg capsule   Yes Yes   Sig: Take 300 mg by mouth three (3) times daily. insulin glargine (Lantus U-100 Insulin) 100 unit/mL injection   Yes Yes   Si Units by SubCUTAneous route nightly. Indications: type 2 diabetes mellitus   insulin lispro (HumaLOG U-100 Insulin) 100 unit/mL injection   Yes Yes   Sig: 10 Units by SubCUTAneous route Before breakfast, lunch, and dinner. Indications: type 2 diabetes mellitus   metoprolol succinate (TOPROL-XL) 200 mg XL tablet   Yes Yes   Sig: Take 200 mg by mouth daily. spironolactone (ALDACTONE) 25 mg tablet   Yes Yes   Sig: Take 25 mg by mouth daily. Facility-Administered Medications: None     No past medical history on file.   Past Surgical History:   Procedure Laterality Date    IR PARACENTESIS ABD W IMAGE  3/8/2022     Social History     Socioeconomic History    Marital status:      Spouse name: Not on file    Number of children: Not on file    Years of education: Not on file    Highest education level: Not on file   Occupational History    Not on file   Tobacco Use    Smoking status: Not on file    Smokeless tobacco: Not on file   Substance and Sexual Activity    Alcohol use: Not on file    Drug use: Not on file    Sexual activity: Not on file   Other Topics Concern    Not on file   Social History Narrative    Not on file     Social Determinants of Health     Financial Resource Strain:     Difficulty of Paying Living Expenses: Not on file   Food Insecurity:     Worried About Running Out of Food in the Last Year: Not on file    Miladys of Food in the Last Year: Not on file   Transportation Needs:     Lack of Transportation (Medical): Not on file    Lack of Transportation (Non-Medical): Not on file   Physical Activity:     Days of Exercise per Week: Not on file    Minutes of Exercise per Session: Not on file   Stress:     Feeling of Stress : Not on file   Social Connections:     Frequency of Communication with Friends and Family: Not on file    Frequency of Social Gatherings with Friends and Family: Not on file    Attends [employer] Services: Not on file    Active Member of [address] [place] [address] or Organizations: Not on file    Attends Club or Organization Meetings: Not on file    Marital Status: Not on file   Intimate Partner Violence:     Fear of Current or Ex-Partner: Not on file    Emotionally Abused: Not on file    Physically Abused: Not on file    Sexually Abused: Not on file   Housing Stability:     Unable to Pay for Housing in the Last Year: Not on file    Number of [place] in the Last Year: Not on file    Unstable Housing in the Last Year: Not on file     No family history on file.   No Known Allergies  Current Facility-Administered Medications   Medication Dose Route Frequency    gabapentin (NEURONTIN) capsule 600 mg  600 mg Oral TID    amiodarone (CORDARONE) 150 mg in dextrose 5% 100 mL bolus infusion  150 mg IntraVENous ONCE    amiodarone (CORDARONE) tablet 200 mg  200 mg Oral BID    NOREPINephrine (LEVOPHED) 4 mg in 5% dextrose 250 mL infusion  0.5-16 mcg/min IntraVENous TITRATE    lip protectant (BLISTEX) ointment 1 Each  1 Each Topical PRN    cefepime (MAXIPIME) 2 g in 0.9% sodium chloride (MBP/ADV) 100 mL MBP  2 g IntraVENous Q8H    albumin human 25% (BUMINATE) solution 25 g  25 g IntraVENous Q6H    dilTIAZem (CARDIZEM) 100 mg in 0.9% sodium chloride (MBP/ADV) 100 mL infusion  5 mg/hr IntraVENous TITRATE    sodium chloride (NS) flush 5-40 mL  5-40 mL IntraVENous Q8H    sodium chloride (NS) flush 5-40 mL  5-40 mL IntraVENous PRN    acetaminophen (TYLENOL) tablet 650 mg  650 mg Oral Q6H PRN    Or    acetaminophen (TYLENOL) suppository 650 mg  650 mg Rectal Q6H PRN    polyethylene glycol (MIRALAX) packet 17 g  17 g Oral DAILY PRN    ondansetron (ZOFRAN ODT) tablet 4 mg  4 mg Oral Q8H PRN    Or    ondansetron (ZOFRAN) injection 4 mg  4 mg IntraVENous Q6H PRN    enoxaparin (LOVENOX) injection 40 mg  40 mg SubCUTAneous DAILY    potassium chloride 10 mEq in 100 ml IVPB  10 mEq IntraVENous PRN    magnesium sulfate 2 g/50 ml IVPB (premix or compounded)  2 g IntraVENous PRN    famotidine (PEPCID) tablet 20 mg  20 mg Oral BID    nicotine buccal (POLACRILEX) lozenge 2 mg  2 mg Oral Q4H PRN    nicotine (NICODERM CQ) 21 mg/24 hr patch 1 Patch  1 Patch TransDERmal DAILY    levalbuterol (XOPENEX) nebulizer soln 0.63 mg/3 mL  0.63 mg Nebulization Q6H RT    ipratropium (ATROVENT) 0.02 % nebulizer solution 0.5 mg  0.5 mg Nebulization Q6H RT    budesonide (PULMICORT) 500 mcg/2 ml nebulizer suspension  500 mcg Nebulization BID RT    HYDROmorphone (DILAUDID) injection 0.5 mg  0.5 mg IntraVENous Q4H PRN    HYDROmorphone (DILAUDID) injection 0.2 mg  0.2 mg IntraVENous Q4H PRN    midodrine (PROAMATINE) tablet 10 mg  10 mg Oral TID WITH MEALS    aspirin chewable tablet 81 mg  81 mg Oral DAILY    nystatin (MYCOSTATIN) 100,000 unit/gram powder   Topical BID Objective:   Blood pressure (!) 95/56, pulse (!) 123, temperature 98.8 °F (37.1 °C), resp. rate 13, height 6' 1\" (1.854 m), weight 285 lb 0.9 oz (129.3 kg), SpO2 94 %, not currently breastfeeding. Intake/Output Summary (Last 24 hours) at 3/10/2022 0943  Last data filed at 3/10/2022 0848  Gross per 24 hour   Intake 2828.06 ml   Output 1800 ml   Net 1028.06 ml     PHYSICAL EXAM   Constitutional:  the patient is well developed and in no acute distress  EENMT:  Sclera clear, pupils equal, oral mucosa moist.  Lower lip with dark areas  Respiratory: CTA anteriorly, decreased posteriorly  Cardiovascular:  irreg irreg  Gastrointestinal: soft and non-tender; with positive bowel sounds. Musculoskeletal: warm without cyanosis. There is 2+ lower extremity edema. Skin: erythema of lower extremity  Neurologic: no gross neuro deficits     Psychiatric:  alert and oriented x 3      Recent Labs     03/10/22  0759 03/09/22  0345 03/08/22  1556 03/08/22  1345   WBC 13.0* 14.8*  --  12.2*   HGB 8.5* 9.9*  --  10.3*   HCT 28.0* 33.2*  --  36.5   * 134*  --  219   INR  --  1.5  --   --    LAC  --  2.0 1.5 4.7*   * 136  --  133*   K 4.4 4.8  --  4.3    104  --  102   CO2 25 26  --  24   * 102*  --  206*   BUN 28* 26*  --  19   CREA 1.20* 1.60*  --  1.20*   MG  --   --   --  2.1   CA 7.8* 8.3  --  8.9   ALB 2.8* 2.7*  --  3.6   AST 46* 139*  --  19   ALT 23 38  --  13   * 329*  --  299*   BNPNT  --   --   --  9,226*     ECHO: Results from Hospital Encounter encounter on 03/08/22    ECHO ADULT COMPLETE    Interpretation Summary    Left Ventricle: Left ventricle is mildly dilated. Normal wall thickness. Normal wall motion. Low normal left ventricular systolic function with a visually estimated EF of 50 - 55%. Abnormal diastolic function.   Right Ventricle: Right ventricle is moderately dilated. Mildly reduced systolic function.   Aortic Valve: Mild sclerosis of the aortic valve cusp.    Mitral Valve: Mild transvalvular regurgitation.   Tricuspid Valve: Moderate transvalvular regurgitation. Mildly elevated RVSP.   Left Atrium: Left atrium is moderately dilated.   Right Atrium: Right atrium is moderately dilated.   Contrast used: Definity. Inpat Anti-Infectives (From admission, onward)     Start     Ordered Stop    03/09/22 0800  cefepime (MAXIPIME) 2 g in 0.9% sodium chloride (MBP/ADV) 100 mL MBP  2 g,   IntraVENous,   EVERY 8 HOURS         03/09/22 0738 --    03/09/22 0000  nystatin (MYCOSTATIN) 100,000 unit/gram powder  Topical,   2 TIMES DAILY         03/08/22 1851 --                  Diagnostic Review:      6MWT needed   distance    Start;end O2 sat    Max SEAMUS dyspnea    COMMENTS        Right Heart Cath Likely needed as outpatient   RA    RV    PA    PCWP    CO    PVR    VASOREACTIVE? TTE 3/9/22   LV EF 72-25%  Diastolic function abnormal, E/e' 13   RV Moderately dilated w mildly reduced systolic function   Peak TRV 3.07m/s   COMMENTS Moderately dilated LA/RA, no significant valvular disease         Oximetry and/or sleep study result:  Needed    Chest CT:    V/Q Scan:    Lab Diagnostics:  HIV  HCV  MARCELO  BNP        FVC    FEV1    FEV1/FVC    TLC    FRC    RV    DLCO      Ultrasound RUQ  IMPRESSION  1. Heterogeneous echotexture to the liver with a nodular margin. This can be  seen with cirrhosis. 2. Nonspecific gallbladder wall thickening can also be seen with liver disease. 3. Cholelithiasis. 4. Small volume ascite      Assessment and Plan:  (Medical Decision Making)   Principal Problem:    Systolic congestive heart failure ([address] (3/9/2022)  Also with PH and diastolic dysfunction. Active Problems:    Anasarca (3/8/2022)  Will need diuresis when AF controlled and BP improved. Cirrhosis ([address] (3/8/2022)  Check alpha-1 antitrypsin genotype      SBP (spontaneous bacterial peritonitis) ([address] (3/8/2022)  Likely to need further paracentesis and possibly albumin.  On cefepime currently       Atrial fibrillation with RVR ([facility] (3/8/2022)  On amiodarone      Respiratory failure with hypoxia ([facility] (3/8/2022)  Continue oxygen        KAIDEN (acute kidney injury) ([facility] (3/8/2022)        Dependence on nicotine from cigarettes (3/8/2022)  Cessation required      Polysubstance abuse ([facility] (3/8/2022)  Discussed importance of abstinence from meth    Pulmonary hypertension  --check HIV, alpha-1 antitrypsin genotype, MARCELO, BNP  --while inpatient would go ahead and get V/Q when euvolemic.  --suspect she will need gentle diuresis prior to discharge after AF under better control.  --we discussed the importance of close follow up. As she approaches discharge we will arrange an expedited sleep study, complete PFTs, 6MWT and follow up appointment. We discussed that for medication consideration and even prior to cath she will need to attend appointments and have negative drug testing. Full Code    More than 50% of the time documented was spent in face-to-face contact with the patient and in the care of the patient on the floor/unit where the patient is located. Thank you very much for this referral.  We appreciate the opportunity to participate in this patient's care. Will follow along with above stated plan.     Live Harley MD

## 2022-03-10 NOTE — PROGRESS NOTES
Admit Date: 3/8/2022      Subjective:      More awake     Review of Systems  Cardio-vascular: no chest pain, no SOB  GI: no N/V/D  Leg pain     Objective:     Patient Vitals for the past 8 hrs:   BP Temp Pulse Resp SpO2   03/10/22 0930 (!) 95/56 -- (!) 123 13 94 %   03/10/22 0915 95/60 -- (!) 121 16 96 %   03/10/22 0900 (!) 98/54 -- (!) 124 22 96 %   03/10/22 0845 (!) 101/56 -- (!) 119 19 100 %   03/10/22 0830 (!) 100/56 -- (!) 116 16 99 %   03/10/22 0826 -- -- -- -- 98 %   03/10/22 0815 (!) 100/55 -- (!) 121 12 97 %   03/10/22 0800 (!) 92/55 -- (!) 115 20 96 %   03/10/22 0745 (!) 91/55 -- (!) 119 24 96 %   03/10/22 0730 (!) 85/54 -- (!) 116 18 95 %   03/10/22 0715 (!) 86/53 -- (!) 120 12 95 %   03/10/22 0700 (!) 88/52 98.8 °F (37.1 °C) (!) 121 14 95 %   03/10/22 0645 (!) 91/52 -- (!) 117 12 95 %   03/10/22 0630 (!) 91/55 -- (!) 112 -- 94 %   03/10/22 0615 (!) 94/55 -- (!) 122 11 95 %   03/10/22 0600 (!) 97/53 -- (!) 123 14 93 %   03/10/22 0545 104/60 -- (!) 119 17 92 %   03/10/22 0530 (!) 101/55 -- (!) 117 15 93 %   03/10/22 0515 117/68 -- (!) 124 18 92 %   03/10/22 0500 (!) 101/58 -- (!) 117 15 94 %   03/10/22 0445 (!) 98/56 -- (!) 123 13 94 %   03/10/22 0430 (!) 93/54 -- (!) 124 (!) 5 94 %   03/10/22 0415 (!) 101/55 -- (!) 120 14 93 %   03/10/22 0400 (!) 101/58 -- (!) 122 11 95 %   03/10/22 0345 (!) 102/55 -- (!) 120 (!) 0 94 %   03/10/22 0330 (!) 101/59 -- (!) 120 18 97 %   03/10/22 0324 (!) 93/54 98.5 °F (36.9 °C) (!) 111 21 97 %   03/10/22 0315 97/60 -- (!) 117 11 99 %   03/10/22 0314 -- -- -- -- 96 %   03/10/22 0300 109/66 -- (!) 110 (!) 43 98 %   03/10/22 0245 109/66 -- (!) 116 17 96 %   03/10/22 0230 105/63 -- (!) 114 15 96 %   03/10/22 0215 (!) 103/59 -- (!) 109 19 99 %   03/10/22 0200 105/61 -- (!) 112 17 98 %   03/10/22 0145 104/62 -- (!) 104 18 97 %     03/10 0701 - 03/10 1900  In: 879.9 [P.O.:500;  I.V.:379.9]  Out: 0 [Urine:700]    PE:   Lethargic, more awake   Not in RUDY  Mildly jaundice   Lung: poor inspiration, decreased BS   CV: IR, no rub. Abd: mildly distended, no rebound. Ext: lower: erythema, ++ edema with increased local Tp.             Data Review   Recent Results (from the past 8 hour(s))   VANCOMYCIN, RANDOM    Collection Time: 03/10/22  4:10 AM   Result Value Ref Range    Vancomycin, random 14.6 UG/ML   GLUCOSE, POC    Collection Time: 03/10/22  6:43 AM   Result Value Ref Range    Glucose (POC) 139 (H) 65 - 100 mg/dL    Performed by Lainez ([place])    EKG, 12 LEAD, INITIAL    Collection Time: 03/10/22  7:37 AM   Result Value Ref Range    Ventricular Rate 116 BPM    Atrial Rate 120 BPM    QRS Duration 80 ms    Q-T Interval 334 ms    QTC Calculation (Bezet) 464 ms    Calculated R Axis -110 degrees    Calculated T Axis 43 degrees    Diagnosis       Atrial fibrillation with rapid ventricular response  Possible Right ventricular hypertrophy  Inferior infarct (cited on or before 10-MAR-2022)  Possible Anterolateral infarct (cited on or before 10-MAR-2022)  Abnormal ECG  When compared with ECG of 08-MAR-2022 19:26,  Atrial fibrillation has replaced Sinus rhythm     CBC WITH AUTOMATED DIFF    Collection Time: 03/10/22  7:59 AM   Result Value Ref Range    WBC 13.0 (H) 4.3 - 11.1 K/uL    RBC 3.09 (L) 4.05 - 5.2 M/uL    HGB 8.5 (L) 11.7 - 15.4 g/dL    HCT 28.0 (L) 35.8 - 46.3 %    MCV 90.6 79.6 - 97.8 FL    MCH 27.5 26.1 - 32.9 PG    MCHC 30.4 (L) 31.4 - 35.0 g/dL    RDW 18.2 (H) 11.9 - 14.6 %    PLATELET 877 (L) 576 - 450 K/uL    MPV 11.3 9.4 - 12.3 FL    ABSOLUTE NRBC 0.00 0.0 - 0.2 K/uL    DF AUTOMATED      NEUTROPHILS 94 (H) 43 - 78 %    LYMPHOCYTES 3 (L) 13 - 44 %    MONOCYTES 2 (L) 4.0 - 12.0 %    EOSINOPHILS 0 (L) 0.5 - 7.8 %    BASOPHILS 0 0.0 - 2.0 %    IMMATURE GRANULOCYTES 0 0.0 - 5.0 %    ABS. NEUTROPHILS 12.2 (H) 1.7 - 8.2 K/UL    ABS. LYMPHOCYTES 0.4 (L) 0.5 - 4.6 K/UL    ABS. MONOCYTES 0.3 0.1 - 1.3 K/UL    ABS. EOSINOPHILS 0.0 0.0 - 0.8 K/UL    ABS.  BASOPHILS 0.1 0.0 - 0.2 K/UL ABS. IMM. GRANS. 0.0 0.0 - 0.5 K/UL   METABOLIC PANEL, COMPREHENSIVE    Collection Time: 03/10/22  7:59 AM   Result Value Ref Range    Sodium 133 (L) 136 - 145 mmol/L    Potassium 4.4 3.5 - 5.1 mmol/L    Chloride 103 98 - 107 mmol/L    CO2 25 21 - 32 mmol/L    Anion gap 5 (L) 7 - 16 mmol/L    Glucose 122 (H) 65 - 100 mg/dL    BUN 28 (H) 6 - 23 MG/DL    Creatinine 1.20 (H) 0.6 - 1.0 MG/DL    GFR est AA >60 >60 ml/min/1.73m2    GFR est non-AA 51 (L) >60 ml/min/1.73m2    Calcium 7.8 (L) 8.3 - 10.4 MG/DL    Bilirubin, total 2.6 (H) 0.2 - 1.1 MG/DL    ALT (SGPT) 23 12 - 65 U/L    AST (SGOT) 46 (H) 15 - 37 U/L    Alk. phosphatase 203 (H) 50 - 136 U/L    Protein, total 5.8 (L) 6.3 - 8.2 g/dL    Albumin 2.8 (L) 3.5 - 5.0 g/dL    Globulin 3.0 2.3 - 3.5 g/dL    A-G Ratio 0.9 (L) 1.2 - 3.5             Assessment:     Principal Problem:    Systolic congestive heart failure ([facility] (3/9/2022)    Active Problems:    Anasarca (3/8/2022)      Cirrhosis ([facility] (3/8/2022)      SBP (spontaneous bacterial peritonitis) ([facility] (3/8/2022)      Atrial fibrillation with RVR ([facility] (3/8/2022)      Respiratory failure with hypoxia ([address] (3/8/2022)      Hyponatremia (3/8/2022)      COPD exacerbation ([address] (3/8/2022)      KAIDEN (acute kidney injury) ([facility] (3/8/2022)      Dependence on nicotine from cigarettes (3/8/2022)      Polysubstance abuse ([address] (3/8/2022)        Plan:   KAIDEN: concern for HRS ( low U. Na)due to large volume paracentesis, hypotension. Responding to treatment:   IV alb. Pressors.     Hypoxic resp. Failure, pulmonary edema? Shock. Sepsis:  GNR UTI  Cirrhosis? DM, CHF, polysubstance abuse.       Rory Howard MD

## 2022-03-10 NOTE — PROGRESS NOTES
VANCO DAILY FOLLOW UP NOTE  [facility] Pharmacokinetic Monitoring Service - Vancomycin    Consulting Provider: Dr. Zenia Manzanares   Indication: SSTI  Target Concentration: Goal AUC/KATE 400-600 mg*hr/L  Day of Therapy: 2  Additional Antimicrobials: cefepime    Pertinent Laboratory Values: Wt Readings from Last 1 Encounters:   03/09/22 129.3 kg (285 lb 0.9 oz)     Temp Readings from Last 1 Encounters:   03/10/22 98.8 °F (37.1 °C)     No components found for: PROCAL  Recent Labs     03/10/22  0759 03/09/22  0345 03/08/22  1556 03/08/22  1345   BUN 28* 26*  --  19   CREA 1.20* 1.60*  --  1.20*   WBC 13.0* 14.8*  --  12.2*   LAC  --  2.0 1.5 4.7*     Estimated Creatinine Clearance: 89.7 mL/min (A) (based on SCr of 1.2 mg/dL (H)). Lab Results   Component Value Date/Time    Vancomycin, random 14.6 03/10/2022 04:10 AM       MRSA Nasal Swab: N/A. Non-respiratory infection. Assessment:  Date/Time Dose Concentration AUC         Note: Serum concentrations collected for AUC dosing may appear elevated if collected in close proximity to the dose administered, this is not necessarily an indication of toxicity    Plan:  1. Current dosing regimen is predicted to be therapeutic  2. Continue current dose of 1500 mg q24h  3. Repeat vancomycin concentration ordered for 3/11 @ 0400    4.  Pharmacy will continue to monitor patient and adjust therapy as indicated    Thank you for the consult,  DUYEN Magdaleno Suspected delayed pharyngeal swallow, intermittent cough Suspected delayed pharyngeal swallow/Cough post oral intake Suspected delayed pharyngeal swallow/Throat clear post oral intake

## 2022-03-10 NOTE — PROGRESS NOTES
Hospitalist Progress Note   Admit Date:  3/8/2022  1:48 PM   Name:  Celia Smalls   Age:  55 y.o. Sex:  female  :  1975   MRN:  [id number]   Room:  [facility]/    Presenting Complaint: Abdominal Pain    Reason(s) for Admission: New onset a-fib [facility]) [I48.91]  Atrial fibrillation with RVR ([facility] [address] [I48.91]  Respiratory failure with hypoxia ([facility] [address] [J96.91]  Anasarca [R60.1]  Cirrhosis ([facility] [address] [K74.60]  SBP (spontaneous bacterial peritonitis) [facility]) [facility] [place] [facility] Course & Interval History:   Celia Smalls is a 55 y.o. female with a past medical history of cardiomyopathy, CHF hypertension, diabetes, smoker of 1 pack of cigarettes per day, atrial fibrillation, COPD and drug abuse per the patient's mom who presents with severe shortness of breath and abdominal distention and medication noncompliance. Symptoms have been ongoing for about a week acutely worse today per her partner at the bedside. She was seen and evaluated in the ER found to be in A. fib with RVR with a heart rate in the 160s. A grossly distended abdomen. Likely the major factor in her shortness of breath.      She also complains of abdominal pain. Writhing around in pain. Poor historian. History is taken from ER physician and staff and patient's chart and partner at bedside. Subjective/24hr Events (03/10/22): Patient seen and examined. Patient alert and oriented and appropriate today complains of bilateral lower extremity pain denies fever chills nausea vomiting chest pain and shortness of breath. ROS:  10 systems reviewed and negative except as noted above. Assessment & Plan: Active Problems:  CHF and cardiomyopathy with anasarca, ascites and volume overload, chronic venous stasis  -We will admit to the ICU  -Interventional radiology consulted for emergent paracentesis  -Coags are pending  -Diuretics as tolerated-blood pressure significantly low at this time in the 90s to 100s.   3/9-EF noted to be 50 to 55% with diastolic dysfunction and mildly elevated RVSP. Given patient's hypotension and tachycardia and KAIDEN will hold diuresis at this time. Patient too unstable to undergo further cardiac evaluation including potential left and right heart catheterizations. Once patient clinically stabilizes will consult cardiology. Will get urine micro creatinine protein ratio and will get right upper quadrant ultrasound to evaluate liver. 3/10-patient's blood pressure significantly improved today. Will start Lasix 40 mg IV daily and likely will need significant up titration as patient's vital signs stabilized and is noted to be taking Bumex 1 mg daily on an outpatient basis. Patient will need aggressive diuresis once her vital signs stabilized. Patient noted to have significant bilateral venous stasis changes with bilateral erythema. Likely will improve with aggressive diuresis and will ask wound care to apply Unaboot. Unlikely hepatic source given relatively benign right upper quadrant ultrasound. Urine protein creatinine ratio also not clinically impressive and as such likely not a renal source and suspect severe pulmonary hypertension precipitated by obstructive sleep apnea morbid obesity and chronic methamphetamine use. Chronic venous stasis bilaterally-  3/10-should improve with Unaboot and diuresis. Consider discontinuation of vancomycin in a.m. with likely urinary source of infection and no clear evidence of cellulitis as patient's clinical exam findings are most consistent with chronic venous stasis.         Respiratory failure with hypoxia  Patient does not wear oxygen at home. She is wheezing and short of breath came air on 8 L via nasal cannula  Respiratory rate of 26. Use of accessory muscles.   Unable to speak in full sentences   although she is agreeable to go over the floor respiratory failure secondary to gross anasarca and ascites she would not be a good if IR can pull off some of the fluid of her belly  3/9-likely some combination of obstructive sleep apnea as well as pulmonary edema. Holding diuresis at this time due to hypotension and tachycardia. Will monitor closely  3/10-likely secondary to underlying pulmonary edema. Diuresis when patient's blood pressure and heart rate are stable.         Sepsis with septic shock-  ER did preliminary bedside ultrasound concerning for cirrhotic liver  -We will treat empirically for SBP  -Follow-up ascitic fluid  -Will not be too aggressive with pain management secondary to the need to maintain her blood pressures. 3/9-clinically worsening. Patient meets sepsis criteria with leukocytosis tachycardia and fever. Will discontinue ceftriaxone and start patient on vancomycin and cefepime. Source unclear at this time. Urine cultures and fluid cultures from paracentesis pending blood cultures pending with no growth to date. Continue Levophed at this time and we will bolus the patient 1 L of normal saline as patient did have 15 L of fluid removed from abdomen yesterday and I suspect ongoing fluid shifts. 3/10-question urinary source with gram-negative rods growing in urine. Blood cultures and ascitic fluid demonstrate no growth to date. Will de-escalate antibiotic regimen to ceftriaxone only. Currently weaning off pressors. UTI-  3/10-urine cultures demonstrating gram-negative rods. Continue ceftriaxone and follow urine cultures and sensitivities to completion        Diabetes-  3/10-we will obtain hemoglobin A1c and start sliding scale today. Patient's blood sugars to goal on sliding scale at this time. Patient noted to have diabetic foot foot wounds at multiple spots and will consult wound care.         Atrial fibrillation with rapid ventricular response-May be secondary to ongoing sepsis.   Patient on Levophed for blood pressure as well as Midrin and we will bolus the patient 1 L of normal saline at this time and see if this improves patient's tachycardia. 3/10-given underlying hypotension we will bolus the patient 150 mg of amiodarone and then transition to oral amiodarone 200 mg twice daily and monitor. Once patient blood pressure improves may likely benefit from moving away from amiodarone. Will start patient on metoprolol 25 mg p.o. twice daily and uptitrate as tolerated        Pulmonary hypertension-  3/10-MARCELO HIV and hepatitis pending. Patient will need sleep study VQ scan and likely right heart cath. Will consult pulmonology. Patient will need aggressive diuresis once vital signs normalized. Obstructive sleep apnea-we will need outpatient sleep study        Anemia, thrombocytopenia-no evidence of bleeding at this time will monitor.         History of polysubstance abuse  Patient endorses smoking and using marijuana  She smokes about 1 pack of cigarettes per day  Patient denies any use of methamphetamines or other drugs  However this was the history given by her mother to the ER  We will get a urine drug screen unable  3/9-the patient does deny IV drug use but freely admits to frequent smoking of methamphetamines. Patient's urine drug screen positive for marijuana and methamphetamines. Will check hepatitis panel  3/10-noted        Morbid obesity BMI of 39.70 with a weight of 300 pounds           Hyponatremia  Likely secondary to SIADH in the setting of CHF and COPD  3/9-resolved  3/10-slightly decreased today. Will need to monitor as we initiate diuresis    Elevated lactic acid  Patient concerning for sepsis and was  Elevation could also be secondary to cirrhosis  Will cover her for SBP as above  However given her volume overloaded status will hold off on IV fluids  3/9-resolved        KAIDEN versus chronic kidney disease  No prior labs available for comparison  We will avoid nephrotoxins  And dose medications for her renal function  3/9-worsening with removal of 15 L of fluid from abdomen and diuresis.   We will hold diuresis and bolus the patient 1 L of normal saline and will administer albumin and monitor. Patient will likely need continued ongoing diuresis however this is difficult in the face of profound hypotension requiring Levophed. Will ask nephrology to evaluate patient. 3/10-improving with fluid resuscitation. We will continue to monitor as we initiate diuresis. Yeast infection of pannus/vaginal labia-  3/10-severe. Will start patient on IV Diflucan and topical Diflucan.           Dispo/Discharge Planning:   Admit to the medicine inpatient ICU  Pending clinical course  Further work-up and management based on clinical course. Anticipate home at discharge        Critical care interventions: IV pressors, IV Cardizem for A. fib  Total critical care time spent: 50 minutes. Time is indicative of direct patient attendance at bedside and on the patient's floor nearby. Includes time spent at bedside performing history and exam, performing chart review, discussing findings and treatment plan with patient and/or family, discussing patient with consultants and colleagues, ordering and reviewing pertinent laboratory and radiographic evaluations, and discussing patient with nursing staff.   Time excludes procedures.          Diet: Diabetic diet  VTE ppx: SCDs   code status: Full      Hospital Problems as of 3/10/2022 Never Reviewed          Codes Class Noted - Resolved POA    Suspected sleep apnea ICD-10-CM: R29.818  ICD-9-CM: 781.99  3/10/2022 - Present Unknown        Pulmonary hypertension ([facility] ICD-10-CM: I27.20  ICD-9-CM: 416.8  3/10/2022 - Present Unknown        Methamphetamine abuse ([facility] ICD-10-CM: F15.10  ICD-9-CM: 305.70  3/10/2022 - Present Unknown        * (Principal) Systolic congestive heart failure (HCC) ICD-10-CM: I50.20  ICD-9-CM: 428.20, 428.0  3/9/2022 - Present Unknown        Anasarca ICD-10-CM: R60.1  ICD-9-CM: 782.3  3/8/2022 - Present Yes        Cirrhosis ([facility] ICD-10-CM: K74.60  ICD-9-CM: 571.5  3/8/2022 - Present Unknown        SBP (spontaneous bacterial peritonitis) ([facility] ICD-10-CM: R51.7  ICD-9-CM: 567.23  3/8/2022 - Present Unknown        Atrial fibrillation with RVR [facility]) ICD-10-CM: I48.91  ICD-9-CM: 427.31  3/8/2022 - Present Yes        Respiratory failure with hypoxia [facility]) ICD-10-CM: J96.91  ICD-9-CM: 518.81  3/8/2022 - Present Yes        Hyponatremia ICD-10-CM: E87.1  ICD-9-CM: 276.1  3/8/2022 - Present Yes        COPD exacerbation ([facility] ICD-10-CM: J44.1  ICD-9-CM: 491.21  3/8/2022 - Present Yes        KAIDEN (acute kidney injury) [facility]) ICD-10-CM: N17.9  ICD-9-CM: 584.9  3/8/2022 - Present Yes        Dependence on nicotine from cigarettes ICD-10-CM: F17.210  ICD-9-CM: 305.1  3/8/2022 - Present Yes        Polysubstance abuse ([facility] ICD-10-CM: F19.10  ICD-9-CM: 305.90  3/8/2022 - Present Yes              Objective:     Patient Vitals for the past 24 hrs:   Temp Pulse Resp BP SpO2   03/10/22 1501 -- (!) 122 13 103/66 99 %   03/10/22 1446 -- (!) 119 25 102/67 98 %   03/10/22 1431 -- (!) 121 19 102/62 98 %   03/10/22 1333 -- -- -- -- 98 %   03/10/22 1148 -- (!) 122 16 (!) 84/66 96 %   03/10/22 1133 -- (!) 122 14 (!) 89/59 96 %   03/10/22 1118 -- (!) 118 14 101/64 96 %   03/10/22 1103 99.3 °F (37.4 °C) (!) 120 14 95/60 94 %   03/10/22 1048 -- (!) 121 18 (!) 89/59 93 %   03/10/22 1033 99.3 °F (37.4 °C) (!) 122 14 (!) 104/52 94 %   03/10/22 1015 -- (!) 122 16 (!) 91/56 94 %   03/10/22 1000 -- (!) 125 18 (!) 94/55 93 %   03/10/22 0945 -- (!) 123 22 (!) 92/54 95 %   03/10/22 0930 -- (!) 123 13 (!) 95/56 94 %   03/10/22 0915 -- (!) 121 16 95/60 96 %   03/10/22 0900 -- (!) 124 22 (!) 98/54 96 %   03/10/22 0845 -- (!) 119 19 (!) 101/56 100 %   03/10/22 0830 -- (!) 116 16 (!) 100/56 99 %   03/10/22 0826 -- -- -- -- 98 %   03/10/22 0815 -- (!) 121 12 (!) 100/55 97 %   03/10/22 0800 99 °F (37.2 °C) (!) 115 20 (!) 92/55 96 %   03/10/22 0745 -- (!) 119 24 (!) 91/55 96 %   03/10/22 0730 -- (!) 116 18 (!) 85/54 95 %   03/10/22 0715 -- (!) 120 12 (!) 86/53 95 %   03/10/22 0700 98.8 °F (37.1 °C) (!) 121 14 (!) 88/52 95 %   03/10/22 0645 -- (!) 117 12 (!) 91/52 95 %   03/10/22 0630 -- (!) 112 -- (!) 91/55 94 %   03/10/22 0615 -- (!) 122 11 (!) 94/55 95 %   03/10/22 0600 -- (!) 123 14 (!) 97/53 93 %   03/10/22 0545 -- (!) 119 17 104/60 92 %   03/10/22 0530 -- (!) 117 15 (!) 101/55 93 %   03/10/22 0515 -- (!) 124 18 117/68 92 %   03/10/22 0500 -- (!) 117 15 (!) 101/58 94 %   03/10/22 0445 -- (!) 123 13 (!) 98/56 94 %   03/10/22 0430 -- (!) 124 (!) 5 (!) 93/54 94 %   03/10/22 0415 -- (!) 120 14 (!) 101/55 93 %   03/10/22 0400 -- (!) 122 11 (!) 101/58 95 %   03/10/22 0345 -- (!) 120 (!) 0 (!) 102/55 94 %   03/10/22 0330 -- (!) 120 18 (!) 101/59 97 %   03/10/22 0324 98.5 °F (36.9 °C) (!) 111 21 (!) 93/54 97 %   03/10/22 0315 -- (!) 117 11 97/60 99 %   03/10/22 0314 -- -- -- -- 96 %   03/10/22 0300 -- (!) 110 (!) 43 109/66 98 %   03/10/22 0245 -- (!) 116 17 109/66 96 %   03/10/22 0230 -- (!) 114 15 105/63 96 %   03/10/22 0215 -- (!) 109 19 (!) 103/59 99 %   03/10/22 0200 -- (!) 112 17 105/61 98 %   03/10/22 0145 -- (!) 104 18 104/62 97 %   03/10/22 0130 -- (!) 103 15 106/61 97 %   03/10/22 0115 -- (!) 111 14 (!) 91/54 95 %   03/10/22 0100 -- (!) 105 19 (!) 90/54 96 %   03/10/22 0045 -- (!) 114 17 (!) 97/57 97 %   03/10/22 0030 -- 100 18 (!) 97/55 96 %   03/10/22 0015 -- (!) 101 18 108/61 97 %   03/10/22 0000 -- (!) 111 13 (!) 109/58 96 %   03/09/22 2345 -- (!) 106 12 98/62 94 %   03/09/22 2330 -- (!) 109 18 106/61 95 %   03/09/22 2315 98 °F (36.7 °C) (!) 103 15 100/65 96 %   03/09/22 2300 -- 100 14 100/63 96 %   03/09/22 2245 -- (!) 102 10 (!) 102/57 100 %   03/09/22 2230 -- (!) 113 21 105/68 100 %   03/09/22 2215 -- (!) 104 12 (!) 104/59 97 %   03/09/22 2200 -- 99 (!) 7 116/72 91 %   03/09/22 2145 -- (!) 104 20 101/61 96 %   03/09/22 2130 -- (!) 105 15 101/61 95 %   03/09/22 2115 -- 100 15 (!) 94/56 94 %   03/09/22 2100 -- (!) 103 21 99/61 94 % 03/09/22 2045 -- (!) 107 15 (!) 95/55 94 %   03/09/22 2030 -- (!) 105 18 (!) 100/58 95 %   03/09/22 2015 -- (!) 114 15 (!) 106/57 97 %   03/09/22 2000 -- 100 15 (!) 95/57 96 %   03/09/22 1945 -- (!) 101 16 101/61 95 %   03/09/22 1930 99.8 °F (37.7 °C) (!) 101 18 (!) 104/59 95 %   03/09/22 1915 -- 88 9 107/64 100 %   03/09/22 1908 -- -- -- -- 96 %   03/09/22 1900 -- 96 13 (!) 98/56 96 %   03/09/22 1845 -- 100 13 (!) 109/58 (!) 87 %   03/09/22 1830 -- (!) 101 13 100/63 91 %   03/09/22 1815 -- (!) 106 17 108/64 96 %   03/09/22 1800 -- 100 18 (!) 98/58 96 %   03/09/22 1745 -- (!) 101 (!) 39 (!) 90/50 96 %   03/09/22 1738 -- (!) 101 14 -- 97 %   03/09/22 1730 -- (!) 101 12 (!) 89/53 97 %   03/09/22 1715 -- 99 10 (!) 97/57 94 %   03/09/22 1700 -- 100 13 (!) 95/50 95 %   03/09/22 1645 -- 100 20 (!) 98/56 96 %   03/09/22 1641 -- 98 13 (!) 98/56 98 %   03/09/22 1634 -- 100 -- (!) 99/55 --   03/09/22 1630 -- (!) 101 15 -- 97 %   03/09/22 1628 -- 100 22 (!) 99/55 97 %   03/09/22 1615 -- 100 13 -- 98 %   03/09/22 1613 -- 99 11 (!) 93/58 97 %   03/09/22 1600 99.5 °F (37.5 °C) 100 15 (!) 91/53 96 %   03/09/22 1552 -- 99 22 (!) 99/57 97 %   03/09/22 1545 -- 100 8 (!) 95/55 97 %     Oxygen Therapy  O2 Sat (%): 99 % (03/10/22 1501)  Pulse via Oximetry: 117 beats per minute (03/10/22 1333)  O2 Device: Nasal cannula (03/10/22 1333)  Skin Assessment: Skin breakdown present (see comment/note) (03/10/22 1033)  Skin Protection for O2 Device: N/A (03/09/22 0800)  O2 Flow Rate (L/min): 2 l/min (03/10/22 1333)    Estimated body mass index is 39.7 kg/m² as calculated from the following:    Height as of this encounter: 6' 1\" (1.854 m). Weight as of this encounter: 136.5 kg (300 lb 14.9 oz).     Intake/Output Summary (Last 24 hours) at 3/10/2022 1542  Last data filed at 3/10/2022 1033  Gross per 24 hour   Intake 1844.41 ml   Output 1600 ml   Net 244.41 ml         Physical Exam:     Blood pressure 103/66, pulse (!) 122, temperature 99.3 °F (37.4 °C), resp. rate 13, height 6' 1\" (1.854 m), weight 136.5 kg (300 lb 14.9 oz), SpO2 99 %, not currently breastfeeding. General:    Well nourished. No overt distress, hypersomnolent  Head:  Normocephalic, atraumatic  Eyes:  Sclerae appear normal.  Pupils equally round. ENT:  Nares appear normal, no drainage. Moist oral mucosa  Neck:  No restricted ROM. Trachea midline   CV:   RRR. No m/r/g. No jugular venous distension. Lungs:   Wheezing diffusely  Abdomen: Bowel sounds present. Soft, nontender, distended  Extremities: No cyanosis or clubbing.  +3-4 bilateral edema with erythema  Skin:     No rashes and normal coloration. Warm and dry. Numerous homemade tattoos  Neuro:  CN II-XII grossly intact. Sensation intact. A&Ox3  Psych:  Normal mood and affect. I have reviewed ordered lab tests and independently visualized imaging below:    Recent Labs:  Recent Results (from the past 48 hour(s))   LACTIC ACID    Collection Time: 03/08/22  3:56 PM   Result Value Ref Range    Lactic acid 1.5 0.4 - 2.0 MMOL/L   AMMONIA    Collection Time: 03/08/22  3:56 PM   Result Value Ref Range    Ammonia, plasma 45 (H) 11 - 32 UMOL/L   PTT    Collection Time: 03/08/22  3:56 PM   Result Value Ref Range    aPTT 30.5 24.1 - 35.1 SEC   PROTEIN TOTAL, FLUID    Collection Time: 03/08/22  4:37 PM   Result Value Ref Range    Fluid Type: ABDOMINAL FLUID      Protein total, body fld. 4.7 g/dL   GLUCOSE, FLUID    Collection Time: 03/08/22  4:37 PM   Result Value Ref Range    Fluid Type: ABDOMINAL FLUID      Glucose, body fld.  158 MG/DL   LDH, BODY FLUID    Collection Time: 03/08/22  4:37 PM   Result Value Ref Range    Fluid Type: ABDOMINAL FLUID      LD, body fld. 120 U/L   CELL COUNT, BODY FLUID    Collection Time: 03/08/22  4:37 PM   Result Value Ref Range    BODY FLUID TYPE ABDOMINAL FLUID      FLUID COLOR YELLOW      FLUID APPEARANCE CLEAR      FLUID RBC CT. <1,000 /cu mm    FLUID WBC COUNT <100 /cu mm   CULTURE, BODY FLUID Shabana Josner STAIN    Collection Time: 03/08/22  4:37 PM    Specimen: Abdominal Fluid; Body Fluid   Result Value Ref Range    Special Requests: NO SPECIAL REQUESTS      GRAM STAIN NO WBC'S SEEN      GRAM STAIN NO DEFINITE ORGANISM SEEN      Culture result: NO GROWTH 1 DAY     AMYLASE, FLUID    Collection Time: 03/08/22  4:37 PM   Result Value Ref Range    Fluid Type: ABDOMINAL FLUID      Amylase, body fld.  13 U/L   EKG, 12 LEAD, SUBSEQUENT    Collection Time: 03/08/22  7:26 PM   Result Value Ref Range    Ventricular Rate 133 BPM    Atrial Rate 129 BPM    QRS Duration 72 ms    Q-T Interval 276 ms    QTC Calculation (Bezet) 410 ms    Calculated R Axis -148 degrees    Calculated T Axis 13 degrees    Diagnosis       Supraventricular tachycardia  Low voltage QRS  Poor R wave progression  Abnormal ECG  No previous ECGs available  Confirmed by Fátima Pelayo ([id number]) on 3/9/2022 6:53:59 AM     CULTURE, BLOOD    Collection Time: 03/08/22  9:56 PM    Specimen: Blood   Result Value Ref Range    Special Requests: RIGHT  HAND        Culture result: NO GROWTH 2 DAYS     CULTURE, BLOOD    Collection Time: 03/08/22 10:41 PM    Specimen: Blood   Result Value Ref Range    Special Requests: RIGHT  HAND        Culture result: NO GROWTH 2 DAYS     PROTHROMBIN TIME + INR    Collection Time: 03/09/22  3:45 AM   Result Value Ref Range    Prothrombin time 18.1 (H) 12.6 - 14.5 sec    INR 1.5     METABOLIC PANEL, COMPREHENSIVE    Collection Time: 03/09/22  3:45 AM   Result Value Ref Range    Sodium 136 136 - 145 mmol/L    Potassium 4.8 3.5 - 5.1 mmol/L    Chloride 104 98 - 107 mmol/L    CO2 26 21 - 32 mmol/L    Anion gap 6 (L) 7 - 16 mmol/L    Glucose 102 (H) 65 - 100 mg/dL    BUN 26 (H) 6 - 23 MG/DL    Creatinine 1.60 (H) 0.6 - 1.0 MG/DL    GFR est AA 45 (L) >60 ml/min/1.73m2    GFR est non-AA 37 (L) >60 ml/min/1.73m2    Calcium 8.3 8.3 - 10.4 MG/DL    Bilirubin, total 2.9 (H) 0.2 - 1.1 MG/DL    ALT (SGPT) 38 12 - 65 U/L    AST (SGOT) 139 (H) 15 - 37 U/L    Alk. phosphatase 329 (H) 50 - 136 U/L    Protein, total 5.9 (L) 6.3 - 8.2 g/dL    Albumin 2.7 (L) 3.5 - 5.0 g/dL    Globulin 3.2 2.3 - 3.5 g/dL    A-G Ratio 0.8 (L) 1.2 - 3.5     LACTIC ACID    Collection Time: 03/09/22  3:45 AM   Result Value Ref Range    Lactic acid 2.0 0.4 - 2.0 MMOL/L   CBC WITH AUTOMATED DIFF    Collection Time: 03/09/22  3:45 AM   Result Value Ref Range    WBC 14.8 (H) 4.3 - 11.1 K/uL    RBC 3.65 (L) 4.05 - 5.2 M/uL    HGB 9.9 (L) 11.7 - 15.4 g/dL    HCT 33.2 (L) 35.8 - 46.3 %    MCV 91.0 79.6 - 97.8 FL    MCH 27.1 26.1 - 32.9 PG    MCHC 29.8 (L) 31.4 - 35.0 g/dL    RDW 18.4 (H) 11.9 - 14.6 %    PLATELET 994 (L) 796 - 450 K/uL    MPV 11.0 9.4 - 12.3 FL    ABSOLUTE NRBC 0.00 0.0 - 0.2 K/uL    DF AUTOMATED      NEUTROPHILS 95 (H) 43 - 78 %    LYMPHOCYTES 2 (L) 13 - 44 %    MONOCYTES 2 (L) 4.0 - 12.0 %    EOSINOPHILS 0 (L) 0.5 - 7.8 %    BASOPHILS 0 0.0 - 2.0 %    IMMATURE GRANULOCYTES 0 0.0 - 5.0 %    ABS. NEUTROPHILS 14.1 (H) 1.7 - 8.2 K/UL    ABS. LYMPHOCYTES 0.4 (L) 0.5 - 4.6 K/UL    ABS. MONOCYTES 0.2 0.1 - 1.3 K/UL    ABS. EOSINOPHILS 0.0 0.0 - 0.8 K/UL    ABS. BASOPHILS 0.1 0.0 - 0.2 K/UL    ABS. IMM.  GRANS. 0.1 0.0 - 0.5 K/UL   GLUCOSE, POC    Collection Time: 03/09/22  9:15 AM   Result Value Ref Range    Glucose (POC) 101 (H) 65 - 100 mg/dL    Performed by Suraj    ECHO ADULT COMPLETE    Collection Time: 03/09/22  9:30 AM   Result Value Ref Range    LV EDV A2C 189 mL    LV EDV A4C 182 mL    LV ESV A2C 94 mL    LV ESV A4C 72 mL    IVSd 0.8 0.6 - 0.9 cm    LVIDd 5.3 3.9 - 5.3 cm    LVIDs 3.8 cm    LVOT Diameter 2.1 cm    LVOT Mean Gradient 4 mmHg    LVOT VTI 21.0 cm    LVOT Peak Velocity 1.2 m/s    LVOT Peak Gradient 6 mmHg    LVPWd 0.8 0.6 - 0.9 cm    LV E' Lateral Velocity 11 cm/s    LV E' Septal Velocity 9 cm/s    LV Ejection Fraction A2C 50 %    LV Ejection Fraction A4C 60 %    EF BP 56 55 - 100 %    LVOT Area 3.5 cm2    LVOT SV 72.7 ml    LA Major Axis 6.3 cm    LA Area 4C 26.5 cm2    LA Diameter 5.2 cm    AV Mean Velocity 1.3 m/s    AV Mean Gradient 8 mmHg    AV VTI 31.2 cm    AV Peak Velocity 1.8 m/s    AV Peak Gradient 14 mmHg    AV Area by VTI 2.3 cm2    AV Area by Peak Velocity 2.3 cm2    Aortic Root 3.0 cm    Ascending Aorta 2.8 cm    IVC Proxmal 2.6 cm    MR VTI 78.2 cm    MV Mean Gradient 4 mmHg    MV VTI 26.5 cm    MV Mean Velocity 1.0 m/s    MR Peak Velocity 3.6 m/s    MR Peak Gradient 52 mmHg    MV Max Velocity 1.4 m/s    MV Peak Gradient 8 mmHg    MV E Wave Deceleration Time 158.0 ms    MV E Velocity 1.29 m/s    MV Area by VTI 2.7 cm2    PV .0 ms    PV Max Velocity 1.2 m/s    PV Peak Gradient 6 mmHg    RVIDd 4.2 cm    RV Basal Dimension 4.9 cm    RV Longitudinal Dimension 8.4 cm    RV Mid Dimension 4.2 cm    TAPSE 1.3 (A) 1.5 - 2.0 cm    TR Max Velocity 3.07 m/s    TR Peak Gradient 38 mmHg    Fractional Shortening 2D 28 28 - 44 %    LV ESV Index A4C 29 mL/m2    LV EDV Index A4C 73 mL/m2    LV ESV Index A2C 38 mL/m2    LV EDV Index A2C 76 mL/m2    LVIDd Index 2.12 cm/m2    LVIDs Index 1.52 cm/m2    LV RWT Ratio 0.30     LV Mass 2D 150.1 67 - 162 g    LV Mass 2D Index 60.0 43 - 95 g/m2    E/E' Ratio (Averaged) 13.03     E/E' Lateral 11.73     E/E' Septal 14.33     LVOT Stroke Volume Index 29.1 mL/m2    LA Size Index 2.08 cm/m2    LA/AO Root Ratio 1.73     Ao Root Index 1.20 cm/m2    Ascending Aorta Index 1.12 cm/m2    AV Velocity Ratio 0.67     LVOT:AV VTI Index 0.67     ELOISE/BSA VTI 0.9 cm2/m2    ELOISE/BSA Peak Velocity 0.9 cm2/m2    MV:LVOT VTI Index 1.26    HEPATITIS PANEL, ACUTE    Collection Time: 03/09/22 12:07 PM   Result Value Ref Range    Hepatitis A, IgM NONREACTIVE NR      Hepatitis B core, IgM NONREACTIVE NR      Hep B Surface Ag NONREACTIVE NR      Hepatitis C virus Ab NONREACTIVE NR     PROTEIN/CREATININE RATIO, URINE    Collection Time: 03/09/22 12:15 PM   Result Value Ref Range    Protein, urine random 58 (H) <11.9 mg/dL    Creatinine, urine 156.00 mg/dL    Protein/Creat.  urine Ratio 0.4     SODIUM, UR, RANDOM    Collection Time: 03/09/22 12:15 PM   Result Value Ref Range    Sodium,urine random 5 MMOL/L   OSMOLALITY, UR    Collection Time: 03/09/22 12:15 PM   Result Value Ref Range    Osmolality,urine 469 50 - 1,400 MOSM/kg H2O   CREATININE, UR, RANDOM    Collection Time: 03/09/22 12:15 PM   Result Value Ref Range    Creatinine, urine 157.00 mg/dL   GLUCOSE, POC    Collection Time: 03/09/22  4:40 PM   Result Value Ref Range    Glucose (POC) 112 (H) 65 - 100 mg/dL    Performed by Shade Grayson, RANDOM    Collection Time: 03/10/22  4:10 AM   Result Value Ref Range    Vancomycin, random 14.6 UG/ML   GLUCOSE, POC    Collection Time: 03/10/22  6:43 AM   Result Value Ref Range    Glucose (POC) 139 (H) 65 - 100 mg/dL    Performed by Lainez (Hoeung)    EKG, 12 LEAD, INITIAL    Collection Time: 03/10/22  7:37 AM   Result Value Ref Range    Ventricular Rate 116 BPM    Atrial Rate 120 BPM    QRS Duration 80 ms    Q-T Interval 334 ms    QTC Calculation (Bezet) 464 ms    Calculated R Axis -110 degrees    Calculated T Axis 43 degrees    Diagnosis       Atrial fibrillation with rapid ventricular response  Possible Right ventricular hypertrophy  Inferior infarct (cited on or before 10-MAR-2022)  Possible Anterolateral infarct (cited on or before 10-MAR-2022)  Abnormal ECG  When compared with ECG of 08-MAR-2022 19:26,  Atrial fibrillation has replaced Sinus rhythm  Confirmed by Олег Rivera MD (), DEAN HIDALGO ([id number]) on 3/10/2022 12:53:36 PM     CBC WITH AUTOMATED DIFF    Collection Time: 03/10/22  7:59 AM   Result Value Ref Range    WBC 13.0 (H) 4.3 - 11.1 K/uL    RBC 3.09 (L) 4.05 - 5.2 M/uL    HGB 8.5 (L) 11.7 - 15.4 g/dL    HCT 28.0 (L) 35.8 - 46.3 %    MCV 90.6 79.6 - 97.8 FL    MCH 27.5 26.1 - 32.9 PG    MCHC 30.4 (L) 31.4 - 35.0 g/dL    RDW 18.2 (H) 11.9 - 14.6 %    PLATELET 529 (L) 718 - 450 K/uL    MPV 11.3 9.4 - 12.3 FL    ABSOLUTE NRBC 0. 00 0.0 - 0.2 K/uL    DF AUTOMATED      NEUTROPHILS 94 (H) 43 - 78 %    LYMPHOCYTES 3 (L) 13 - 44 %    MONOCYTES 2 (L) 4.0 - 12.0 %    EOSINOPHILS 0 (L) 0.5 - 7.8 %    BASOPHILS 0 0.0 - 2.0 %    IMMATURE GRANULOCYTES 0 0.0 - 5.0 %    ABS. NEUTROPHILS 12.2 (H) 1.7 - 8.2 K/UL    ABS. LYMPHOCYTES 0.4 (L) 0.5 - 4.6 K/UL    ABS. MONOCYTES 0.3 0.1 - 1.3 K/UL    ABS. EOSINOPHILS 0.0 0.0 - 0.8 K/UL    ABS. BASOPHILS 0.1 0.0 - 0.2 K/UL    ABS. IMM. GRANS. 0.0 0.0 - 0.5 K/UL   METABOLIC PANEL, COMPREHENSIVE    Collection Time: 03/10/22  7:59 AM   Result Value Ref Range    Sodium 133 (L) 136 - 145 mmol/L    Potassium 4.4 3.5 - 5.1 mmol/L    Chloride 103 98 - 107 mmol/L    CO2 25 21 - 32 mmol/L    Anion gap 5 (L) 7 - 16 mmol/L    Glucose 122 (H) 65 - 100 mg/dL    BUN 28 (H) 6 - 23 MG/DL    Creatinine 1.20 (H) 0.6 - 1.0 MG/DL    GFR est AA >60 >60 ml/min/1.73m2    GFR est non-AA 51 (L) >60 ml/min/1.73m2    Calcium 7.8 (L) 8.3 - 10.4 MG/DL    Bilirubin, total 2.6 (H) 0.2 - 1.1 MG/DL    ALT (SGPT) 23 12 - 65 U/L    AST (SGOT) 46 (H) 15 - 37 U/L    Alk.  phosphatase 203 (H) 50 - 136 U/L    Protein, total 5.8 (L) 6.3 - 8.2 g/dL    Albumin 2.8 (L) 3.5 - 5.0 g/dL    Globulin 3.0 2.3 - 3.5 g/dL    A-G Ratio 0.9 (L) 1.2 - 3.5     NT-PRO BNP    Collection Time: 03/10/22 11:05 AM   Result Value Ref Range    NT pro-BNP 13,200 (H) 5 - 125 PG/ML   HIV 1/2 AG/AB, 4TH GENERATION,W RFLX CONFIRM    Collection Time: 03/10/22 11:05 AM   Result Value Ref Range    HIV 1/2 Interpretation NONREACTIVE NR      HIV 1/2 result comment SEE NOTE (A) NR     GLUCOSE, POC    Collection Time: 03/10/22 11:24 AM   Result Value Ref Range    Glucose (POC) 158 (H) 65 - 100 mg/dL    Performed by Freddy        All Micro Results     Procedure Component Value Units Date/Time    CULTURE, BLOOD #1 [[id number]] Collected: 03/08/22 2156    Order Status: Completed Specimen: Blood Updated: 03/10/22 0912     Special Requests: --        RIGHT  HAND       Culture result: NO GROWTH 2 DAYS       CULTURE, BLOOD #2 [[id number]] Collected: 03/08/22 2241    Order Status: Completed Specimen: Blood Updated: 03/10/22 0912     Special Requests: --        RIGHT  HAND       Culture result: NO GROWTH 2 DAYS       CULTURE, URINE [[id number]]  (Abnormal) Collected: 03/08/22 1416    Order Status: Completed Specimen: Urine from Clean catch Updated: 03/10/22 0757     Special Requests: NO SPECIAL REQUESTS        Culture result:       >100,000 COLONIES/mL GRAM NEGATIVE RODS IDENTIFICATION AND SUSCEPTIBILITY TO FOLLOW          CULTURE, BODY FLUID W Teresita Isidro [[id number]] Collected: 03/08/22 1637    Order Status: Completed Specimen: Body Fluid from Abdominal Fluid Updated: 03/10/22 0630     Special Requests: NO SPECIAL REQUESTS        GRAM STAIN NO WBC'S SEEN         NO DEFINITE ORGANISM SEEN        Culture result: NO GROWTH 1 DAY       CULTURE, BLOOD [[id number]] Collected: 03/08/22 1745    Order Status: Canceled Specimen: Blood     CULTURE, BLOOD [[id number]] Collected: 03/08/22 1745    Order Status: Canceled Specimen: Blood           Other Studies:  No results found.     Current Meds:  Current Facility-Administered Medications   Medication Dose Route Frequency    gabapentin (NEURONTIN) capsule 600 mg  600 mg Oral TID    amiodarone (CORDARONE) tablet 200 mg  200 mg Oral BID    fluconazole (DIFLUCAN) 200mg/100 mL IVPB (premix)  200 mg IntraVENous Q24H    vancomycin (VANCOCIN) 1500 mg in  ml infusion  1,500 mg IntraVENous Q24H    insulin lispro (HUMALOG) injection   SubCUTAneous AC&HS    furosemide (LASIX) tablet 40 mg  40 mg Oral DAILY    spironolactone (ALDACTONE) tablet 25 mg  25 mg Oral DAILY    metoprolol tartrate (LOPRESSOR) tablet 50 mg  50 mg Oral BID    NOREPINephrine (LEVOPHED) 4 mg in 5% dextrose 250 mL infusion  0.5-16 mcg/min IntraVENous TITRATE    lip protectant (BLISTEX) ointment 1 Each  1 Each Topical PRN    cefepime (MAXIPIME) 2 g in 0.9% sodium chloride (MBP/ADV) 100 mL MBP 2 g IntraVENous Q8H    albumin human 25% (BUMINATE) solution 25 g  25 g IntraVENous Q6H    dilTIAZem (CARDIZEM) 100 mg in 0.9% sodium chloride (MBP/ADV) 100 mL infusion  0-15 mg/hr IntraVENous TITRATE    sodium chloride (NS) flush 5-40 mL  5-40 mL IntraVENous Q8H    sodium chloride (NS) flush 5-40 mL  5-40 mL IntraVENous PRN    acetaminophen (TYLENOL) tablet 650 mg  650 mg Oral Q6H PRN    Or    acetaminophen (TYLENOL) suppository 650 mg  650 mg Rectal Q6H PRN    polyethylene glycol (MIRALAX) packet 17 g  17 g Oral DAILY PRN    ondansetron (ZOFRAN ODT) tablet 4 mg  4 mg Oral Q8H PRN    Or    ondansetron (ZOFRAN) injection 4 mg  4 mg IntraVENous Q6H PRN    enoxaparin (LOVENOX) injection 40 mg  40 mg SubCUTAneous DAILY    potassium chloride 10 mEq in 100 ml IVPB  10 mEq IntraVENous PRN    magnesium sulfate 2 g/50 ml IVPB (premix or compounded)  2 g IntraVENous PRN    famotidine (PEPCID) tablet 20 mg  20 mg Oral BID    nicotine buccal (POLACRILEX) lozenge 2 mg  2 mg Oral Q4H PRN    nicotine (NICODERM CQ) 21 mg/24 hr patch 1 Patch  1 Patch TransDERmal DAILY    levalbuterol (XOPENEX) nebulizer soln 0.63 mg/3 mL  0.63 mg Nebulization Q6H RT    ipratropium (ATROVENT) 0.02 % nebulizer solution 0.5 mg  0.5 mg Nebulization Q6H RT    budesonide (PULMICORT) 500 mcg/2 ml nebulizer suspension  500 mcg Nebulization BID RT    HYDROmorphone (DILAUDID) injection 0.5 mg  0.5 mg IntraVENous Q4H PRN    HYDROmorphone (DILAUDID) injection 0.2 mg  0.2 mg IntraVENous Q4H PRN    midodrine (PROAMATINE) tablet 10 mg  10 mg Oral TID WITH MEALS    aspirin chewable tablet 81 mg  81 mg Oral DAILY    nystatin (MYCOSTATIN) 100,000 unit/gram powder   Topical BID       Signed:  Izzy Hughes DO    Part of this note may have been written by using a voice dictation software. The note has been proof read but may still contain some grammatical/other typographical errors.

## 2022-03-10 NOTE — PROGRESS NOTES
Comprehensive Nutrition Assessment    Type and Reason for Visit: [address] for Pressure Injury stage 2 or greater    Nutrition Recommendations/Plan:   Meals and Snacks:  Continue current diet. Nutrition Supplement Therapy:   Medical food supplement therapy:  Deferred for now pending PO trends and wound care team assessment. Malnutrition Assessment:  Malnutrition Status: Insufficient data (reported poor PO, h/o drug abuse)    Nutrition Assessment:   Nutrition History: Patient reports 1 meal per day or none and just snacks. She does not know about usual body weight. Daughter reports patient was not eating well prior to [address] because Jeannine Henderson was in a bad place. \" She states that she will eat better after discharge because she will d/c home with family. Cultural/Catholic/Ethnic Food Preference(s): None   Nutrition Background: Patient with PMH significant for cardiomyopathy, CHF, HTN, DM, Afib, COPD, drug abuse. She presented with abdominal distention and shortness of breath. She was admitted with CHF, anasarca, and ascites. She is now s/p paracentesis with 15L removed. Nutrition Interval:  Patient seen with daughter at bedside. She reports she ate all her lunch today as she was starving since only having liquids prior. She denies any current barriers to PO and states that she thinks she will eat well going forward. She voiced no other needs of questions. Discussed with RN, Vj Maya.     Nutrition Related Findings:   No zachery wasting noted Wound Type:  (redness on sacrum, toe wound, cellulitis - WC pending)    Current Nutrition Therapies:  ADULT DIET Regular    Current Intake:   Average Meal Intake: Unable to assess (diet just advanced today)        Anthropometric Measures:  Height: 6' 1\" (185.4 cm)  Current Body Wt: 136.5 kg (300 lb 14.9 oz) (3/10), Weight source: Bed scale  BMI: 39.7,  (BMI likely skewed by fluid status)  Admission Body Weight: 284 lb 13.4 oz (3/8)  Ideal Body Weight (lbs) (Calculated): 165 lbs (75 kg), 182.4 %  Usual Body Wt:  (unknown, limted wt history, pt unsure), Percent weight change:            Edema: Genital: 2+ (3/9/2022  8:00 AM)  Facial: 1+ (3/9/2022  8:00 AM)  Generalized: 2+; Pitting (3/10/2022  8:00 AM)  LLE: 3+; Pitting (3/10/2022 10:33 AM)  LUE: 1+; Non-pitting (3/10/2022 10:33 AM)  RLE: 3+; Pitting (3/10/2022 10:33 AM)  RUE: 1+; Non-pitting (3/10/2022 10:33 AM)     Estimated Daily Nutrient Needs:  Energy (kcal/day): 5284-9603 (Kcal/kg (23-28), Weight Used: Ideal (75 kg))  Protein (g/day):  Weight Used: ( (20% kcal))  Fluid (ml/day):   (1 ml/kcal)    Nutrition Diagnosis:   · No nutrition diagnosis at this time       Nutrition Interventions:   Food and/or Nutrient Delivery: Continue current diet     Coordination of Nutrition Care: Continue to monitor while inpatient    Goals:       Active Goal: Meet at least 75% nutrition needs with PO diet by RD follow-up    Nutrition Monitoring and Evaluation:      Food/Nutrient Intake Outcomes: Food and nutrient intake  Physical Signs/Symptoms Outcomes: Fluid status or edema,Meal time behavior,Weight    Discharge Planning:    No discharge needs at this time    [address] F[address] Dumont on 3/10/2022 at 2:47 PM  Contact: [phone]

## 2022-03-10 NOTE — PROGRESS NOTES
Today's Events:  · Weaned Levophed gtt to 2 mg/min   · Frequent turns to prevent further decubital skin deterioration  · Advanced to regular diet  · Start sliding scale/ ACHS  · Lateral transfer to ICU 3104           ROS     Gtts:   norepinephrine 2 mcg/min  Diltiazem 10 mg/hr           N- A&Ox4, PERRLA 3mm, TMAX 103.1F, spontaneous BUE BLE movements, weak but assists with turns, afebrile     CV- Afib 110-120s, MAP goal >65, on norepinephrine gtt, 1 L NS bolus given, radial pulses 2+, DP/PT pulses doppler; significant generalized and pitting edema     P- 2L nc SpO2 96%; LS coarse; strong congested non-productive cough     GI- clear liquid diet, BS hypoactive, LBM unknown; ascites +     - echols present patent and draining minimal amount of brown-brent urine     INT- see wound LDAs, induration/cellulitis on BLE     ACCESS- L ha 22g, R fa 20g, R ac 20g     SOCIAL- updated mother, Andra Taylor, that she transferred to ICU 3104     PLAN- wean off pressors and diltiazem, diurese

## 2022-03-10 NOTE — PROGRESS NOTES
Dual skin assessment performed with Romulo Quiles RN. Scar on chest from heart surgery. Redness and excoriation to sacrum. Allevyn applied. Redness and irritation to pannus. BLE hot, red, and swollen (L>R). Scabs on feet. Missing 5th toe R foot. Wound consult placed.

## 2022-03-10 NOTE — PROGRESS NOTES
TRANSFER - IN REPORT:    Verbal report received from Pebbles Hernandez [facility] (name) on Katharina Palmer  being received from CVICU (unit) for routine progression of care      Report consisted of patients Situation, Background, Assessment and   Recommendations(SBAR). Information from the following report(s) SBAR, Kardex, Intake/Output, MAR, Recent Results, Cardiac Rhythm A fib and Dual Neuro Assessment was reviewed with the receiving nurse. Opportunity for questions and clarification was provided. Assessment completed upon patients arrival to unit and care assumed.

## 2022-03-10 NOTE — PROGRESS NOTES
Problem: Falls - Risk of  Goal: *Absence of Falls  Description: Document Sofia Fothergill Fall Risk and appropriate interventions in the flowsheet.   Outcome: Progressing Towards Goal  Note: Fall Risk Interventions:  Mobility Interventions: Assess mobility with egress test    Mentation Interventions: Adequate sleep, hydration, pain control    Medication Interventions: Bed/chair exit alarm    Elimination Interventions: Bed/chair exit alarm,Call light in reach,Patient to call for help with toileting needs,Toileting schedule/hourly rounds              Problem: Patient Education: Go to Patient Education Activity  Goal: Patient/Family Education  Outcome: Progressing Towards Goal     Problem: Acute Renal Failure: Day 2  Goal: Nutrition/Diet  Outcome: Progressing Towards Goal

## 2022-03-10 NOTE — ACP (ADVANCE CARE PLANNING)
Advance Care Planning     General Advance Care Planning (ACP) Conversation      Date of Conversation: 3/8/2022  Conducted with: Patient with Decision Making Capacity and son, [address] Decision Maker:     Primary Decision Maker: Brianna Giles - Son - [phone]    Secondary Decision Maker: Charley Goff - Mother - [phone]  Click here to complete [address] including selection of the Healthcare Decision Maker Relationship (ie \"Primary\")      Today we documented Decision Maker(s) consistent with Legal Next of Kin hierarchy. Content/Action Overview:   No current LW/HCPOA. Not  and legally . Pt had 3 children, but 2 adopted from pt. Baldemar only legal child. Full code per MD orders.      Length of Voluntary ACP Conversation in minutes:  <16 minutes (Non-Billable)    Marine Henson RN

## 2022-03-10 NOTE — PROGRESS NOTES
Pt seen in ICU s/p admission new onset afib. Alert and oriented. Confirms demographics and states she will be going to stay with her mother at [address] [place] [address] at d/c. No current DME's, HH, or rehab. Son, Tucker at bedside. She had 3 children, but gave 2 up for adoption to relatives. So Tucker, is only legal NOK. Discussed poss needs at d/c. When ask what she felt like she needed, she stated BSC. No mention of rehab for poss drug abuse per mother. Aware CM to follow for any assist and d/c needs/POC when stable. Care Management Interventions  PCP Verified by CM: Yes ([address] for PCP)  Mode of Transport at Discharge:  Other (see comment)  Transition of Care Consult (CM Consult): Discharge Planning  Support Systems: Child(jitendra),Other Family Member(s)  Confirm Follow Up Transport: Family  Freedom of Choice List was Provided with Basic Dialogue that Supports the Patient's Individualized Plan of Care/Goals, Treatment Preferences and Shares the Quality Data Associated with the Providers?: Yes   Resource Information Provided?:  (MICHELLE)  Discharge Location  Patient Expects to be Discharged to[de-identified] Unable to determine at this time

## 2022-03-10 NOTE — PROGRESS NOTES
Physician Progress Note      Rey Ha  CSN #:                  [id number]  :                       1975  ADMIT DATE:       3/8/2022 1:48 PM  DISCH DATE:  RESPONDING  PROVIDER #:        ANASTACIO CHATTERJEE DO          QUERY TEXT:    Pt admitted with sepsis and has shock documented. If possible, please document in progress notes and discharge summary further specificity regarding the type of shock: The medical record reflects the following:  Risk Factors: sepsis, concern for SBP  Clinical Indicators: As per nephrology note, documentation of shock. LA on admission 4.7. SBP as low as 76. Treatment: Levophed. IVF bolus. Albumin. Options provided:  -- Septic Shock  -- Other - I will add my own diagnosis  -- Disagree - Not applicable / Not valid  -- Disagree - Clinically unable to determine / Unknown  -- Refer to Clinical Documentation Reviewer    PROVIDER RESPONSE TEXT:    This patient is in septic shock.     Query created by: Sheree Hatfield on 3/10/2022 8:26 AM      Electronically signed by:  Mari Pickett DO 3/10/2022 8:28 AM

## 2022-03-11 PROBLEM — N39.0 E. COLI UTI: Status: ACTIVE | Noted: 2022-03-11

## 2022-03-11 PROBLEM — L03.90 CELLULITIS: Status: ACTIVE | Noted: 2022-03-11

## 2022-03-11 PROBLEM — J44.1 COPD EXACERBATION (HCC): Status: RESOLVED | Noted: 2022-03-08 | Resolved: 2022-03-11

## 2022-03-11 PROBLEM — B96.20 E. COLI UTI: Status: ACTIVE | Noted: 2022-03-11

## 2022-03-11 PROBLEM — F17.210 DEPENDENCE ON NICOTINE FROM CIGARETTES: Chronic | Status: ACTIVE | Noted: 2022-03-08

## 2022-03-11 LAB
ALBUMIN SERPL-MCNC: 2.7 G/DL (ref 3.5–5)
ALBUMIN/GLOB SERPL: 0.9 {RATIO} (ref 1.2–3.5)
ALP SERPL-CCNC: 229 U/L (ref 50–136)
ALT SERPL-CCNC: 17 U/L (ref 12–65)
ANA SER QL: NEGATIVE
ANION GAP SERPL CALC-SCNC: 7 MMOL/L (ref 7–16)
AST SERPL-CCNC: 33 U/L (ref 15–37)
BACTERIA SPEC CULT: ABNORMAL
BACTERIA SPEC CULT: NORMAL
BILIRUB SERPL-MCNC: 2.4 MG/DL (ref 0.2–1.1)
BUN SERPL-MCNC: 26 MG/DL (ref 6–23)
CALCIUM SERPL-MCNC: 8 MG/DL (ref 8.3–10.4)
CHLORIDE SERPL-SCNC: 103 MMOL/L (ref 98–107)
CO2 SERPL-SCNC: 23 MMOL/L (ref 21–32)
CREAT SERPL-MCNC: 1 MG/DL (ref 0.6–1)
ERYTHROCYTE [DISTWIDTH] IN BLOOD BY AUTOMATED COUNT: 18.1 % (ref 11.9–14.6)
EST. AVERAGE GLUCOSE BLD GHB EST-MCNC: 151 MG/DL
GLOBULIN SER CALC-MCNC: 3.1 G/DL (ref 2.3–3.5)
GLUCOSE BLD STRIP.AUTO-MCNC: 116 MG/DL (ref 65–100)
GLUCOSE BLD STRIP.AUTO-MCNC: 141 MG/DL (ref 65–100)
GLUCOSE BLD STRIP.AUTO-MCNC: 145 MG/DL (ref 65–100)
GLUCOSE BLD STRIP.AUTO-MCNC: 154 MG/DL (ref 65–100)
GLUCOSE SERPL-MCNC: 141 MG/DL (ref 65–100)
GRAM STN SPEC: NORMAL
GRAM STN SPEC: NORMAL
HBA1C MFR BLD: 6.9 % (ref 4.2–6.3)
HCT VFR BLD AUTO: 29.6 % (ref 35.8–46.3)
HGB BLD-MCNC: 9 G/DL (ref 11.7–15.4)
MCH RBC QN AUTO: 26.9 PG (ref 26.1–32.9)
MCHC RBC AUTO-ENTMCNC: 30.4 G/DL (ref 31.4–35)
MCV RBC AUTO: 88.4 FL (ref 79.6–97.8)
NRBC # BLD: 0 K/UL (ref 0–0.2)
PLATELET # BLD AUTO: 109 K/UL (ref 150–450)
PMV BLD AUTO: 10.9 FL (ref 9.4–12.3)
POTASSIUM SERPL-SCNC: 3.9 MMOL/L (ref 3.5–5.1)
PROT SERPL-MCNC: 5.8 G/DL (ref 6.3–8.2)
RBC # BLD AUTO: 3.35 M/UL (ref 4.05–5.2)
SERVICE CMNT-IMP: ABNORMAL
SERVICE CMNT-IMP: NORMAL
SODIUM SERPL-SCNC: 133 MMOL/L (ref 136–145)
VANCOMYCIN SERPL-MCNC: 17.2 UG/ML
WBC # BLD AUTO: 11.4 K/UL (ref 4.3–11.1)

## 2022-03-11 PROCEDURE — 74011250637 HC RX REV CODE- 250/637: Performed by: INTERNAL MEDICINE

## 2022-03-11 PROCEDURE — 83036 HEMOGLOBIN GLYCOSYLATED A1C: CPT

## 2022-03-11 PROCEDURE — 74011000250 HC RX REV CODE- 250: Performed by: INTERNAL MEDICINE

## 2022-03-11 PROCEDURE — 74011250636 HC RX REV CODE- 250/636: Performed by: INTERNAL MEDICINE

## 2022-03-11 PROCEDURE — 85027 COMPLETE CBC AUTOMATED: CPT

## 2022-03-11 PROCEDURE — 97161 PT EVAL LOW COMPLEX 20 MIN: CPT

## 2022-03-11 PROCEDURE — 65660000000 HC RM CCU STEPDOWN

## 2022-03-11 PROCEDURE — 94640 AIRWAY INHALATION TREATMENT: CPT

## 2022-03-11 PROCEDURE — 97535 SELF CARE MNGMENT TRAINING: CPT

## 2022-03-11 PROCEDURE — 74011250637 HC RX REV CODE- 250/637: Performed by: FAMILY MEDICINE

## 2022-03-11 PROCEDURE — 99232 SBSQ HOSP IP/OBS MODERATE 35: CPT | Performed by: INTERNAL MEDICINE

## 2022-03-11 PROCEDURE — 77010033678 HC OXYGEN DAILY

## 2022-03-11 PROCEDURE — 2709999900 HC NON-CHARGEABLE SUPPLY

## 2022-03-11 PROCEDURE — 74011000258 HC RX REV CODE- 258: Performed by: INTERNAL MEDICINE

## 2022-03-11 PROCEDURE — 97165 OT EVAL LOW COMPLEX 30 MIN: CPT

## 2022-03-11 PROCEDURE — 86580 TB INTRADERMAL TEST: CPT | Performed by: INTERNAL MEDICINE

## 2022-03-11 PROCEDURE — 74011000258 HC RX REV CODE- 258: Performed by: NURSE PRACTITIONER

## 2022-03-11 PROCEDURE — 82962 GLUCOSE BLOOD TEST: CPT

## 2022-03-11 PROCEDURE — 74011636637 HC RX REV CODE- 636/637: Performed by: INTERNAL MEDICINE

## 2022-03-11 PROCEDURE — 94762 N-INVAS EAR/PLS OXIMTRY CONT: CPT

## 2022-03-11 PROCEDURE — 74011250636 HC RX REV CODE- 250/636: Performed by: NURSE PRACTITIONER

## 2022-03-11 PROCEDURE — 80202 ASSAY OF VANCOMYCIN: CPT

## 2022-03-11 PROCEDURE — 74011250637 HC RX REV CODE- 250/637: Performed by: NURSE PRACTITIONER

## 2022-03-11 PROCEDURE — 97112 NEUROMUSCULAR REEDUCATION: CPT

## 2022-03-11 PROCEDURE — 74011250637 HC RX REV CODE- 250/637: Performed by: EMERGENCY MEDICINE

## 2022-03-11 PROCEDURE — 80053 COMPREHEN METABOLIC PANEL: CPT

## 2022-03-11 PROCEDURE — 97530 THERAPEUTIC ACTIVITIES: CPT

## 2022-03-11 RX ORDER — SPIRONOLACTONE 25 MG/1
50 TABLET ORAL DAILY
Status: DISCONTINUED | OUTPATIENT
Start: 2022-03-12 | End: 2022-03-13 | Stop reason: HOSPADM

## 2022-03-11 RX ORDER — FUROSEMIDE 40 MG/1
40 TABLET ORAL
Status: DISCONTINUED | OUTPATIENT
Start: 2022-03-11 | End: 2022-03-12

## 2022-03-11 RX ADMIN — ACETAMINOPHEN 650 MG: 325 TABLET ORAL at 09:41

## 2022-03-11 RX ADMIN — SODIUM CHLORIDE, PRESERVATIVE FREE 10 ML: 5 INJECTION INTRAVENOUS at 14:02

## 2022-03-11 RX ADMIN — AMIODARONE HYDROCHLORIDE 200 MG: 200 TABLET ORAL at 08:11

## 2022-03-11 RX ADMIN — FUROSEMIDE 40 MG: 40 TABLET ORAL at 08:10

## 2022-03-11 RX ADMIN — SODIUM CHLORIDE, PRESERVATIVE FREE 10 ML: 5 INJECTION INTRAVENOUS at 22:00

## 2022-03-11 RX ADMIN — BUDESONIDE 500 MCG: 0.5 INHALANT RESPIRATORY (INHALATION) at 20:13

## 2022-03-11 RX ADMIN — HYDROMORPHONE HYDROCHLORIDE 0.2 MG: 1 INJECTION, SOLUTION INTRAMUSCULAR; INTRAVENOUS; SUBCUTANEOUS at 06:19

## 2022-03-11 RX ADMIN — Medication 5 MG: at 22:34

## 2022-03-11 RX ADMIN — IPRATROPIUM BROMIDE 0.5 MG: 0.5 SOLUTION RESPIRATORY (INHALATION) at 20:13

## 2022-03-11 RX ADMIN — ENOXAPARIN SODIUM 40 MG: 100 INJECTION SUBCUTANEOUS at 08:12

## 2022-03-11 RX ADMIN — IPRATROPIUM BROMIDE 0.5 MG: 0.5 SOLUTION RESPIRATORY (INHALATION) at 09:08

## 2022-03-11 RX ADMIN — ASPIRIN 81 MG: 81 TABLET, CHEWABLE ORAL at 08:10

## 2022-03-11 RX ADMIN — MIDODRINE HYDROCHLORIDE 10 MG: 5 TABLET ORAL at 12:05

## 2022-03-11 RX ADMIN — LEVALBUTEROL HYDROCHLORIDE 0.63 MG: 0.63 SOLUTION RESPIRATORY (INHALATION) at 09:08

## 2022-03-11 RX ADMIN — ACETAMINOPHEN 650 MG: 325 TABLET ORAL at 04:16

## 2022-03-11 RX ADMIN — SODIUM CHLORIDE 10 MG/HR: 900 INJECTION, SOLUTION INTRAVENOUS at 04:22

## 2022-03-11 RX ADMIN — FAMOTIDINE 20 MG: 20 TABLET, FILM COATED ORAL at 08:10

## 2022-03-11 RX ADMIN — GABAPENTIN 600 MG: 300 CAPSULE ORAL at 22:34

## 2022-03-11 RX ADMIN — LEVALBUTEROL HYDROCHLORIDE 0.63 MG: 0.63 SOLUTION RESPIRATORY (INHALATION) at 03:58

## 2022-03-11 RX ADMIN — IPRATROPIUM BROMIDE 0.5 MG: 0.5 SOLUTION RESPIRATORY (INHALATION) at 14:28

## 2022-03-11 RX ADMIN — CEFEPIME HYDROCHLORIDE 2 G: 2 INJECTION, POWDER, FOR SOLUTION INTRAVENOUS at 10:40

## 2022-03-11 RX ADMIN — Medication 2 UNITS: at 22:34

## 2022-03-11 RX ADMIN — NYSTATIN: 100000 POWDER TOPICAL at 08:12

## 2022-03-11 RX ADMIN — LEVALBUTEROL HYDROCHLORIDE 0.63 MG: 0.63 SOLUTION RESPIRATORY (INHALATION) at 20:13

## 2022-03-11 RX ADMIN — FAMOTIDINE 20 MG: 20 TABLET, FILM COATED ORAL at 17:56

## 2022-03-11 RX ADMIN — AMIODARONE HYDROCHLORIDE 200 MG: 200 TABLET ORAL at 17:56

## 2022-03-11 RX ADMIN — SPIRONOLACTONE 25 MG: 25 TABLET ORAL at 08:11

## 2022-03-11 RX ADMIN — CEFEPIME HYDROCHLORIDE 2 G: 2 INJECTION, POWDER, FOR SOLUTION INTRAVENOUS at 22:34

## 2022-03-11 RX ADMIN — MIDODRINE HYDROCHLORIDE 10 MG: 5 TABLET ORAL at 16:05

## 2022-03-11 RX ADMIN — APIXABAN 5 MG: 5 TABLET, FILM COATED ORAL at 19:47

## 2022-03-11 RX ADMIN — FLUCONAZOLE IN SODIUM CHLORIDE 200 MG: 2 INJECTION, SOLUTION INTRAVENOUS at 10:45

## 2022-03-11 RX ADMIN — METOPROLOL TARTRATE 75 MG: 50 TABLET, FILM COATED ORAL at 17:56

## 2022-03-11 RX ADMIN — VANCOMYCIN HYDROCHLORIDE 1000 MG: 1 INJECTION, POWDER, LYOPHILIZED, FOR SOLUTION INTRAVENOUS at 09:30

## 2022-03-11 RX ADMIN — HYDROMORPHONE HYDROCHLORIDE 0.5 MG: 1 INJECTION, SOLUTION INTRAMUSCULAR; INTRAVENOUS; SUBCUTANEOUS at 19:47

## 2022-03-11 RX ADMIN — GABAPENTIN 600 MG: 300 CAPSULE ORAL at 14:00

## 2022-03-11 RX ADMIN — METOPROLOL TARTRATE 50 MG: 50 TABLET, FILM COATED ORAL at 08:10

## 2022-03-11 RX ADMIN — IPRATROPIUM BROMIDE 0.5 MG: 0.5 SOLUTION RESPIRATORY (INHALATION) at 03:58

## 2022-03-11 RX ADMIN — LEVALBUTEROL HYDROCHLORIDE 0.63 MG: 0.63 SOLUTION RESPIRATORY (INHALATION) at 14:28

## 2022-03-11 RX ADMIN — BUDESONIDE 500 MCG: 0.5 INHALANT RESPIRATORY (INHALATION) at 09:08

## 2022-03-11 RX ADMIN — SODIUM CHLORIDE, PRESERVATIVE FREE 10 ML: 5 INJECTION INTRAVENOUS at 05:22

## 2022-03-11 RX ADMIN — MIDODRINE HYDROCHLORIDE 10 MG: 5 TABLET ORAL at 08:11

## 2022-03-11 RX ADMIN — FUROSEMIDE 40 MG: 40 TABLET ORAL at 16:05

## 2022-03-11 RX ADMIN — NYSTATIN: 100000 POWDER TOPICAL at 17:57

## 2022-03-11 RX ADMIN — Medication 5 UNITS: at 15:13

## 2022-03-11 RX ADMIN — GABAPENTIN 600 MG: 300 CAPSULE ORAL at 05:30

## 2022-03-11 RX ADMIN — HYDROMORPHONE HYDROCHLORIDE 0.5 MG: 1 INJECTION, SOLUTION INTRAMUSCULAR; INTRAVENOUS; SUBCUTANEOUS at 12:04

## 2022-03-11 NOTE — PROGRESS NOTES
VANCO DAILY FOLLOW UP NOTE  [address] Pharmacokinetic Monitoring Service - Vancomycin    Consulting Provider: Dr. Violet Harp   Indication: unclear source   Target Concentration: Goal AUC/KATE 400-600 mg*hr/L  Day of Therapy: 2  Additional Antimicrobials: cefepime    Pertinent Laboratory Values: Wt Readings from Last 1 Encounters:   03/11/22 136.5 kg (300 lb 14.9 oz)     Temp Readings from Last 1 Encounters:   03/11/22 99.2 °F (37.3 °C)     No components found for: PROCAL  Recent Labs     03/11/22  0325 03/10/22  0759 03/09/22  0345 03/08/22  1556 03/08/22  1345 03/08/22  1345   BUN 26* 28* 26*  --    < > 19   CREA 1.00 1.20* 1.60*  --    < > 1.20*   WBC 11.4* 13.0* 14.8*  --    < > 12.2*   LAC  --   --  2.0 1.5  --  4.7*    < > = values in this interval not displayed. Estimated Creatinine Clearance: 110.8 mL/min (based on SCr of 1 mg/dL). Lab Results   Component Value Date/Time    Vancomycin, random 17.2 03/11/2022 03:25 AM       MRSA Nasal Swab: N/A. Non-respiratory infection.       Assessment:  Date/Time Dose Concentration AUC   3/11 0325 1500 mg q 24 hours 17.2 370   Note: Serum concentrations collected for AUC dosing may appear elevated if collected in close proximity to the dose administered, this is not necessarily an indication of toxicity    Plan:  Current dosing regimen is sub-therapeutic  Increase dose to 1000 mg q 12 hours for predicted AUC/Tr of 484/16  Repeat vancomycin concentrations will be ordered as clinically appropriate   Pharmacy will continue to monitor patient and adjust therapy as indicated    Thank you for the consult,  Kimberly Way, NIRMALD

## 2022-03-11 NOTE — PROGRESS NOTES
Date of Outreach Update:  Melly Brennan was seen and assessed. Pt remains lethargic and complaining of BLE leg pain - pt stated that IVP pain medications have been working to relieve pain      MEWS Score: 1 (03/11/22 1538)  Vitals:    03/11/22 1116 03/11/22 1151 03/11/22 1428 03/11/22 1538   BP:  (!) 100/59  (!) 100/51   Pulse: 89 89  94   Resp: 12 16  14   Temp:  98.5 °F (36.9 °C)  97 °F (36.1 °C)   SpO2: 94% 95% 92% 92%   Weight:       Height:             Pain Assessment  Pain Intensity 1: 0 (03/11/22 1600)  Pain Location 1: Leg  Pain Intervention(s) 1: Medication (see MAR)  Patient Stated Pain Goal: 0      Previous Outreach assessment has been reviewed. There have been no significant clinical changes since the completion of the last dated Outreach assessment. Will continue to follow up per outreach protocol.     Signed By:   Ana Gallardo RN    March 11, 2022 4:54 PM

## 2022-03-11 NOTE — CONSULTS
Pulmonary Progress NOTE           3/11/2022    Melvin Torres                        Date of Admission:  3/8/2022    The patient's chart is reviewed and the patient is discussed with the staff. Patient is a 55 y.o.  female with a PMH of 10pkyr smoking history (1/2ppd currently), COPD, a fib on eliquis seen and evaluated at the request of Dr. Hartley Halsted for possible [place] [address] based on TTE with TPRV of 3.0m/s, moderate RV dilation and reduced systolic function. She reports methamphetamine use 1 week prior to admission. She has been taking eliquis since her heart attack 5 years ago. She also reports daytime sleepiness. During her hospitalization she has had paracentesis with removal of 15L of ascitic fluid. Abx for spontaneous bacterial peritonitis. Her ultrasound appears that she may have cirrhosis and INR is slightly elevated. Platelets were normal on admission but now decreased. + E coli UTI -> cefepime (resistent to rocephin). Nephrology following for ? Hepatorenal syndrome. Required transfer to the ICU for hypotension with rapid a fib. On Amio/Cardizem for rate control. BNP 13,200. On Lasix/aldactone with improvement in renal function. Alpha 1 normal and HIV negative. MARCELO pending. Plans for VQ scan before discharge and for PFTs/PSG/6 minute walk test as outpatient. ? Need RHC later. Plans for office follow up. Subjective:       Legs are hurting - wound care wrapping them at present. Abdomen still swollen. Discussed need for outpatient follow up and she understands.      Risk factors for PH  Obesity and possible HOLLEY  Methamphetamine use  Smoking  Possible cirrhosis    ROS:  Constitutional: negative for fever, chills, sweats  Cardiovascular: negative for chest pain, palpitations, syncope  + edema  Gastrointestinal:  negative for dysphagia, reflux, vomiting, diarrhea, or melena  +  Abdominal pain with ascites  Neurologic:  negative for focal weakness, numbness, headache    No past medical history on file. Past Surgical History:   Procedure Laterality Date    IR PARACENTESIS ABD W IMAGE  3/8/2022     Objective:   Blood pressure (!) 93/50, pulse (!) 102, temperature 99.2 °F (37.3 °C), resp. rate 16, height 6' 1\" (1.854 m), weight 300 lb 14.9 oz (136.5 kg), SpO2 96 %, not currently breastfeeding. Intake/Output Summary (Last 24 hours) at 3/11/2022 1807  Last data filed at 3/11/2022 0703  Gross per 24 hour   Intake 1712.98 ml   Output 1725 ml   Net -12.02 ml     PHYSICAL EXAM   Constitutional:  the patient is well developed and in no acute distress  EENMT:  Sclera clear, pupils equal, oral mucosa moist.   Respiratory:  Clear bilaterally from anterior. Cardiovascular:  Irregular - a fib per telemetry  Gastrointestinal: distended/tight; with positive bowel sounds. Musculoskeletal: moves all 4 extremities   Skin: warm without cyanosis. There is 2+ lower extremity edema.  + erythema extending up to the thighs, + tenderness  Neurologic: no gross neuro deficits     Psychiatric:  alert and oriented     Recent Labs     03/11/22  0325 03/10/22  1105 03/10/22  0759 03/09/22  0345 03/08/22  1556 03/08/22  1345 03/08/22  1345   WBC 11.4*  --  13.0* 14.8*  --    < > 12.2*   HGB 9.0*  --  8.5* 9.9*  --    < > 10.3*   HCT 29.6*  --  28.0* 33.2*  --    < > 36.5   *  --  112* 134*  --    < > 219   INR  --   --   --  1.5  --   --   --    LAC  --   --   --  2.0 1.5  --  4.7*   *  --  133* 136  --    < > 133*   K 3.9  --  4.4 4.8  --    < > 4.3     --  103 104  --    < > 102   CO2 23  --  25 26  --    < > 24   *  --  122* 102*  --    < > 206*   BUN 26*  --  28* 26*  --    < > 19   CREA 1.00  --  1.20* 1.60*  --    < > 1.20*   MG  --   --   --   --   --   --  2.1   CA 8.0*  --  7.8* 8.3  --    < > 8.9   ALB 2.7*  --  2.8* 2.7*  --    < > 3.6   AST 33  --  46* 139*  --    < > 19   ALT 17  --  23 38  --    < > 13   *  --  203* 329*  --    < > 299*   BNPNT  --  13,200*  --   -- --   --  9,226*    < > = values in this interval not displayed. ECHO: Results from [place] encounter on 03/08/22    ECHO ADULT COMPLETE    Interpretation Summary    Left Ventricle: Left ventricle is mildly dilated. Normal wall thickness. Normal wall motion. Low normal left ventricular systolic function with a visually estimated EF of 50 - 55%. Abnormal diastolic function.   Right Ventricle: Right ventricle is moderately dilated. Mildly reduced systolic function.   Aortic Valve: Mild sclerosis of the aortic valve cusp.   Mitral Valve: Mild transvalvular regurgitation.   Tricuspid Valve: Moderate transvalvular regurgitation. Mildly elevated RVSP.   Left Atrium: Left atrium is moderately dilated.   Right Atrium: Right atrium is moderately dilated.   Contrast used: Definity. Inpat Anti-Infectives (From admission, onward)     Start     Ordered Stop    03/09/22 0800  cefepime (MAXIPIME) 2 g in 0.9% sodium chloride (MBP/ADV) 100 mL MBP  2 g,   IntraVENous,   EVERY 8 HOURS         03/09/22 0738 --    03/09/22 0000  nystatin (MYCOSTATIN) 100,000 unit/gram powder  Topical,   2 TIMES DAILY         03/08/22 2351 --              Diagnostic Review:      6MWT needed   distance    Start;end O2 sat    Max SEAMUS dyspnea    COMMENTS        Right Heart Cath Likely needed as outpatient   RA    RV    PA    PCWP    CO    PVR    VASOREACTIVE? TTE 3/9/22   LV EF 45-23%  Diastolic function abnormal, E/e' 13   RV Moderately dilated w mildly reduced systolic function   Peak TRV 3.07m/s   COMMENTS Moderately dilated LA/RA, no significant valvular disease       Oximetry and/or sleep study result: plan for as outpatient  Chest CT: none  V/Q Scan: Plan to get before discharge    Lab Diagnostics:  HIV - negative  HCV - pending  MARCELO - pending  BNP - 13,200    No PFTs     Ultrasound RUQ  IMPRESSION  1. Heterogeneous echotexture to the liver with a nodular margin. This can be  seen with cirrhosis.   2. Nonspecific gallbladder wall thickening can also be seen with liver disease. 3. Cholelithiasis. 4. Small volume ascite    Assessment and Plan:  (Medical Decision Making)   Principal Problem:    Systolic congestive heart failure ([address] (3/9/2022)   BNP 13, 200. Currently on oral lasix and aldactone with + fluid balance of 790 mls over past 24 hours. + hypotension but off pressor. Creatinine down to normal. Increase diuresis as blood pressure allows. Active Problems:    Anasarca (3/8/2022)   Still present. Had recent paracentesis. May need to repeat. Cirrhosis ([address] (3/8/2022)   US as noted. + ascites. SBP (spontaneous bacterial peritonitis) ([address] (3/8/2022)   Culture negative. Low WBC in fluid       Atrial fibrillation with RVR ([address] (3/8/2022)   using cardizem for rate control but tapering off. Increased lopressor dose today. Also on Amiodarone. Off Eliquis at present - ? restart      Respiratory failure with hypoxia ([address] (3/8/2022)   On 2 liter cannula with sat of 95%      Hyponatremia (3/8/2022)    Stable - 133      COPD exacerbation (HCC) (3/8/2022)  No wheezing on exam. Scheduled bronchodilators/pulmicort      KAIDEN (acute kidney injury) ([address] (3/8/2022)  resolved      Dependence on nicotine from cigarettes (3/8/2022)  Has been counseled with cessation recommended      Polysubstance abuse ([facility] (3/8/2022)  Drug screen + meth and THC on admission      Suspected sleep apnea (3/10/2022)  Plans for outpatient PSG. CO2 40 on ABG      Pulmonary hypertension ([address] (3/10/2022)  RVSP mildly elevated on echo. HIV, alpha 1 negative. MARCELO and HCV pending. Plan for VQ before discharge and will need PSG, PFTs and 6 minute walk test as outpatient. Plan to increase diuresis today and try to get her euvolemic. Will reevaluate for RHC as outpatient. Methamphetamine abuse ([address] (3/10/2022)  Have discussed need for cessation. E coli UTI -   resistant to Rocephin - will change to Maxipeme.       Lower extremity cellulitis    Increase diuretics and continue abx. Wound care has seen     Full Code    Aiden Astorga NP    I have spoken with and examined the patient. I agree with the above assessment and plan as documented. Gen: alert  Lungs:  Diminished, 2L NC  Heart:  RRR with no Murmur/Rubs/Gallops  Abd: distended, moderately tense  Ext: 3+ anasarca    Will increase diuresis with lasix/aldactone PO. Needs to have legs elevated for decreasing edema for abx to be effective in cellulitis. Will need outpatient follow up/re-evaluation for pulmonary hypertension. Will see Monday if still admitted. Call with any questions.      Michael Camarillo MD

## 2022-03-11 NOTE — WOUND CARE
Bilateral lower legs with erythema and edema consistent with venous stasis. Patient has had known illicit drug use. Will wrap legs with kerlex and coban while in acute care. Will need compression stockings for home, such as HUGO hose. Unna boot is not appropriate at this time. Noted 5th toe right foot amputated, calloused scar and 5th lateral heel with callous/ fissure. Patient is agreeable to wraps. Will monitor.

## 2022-03-11 NOTE — PROGRESS NOTES
ACUTE OT GOALS:  (Developed with and agreed upon by patient and/or caregiver.)  1. Patient will complete lower body bathing and dressing with MOD I and adaptive equipment as needed. 2. Patient will complete toileting with MOD I.   3. Patient will tolerate 30 minutes of OT treatment with 1-2 rest breaks to increase activity tolerance for ADLs. 4. Patient will complete functional transfers with MOD I and adaptive equipment as needed. 5. Patient will complete functional mobility for household distances with MOD I and adaptive equipment as needed. 6. Patient will complete self-grooming while standing edge of sink with MOD I and adaptive equipment as needed.     Timeframe: 7 visits         OCCUPATIONAL THERAPY ASSESSMENT: Initial Assessment and Daily Note OT Treatment Day # 1    Hu Miguel is a 55 y.o. female   PRIMARY DIAGNOSIS: Systolic congestive heart failure (HCC)  New onset a-fib (HCC) [I48.91]  Atrial fibrillation with RVR (HCC) [I48.91]  Respiratory failure with hypoxia (HCC) [J96.91]  Anasarca [R60.1]  Cirrhosis (HCC) [K74.60]  SBP (spontaneous bacterial peritonitis) ([facility] [K65.2]       Reason for Referral:   ICD-10: Treatment Diagnosis: Generalized Muscle Weakness (M62.81)  INPATIENT: Payor: [place] / Plan: [place] MEDICAID OF [place] / Product Type: Medicaid /   ASSESSMENT:     REHAB RECOMMENDATIONS:   Recommendation to date pending progress:  [address] [place] [address]  Equipment:    To Be Determined     PRIOR LEVEL OF FUNCTION:  (Prior to Hospitalization)  INITIAL/CURRENT LEVEL OF FUNCTION:  (Based on today's evaluation)   Bathing:   Independent  Dressing:   Independent  Feeding/Grooming:   Independent  Toileting:   Independent  Functional Mobility:   Independent Bathing:   Moderate Assistance  Dressing:   Moderate Assistance  Feeding/Grooming:   Standby Assistance  Toileting:   Moderate Assistance  Functional Mobility:   Minimal Assistance x 2 x RW ASSESSMENT:  Ms. Claudio Loyola presents with deficits in overall strength, activity tolerance, ADL performance and functional mobility. Currently in CHF with new onset a-fib; presents in ICU; alert and agreeable to OT evaluation. Limited by LLE leg pain d/t cellulitis. Hypotensive today (has been on/off levo). BUE AROM and strength (4/5) are generally decreased but WFL. Min to mod A x 2 for functional bed mobility/transfers; good EOB sitting balance once positioned. Max A to terrell socks (mostly d/t leg pain). Min A x 2 x RW with cues for safety and RW use. Fair standing balance with use of RW; pt only able to take side steps towards HOB d/t limited standing tolerance. Returned to sitting EOB for seated rest break. Pt then proceeded to participate in self-grooming tasks including washing face and brushing teeth with close CGA. Upon completion, pt returned to bed with mod A x 2. At this time, Kilo Monson is functioning below baseline for ADLs and functional mobility. Pt would benefit from ongoing intense skilled OT services d/t pt's medical complexity to address OT goals/plan of care and return to pt's PLOF. SUBJECTIVE:   Ms. Claudio Loyola states, \"You're gonna have to help me with my leg. \"    SOCIAL HISTORY/LIVING ENVIRONMENT: Lives with mother and sister. Independent at baseline for ADLs and functional mobility. Drives. Home Environment: Private residence  # Steps to Enter: 2  One/Two Story Residence: One story  Living Alone: No  Support Systems: Child(jitendra),Other Family Member(s)    OBJECTIVE:     PAIN: VITAL SIGNS: LINES/DRAINS:   Pre Treatment: Pain Screen  Pain Scale 1: FLACC  Pain Intensity 1: 4  Pain Onset 1: ongoing  Pain Location 1: Leg  Pain Orientation 1: Left  Pain Description 1: Aching; Throbbing  Post Treatment: 4   Blackwell Catheter and IV  O2 Device: Nasal cannula     GROSS EVALUATION:  BUEs Within Functional Limits Abnormal/ Functional Abnormal/ Non-Functional (see comments) Not Tested Comments: AROM [] [x] [] []    PROM [] [x] [] []    Strength [] [x] [] [] Generally weak   Balance [] [x] [] [] Fair standing balance d/t limited tolerance   Posture [x] [] [] []    Sensation [x] [] [] []    Coordination [x] [] [] []    Tone [x] [] [] []    Edema [] [x] [] [] BLE   Activity Tolerance [] [] [] [] Generally decreased on 2L     [] [] [] []      COGNITION/  PERCEPTION: Intact Impaired   (see comments) Comments:   Orientation [x] []    Vision [x] []    Hearing [x] []    Judgment/ Insight [] [x] Decreased insight into awareness   Attention [x] []    Memory [x] []    Command Following [x] []    Emotional Regulation [x] []     [] []      ACTIVITIES OF DAILY LIVING: I Mod I S SBA CGA Min Mod Max Total NT Comments   BASIC ADLs:              Bathing/ Showering [] [] [] [] [] [] [] [] [] [x]    Toileting [] [] [] [] [] [] [] [] [] [x]    Dressing [] [] [] [] [] [] [] [x] [] [] Donning socks   Feeding [] [] [] [] [] [] [] [] [] [x]    Grooming [] [] [] [x] [] [] [] [] [] [] Seated EOB   Personal Device Care [] [] [] [] [] [] [] [] [] [x]    Functional Mobility [] [] [] [] [] [x] [] [] [] [] X 2 x RW   I=Independent, Mod I=Modified Independent, S=Supervision, SBA=Standby Assistance, CGA=Contact Guard Assistance,   Min=Minimal Assistance, Mod=Moderate Assistance, Max=Maximal Assistance, Total=Total Assistance, NT=Not Tested    MOBILITY: I Mod I S SBA CGA Min Mod Max Total  NT x2 Comments:   Supine to sit [] [] [] [] [] [x] [] [] [] [] [x]    Sit to supine [] [] [] [] [] [] [x] [] [] [] [x]    Sit to stand [] [] [] [] [] [x] [] [] [] [] [x]    Bed to chair [] [] [] [] [] [] [] [] [] [x] []    I=Independent, Mod I=Modified Independent, S=Supervision, SBA=Standby Assistance, CGA=Contact Guard Assistance,   Min=Minimal Assistance, Mod=Moderate Assistance, Max=Maximal Assistance, Total=Total Assistance, NT=Not Tested    [facility] [address] [id number] AM-PAC 6 Clicks   Daily Activity Inpatient Short Form        How much help from another person does the patient currently need. .. Total A Lot A Little None   1. Putting on and taking off regular lower body clothing? [] 1   [x] 2   [] 3   [] 4   2. Bathing (including washing, rinsing, drying)? [] 1   [x] 2   [] 3   [] 4   3. Toileting, which includes using toilet, bedpan or urinal?   [] 1   [x] 2   [] 3   [] 4   4. Putting on and taking off regular upper body clothing? [] 1   [] 2   [x] 3   [] 4   5. Taking care of personal grooming such as brushing teeth? [] 1   [] 2   [x] 3   [] 4   6. Eating meals? [] 1   [] 2   [x] 3   [] 4   © 2007, Trustees of [address] [place] [address] [address] [address], under license to [employer]. All rights reserved     Score:  Initial: 15 Most Recent: X (Date: -- )   Interpretation of Tool:  Represents activities that are increasingly more difficult (i.e. Bed mobility, Transfers, Gait). PLAN:   FREQUENCY/DURATION: OT Plan of Care: 3 times/week for duration of hospital stay or until stated goals are met, whichever comes first.    PROBLEM LIST:   (Skilled intervention is medically necessary to address:)  1. Decreased ADL/Functional Activities  2. Decreased Activity Tolerance  3. Decreased AROM/PROM  4. Decreased Balance  5. Decreased Coordination  6. Decreased Gait Ability  7. Decreased Strength  8. Decreased Transfer Abilities  9. Increased Pain   INTERVENTIONS PLANNED:   (Benefits and precautions of occupational therapy have been discussed with the patient.)  1. Self Care Training  2. Therapeutic Activity  3. Therapeutic Exercise/HEP  4. Neuromuscular Re-education  5. Education     TREATMENT:     EVALUATION: Low Complexity : (Untimed Charge)    TREATMENT:   ($$ Self Care/Home Management: 8-22 mins$$ Neuromuscular Re-Education: 8-22 mins   )  Self Care (13 Minutes): Self care including Lower Body Dressing, Grooming and Energy Conservation Training to increase independence and decrease level of assistance required.   Neuromuscular Re-education (10 Minutes): Neuromuscular Re-education included Balance Training, Coordination training, Postural training, Sitting balance training and Standing balance training to improve Balance, Coordination and Postural Control.     TREATMENT GRID:  N/A    AFTER TREATMENT POSITION/PRECAUTIONS:  Bed, Restraints  and RN notified    INTERDISCIPLINARY COLLABORATION:  RN/PCT, PT/PTA and OT/CRESPO    TOTAL TREATMENT DURATION:  OT Patient Time In/Time Out  Time In: 8296  Time Out: 100 [facility], OT

## 2022-03-11 NOTE — PROGRESS NOTES
[facility] CRITICAL CARE OUTREACH NURSE PROGRESS REPORT      SUBJECTIVE: Called to assess patient secondary to moving out of ICU. MEWS Score: 1 (03/11/22 0703)  Vitals:    03/11/22 1033 03/11/22 1047 03/11/22 1116 03/11/22 1151   BP: 96/70 91/66  (!) 100/59   Pulse: 93 94 89 89   Resp: 15 15 12 16   Temp:    98.5 °F (36.9 °C)   SpO2: 96% 94% 94% 95%   Weight:       Height:          EKG: unchanged from previous tracings. LAB DATA:    Recent Labs     03/11/22  0325 03/10/22  0759 03/09/22  0345 03/08/22  1345 03/08/22  1345   * 133* 136   < > 133*   K 3.9 4.4 4.8   < > 4.3    103 104   < > 102   CO2 23 25 26   < > 24   AGAP 7 5* 6*   < > 7   * 122* 102*   < > 206*   BUN 26* 28* 26*   < > 19   CREA 1.00 1.20* 1.60*   < > 1.20*   GFRAA >60 >60 45*   < > >60   GFRNA >60 51* 37*   < > 51*   CA 8.0* 7.8* 8.3   < > 8.9   MG  --   --   --   --  2.1   ALB 2.7* 2.8* 2.7*   < > 3.6   TP 5.8* 5.8* 5.9*   < > 8.1   GLOB 3.1 3.0 3.2   < > 4.5*   AGRAT 0.9* 0.9* 0.8*   < > 0.8*   ALT 17 23 38   < > 13    < > = values in this interval not displayed. Recent Labs     03/11/22  0325 03/10/22  0759 03/09/22  0345   WBC 11.4* 13.0* 14.8*   HGB 9.0* 8.5* 9.9*   HCT 29.6* 28.0* 33.2*   * 112* 134*          OBJECTIVE: On arrival to room, I found patient to be sitting up in bed.      Pain Assessment  Pain Intensity 1: 2 (03/11/22 1233)  Pain Location 1: Leg  Pain Intervention(s) 1: Medication (see MAR)  Patient Stated Pain Goal: 0        ASSESSMENT:  I assisted with pt transfer/ pt on 2 L NC/ a/o x3 / following commands/ BLE remains swollen and painful to touch/ pt was admitted for CHF and cardiomyopathy with anasarca, ascites and volume overload/ pulled off 15L during paracentesis/ pt became hypotensive and was admitted to the unit/ pt remains lethargic with mild improvements    No acute needs at this time / pt on remote tele currently in controlled A Fib     PLAN:  Will continue to monitor per outreach protocol

## 2022-03-11 NOTE — PROGRESS NOTES
TRANSFER - OUT REPORT:    Verbal report given to RN (name) on Mary Ann Felix  being transferred to [facility] (unit) for routine progression of care       Report consisted of patients Situation, Background, Assessment and   Recommendations(SBAR). Information from the following report(s) SBAR, Kardex, Procedure Summary, Intake/Output, MAR, Cardiac Rhythm a fib and Dual Neuro Assessment was reviewed with the receiving nurse. Lines:   Peripheral IV 03/08/22 Right Antecubital (Active)   Site Assessment Clean, dry, & intact 03/11/22 0703   Phlebitis Assessment 0 03/11/22 0703   Infiltration Assessment 0 03/11/22 0703   Dressing Status Clean, dry, & intact 03/11/22 0703   Dressing Type Transparent;Tape 03/11/22 0703   Hub Color/Line Status Patent; Flushed 03/11/22 0703   Alcohol Cap Used Yes 03/11/22 0402       Peripheral IV 03/08/22 Left;Posterior Hand (Active)   Site Assessment Clean, dry, & intact 03/11/22 0703   Phlebitis Assessment 0 03/11/22 0703   Infiltration Assessment 0 03/11/22 0703   Dressing Status Clean, dry, & intact 03/11/22 0703   Dressing Type Transparent;Tape 03/11/22 0703   Hub Color/Line Status Patent; Flushed 03/11/22 0703   Alcohol Cap Used Yes 03/11/22 0402       Peripheral IV 03/08/22 Right Forearm (Active)   Site Assessment Clean, dry, & intact 03/11/22 0703   Phlebitis Assessment 0 03/11/22 0703   Infiltration Assessment 0 03/11/22 0703   Dressing Status Clean, dry, & intact 03/11/22 0703   Dressing Type Transparent;Tape 03/11/22 0703   Hub Color/Line Status Patent; Flushed 03/11/22 0703   Alcohol Cap Used Yes 03/11/22 0402        Opportunity for questions and clarification was provided.       Patient transported with:   Monitor  O2 @ 2 liters  Registered Nurse  [facility]

## 2022-03-11 NOTE — PROGRESS NOTES
Hospitalist Progress Note   Admit Date:  3/8/2022  1:48 PM   Name:  Melly Brennan   Age:  55 y.o. Sex:  female  :  1975   MRN:  [id number]   Room:  303/01    Presenting Complaint: Abdominal Pain    Reason(s) for Admission: New onset a-fib [facility]) [I48.91]  Atrial fibrillation with RVR ([facility] [I48.91]  Respiratory failure with hypoxia ([facility] [J96.91]  Anasarca [R60.1]  Cirrhosis ([facility] [K74.60]  [facility]ont[facility]itonitis) [facility] Course & Interval History:   Melly Brennan is a 55 y.o. female with a past medical history of cardiomyopathy, CHF hypertension, diabetes, smoker of 1 pack of cigarettes per day, atrial fibrillation, COPD and drug abuse per the patient's mom who presents with severe shortness of breath and abdominal distention and medication noncompliance. Symptoms have been ongoing for about a week acutely worse today per her partner at the bedside. She was seen and evaluated in the ER found to be in A. fib with RVR with a heart rate in the 160s. A grossly distended abdomen. Likely the major factor in her shortness of breath.      She also complains of abdominal pain. Writhing around in pain. Poor historian. History is taken from ER physician and staff and patient's chart and partner at bedside. Subjective/24hr Events (22): Patient seen and examined and complains of bilateral lower extremity pain. Denies fever chills nausea vomiting chest pain and shortness of breath. ROS:  10 systems reviewed and negative except as noted above. Assessment & Plan: Active Problems:  Diastolic CHF and cardiomyopathy with anasarca, ascites and volume overload, chronic venous stasis  -We will admit to the ICU  -Interventional radiology consulted for emergent paracentesis  -Coags are pending  -Diuretics as tolerated-blood pressure significantly low at this time in the 90s to 100s.   3/9-EF noted to be 50 to 15% with diastolic dysfunction and mildly elevated RVSP. Given patient's hypotension and tachycardia and KAIDEN will hold diuresis at this time. Patient too unstable to undergo further cardiac evaluation including potential left and right heart catheterizations. Once patient clinically stabilizes will consult cardiology. Will get urine micro creatinine protein ratio and will get right upper quadrant ultrasound to evaluate liver. 3/10-patient's blood pressure significantly improved today. Will start Lasix 40 mg IV daily and likely will need significant up titration as patient's vital signs stabilized and is noted to be taking Bumex 1 mg daily on an outpatient basis. Patient will need aggressive diuresis once her vital signs stabilized. Patient noted to have significant bilateral venous stasis changes with bilateral erythema. Likely will improve with aggressive diuresis and will ask wound care to apply Unaboot. Unlikely hepatic source given relatively benign right upper quadrant ultrasound. Urine protein creatinine ratio also not clinically impressive and as such likely not a renal source and suspect severe pulmonary hypertension precipitated by obstructive sleep apnea morbid obesity and chronic methamphetamine use.  3/11-we will increase Lasix to 40 mg IV twice daily and continue spironolactone and monitor patient's volume status as well as blood pressure closely given she currently has low normal pressures. Will start patient on 1500 cc fluid restriction and monitor volume status closely. Patient may need another paracentesis for addressing anasarca however given patient's unstable vital signs at this time will delay repeat procedure. Chronic venous stasis bilaterally-  3/10-should improve with Unaboot and diuresis. Consider discontinuation of vancomycin in a.m. with likely urinary source of infection and no clear evidence of cellulitis as patient's clinical exam findings are most consistent with chronic venous stasis.   3/11-fluid restriction increase Lasix and continue spironolactone.       Respiratory failure with hypoxia  Patient does not wear oxygen at home. She is wheezing and short of breath came air on 8 L via nasal cannula  Respiratory rate of 26. Use of accessory muscles. Unable to speak in full sentences   although she is agreeable to go over the floor respiratory failure secondary to gross anasarca and ascites she would not be a good if IR can pull off some of the fluid of her belly  3/9-likely some combination of obstructive sleep apnea as well as pulmonary edema. Holding diuresis at this time due to hypotension and tachycardia. Will monitor closely  3/10-likely secondary to underlying pulmonary edema. Diuresis when patient's blood pressure and heart rate are stable. 3/11-improving now only requiring 2 L via nasal cannula. Continue to diuresis.       Sepsis with septic shock-  ER did preliminary bedside ultrasound concerning for cirrhotic liver  -We will treat empirically for SBP  -Follow-up ascitic fluid  -Will not be too aggressive with pain management secondary to the need to maintain her blood pressures. 3/9-clinically worsening. Patient meets sepsis criteria with leukocytosis tachycardia and fever. Will discontinue ceftriaxone and start patient on vancomycin and cefepime. Source unclear at this time. Urine cultures and fluid cultures from paracentesis pending blood cultures pending with no growth to date. Continue Levophed at this time and we will bolus the patient 1 L of normal saline as patient did have 15 L of fluid removed from abdomen yesterday and I suspect ongoing fluid shifts. 3/10-question urinary source with gram-negative rods growing in urine. Blood cultures and ascitic fluid demonstrate no growth to date. Will de-escalate antibiotic regimen to ceftriaxone only. Currently weaning off pressors.   3/11-  Blood cultures and cultures from paracentesis are negative to date urine cultures positive for gram-negative rods. Continue ceftriaxone and monitor. Off Levophed at this time. Continue midodrine       UTI-  3/10-urine cultures demonstrating gram-negative rods. Continue cefepime at this time as cultures and sensitivities demonstrate resistance to ceftriaxone. Diabetes-  3/10-we will obtain hemoglobin A1c and start sliding scale today. Patient's blood sugars to goal on sliding scale at this time. Patient noted to have diabetic foot foot wounds at multiple spots and will consult wound care. 3/11-hemoglobin A1c pending. Blood sugars to goal on sliding scale alone       Atrial fibrillation with rapid ventricular response-May be secondary to ongoing sepsis. Patient on Levophed for blood pressure as well as Midrin and we will bolus the patient 1 L of normal saline at this time and see if this improves patient's tachycardia. 3/10-given underlying hypotension we will bolus the patient 150 mg of amiodarone and then transition to oral amiodarone 200 mg twice daily and monitor. Once patient blood pressure improves may likely benefit from moving away from amiodarone. Will start patient on metoprolol 25 mg p.o. twice daily and uptitrate as tolerated  3/11-we will increase patient's metoprolol to 75 mg twice daily and continue amiodarone. Will resume patient's Eliquis. Attempting to titrate off Cardizem drip      Pulmonary hypertension-  3/10-MARCELO HIV and hepatitis pending. Patient will need sleep study VQ scan and likely right heart cath. Will consult pulmonology. Patient will need aggressive diuresis once vital signs normalized. 3/11-pulmonology consulted and work-up pending. Will need VQ scan prior to discharge.       Obstructive sleep apnea-we will need outpatient sleep study        Anemia, thrombocytopenia-no evidence of bleeding at this time will monitor.         History of polysubstance abuse  Patient endorses smoking and using marijuana  She smokes about 1 pack of cigarettes per day  Patient denies any use of methamphetamines or other drugs  However this was the history given by her mother to the ER  We will get a urine drug screen unable  3/9-the patient does deny IV drug use but freely admits to frequent smoking of methamphetamines. Patient's urine drug screen positive for marijuana and methamphetamines. Will check hepatitis panel  3/10-noted        Morbid obesity BMI of 39.70 with a weight of 300 pounds           Hyponatremia  Likely secondary to SIADH in the setting of CHF and COPD  3/9-resolved  3/10-slightly decreased today. Will need to monitor as we initiate diuresis  3/11-stable. Will monitor closely with daily BMP given aggressive diuresis      Elevated lactic acid  Patient concerning for sepsis and was  Elevation could also be secondary to cirrhosis  Will cover her for SBP as above  However given her volume overloaded status will hold off on IV fluids  3/9-resolved        KAIDEN versus chronic kidney disease  No prior labs available for comparison  We will avoid nephrotoxins  And dose medications for her renal function  3/9-worsening with removal of 15 L of fluid from abdomen and diuresis. We will hold diuresis and bolus the patient 1 L of normal saline and will administer albumin and monitor. Patient will likely need continued ongoing diuresis however this is difficult in the face of profound hypotension requiring Levophed. Will ask nephrology to evaluate patient. 3/10-improving with fluid resuscitation. We will continue to monitor as we initiate diuresis. 3/11-resolved      Yeast infection of pannus/vaginal labia-  3/10-severe. Will start patient on IV Diflucan and topical Diflucan.  3/11-continue IV Diflucan and topical diet glucan        Dispo/Discharge Planning:   Transfer out of ICU  Pending clinical course  Further work-up and management based on clinical course.   Anticipate home at discharge              Diet: Diabetic diet  VTE ppx:  Eliquis  code status: Full      Hospital Problems as of 3/11/2022 Never Reviewed          Codes Class Noted - Resolved POA    Cellulitis ICD-10-CM: L03.90  ICD-9-CM: 682.9  3/11/2022 - Present Yes        E. coli UTI ICD-10-CM: N39.0, B96.20  ICD-9-CM: 599.0, 041.49  3/11/2022 - Present Yes        Suspected sleep apnea ICD-10-CM: R29.818  ICD-9-CM: 781.99  3/10/2022 - Present Yes        Pulmonary hypertension ([facility] ICD-10-CM: I27.20  ICD-9-CM: 416.8  3/10/2022 - Present Yes        Methamphetamine abuse ([facility] ICD-10-CM: F15.10  ICD-9-CM: 305.70  3/10/2022 - Present Yes        * (Principal) Systolic congestive heart failure (HCC) ICD-10-CM: I50.20  ICD-9-CM: 428.20, 428.0  3/9/2022 - Present Yes        Anasarca ICD-10-CM: R60.1  ICD-9-CM: 782.3  3/8/2022 - Present Yes        Cirrhosis ([facility] ICD-10-CM: K74.60  ICD-9-CM: 571.5  3/8/2022 - Present Yes        SBP (spontaneous bacterial peritonitis) ([facility] ICD-10-CM: W29.4  ICD-9-CM: 567.23  3/8/2022 - Present Unknown        Atrial fibrillation with RVR (HCC) ICD-10-CM: I48.91  ICD-9-CM: 427.31  3/8/2022 - Present Yes        Respiratory failure with hypoxia ([facility] ICD-10-CM: J96.91  ICD-9-CM: 518.81  3/8/2022 - Present Yes        Hyponatremia ICD-10-CM: E87.1  ICD-9-CM: 276.1  3/8/2022 - Present Yes        KAIDEN (acute kidney injury) ([facility] ICD-10-CM: N17.9  ICD-9-CM: 584.9  3/8/2022 - Present Yes        Dependence on nicotine from cigarettes (Chronic) ICD-10-CM: R54.257  ICD-9-CM: 305.1  3/8/2022 - Present Yes        Polysubstance abuse ([facility] ICD-10-CM: F19.10  ICD-9-CM: 305.90  3/8/2022 - Present Yes        RESOLVED: COPD exacerbation ([facility] ICD-10-CM: J44.1  ICD-9-CM: 491.21  3/8/2022 - 3/11/2022 Yes              Objective:     Patient Vitals for the past 24 hrs:   Temp Pulse Resp BP SpO2   03/11/22 1151 98.5 °F (36.9 °C) 89 16 (!) 100/59 95 %   03/11/22 1116 -- 89 12 -- 94 %   03/11/22 1047 -- 94 15 91/66 94 %   03/11/22 1033 -- 93 15 96/70 96 %   03/11/22 1017 -- -- 11 (!) 92/59 96 %   03/11/22 1015 -- 88 20 -- 91 %   03/11/22 1002 -- 99 17 (!) 97/57 95 %   03/11/22 0953 -- 93 19 -- 95 %   03/11/22 0948 -- 99 17 (!) 95/57 95 %   03/11/22 0933 -- 96 18 91/61 97 %   03/11/22 0922 -- 100 21 -- 97 %   03/11/22 0921 -- -- 22 109/77 97 %   03/11/22 0908 -- -- 16 (!) 136/58 --   03/11/22 0904 -- (!) 103 23 -- --   03/11/22 0903 -- -- -- -- 96 %   03/11/22 0849 -- (!) 102 16 -- 97 %   03/11/22 0833 -- -- 11 (!) 93/50 97 %   03/11/22 0831 -- (!) 107 -- -- --   03/11/22 0818 -- (!) 104 22 -- 94 %   03/11/22 0803 -- (!) 104 16 (!) 92/51 96 %   03/11/22 0748 -- (!) 102 13 -- 96 %   03/11/22 0733 -- 97 20 (!) 101/53 95 %   03/11/22 0718 -- 100 15 -- 93 %   03/11/22 0703 99.2 °F (37.3 °C) 98 13 (!) 91/55 93 %   03/11/22 0633 -- 100 15 (!) 94/51 94 %   03/11/22 0602 -- 99 16 (!) 86/50 94 %   03/11/22 0533 -- (!) 104 15 (!) 96/52 94 %   03/11/22 0502 -- 84 16 (!) 98/57 --   03/11/22 0501 -- -- -- -- 97 %   03/11/22 0433 -- 100 17 (!) 94/53 96 %   03/11/22 0402 99.4 °F (37.4 °C) 95 16 (!) 97/54 100 %   03/11/22 0358 -- -- -- -- 96 %   03/11/22 0354 -- 99 -- -- --   03/11/22 0333 -- 94 15 (!) 99/56 98 %   03/11/22 0321 -- 98 15 (!) 90/53 98 %   03/11/22 0301 -- 96 16 -- 99 %   03/11/22 0229 -- 99 15 -- 99 %   03/11/22 0200 -- 92 13 -- 98 %   03/11/22 0136 -- 96 16 (!) 98/52 92 %   03/11/22 0102 -- 94 14 90/64 97 %   03/11/22 0033 -- 92 13 (!) 95/56 97 %   03/11/22 0002 99.4 °F (37.4 °C) 90 17 (!) 100/58 94 %   03/10/22 2345 -- 90 -- -- --   03/10/22 2333 -- 90 -- 94/62 96 %   03/10/22 2302 -- 94 13 (!) 97/45 94 %   03/10/22 2252 -- 86 16 (!) 89/53 94 %   03/10/22 2233 -- 99 30 (!) 82/48 94 %   03/10/22 2202 -- (!) 103 16 (!) 88/51 96 %   03/10/22 2152 -- 100 15 (!) 86/54 97 %   03/10/22 2102 -- 91 15 (!) 90/52 95 %   03/10/22 2000 98.6 °F (37 °C) 89 16 (!) 94/52 100 %   03/10/22 1957 -- -- -- -- 98 %   03/10/22 1930 -- 93 21 (!) 101/53 97 %   03/10/22 1915 -- 88 12 (!) 83/48 97 %   03/10/22 1900 -- 88 9 (!) 85/49 95 %   03/10/22 1832 -- 91 -- (!) 98/53 90 %   03/10/22 1752 -- (!) 111 17 94/67 97 %   03/10/22 1718 -- (!) 111 14 101/85 97 %   03/10/22 1702 -- (!) 105 17 -- 93 %   03/10/22 1646 -- (!) 116 13 (!) 95/56 100 %   03/10/22 1631 -- (!) 117 15 93/66 97 %   03/10/22 1616 -- (!) 103 15 (!) 96/59 98 %   03/10/22 1601 -- (!) 114 -- 105/62 95 %   03/10/22 1546 99.2 °F (37.3 °C) (!) 119 10 99/65 97 %   03/10/22 1531 -- (!) 117 11 99/62 97 %   03/10/22 1516 -- (!) 117 14 96/67 96 %   03/10/22 1501 -- (!) 122 13 103/66 99 %   03/10/22 1446 -- (!) 119 25 102/67 98 %   03/10/22 1431 -- (!) 121 19 102/62 98 %   03/10/22 1333 -- -- -- -- 98 %     Oxygen Therapy  O2 Sat (%): 95 % (03/11/22 1151)  Pulse via Oximetry: 112 beats per minute (03/11/22 0903)  O2 Device: Nasal cannula (03/11/22 1148)  Skin Assessment: Clean, dry, & intact (03/11/22 1148)  Skin Protection for O2 Device: N/A (03/11/22 0402)  O2 Flow Rate (L/min): 2 l/min (03/11/22 1148)    Estimated body mass index is 39.7 kg/m² as calculated from the following:    Height as of this encounter: 6' 1\" (1.854 m). Weight as of this encounter: 136.5 kg (300 lb 14.9 oz). Intake/Output Summary (Last 24 hours) at 3/11/2022 1216  Last data filed at 3/11/2022 1050  Gross per 24 hour   Intake 2481.11 ml   Output 2275 ml   Net 206.11 ml         Physical Exam:     Blood pressure (!) 100/59, pulse 89, temperature 98.5 °F (36.9 °C), resp. rate 16, height 6' 1\" (1.854 m), weight 136.5 kg (300 lb 14.9 oz), SpO2 95 %, not currently breastfeeding. General:    Well nourished. No overt distress, hypersomnolent  Head:  Normocephalic, atraumatic  Eyes:  Sclerae appear normal.  Pupils equally round. ENT:  Nares appear normal, no drainage. Moist oral mucosa  Neck:  No restricted ROM. Trachea midline   CV:   RRR. No m/r/g. No jugular venous distension. Lungs:   Wheezing diffusely  Abdomen: Bowel sounds present.   Soft, nontender, distended  Extremities: No cyanosis or clubbing.  +3-4 bilateral edema with erythema  Skin:     No rashes and normal coloration. Warm and dry. Numerous homemade tattoos  Neuro:  CN II-XII grossly intact. Sensation intact. A&Ox3  Psych:  Normal mood and affect.       I have reviewed ordered lab tests and independently visualized imaging below:    Recent Labs:  Recent Results (from the past 48 hour(s))   GLUCOSE, POC    Collection Time: 03/09/22  4:40 PM   Result Value Ref Range    Glucose (POC) 112 (H) 65 - 100 mg/dL    Performed by Abbie Chaidez, RANDOM    Collection Time: 03/10/22  4:10 AM   Result Value Ref Range    Vancomycin, random 14.6 UG/ML   GLUCOSE, POC    Collection Time: 03/10/22  6:43 AM   Result Value Ref Range    Glucose (POC) 139 (H) 65 - 100 mg/dL    Performed by Lainez (Hoeung)    EKG, 12 LEAD, INITIAL    Collection Time: 03/10/22  7:37 AM   Result Value Ref Range    Ventricular Rate 116 BPM    Atrial Rate 120 BPM    QRS Duration 80 ms    Q-T Interval 334 ms    QTC Calculation (Bezet) 464 ms    Calculated R Axis -110 degrees    Calculated T Axis 43 degrees    Diagnosis       Atrial fibrillation with rapid ventricular response  Possible Right ventricular hypertrophy  Inferior infarct (cited on or before 10-MAR-2022)  Possible Anterolateral infarct (cited on or before 10-MAR-2022)  Abnormal ECG  When compared with ECG of 08-MAR-2022 19:26,  Atrial fibrillation has replaced Sinus rhythm  Confirmed by [facility] SIMON PERDOMO [facility]  MD ()DEAN ([id number]) on 3/10/2022 12:53:36 PM     CBC WITH AUTOMATED DIFF    Collection Time: 03/10/22  7:59 AM   Result Value Ref Range    WBC 13.0 (H) 4.3 - 11.1 K/uL    RBC 3.09 (L) 4.05 - 5.2 M/uL    HGB 8.5 (L) 11.7 - 15.4 g/dL    HCT 28.0 (L) 35.8 - 46.3 %    MCV 90.6 79.6 - 97.8 FL    MCH 27.5 26.1 - 32.9 PG    MCHC 30.4 (L) 31.4 - 35.0 g/dL    RDW 18.2 (H) 11.9 - 14.6 %    PLATELET 266 (L) 183 - 450 K/uL    MPV 11.3 9.4 - 12.3 FL    ABSOLUTE NRBC 0.00 0.0 - 0.2 K/uL    DF AUTOMATED      NEUTROPHILS 94 (H) 43 - 78 %    LYMPHOCYTES 3 (L) 13 - 44 %    MONOCYTES 2 (L) 4.0 - 12.0 %    EOSINOPHILS 0 (L) 0.5 - 7.8 %    BASOPHILS 0 0.0 - 2.0 %    IMMATURE GRANULOCYTES 0 0.0 - 5.0 %    ABS. NEUTROPHILS 12.2 (H) 1.7 - 8.2 K/UL    ABS. LYMPHOCYTES 0.4 (L) 0.5 - 4.6 K/UL    ABS. MONOCYTES 0.3 0.1 - 1.3 K/UL    ABS. EOSINOPHILS 0.0 0.0 - 0.8 K/UL    ABS. BASOPHILS 0.1 0.0 - 0.2 K/UL    ABS. IMM. GRANS. 0.0 0.0 - 0.5 K/UL   METABOLIC PANEL, COMPREHENSIVE    Collection Time: 03/10/22  7:59 AM   Result Value Ref Range    Sodium 133 (L) 136 - 145 mmol/L    Potassium 4.4 3.5 - 5.1 mmol/L    Chloride 103 98 - 107 mmol/L    CO2 25 21 - 32 mmol/L    Anion gap 5 (L) 7 - 16 mmol/L    Glucose 122 (H) 65 - 100 mg/dL    BUN 28 (H) 6 - 23 MG/DL    Creatinine 1.20 (H) 0.6 - 1.0 MG/DL    GFR est AA >60 >60 ml/min/1.73m2    GFR est non-AA 51 (L) >60 ml/min/1.73m2    Calcium 7.8 (L) 8.3 - 10.4 MG/DL    Bilirubin, total 2.6 (H) 0.2 - 1.1 MG/DL    ALT (SGPT) 23 12 - 65 U/L    AST (SGOT) 46 (H) 15 - 37 U/L    Alk.  phosphatase 203 (H) 50 - 136 U/L    Protein, total 5.8 (L) 6.3 - 8.2 g/dL    Albumin 2.8 (L) 3.5 - 5.0 g/dL    Globulin 3.0 2.3 - 3.5 g/dL    A-G Ratio 0.9 (L) 1.2 - 3.5     NT-PRO BNP    Collection Time: 03/10/22 11:05 AM   Result Value Ref Range    NT pro-BNP 13,200 (H) 5 - 125 PG/ML   HIV 1/2 AG/AB, 4TH GENERATION,W RFLX CONFIRM    Collection Time: 03/10/22 11:05 AM   Result Value Ref Range    HIV 1/2 Interpretation NONREACTIVE NR      HIV 1/2 result comment SEE NOTE (A) NR     ALPHA-1-ANTITRYPSIN, TOTAL, PHENOTYPE    Collection Time: 03/10/22 11:05 AM   Result Value Ref Range    Alpha-1 Antitrypsin 194 (H) 101 - 187 mg/dL    A-1-A Phenotype PENDING    GLUCOSE, POC    Collection Time: 03/10/22 11:24 AM   Result Value Ref Range    Glucose (POC) 158 (H) 65 - 100 mg/dL    Performed by PigulCool City AvionicsDianaRN    GLUCOSE, POC    Collection Time: 03/10/22  4:39 PM   Result Value Ref Range    Glucose (POC) 131 (H) 65 - 100 mg/dL    Performed by PigulkoDianaRN    GLUCOSE, POC Collection Time: 03/10/22  9:49 PM   Result Value Ref Range    Glucose (POC) 164 (H) 65 - 100 mg/dL    Performed by Rogelio    CBC W/O DIFF    Collection Time: 03/11/22  3:25 AM   Result Value Ref Range    WBC 11.4 (H) 4.3 - 11.1 K/uL    RBC 3.35 (L) 4.05 - 5.2 M/uL    HGB 9.0 (L) 11.7 - 15.4 g/dL    HCT 29.6 (L) 35.8 - 46.3 %    MCV 88.4 79.6 - 97.8 FL    MCH 26.9 26.1 - 32.9 PG    MCHC 30.4 (L) 31.4 - 35.0 g/dL    RDW 18.1 (H) 11.9 - 14.6 %    PLATELET 777 (L) 196 - 450 K/uL    MPV 10.9 9.4 - 12.3 FL    ABSOLUTE NRBC 0.00 0.0 - 0.2 K/uL   METABOLIC PANEL, COMPREHENSIVE    Collection Time: 03/11/22  3:25 AM   Result Value Ref Range    Sodium 133 (L) 136 - 145 mmol/L    Potassium 3.9 3.5 - 5.1 mmol/L    Chloride 103 98 - 107 mmol/L    CO2 23 21 - 32 mmol/L    Anion gap 7 7 - 16 mmol/L    Glucose 141 (H) 65 - 100 mg/dL    BUN 26 (H) 6 - 23 MG/DL    Creatinine 1.00 0.6 - 1.0 MG/DL    GFR est AA >60 >60 ml/min/1.73m2    GFR est non-AA >60 >60 ml/min/1.73m2    Calcium 8.0 (L) 8.3 - 10.4 MG/DL    Bilirubin, total 2.4 (H) 0.2 - 1.1 MG/DL    ALT (SGPT) 17 12 - 65 U/L    AST (SGOT) 33 15 - 37 U/L    Alk. phosphatase 229 (H) 50 - 136 U/L    Protein, total 5.8 (L) 6.3 - 8.2 g/dL    Albumin 2.7 (L) 3.5 - 5.0 g/dL    Globulin 3.1 2.3 - 3.5 g/dL    A-G Ratio 0.9 (L) 1.2 - 3.5     VANCOMYCIN, RANDOM    Collection Time: 03/11/22  3:25 AM   Result Value Ref Range    Vancomycin, random 17.2 UG/ML   GLUCOSE, POC    Collection Time: 03/11/22  7:07 AM   Result Value Ref Range    Glucose (POC) 141 (H) 65 - 100 mg/dL    Performed by Freddy    GLUCOSE, POC    Collection Time: 03/11/22 10:44 AM   Result Value Ref Range    Glucose (POC) 145 (H) 65 - 100 mg/dL    Performed by [employer]        All Micro Results     Procedure Component Value Units Date/Time    CULTURE, BODY FLUID W Nena Terrazas [[id number]] Collected: 03/08/22 1637    Order Status: Completed Specimen:  Body Fluid from Abdominal Fluid Updated: 03/11/22 9078 Special Requests: NO SPECIAL REQUESTS        GRAM STAIN NO WBC'S SEEN         NO DEFINITE ORGANISM SEEN        Culture result: NO GROWTH 2 DAYS       CULTURE, URINE [[id number]]  (Abnormal)  (Susceptibility) Collected: 03/08/22 1416    Order Status: Completed Specimen: Urine from Clean catch Updated: 03/11/22 0723     Special Requests: NO SPECIAL REQUESTS        Culture result:       >100,000 COLONIES/mL ESCHERICHIA COLI          CULTURE, BLOOD #1 [[id number]] Collected: 03/08/22 2156    Order Status: Completed Specimen: Blood Updated: 03/11/22 0714     Special Requests: --        RIGHT  HAND       Culture result: NO GROWTH 3 DAYS       CULTURE, BLOOD #2 [[id number]] Collected: 03/08/22 2241    Order Status: Completed Specimen: Blood Updated: 03/11/22 0714     Special Requests: --        RIGHT  HAND       Culture result: NO GROWTH 3 DAYS       CULTURE, BLOOD [[id number]] Collected: 03/08/22 1745    Order Status: Canceled Specimen: Blood     CULTURE, BLOOD [[id number]] Collected: 03/08/22 1745    Order Status: Canceled Specimen: Blood           Other Studies:  No results found.     Current Meds:  Current Facility-Administered Medications   Medication Dose Route Frequency    cefepime (MAXIPIME) 2 g in 0.9% sodium chloride (MBP/ADV) 100 mL MBP  2 g IntraVENous Q12H    furosemide (LASIX) tablet 40 mg  40 mg Oral ACB&D    [START ON 3/12/2022] spironolactone (ALDACTONE) tablet 50 mg  50 mg Oral DAILY    metoprolol tartrate (LOPRESSOR) tablet 75 mg  75 mg Oral BID    gabapentin (NEURONTIN) capsule 600 mg  600 mg Oral TID    amiodarone (CORDARONE) tablet 200 mg  200 mg Oral BID    fluconazole (DIFLUCAN) 200mg/100 mL IVPB (premix)  200 mg IntraVENous Q24H    insulin lispro (HUMALOG) injection   SubCUTAneous AC&HS    melatonin tablet 5 mg  5 mg Oral QHS    lip protectant (BLISTEX) ointment 1 Each  1 Each Topical PRN    dilTIAZem (CARDIZEM) 100 mg in 0.9% sodium chloride (MBP/ADV) 100 mL infusion  0-15 mg/hr IntraVENous TITRATE    sodium chloride (NS) flush 5-40 mL  5-40 mL IntraVENous Q8H    sodium chloride (NS) flush 5-40 mL  5-40 mL IntraVENous PRN    acetaminophen (TYLENOL) tablet 650 mg  650 mg Oral Q6H PRN    Or    acetaminophen (TYLENOL) suppository 650 mg  650 mg Rectal Q6H PRN    polyethylene glycol (MIRALAX) packet 17 g  17 g Oral DAILY PRN    ondansetron (ZOFRAN ODT) tablet 4 mg  4 mg Oral Q8H PRN    Or    ondansetron (ZOFRAN) injection 4 mg  4 mg IntraVENous Q6H PRN    enoxaparin (LOVENOX) injection 40 mg  40 mg SubCUTAneous DAILY    potassium chloride 10 mEq in 100 ml IVPB  10 mEq IntraVENous PRN    magnesium sulfate 2 g/50 ml IVPB (premix or compounded)  2 g IntraVENous PRN    famotidine (PEPCID) tablet 20 mg  20 mg Oral BID    nicotine buccal (POLACRILEX) lozenge 2 mg  2 mg Oral Q4H PRN    nicotine (NICODERM CQ) 21 mg/24 hr patch 1 Patch  1 Patch TransDERmal DAILY    levalbuterol (XOPENEX) nebulizer soln 0.63 mg/3 mL  0.63 mg Nebulization Q6H RT    ipratropium (ATROVENT) 0.02 % nebulizer solution 0.5 mg  0.5 mg Nebulization Q6H RT    budesonide (PULMICORT) 500 mcg/2 ml nebulizer suspension  500 mcg Nebulization BID RT    HYDROmorphone (DILAUDID) injection 0.5 mg  0.5 mg IntraVENous Q4H PRN    HYDROmorphone (DILAUDID) injection 0.2 mg  0.2 mg IntraVENous Q4H PRN    midodrine (PROAMATINE) tablet 10 mg  10 mg Oral TID WITH MEALS    aspirin chewable tablet 81 mg  81 mg Oral DAILY    nystatin (MYCOSTATIN) 100,000 unit/gram powder   Topical BID       Signed:  Taylor Booker DO    Part of this note may have been written by using a voice dictation software. The note has been proof read but may still contain some grammatical/other typographical errors.

## 2022-03-11 NOTE — PROGRESS NOTES
TRANSFER - IN REPORT:    Verbal report received from Nerissa Campos RN on Kilo Monson being received from ICU for routine progression of care. Report consisted of patients Situation, Background, Assessment and Recommendations(SBAR). Information from the following report(s) SBAR, Kardex, Intake/Output, MAR and Cardiac Rhythm A Fib. was reviewed. Opportunity for questions and clarification was provided. Assessment completed upon patients arrival to unit and care assumed. Patient received to room 303. Patient connected to monitor and assessment completed. Plan of care reviewed. Patient oriented to room and call light. Patient aware to use call light to communicate any chest pain or needs. Admission skin assessment completed with second RN and reveals the following: sacrum intact and red, but blanchable. Allevyn placed above sacrum D/C/I. Heels/BLE wrapped by wound nurse. Left leg red, inflamed, and painful. BLE swollen. Pt has scattered bruising along both arms along with multiple tattoos. Some scattered redness along panus area.

## 2022-03-11 NOTE — PROGRESS NOTES
ACUTE PHYSICAL THERAPY GOALS:  (Developed with and agreed upon by patient and/or caregiver. )  LTG:  (1.)Ms. Erica Marshall will move from supine to sit and sit to supine , scoot up and down and roll side to side in bed with MODIFIED INDEPENDENCE within 7 treatment day(s). (2.)Ms. Erica Marshall will transfer from bed to chair and chair to bed with MODIFIED INDEPENDENCE using the least restrictive device within 7 treatment day(s). (3.)Ms. Erica Marshall will ambulate with SUPERVISION for 100 feet with the least restrictive device within 7 treatment day(s). (4.)Ms. Erica Marshall will participate in therapeutic activity/exercises x 25 minutes for increased activity tolerance within 7 treatment days. (5.)Ms. Erica Marshall will perform standing static and dynamic balance activities x 15 minutes with SUPERVISION to improve safety within 7 day(s).     ________________________________________________________________________________________________          PHYSICAL THERAPY ASSESSMENT: Initial Assessment and AM PT Treatment Day # 1      Shauna Del Rosario is a 55 y.o. female   PRIMARY DIAGNOSIS: Systolic congestive heart failure (HCC)  New onset a-fib (HCC) [I48.91]  Atrial fibrillation with RVR (HCC) [I48.91]  Respiratory failure with hypoxia ([facility] [J96.91]  Anasarca [R60.1]  Cirrhosis ([facility] [address] [K74.60]  SBP (spontaneous bacterial peritonitis) ([facility] [address] [K65.2]       Reason for Referral:    ICD-10: Treatment Diagnosis: Generalized Muscle Weakness (M62.81)  Difficulty in walking, Not elsewhere classified (R26.2)  INPATIENT: Payor: MEDICAID [facility] / Plan: [place] / Product Type: Medicaid /     ASSESSMENT:     REHAB RECOMMENDATIONS:   Recommendation to date pending progress:  [address]ett[address]  Equipment:    To Be Determined     PRIOR LEVEL OF FUNCTION:  (Prior to Hospitalization) INITIAL/CURRENT LEVEL OF FUNCTION:  (Most Recently Demonstrated)   Bed Mobility:   Independent  Sit to Stand:   Independent  Transfers:   Independent  Gait/Mobility:   Independent Bed Mobility:   Moderate Assistance x 2  Sit to Stand:   Minimal Assistance x 2  Transfers:   Minimal Assistance x 2  Gait/Mobility:   Minimal Assistance x 2     ASSESSMENT:  Ms. Garcia Rater presents to PT with generally decreased AROM and strength in L LE. Pt also has edema in L LE with erythema appreciated. Pt was able to perform bed mobility today with modA x 2 and and good supported sitting balance. She reported increased pain in L LE from cellulitis but was able to stand with Alfredo x 2/RW. Pt demonstrated fair standing balance with decreased L LE weight bearing. She took side steps with min-modA x 2/RW. Ms. Garcia Rater could benefit from skilled PT as she is currently functioning below her baseline.         SUBJECTIVE:   Ms. Garcia Rater states, \"I can't move it\"    SOCIAL HISTORY/LIVING ENVIRONMENT: Lives with mother and PTA independent with ambulation  Home Environment: Private residence  # Steps to Enter: 2  One/Two Story Residence: One story  Living Alone: No  Support Systems: Child(jitendra),Other Family Member(s)  OBJECTIVE:     PAIN: VITAL SIGNS: LINES/DRAINS:   Pre Treatment: Pain Screen  Pain Scale 1: Numeric (0 - 10)  Pain Intensity 1: 0  Post Treatment: 0   Continuous Pulse Oximetry, Blackwell Catheter and IV  O2 Device: Nasal cannula     GROSS EVALUATION:  B LE Within Functional Limits Abnormal/ Functional Abnormal/ Non-Functional (see comments) Not Tested Comments:   AROM [x] [] [] []    PROM [] [] [] []    Strength [] [x] [] [] L LE   Balance [] [] [x] [] L LE   Posture [] [] [] []    Sensation [] [] [] []    Coordination [] [] [] []    Tone [] [] [] []    Edema [] [x] [] [] Pitting edema in B LEs (L>R)   Activity Tolerance [x] [x] [] []     [] [] [] []      COGNITION/  PERCEPTION: Intact Impaired   (see comments) Comments:   Orientation [x] []    Vision [] []    Hearing [] []    Command Following [x] []    Safety Awareness [x] []     [] []      MOBILITY: I Mod I S SBA CGA Min Mod Max Total  NT x2 Comments:   Bed Mobility    Rolling [] [] [] [] [] [] [x] [] [] [] [x]    Supine to Sit [] [] [] [] [] [] [x] [] [] [] [x]    Scooting [] [] [] [] [] [] [] [] [] [] []    Sit to Supine [] [] [] [] [] [] [x] [] [] [] [x]    Transfers    Sit to Stand [] [] [] [] [] [x] [] [] [] [] [x]    Bed to Chair [] [] [] [] [] [] [] [] [] [] []    Stand to Sit [] [] [] [] [] [x] [] [] [] [] [x]    I=Independent, Mod I=Modified Independent, S=Supervision, SBA=Standby Assistance, CGA=Contact Guard Assistance,   Min=Minimal Assistance, Mod=Moderate Assistance, Max=Maximal Assistance, Total=Total Assistance, NT=Not Tested  GAIT: I Mod I S SBA CGA Min Mod Max Total  NT x2 Comments:   Level of Assistance [] [] [] [] [] [x] [] [] [] [] [x]    Distance 2 ft    DME Rolling Walker    Gait Quality Decreased L LE weight bearing    Weightbearing Status N/A     I=Independent, Mod I=Modified Independent, S=Supervision, SBA=Standby Assistance, CGA=Contact Guard Assistance,   Min=Minimal Assistance, Mod=Moderate Assistance, Max=Maximal Assistance, Total=Total Assistance, NT=Not Tested    [address] [facility] [address] [facility] [address] [facility] Short Form       How much difficulty does the patient currently have. .. Unable A Lot A Little None   1. Turning over in bed (including adjusting bedclothes, sheets and blankets)? [] 1   [] 2   [x] 3   [] 4   2. Sitting down on and standing up from a chair with arms ( e.g., wheelchair, bedside commode, etc.)   [] 1   [] 2   [x] 3   [] 4   3. Moving from lying on back to sitting on the side of the bed? [] 1   [x] 2   [] 3   [] 4   How much help from another person does the patient currently need. .. Total A Lot A Little None   4. Moving to and from a bed to a chair (including a wheelchair)? [] 1   [] 2   [x] 3   [] 4   5. Need to walk in hospital room? [] 1   [] 2   [x] 3   [] 4   6.   Climbing 3-5 steps with a railing? [] 1   [x] 2   [] 3   [] 4   © 2007, Trustees of [address] [place] [address] [address] [address], under license to [email or web address]. All rights reserved     Score:  Initial: 16 Most Recent: X (Date: -- )    Interpretation of Tool:  Represents activities that are increasingly more difficult (i.e. Bed mobility, Transfers, Gait). PLAN:   FREQUENCY/DURATION: PT Plan of Care: 3 times/week for duration of hospital stay or until stated goals are met, whichever comes first.    PROBLEM LIST:   (Skilled intervention is medically necessary to address:)  1. Decreased ADL/Functional Activities  2. Decreased AROM/PROM  3. Decreased Balance  4. Decreased Coordination  5. Decreased Gait Ability  6. Decreased Strength  7. Decreased Transfer Abilities  8. Increased Pain   INTERVENTIONS PLANNED:   (Benefits and precautions of physical therapy have been discussed with the patient.)  1. Therapeutic Activity  2. Therapeutic Exercise/HEP  3. Neuromuscular Re-education  4. Gait Training  5. Manual Therapy  6. Education     TREATMENT:     EVALUATION: Low Complexity : (Untimed Charge)    TREATMENT:   ($$ Therapeutic Activity: 23-37 mins    )  Co-Treatment PT/OT necessary due to patient's decreased overall endurance/tolerance levels, as well as need for high level skilled assistance to complete functional transfers/mobility and functional tasks  Therapeutic Activity (23 Minutes): Therapeutic activity included Rolling, Supine to Sit, Sit to Supine, Scooting, Lateral Scooting, Transfer Training, Ambulation on level ground, Sitting balance  and Standing balance to improve functional Mobility, Strength and Activity tolerance.     TREATMENT GRID:  N/A    AFTER TREATMENT POSITION/PRECAUTIONS:  Bed, Needs within reach and RN notified    INTERDISCIPLINARY COLLABORATION:  RN/PCT, PT/PTA and OT/CRESPO    TOTAL TREATMENT DURATION:  PT Patient Time In/Time Out  Time In: 0929  Time Out: [address] [facility] Cristhian PT, DPT

## 2022-03-11 NOTE — PROGRESS NOTES
Bedside and verbal shift change report received from  Adan [place], [place] (offgoing nurse). Report included the following information SBAR, Kardex, Recent Results, Cardiac Rhythm A fib and Dual Neuro Assessment. Dual skin assessment completed at bedside: Scar on chest from heart surgery. Redness and excoriation to sacrum. Redness and irritation to pannus. BLE hot, red, and swollen (L>R). Scabs on feet. Missing 5th toe R foot.     Dual verification of gtts completed (name of gtts verified): N/A

## 2022-03-11 NOTE — PROGRESS NOTES
Admit Date: 3/8/2022      Subjective:     awake     Review of Systems  Cardio-vascular: no chest pain, no SOB  GI: no N/V/D  Leg pain     Objective:     Patient Vitals for the past 8 hrs:   BP Temp Pulse Resp SpO2 Weight   03/11/22 1002 (!) 97/57 -- 99 17 95 % --   03/11/22 0953 -- -- 93 19 95 % --   03/11/22 0948 (!) 95/57 -- 99 17 95 % --   03/11/22 0933 91/61 -- 96 18 97 % --   03/11/22 0922 -- -- 100 21 97 % --   03/11/22 0921 109/77 -- -- 22 97 % --   03/11/22 0908 (!) 136/58 -- -- 16 -- --   03/11/22 0904 -- -- (!) 103 23 -- --   03/11/22 0903 -- -- -- -- 96 % --   03/11/22 0849 -- -- (!) 102 16 97 % --   03/11/22 0833 (!) 93/50 -- -- 11 97 % --   03/11/22 0831 -- -- (!) 107 -- -- --   03/11/22 0818 -- -- (!) 104 22 94 % --   03/11/22 0803 (!) 92/51 -- (!) 104 16 96 % --   03/11/22 0748 -- -- (!) 102 13 96 % --   03/11/22 0733 (!) 101/53 -- 97 20 95 % --   03/11/22 0718 -- -- 100 15 93 % --   03/11/22 0703 (!) 91/55 99.2 °F (37.3 °C) 98 13 93 % --   03/11/22 0633 (!) 94/51 -- 100 15 94 % --   03/11/22 0602 (!) 86/50 -- 99 16 94 % --   03/11/22 0533 (!) 96/52 -- (!) 104 15 94 % --   03/11/22 0502 (!) 98/57 -- 84 16 -- --   03/11/22 0501 -- -- -- -- 97 % --   03/11/22 0433 (!) 94/53 -- 100 17 96 % --   03/11/22 0406 -- -- -- -- -- 136.5 kg (300 lb 14.9 oz)   03/11/22 0402 (!) 97/54 99.4 °F (37.4 °C) 95 16 100 % --   03/11/22 0358 -- -- -- -- 96 % --   03/11/22 0354 -- -- 99 -- -- --   03/11/22 0333 (!) 99/56 -- 94 15 98 % --   03/11/22 0321 (!) 90/53 -- 98 15 98 % --   03/11/22 0301 -- -- 96 16 99 % --   03/11/22 0229 -- -- 99 15 99 % --     03/11 0701 - 03/11 1900  In: -   Out: 200 [Urine:200]    PE:   more awake   Not in RUDY  Mildly jaundice   Lung: poor inspiration, decreased BS   CV: IR, no rub. Abd: mildly distended, no rebound.   Ext: lower: erythema, ++ edema with increased local Tp.             Data Review   Recent Results (from the past 8 hour(s))   CBC W/O DIFF    Collection Time: 03/11/22  3:25 AM Result Value Ref Range    WBC 11.4 (H) 4.3 - 11.1 K/uL    RBC 3.35 (L) 4.05 - 5.2 M/uL    HGB 9.0 (L) 11.7 - 15.4 g/dL    HCT 29.6 (L) 35.8 - 46.3 %    MCV 88.4 79.6 - 97.8 FL    MCH 26.9 26.1 - 32.9 PG    MCHC 30.4 (L) 31.4 - 35.0 g/dL    RDW 18.1 (H) 11.9 - 14.6 %    PLATELET 142 (L) 933 - 450 K/uL    MPV 10.9 9.4 - 12.3 FL    ABSOLUTE NRBC 0.00 0.0 - 0.2 K/uL   METABOLIC PANEL, COMPREHENSIVE    Collection Time: 03/11/22  3:25 AM   Result Value Ref Range    Sodium 133 (L) 136 - 145 mmol/L    Potassium 3.9 3.5 - 5.1 mmol/L    Chloride 103 98 - 107 mmol/L    CO2 23 21 - 32 mmol/L    Anion gap 7 7 - 16 mmol/L    Glucose 141 (H) 65 - 100 mg/dL    BUN 26 (H) 6 - 23 MG/DL    Creatinine 1.00 0.6 - 1.0 MG/DL    GFR est AA >60 >60 ml/min/1.73m2    GFR est non-AA >60 >60 ml/min/1.73m2    Calcium 8.0 (L) 8.3 - 10.4 MG/DL    Bilirubin, total 2.4 (H) 0.2 - 1.1 MG/DL    ALT (SGPT) 17 12 - 65 U/L    AST (SGOT) 33 15 - 37 U/L    Alk.  phosphatase 229 (H) 50 - 136 U/L    Protein, total 5.8 (L) 6.3 - 8.2 g/dL    Albumin 2.7 (L) 3.5 - 5.0 g/dL    Globulin 3.1 2.3 - 3.5 g/dL    A-G Ratio 0.9 (L) 1.2 - 3.5     VANCOMYCIN, RANDOM    Collection Time: 03/11/22  3:25 AM   Result Value Ref Range    Vancomycin, random 17.2 UG/ML   GLUCOSE, POC    Collection Time: 03/11/22  7:07 AM   Result Value Ref Range    Glucose (POC) 141 (H) 65 - 100 mg/dL    Performed by Freddy            Assessment:     Principal Problem:    Systolic congestive heart failure ([address] (3/9/2022)    Active Problems:    Anasarca (3/8/2022)      Cirrhosis ([address] (3/8/2022)      SBP (spontaneous bacterial peritonitis) ([address] (3/8/2022)      Atrial fibrillation with RVR ([address] (3/8/2022)      Respiratory failure with hypoxia ([address] (3/8/2022)      Hyponatremia (3/8/2022)      COPD exacerbation ([address] (3/8/2022)      KAIDEN (acute kidney injury) ([address] (3/8/2022)      Dependence on nicotine from cigarettes (3/8/2022)      Polysubstance abuse ([address] (3/8/2022)      Suspected sleep apnea (3/10/2022)      Pulmonary hypertension ([facility] [address] (3/10/2022)      Methamphetamine abuse ([facility] (3/10/2022)        Plan:   KAIDEN: due to large volume paracentesis, hypotension. Most likely pre renal with low U. Na : Responding to treatment: IV alb. Hypoxic resp. Failure Better   Shock. Sepsis:  GNR UTI  Cirrhosis? DM, CHF, polysubstance abuse.     Sign off   Thanks   Sukh Beltrán MD

## 2022-03-11 NOTE — PROGRESS NOTES
Occupational Therapy Note:    Participated in interdisciplinary rounds in ICU/CCU and chart reviewed. Patient is experiencing decrease in function from baseline. Patient would benefit from skilled acute therapy to increase independence with self care/ADLs, strength, endurance, and functional mobility. Recommend OT/PT consult when medically stable and MD agrees.     Thank you for your consideration,  Shirley Tristan, OTR/L

## 2022-03-11 NOTE — PROGRESS NOTES
This is a follow-up visit to the patient, providing a spiritual presence, emotional support and prayer. The patient appears to be resting. A  contact card was left in her room.     Meri Matos, [facility], [facility]

## 2022-03-12 LAB
ALBUMIN SERPL-MCNC: 2.5 G/DL (ref 3.5–5)
ALBUMIN/GLOB SERPL: 0.7 {RATIO} (ref 1.2–3.5)
ALP SERPL-CCNC: 309 U/L (ref 50–136)
ALT SERPL-CCNC: 17 U/L (ref 12–65)
ANION GAP SERPL CALC-SCNC: 6 MMOL/L (ref 7–16)
AST SERPL-CCNC: 30 U/L (ref 15–37)
BILIRUB SERPL-MCNC: 2.4 MG/DL (ref 0.2–1.1)
BUN SERPL-MCNC: 22 MG/DL (ref 6–23)
CALCIUM SERPL-MCNC: 8.3 MG/DL (ref 8.3–10.4)
CHLORIDE SERPL-SCNC: 102 MMOL/L (ref 98–107)
CO2 SERPL-SCNC: 26 MMOL/L (ref 21–32)
CREAT SERPL-MCNC: 0.8 MG/DL (ref 0.6–1)
ERYTHROCYTE [DISTWIDTH] IN BLOOD BY AUTOMATED COUNT: 17.9 % (ref 11.9–14.6)
GLOBULIN SER CALC-MCNC: 3.4 G/DL (ref 2.3–3.5)
GLUCOSE BLD STRIP.AUTO-MCNC: 137 MG/DL (ref 65–100)
GLUCOSE BLD STRIP.AUTO-MCNC: 149 MG/DL (ref 65–100)
GLUCOSE BLD STRIP.AUTO-MCNC: 166 MG/DL (ref 65–100)
GLUCOSE BLD STRIP.AUTO-MCNC: 93 MG/DL (ref 65–100)
GLUCOSE SERPL-MCNC: 89 MG/DL (ref 65–100)
HCT VFR BLD AUTO: 27.9 % (ref 35.8–46.3)
HGB BLD-MCNC: 8.6 G/DL (ref 11.7–15.4)
MCH RBC QN AUTO: 26.4 PG (ref 26.1–32.9)
MCHC RBC AUTO-ENTMCNC: 30.8 G/DL (ref 31.4–35)
MCV RBC AUTO: 85.6 FL (ref 79.6–97.8)
NRBC # BLD: 0 K/UL (ref 0–0.2)
PLATELET # BLD AUTO: 97 K/UL (ref 150–450)
PMV BLD AUTO: 11.6 FL (ref 9.4–12.3)
POTASSIUM SERPL-SCNC: 3.7 MMOL/L (ref 3.5–5.1)
PROT SERPL-MCNC: 5.9 G/DL (ref 6.3–8.2)
RBC # BLD AUTO: 3.26 M/UL (ref 4.05–5.2)
SERVICE CMNT-IMP: ABNORMAL
SERVICE CMNT-IMP: NORMAL
SODIUM SERPL-SCNC: 134 MMOL/L (ref 136–145)
WBC # BLD AUTO: 7.1 K/UL (ref 4.3–11.1)

## 2022-03-12 PROCEDURE — 94760 N-INVAS EAR/PLS OXIMETRY 1: CPT

## 2022-03-12 PROCEDURE — 74011250637 HC RX REV CODE- 250/637: Performed by: INTERNAL MEDICINE

## 2022-03-12 PROCEDURE — 74011250637 HC RX REV CODE- 250/637: Performed by: EMERGENCY MEDICINE

## 2022-03-12 PROCEDURE — 94640 AIRWAY INHALATION TREATMENT: CPT

## 2022-03-12 PROCEDURE — 65660000000 HC RM CCU STEPDOWN

## 2022-03-12 PROCEDURE — 74011000250 HC RX REV CODE- 250: Performed by: INTERNAL MEDICINE

## 2022-03-12 PROCEDURE — 2709999900 HC NON-CHARGEABLE SUPPLY

## 2022-03-12 PROCEDURE — 77010033678 HC OXYGEN DAILY

## 2022-03-12 PROCEDURE — 74011250636 HC RX REV CODE- 250/636: Performed by: INTERNAL MEDICINE

## 2022-03-12 PROCEDURE — 74011000258 HC RX REV CODE- 258: Performed by: INTERNAL MEDICINE

## 2022-03-12 PROCEDURE — 82962 GLUCOSE BLOOD TEST: CPT

## 2022-03-12 PROCEDURE — 74011250637 HC RX REV CODE- 250/637: Performed by: NURSE PRACTITIONER

## 2022-03-12 PROCEDURE — 36415 COLL VENOUS BLD VENIPUNCTURE: CPT

## 2022-03-12 PROCEDURE — 85027 COMPLETE CBC AUTOMATED: CPT

## 2022-03-12 PROCEDURE — 80053 COMPREHEN METABOLIC PANEL: CPT

## 2022-03-12 PROCEDURE — 74011250637 HC RX REV CODE- 250/637: Performed by: FAMILY MEDICINE

## 2022-03-12 PROCEDURE — 74011636637 HC RX REV CODE- 636/637: Performed by: INTERNAL MEDICINE

## 2022-03-12 RX ORDER — BUMETANIDE 1 MG/1
1 TABLET ORAL 2 TIMES DAILY
Status: DISCONTINUED | OUTPATIENT
Start: 2022-03-12 | End: 2022-03-13 | Stop reason: HOSPADM

## 2022-03-12 RX ORDER — METOPROLOL TARTRATE 50 MG/1
100 TABLET ORAL ONCE
Status: COMPLETED | OUTPATIENT
Start: 2022-03-12 | End: 2022-03-12

## 2022-03-12 RX ORDER — METOPROLOL TARTRATE 50 MG/1
100 TABLET ORAL 2 TIMES DAILY
Status: DISCONTINUED | OUTPATIENT
Start: 2022-03-12 | End: 2022-03-12

## 2022-03-12 RX ORDER — FUROSEMIDE 40 MG/1
60 TABLET ORAL
Status: DISCONTINUED | OUTPATIENT
Start: 2022-03-12 | End: 2022-03-13

## 2022-03-12 RX ORDER — METOPROLOL SUCCINATE 100 MG/1
200 TABLET, EXTENDED RELEASE ORAL EVERY 24 HOURS
Status: DISCONTINUED | OUTPATIENT
Start: 2022-03-13 | End: 2022-03-13 | Stop reason: HOSPADM

## 2022-03-12 RX ORDER — METOPROLOL SUCCINATE 100 MG/1
200 TABLET, EXTENDED RELEASE ORAL EVERY 24 HOURS
Status: DISCONTINUED | OUTPATIENT
Start: 2022-03-12 | End: 2022-03-12

## 2022-03-12 RX ADMIN — GABAPENTIN 600 MG: 300 CAPSULE ORAL at 05:52

## 2022-03-12 RX ADMIN — LEVALBUTEROL HYDROCHLORIDE 0.63 MG: 0.63 SOLUTION RESPIRATORY (INHALATION) at 02:50

## 2022-03-12 RX ADMIN — MIDODRINE HYDROCHLORIDE 10 MG: 5 TABLET ORAL at 16:42

## 2022-03-12 RX ADMIN — SODIUM CHLORIDE, PRESERVATIVE FREE 10 ML: 5 INJECTION INTRAVENOUS at 05:53

## 2022-03-12 RX ADMIN — SODIUM CHLORIDE, PRESERVATIVE FREE 10 ML: 5 INJECTION INTRAVENOUS at 22:35

## 2022-03-12 RX ADMIN — HYDROMORPHONE HYDROCHLORIDE 0.5 MG: 1 INJECTION, SOLUTION INTRAMUSCULAR; INTRAVENOUS; SUBCUTANEOUS at 22:35

## 2022-03-12 RX ADMIN — BUDESONIDE 500 MCG: 0.5 INHALANT RESPIRATORY (INHALATION) at 07:18

## 2022-03-12 RX ADMIN — IPRATROPIUM BROMIDE 0.5 MG: 0.5 SOLUTION RESPIRATORY (INHALATION) at 07:18

## 2022-03-12 RX ADMIN — HYDROMORPHONE HYDROCHLORIDE 0.5 MG: 1 INJECTION, SOLUTION INTRAMUSCULAR; INTRAVENOUS; SUBCUTANEOUS at 03:14

## 2022-03-12 RX ADMIN — GABAPENTIN 600 MG: 300 CAPSULE ORAL at 14:49

## 2022-03-12 RX ADMIN — FLUCONAZOLE IN SODIUM CHLORIDE 200 MG: 2 INJECTION, SOLUTION INTRAVENOUS at 10:53

## 2022-03-12 RX ADMIN — FUROSEMIDE 60 MG: 40 TABLET ORAL at 16:42

## 2022-03-12 RX ADMIN — MIDODRINE HYDROCHLORIDE 10 MG: 5 TABLET ORAL at 12:23

## 2022-03-12 RX ADMIN — IPRATROPIUM BROMIDE 0.5 MG: 0.5 SOLUTION RESPIRATORY (INHALATION) at 02:50

## 2022-03-12 RX ADMIN — METOPROLOL TARTRATE 75 MG: 50 TABLET, FILM COATED ORAL at 08:17

## 2022-03-12 RX ADMIN — NYSTATIN: 100000 POWDER TOPICAL at 08:22

## 2022-03-12 RX ADMIN — FAMOTIDINE 20 MG: 20 TABLET, FILM COATED ORAL at 17:40

## 2022-03-12 RX ADMIN — AMIODARONE HYDROCHLORIDE 200 MG: 200 TABLET ORAL at 17:40

## 2022-03-12 RX ADMIN — FAMOTIDINE 20 MG: 20 TABLET, FILM COATED ORAL at 08:18

## 2022-03-12 RX ADMIN — METOPROLOL TARTRATE 100 MG: 50 TABLET, FILM COATED ORAL at 17:40

## 2022-03-12 RX ADMIN — APIXABAN 5 MG: 5 TABLET, FILM COATED ORAL at 08:18

## 2022-03-12 RX ADMIN — GABAPENTIN 600 MG: 300 CAPSULE ORAL at 22:35

## 2022-03-12 RX ADMIN — CEFEPIME HYDROCHLORIDE 2 G: 2 INJECTION, POWDER, FOR SOLUTION INTRAVENOUS at 10:14

## 2022-03-12 RX ADMIN — IPRATROPIUM BROMIDE 0.5 MG: 0.5 SOLUTION RESPIRATORY (INHALATION) at 21:43

## 2022-03-12 RX ADMIN — AMIODARONE HYDROCHLORIDE 200 MG: 200 TABLET ORAL at 08:18

## 2022-03-12 RX ADMIN — LEVALBUTEROL HYDROCHLORIDE 0.63 MG: 0.63 SOLUTION RESPIRATORY (INHALATION) at 21:43

## 2022-03-12 RX ADMIN — HYDROMORPHONE HYDROCHLORIDE 0.5 MG: 1 INJECTION, SOLUTION INTRAMUSCULAR; INTRAVENOUS; SUBCUTANEOUS at 08:28

## 2022-03-12 RX ADMIN — Medication 5 MG: at 22:35

## 2022-03-12 RX ADMIN — NYSTATIN: 100000 POWDER TOPICAL at 17:41

## 2022-03-12 RX ADMIN — SPIRONOLACTONE 50 MG: 25 TABLET ORAL at 08:18

## 2022-03-12 RX ADMIN — CEFEPIME HYDROCHLORIDE 2 G: 2 INJECTION, POWDER, FOR SOLUTION INTRAVENOUS at 22:34

## 2022-03-12 RX ADMIN — Medication 2 UNITS: at 22:36

## 2022-03-12 RX ADMIN — FUROSEMIDE 40 MG: 40 TABLET ORAL at 08:18

## 2022-03-12 RX ADMIN — APIXABAN 5 MG: 5 TABLET, FILM COATED ORAL at 20:22

## 2022-03-12 RX ADMIN — ASPIRIN 81 MG: 81 TABLET, CHEWABLE ORAL at 08:18

## 2022-03-12 RX ADMIN — BUDESONIDE 500 MCG: 0.5 INHALANT RESPIRATORY (INHALATION) at 21:43

## 2022-03-12 RX ADMIN — MIDODRINE HYDROCHLORIDE 10 MG: 5 TABLET ORAL at 08:18

## 2022-03-12 RX ADMIN — LEVALBUTEROL HYDROCHLORIDE 0.63 MG: 0.63 SOLUTION RESPIRATORY (INHALATION) at 07:18

## 2022-03-12 NOTE — PROGRESS NOTES
Date of Outreach Update:  Maycol Marion was seen and assessed. Pt remains unchanged from yesterday/ RA / VSS    Lethargic and complaining of BLE pain/ ROM intact, sensation and pulses present      MEWS Score: 3 (03/12/22 0351)  Vitals:    03/12/22 0021 03/12/22 0250 03/12/22 0351 03/12/22 0720   BP: 102/61  (!) 98/56    Pulse: 97  (!) 107    Resp: 20  16    Temp: 99.6 °F (37.6 °C)  98.9 °F (37.2 °C)    SpO2: 94% 93% 93% 92%   Weight:   135 kg (297 lb 9.6 oz)    Height:             Pain Assessment  Pain Intensity 1: 0 (03/12/22 0344)  Pain Location 1: Leg  Pain Intervention(s) 1: Medication (see MAR),Emotional support  Patient Stated Pain Goal: 0      Previous Outreach assessment has been reviewed. There have been no significant clinical changes since the completion of the last dated Outreach assessment. Will continue to follow up per outreach protocol.     Signed By:   Catia Swanson RN    March 12, 2022 7:25 AM

## 2022-03-12 NOTE — PROGRESS NOTES
Problem: Pressure Injury - Risk of  Goal: *Prevention of pressure injury  Description: Document Be Scale and appropriate interventions in the flowsheet. Outcome: Progressing Towards Goal  Note: Pressure Injury Interventions:  Sensory Interventions: Assess changes in LOC,Keep linens dry and wrinkle-free,Minimize linen layers    Moisture Interventions: Absorbent underpads    Activity Interventions: Pressure redistribution bed/mattress(bed type)    Mobility Interventions: Pressure redistribution bed/mattress (bed type)    Nutrition Interventions: Document food/fluid/supplement intake    Friction and Shear Interventions: Foam dressings/transparent film/skin sealants                Problem: Patient Education: Go to Patient Education Activity  Goal: Patient/Family Education  Outcome: Progressing Towards Goal     Problem: Falls - Risk of  Goal: *Absence of Falls  Description: Document Lolis Fall Risk and appropriate interventions in the flowsheet.   Outcome: Progressing Towards Goal  Note: Fall Risk Interventions:  Mobility Interventions: Strengthening exercises (ROM-active/passive)    Mentation Interventions: Adequate sleep, hydration, pain control,Bed/chair exit alarm,Reorient patient,Room close to nurse's station,Update white board    Medication Interventions: Bed/chair exit alarm,Patient to call before getting OOB,Teach patient to arise slowly    Elimination Interventions: Call light in reach    History of Falls Interventions: Bed/chair exit alarm,Investigate reason for fall         Problem: Patient Education: Go to Patient Education Activity  Goal: Patient/Family Education  Outcome: Progressing Towards Goal     Problem: Patient Education: Go to Patient Education Activity  Goal: Patient/Family Education  Outcome: Progressing Towards Goal     Problem: Afib Pathway: Day 2  Goal: Off Pathway (Use only if patient is Off Pathway)  Outcome: Progressing Towards Goal  Goal: Activity/Safety  Outcome: Progressing Towards Goal  Goal: Consults, if ordered  Outcome: Progressing Towards Goal  Goal: Diagnostic Test/Procedures  Outcome: Progressing Towards Goal  Goal: Nutrition/Diet  Outcome: Progressing Towards Goal  Goal: Discharge Planning  Outcome: Progressing Towards Goal  Goal: Medications  Outcome: Progressing Towards Goal  Goal: Respiratory  Outcome: Progressing Towards Goal  Goal: Treatments/Interventions/Procedures  Outcome: Progressing Towards Goal  Goal: Psychosocial  Outcome: Progressing Towards Goal  Goal: *Hemodynamically stable  Outcome: Progressing Towards Goal  Goal: *Optimal pain control at patient's stated goal  Outcome: Progressing Towards Goal  Goal: *Stable cardiac rhythm  Outcome: Progressing Towards Goal  Goal: *Lungs clear or at baseline  Outcome: Progressing Towards Goal  Goal: *Describes available resources and support systems  Outcome: Progressing Towards Goal     Problem: Afib Pathway: Day 3  Goal: Off Pathway (Use only if patient is Off Pathway)  Outcome: Progressing Towards Goal  Goal: Activity/Safety  Outcome: Progressing Towards Goal  Goal: Diagnostic Test/Procedures  Outcome: Progressing Towards Goal  Goal: Nutrition/Diet  Outcome: Progressing Towards Goal  Goal: Discharge Planning  Outcome: Progressing Towards Goal  Goal: Medications  Outcome: Progressing Towards Goal  Goal: Respiratory  Outcome: Progressing Towards Goal  Goal: Treatments/Interventions/Procedures  Outcome: Progressing Towards Goal  Goal: Psychosocial  Outcome: Progressing Towards Goal     Problem: Afib: Discharge Outcomes (not in CAT)  Goal: *Hemodynamically stable  Outcome: Progressing Towards Goal  Goal: *Stable cardiac rhythm  Outcome: Progressing Towards Goal  Goal: *Lungs clear or at baseline  Outcome: Progressing Towards Goal  Goal: *Optimal pain control at patient's stated goal  Outcome: Progressing Towards Goal  Goal: *Identifies cardiac risk factors  Outcome: Progressing Towards Goal  Goal: *Verbalizes home exercise program, activity guidelines, cardiac precautions  Outcome: Progressing Towards Goal  Goal: *Verbalizes understanding and describes prescribed diet  Outcome: Progressing Towards Goal  Goal: *Verbalizes understanding and describes medication purposes and frequencies  Outcome: Progressing Towards Goal  Goal: *Anxiety reduced or absent  Outcome: Progressing Towards Goal  Goal: *Understands and describes signs and symptoms to report to providers(Stroke Metric)  Outcome: Progressing Towards Goal  Goal: *Describes follow-up/return visits to physicians  Outcome: Progressing Towards Goal  Goal: *Describes available resources and support systems  Outcome: Progressing Towards Goal  Goal: *Influenza immunization  Outcome: Progressing Towards Goal  Goal: *Pneumococcal immunization  Outcome: Progressing Towards Goal  Goal: *Describes smoking cessation resources  Outcome: Progressing Towards Goal     Problem: Patient Education: Go to Patient Education Activity  Goal: Patient/Family Education  Outcome: Progressing Towards Goal     Problem: Acute Renal Failure: Day 2  Goal: Off Pathway (Use only if patient is Off Pathway)  Outcome: Progressing Towards Goal  Goal: Activity/Safety  Outcome: Progressing Towards Goal  Goal: Consults, if ordered  Outcome: Progressing Towards Goal  Goal: Diagnostic Test/Procedures  Outcome: Progressing Towards Goal  Goal: Nutrition/Diet  Outcome: Progressing Towards Goal  Goal: Discharge Planning  Outcome: Progressing Towards Goal  Goal: Medications  Outcome: Progressing Towards Goal  Goal: Respiratory  Outcome: Progressing Towards Goal  Goal: Treatments/Interventions/Procedures  Outcome: Progressing Towards Goal  Goal: Psychosocial  Outcome: Progressing Towards Goal  Goal: *Optimal pain control at patient's stated goal  Outcome: Progressing Towards Goal  Goal: *Urinary output within identified parameters  Outcome: Progressing Towards Goal  Goal: *Hemodynamically stable  Outcome: Progressing Towards Goal  Goal: *Tolerating diet  Outcome: Progressing Towards Goal  Goal: *Lab values improving  Outcome: Progressing Towards Goal     Problem: Acute Renal Failure: Day 3  Goal: Off Pathway (Use only if patient is Off Pathway)  Outcome: Progressing Towards Goal  Goal: Activity/Safety  Outcome: Progressing Towards Goal  Goal: Consults, if ordered  Outcome: Progressing Towards Goal  Goal: Diagnostic Test/Procedures  Outcome: Progressing Towards Goal  Goal: Nutrition/Diet  Outcome: Progressing Towards Goal  Goal: Discharge Planning  Outcome: Progressing Towards Goal  Goal: Medications  Outcome: Progressing Towards Goal  Goal: Respiratory  Outcome: Progressing Towards Goal  Goal: Treatments/Interventions/Procedures  Outcome: Progressing Towards Goal  Goal: Psychosocial  Outcome: Progressing Towards Goal  Goal: *Optimal pain control at patient's stated goal  Outcome: Progressing Towards Goal  Goal: *Urinary output within identified parameters  Outcome: Progressing Towards Goal  Goal: *Hemodynamically stable  Outcome: Progressing Towards Goal  Goal: *Tolerating diet  Outcome: Progressing Towards Goal  Goal: *Lab values improving  Outcome: Progressing Towards Goal     Problem: Acute Renal Failure: Day 4  Goal: Off Pathway (Use only if patient is Off Pathway)  Outcome: Progressing Towards Goal  Goal: Activity/Safety  Outcome: Progressing Towards Goal  Goal: Consults, if ordered  Outcome: Progressing Towards Goal  Goal: Diagnostic Test/Procedures  Outcome: Progressing Towards Goal  Goal: Nutrition/Diet  Outcome: Progressing Towards Goal  Goal: Discharge Planning  Outcome: Progressing Towards Goal  Goal: Medications  Outcome: Progressing Towards Goal  Goal: Respiratory  Outcome: Progressing Towards Goal  Goal: Treatments/Interventions/Procedures  Outcome: Progressing Towards Goal  Goal: Psychosocial  Outcome: Progressing Towards Goal  Goal: *Optimal pain control at patient's stated goal  Outcome: Progressing Towards Goal  Goal: *Urinary output within identified parameters  Outcome: Progressing Towards Goal  Goal: *Hemodynamically stable  Outcome: Progressing Towards Goal  Goal: *Tolerating diet  Outcome: Progressing Towards Goal  Goal: *Lab values improving  Outcome: Progressing Towards Goal     Problem: Acute Renal Failure: Day 5  Goal: Off Pathway (Use only if patient is Off Pathway)  Outcome: Progressing Towards Goal  Goal: Activity/Safety  Outcome: Progressing Towards Goal  Goal: Diagnostic Test/Procedures  Outcome: Progressing Towards Goal  Goal: Nutrition/Diet  Outcome: Progressing Towards Goal  Goal: Discharge Planning  Outcome: Progressing Towards Goal  Goal: Medications  Outcome: Progressing Towards Goal  Goal: Respiratory  Outcome: Progressing Towards Goal  Goal: Treatments/Interventions/Procedures  Outcome: Progressing Towards Goal  Goal: Psychosocial  Outcome: Progressing Towards Goal  Goal: *Optimal pain control at patient's stated goal  Outcome: Progressing Towards Goal  Goal: *Urinary output within identified parameters  Outcome: Progressing Towards Goal  Goal: *Hemodynamically stable  Outcome: Progressing Towards Goal  Goal: *Tolerating diet  Outcome: Progressing Towards Goal  Goal: *Lab values improving  Outcome: Progressing Towards Goal     Problem: Acute Renal Failure: Day 6  Goal: Off Pathway (Use only if patient is Off Pathway)  Outcome: Progressing Towards Goal  Goal: Activity/Safety  Outcome: Progressing Towards Goal  Goal: Diagnostic Test/Procedures  Outcome: Progressing Towards Goal  Goal: Nutrition/Diet  Outcome: Progressing Towards Goal  Goal: Discharge Planning  Outcome: Progressing Towards Goal  Goal: Medications  Outcome: Progressing Towards Goal  Goal: Respiratory  Outcome: Progressing Towards Goal  Goal: Treatments/Interventions/Procedures  Outcome: Progressing Towards Goal  Goal: Psychosocial  Outcome: Progressing Towards Goal     Problem: Acute Renal Failure: Discharge Outcomes  Goal: *Optimal pain control at patient's stated goal  Outcome: Progressing Towards Goal  Goal: *Urinary output within identified parameters  Outcome: Progressing Towards Goal  Goal: *Hemodynamically stable  Outcome: Progressing Towards Goal  Goal: *Tolerating diet  Outcome: Progressing Towards Goal  Goal: *Lab values stabilized  Outcome: Progressing Towards Goal  Goal: *Verbalizes understanding and describes medication purposes and frequencies  Outcome: Progressing Towards Goal  Goal: *Medication reconciliation  Outcome: Progressing Towards Goal

## 2022-03-12 NOTE — PROGRESS NOTES
Hospitalist Progress Note   Admit Date:  3/8/2022  1:48 PM   Name:  Brennan Washington   Age:  55 y.o. Sex:  female  :  1975   MRN:  [id number]   Room:  303/01    Presenting Complaint: Abdominal Pain    Reason(s) for Admission: New onset a-fib [facility]) [I48.91]  Atrial fibrillation with RVR ([facility] [address] [I48.91]  Respiratory failure with hypoxia ([facility] [address] [J96.91]  Anasarca [R60.1]  Cirrhosis ([facility] [address] [K74.60]  SBP (spontaneous bacterial peritonitis) [facility]) [facility] Course & Interval History:   Brennan Washington is a 55 y.o. female with a past medical history of cardiomyopathy, CHF hypertension, diabetes, smoker of 1 pack of cigarettes per day, atrial fibrillation, COPD and drug abuse per the patient's mom who presents with severe shortness of breath and abdominal distention and medication noncompliance. Symptoms have been ongoing for about a week acutely worse today per her partner at the bedside. She was seen and evaluated in the ER found to be in A. fib with RVR with a heart rate in the 160s. A grossly distended abdomen. Likely the major factor in her shortness of breath.      She also complains of abdominal pain. Writhing around in pain. Poor historian. History is taken from ER physician and staff and patient's chart and partner at bedside. Subjective/24hr Events (22): Patient seen and examined and complains of bilateral lower extremity pain. Denies fever chills nausea vomiting chest pain and shortness of breath. ROS:  10 systems reviewed and negative except as noted above. Assessment & Plan: Active Problems:  Diastolic CHF and cardiomyopathy with anasarca, ascites and volume overload, chronic venous stasis  -We will admit to the ICU  -Interventional radiology consulted for emergent paracentesis  -Coags are pending  -Diuretics as tolerated-blood pressure significantly low at this time in the 90s to 100s.   39-EF noted to be 50 to 11% with diastolic dysfunction and mildly elevated RVSP. Given patient's hypotension and tachycardia and KAIDEN will hold diuresis at this time. Patient too unstable to undergo further cardiac evaluation including potential left and right heart catheterizations. Once patient clinically stabilizes will consult cardiology. Will get urine micro creatinine protein ratio and will get right upper quadrant ultrasound to evaluate liver. 3/10-patient's blood pressure significantly improved today. Will start Lasix 40 mg IV daily and likely will need significant up titration as patient's vital signs stabilized and is noted to be taking Bumex 1 mg daily on an outpatient basis. Patient will need aggressive diuresis once her vital signs stabilized. Patient noted to have significant bilateral venous stasis changes with bilateral erythema. Likely will improve with aggressive diuresis and will ask wound care to apply Unaboot. Unlikely hepatic source given relatively benign right upper quadrant ultrasound. Urine protein creatinine ratio also not clinically impressive and as such likely not a renal source and suspect severe pulmonary hypertension precipitated by obstructive sleep apnea morbid obesity and chronic methamphetamine use.  3/11-we will increase Lasix to 40 mg IV twice daily and continue spironolactone and monitor patient's volume status as well as blood pressure closely given she currently has low normal pressures. Will start patient on 1500 cc fluid restriction and monitor volume status closely. Patient may need another paracentesis for addressing anasarca however given patient's unstable vital signs at this time will delay repeat procedure. 3/12-we will increase patient's Lasix to 60 mg IV twice daily and monitor volume status as well as urinary output and will again attempt to reinstitute 1500 cc fluid restriction. Chronic venous stasis bilaterally-  3/10-should improve with Unaboot and diuresis.   Consider discontinuation of vancomycin in a.m. with likely urinary source of infection and no clear evidence of cellulitis as patient's clinical exam findings are most consistent with chronic venous stasis. 3/11-fluid restriction increase Lasix and continue spironolactone. 3/12-fluid restrictions and will increase patient's Lasix to 60 mg IV twice daily and monitor         Respiratory failure with hypoxia  Patient does not wear oxygen at home. She is wheezing and short of breath came air on 8 L via nasal cannula  Respiratory rate of 26. Use of accessory muscles. Unable to speak in full sentences   although she is agreeable to go over the floor respiratory failure secondary to gross anasarca and ascites she would not be a good if IR can pull off some of the fluid of her belly  3/9-likely some combination of obstructive sleep apnea as well as pulmonary edema. Holding diuresis at this time due to hypotension and tachycardia. Will monitor closely  3/10-likely secondary to underlying pulmonary edema. Diuresis when patient's blood pressure and heart rate are stable. 3/11-improving now only requiring 2 L via nasal cannula. Continue to diuresis. 3/12-we will attempt to wean from oxygen as we continue diuresing.         Sepsis with septic shock-  ER did preliminary bedside ultrasound concerning for cirrhotic liver  -We will treat empirically for SBP  -Follow-up ascitic fluid  -Will not be too aggressive with pain management secondary to the need to maintain her blood pressures. 3/9-clinically worsening. Patient meets sepsis criteria with leukocytosis tachycardia and fever. Will discontinue ceftriaxone and start patient on vancomycin and cefepime. Source unclear at this time. Urine cultures and fluid cultures from paracentesis pending blood cultures pending with no growth to date.   Continue Levophed at this time and we will bolus the patient 1 L of normal saline as patient did have 15 L of fluid removed from abdomen yesterday and I suspect ongoing fluid shifts. 3/10-question urinary source with gram-negative rods growing in urine. Blood cultures and ascitic fluid demonstrate no growth to date. Will de-escalate antibiotic regimen to ceftriaxone only. Currently weaning off pressors. 3/11-Blood cultures and cultures from paracentesis are negative to date urine cultures positive for gram-negative rods. Continue ceftriaxone and monitor. Off Levophed at this time. Continue midodrine   3/12-suspect urinary source. Improving not resolved. UTI-  3/10-urine cultures demonstrating gram-negative rods. Continue cefepime at this time as cultures and sensitivities demonstrate resistance to ceftriaxone. 3/12 continue cefepime. Likely transition to oral antibiotics in a.m. if patient continues to clinically improve      Diabetes-  3/10-we will obtain hemoglobin A1c and start sliding scale today. Patient's blood sugars to goal on sliding scale at this time. Patient noted to have diabetic foot foot wounds at multiple spots and will consult wound care. 3/11-hemoglobin A1c pending. Blood sugars to goal on sliding scale alone  3/12-hemoglobin A1c noted to be 6.9 and likely would benefit from oral agent alone on an outpatient basis. Continue sliding scale on an inpatient basis. Sugars currently to goal.         Atrial fibrillation with rapid ventricular response-May be secondary to ongoing sepsis. Patient on Levophed for blood pressure as well as Midrin and we will bolus the patient 1 L of normal saline at this time and see if this improves patient's tachycardia. 3/10-given underlying hypotension we will bolus the patient 150 mg of amiodarone and then transition to oral amiodarone 200 mg twice daily and monitor. Once patient blood pressure improves may likely benefit from moving away from amiodarone.   Will start patient on metoprolol 25 mg p.o. twice daily and uptitrate as tolerated  3/11-we will increase patient's metoprolol to 75 mg twice daily and continue amiodarone. Will resume patient's Eliquis. Attempting to titrate off Cardizem drip  3/12-we will increase metoprolol to 100 mg p.o. twice daily and likely transition patient to patient's home oral regimen of metoprolol 200 mg XL p.o. daily tomorrow if heart rate improved. Pulmonary hypertension-  3/10-MARCELO HIV and hepatitis pending. Patient will need sleep study VQ scan and likely right heart cath. Will consult pulmonology. Patient will need aggressive diuresis once vital signs normalized. 3/11-pulmonology consulted and work-up pending. Will need VQ scan prior to discharge. 3/12-work-up pending. Palm consulted    Obstructive sleep apnea-we will need outpatient sleep study        Anemia, thrombocytopenia-no evidence of bleeding at this time will monitor.         History of polysubstance abuse  Patient endorses smoking and using marijuana  She smokes about 1 pack of cigarettes per day  Patient denies any use of methamphetamines or other drugs  However this was the history given by her mother to the ER  We will get a urine drug screen unable  3/9-the patient does deny IV drug use but freely admits to frequent smoking of methamphetamines. Patient's urine drug screen positive for marijuana and methamphetamines. Will check hepatitis panel  3/10-noted        Morbid obesity BMI of 39.70 with a weight of 300 pounds           Hyponatremia  Likely secondary to SIADH in the setting of CHF and COPD  3/9-resolved  3/10-slightly decreased today. Will need to monitor as we initiate diuresis  3/11-stable.   Will monitor closely with daily BMP given aggressive diuresis      Elevated lactic acid  Patient concerning for sepsis and was  Elevation could also be secondary to cirrhosis  Will cover her for SBP as above  However given her volume overloaded status will hold off on IV fluids  3/9-resolved        KAIDEN versus chronic kidney disease  No prior labs available for comparison  We will avoid nephrotoxins  And dose medications for her renal function  3/9-worsening with removal of 15 L of fluid from abdomen and diuresis. We will hold diuresis and bolus the patient 1 L of normal saline and will administer albumin and monitor. Patient will likely need continued ongoing diuresis however this is difficult in the face of profound hypotension requiring Levophed. Will ask nephrology to evaluate patient. 3/10-improving with fluid resuscitation. We will continue to monitor as we initiate diuresis. 3/11-resolved      Yeast infection of pannus/vaginal labia-  3/10-severe. Will start patient on IV Diflucan and topical Diflucan.  3/11-continue IV Diflucan and topical diet glucan  3/12-we will complete IV Diflucan today continue topical nystatin powder    Debility-inpatient rehab per PT OT     Dispo/Discharge Planning:   Inpatient rehab  Further work-up and management based on clinical course.   Anticipate home at discharge              Diet: Diabetic diet  VTE ppx:  Eliquis  code status: Full      Hospital Problems as of 3/12/2022 Never Reviewed          Codes Class Noted - Resolved POA    Cellulitis ICD-10-CM: L03.90  ICD-9-CM: 682.9  3/11/2022 - Present Yes        E. coli UTI ICD-10-CM: N39.0, B96.20  ICD-9-CM: 599.0, 041.49  3/11/2022 - Present Yes        Suspected sleep apnea ICD-10-CM: R29.818  ICD-9-CM: 781.99  3/10/2022 - Present Yes        Pulmonary hypertension ([facility] ICD-10-CM: I27.20  ICD-9-CM: 416.8  3/10/2022 - Present Yes        Methamphetamine abuse ([facility] ICD-10-CM: F15.10  ICD-9-CM: 305.70  3/10/2022 - Present Yes        * (Principal) Systolic congestive heart failure (HCC) ICD-10-CM: I50.20  ICD-9-CM: 428.20, 428.0  3/9/2022 - Present Yes        Anasarca ICD-10-CM: R60.1  ICD-9-CM: 782.3  3/8/2022 - Present Yes        Cirrhosis ([facility] ICD-10-CM: K74.60  ICD-9-CM: 571.5  3/8/2022 - Present Yes        SBP (spontaneous bacterial peritonitis) ([facility] ICD-10-CM: J20.1  ICD-9-CM: 567.23  3/8/2022 - Present Unknown        Atrial fibrillation with RVR [facility]) ICD-10-CM: I48.91  ICD-9-CM: 427.31  3/8/2022 - Present Yes        Respiratory failure with hypoxia [facility]) ICD-10-CM: J96.91  ICD-9-CM: 518.81  3/8/2022 - Present Yes        Hyponatremia ICD-10-CM: E87.1  ICD-9-CM: 276.1  3/8/2022 - Present Yes        KAIDEN (acute kidney injury) ([facility] ICD-10-CM: N17.9  ICD-9-CM: 584.9  3/8/2022 - Present Yes        Dependence on nicotine from cigarettes (Chronic) ICD-10-CM: T82.246  ICD-9-CM: 305.1  3/8/2022 - Present Yes        Polysubstance abuse ([facility] ICD-10-CM: F19.10  ICD-9-CM: 305.90  3/8/2022 - Present Yes        RESOLVED: COPD exacerbation ([facility] ICD-10-CM: J44.1  ICD-9-CM: 491.21  3/8/2022 - 3/11/2022 Yes              Objective:     Patient Vitals for the past 24 hrs:   Temp Pulse Resp BP SpO2   03/12/22 1050 98.2 °F (36.8 °C) 100 16 99/65 92 %   03/12/22 0742 98.6 °F (37 °C) (!) 113 17 100/69 93 %   03/12/22 0720 -- -- -- -- 92 %   03/12/22 0351 98.9 °F (37.2 °C) (!) 107 16 (!) 98/56 93 %   03/12/22 0250 -- -- -- -- 93 %   03/12/22 0021 99.6 °F (37.6 °C) 97 20 102/61 94 %   03/11/22 2034 98.4 °F (36.9 °C) 94 16 (!) 92/52 94 %   03/11/22 2016 -- -- -- -- 95 %   03/11/22 1756 -- (!) 102 -- (!) 100/59 --   03/11/22 1538 97 °F (36.1 °C) 94 14 (!) 100/51 92 %   03/11/22 1428 -- -- -- -- 92 %     Oxygen Therapy  O2 Sat (%): 92 % (03/12/22 1050)  Pulse via Oximetry: 116 beats per minute (03/12/22 0720)  O2 Device: Nasal cannula (03/12/22 0818)  Skin Assessment: Clean, dry, & intact (03/11/22 1600)  Skin Protection for O2 Device: N/A (03/11/22 0402)  O2 Flow Rate (L/min): 2 l/min (removed) (03/12/22 0818)    Estimated body mass index is 39.26 kg/m² as calculated from the following:    Height as of this encounter: 6' 1\" (1.854 m). Weight as of this encounter: 135 kg (297 lb 9.6 oz).     Intake/Output Summary (Last 24 hours) at 3/12/2022 1345  Last data filed at 3/12/2022 0945  Gross per 24 hour   Intake 600 ml   Output 4350 ml   Net -3750 ml Physical Exam:     Blood pressure 99/65, pulse 100, temperature 98.2 °F (36.8 °C), resp. rate 16, height 6' 1\" (1.854 m), weight 135 kg (297 lb 9.6 oz), SpO2 92 %, not currently breastfeeding. General:    Well nourished. No overt distress, hypersomnolent  Head:  Normocephalic, atraumatic  Eyes:  Sclerae appear normal.  Pupils equally round. ENT:  Nares appear normal, no drainage. Moist oral mucosa  Neck:  No restricted ROM. Trachea midline   CV:   RRR. No m/r/g. No jugular venous distension. Lungs:   Wheezing diffusely  Abdomen: Bowel sounds present. Soft, nontender, distended  Extremities: No cyanosis or clubbing.  +3-4 bilateral edema with erythema  Skin:     No rashes and normal coloration. Warm and dry. Numerous homemade tattoos  Neuro:  CN II-XII grossly intact. Sensation intact. A&Ox3  Psych:  Normal mood and affect.       I have reviewed ordered lab tests and independently visualized imaging below:    Recent Labs:  Recent Results (from the past 48 hour(s))   GLUCOSE, POC    Collection Time: 03/10/22  4:39 PM   Result Value Ref Range    Glucose (POC) 131 (H) 65 - 100 mg/dL    Performed by Freddy    GLUCOSE, POC    Collection Time: 03/10/22  9:49 PM   Result Value Ref Range    Glucose (POC) 164 (H) 65 - 100 mg/dL    Performed by Rogelio    CBC W/O DIFF    Collection Time: 03/11/22  3:25 AM   Result Value Ref Range    WBC 11.4 (H) 4.3 - 11.1 K/uL    RBC 3.35 (L) 4.05 - 5.2 M/uL    HGB 9.0 (L) 11.7 - 15.4 g/dL    HCT 29.6 (L) 35.8 - 46.3 %    MCV 88.4 79.6 - 97.8 FL    MCH 26.9 26.1 - 32.9 PG    MCHC 30.4 (L) 31.4 - 35.0 g/dL    RDW 18.1 (H) 11.9 - 14.6 %    PLATELET 974 (L) 049 - 450 K/uL    MPV 10.9 9.4 - 12.3 FL    ABSOLUTE NRBC 0.00 0.0 - 0.2 K/uL   METABOLIC PANEL, COMPREHENSIVE    Collection Time: 03/11/22  3:25 AM   Result Value Ref Range    Sodium 133 (L) 136 - 145 mmol/L    Potassium 3.9 3.5 - 5.1 mmol/L    Chloride 103 98 - 107 mmol/L    CO2 23 21 - 32 mmol/L    Anion gap 7 7 - 16 mmol/L    Glucose 141 (H) 65 - 100 mg/dL    BUN 26 (H) 6 - 23 MG/DL    Creatinine 1.00 0.6 - 1.0 MG/DL    GFR est AA >60 >60 ml/min/1.73m2    GFR est non-AA >60 >60 ml/min/1.73m2    Calcium 8.0 (L) 8.3 - 10.4 MG/DL    Bilirubin, total 2.4 (H) 0.2 - 1.1 MG/DL    ALT (SGPT) 17 12 - 65 U/L    AST (SGOT) 33 15 - 37 U/L    Alk.  phosphatase 229 (H) 50 - 136 U/L    Protein, total 5.8 (L) 6.3 - 8.2 g/dL    Albumin 2.7 (L) 3.5 - 5.0 g/dL    Globulin 3.1 2.3 - 3.5 g/dL    A-G Ratio 0.9 (L) 1.2 - 3.5     VANCOMYCIN, RANDOM    Collection Time: 03/11/22  3:25 AM   Result Value Ref Range    Vancomycin, random 17.2 UG/ML   HEMOGLOBIN A1C WITH EAG    Collection Time: 03/11/22  3:25 AM   Result Value Ref Range    Hemoglobin A1c 6.9 (H) 4.20 - 6.30 %    Est. average glucose 151 mg/dL   GLUCOSE, POC    Collection Time: 03/11/22  7:07 AM   Result Value Ref Range    Glucose (POC) 141 (H) 65 - 100 mg/dL    Performed by RobertN    GLUCOSE, POC    Collection Time: 03/11/22 10:44 AM   Result Value Ref Range    Glucose (POC) 145 (H) 65 - 100 mg/dL    Performed by Freddy    GLUCOSE, POC    Collection Time: 03/11/22  3:41 PM   Result Value Ref Range    Glucose (POC) 116 (H) 65 - 100 mg/dL    Performed by DarbyPCT    GLUCOSE, POC    Collection Time: 03/11/22  8:33 PM   Result Value Ref Range    Glucose (POC) 154 (H) 65 - 100 mg/dL    Performed by Melanie    CBC W/O DIFF    Collection Time: 03/12/22  6:51 AM   Result Value Ref Range    WBC 7.1 4.3 - 11.1 K/uL    RBC 3.26 (L) 4.05 - 5.2 M/uL    HGB 8.6 (L) 11.7 - 15.4 g/dL    HCT 27.9 (L) 35.8 - 46.3 %    MCV 85.6 79.6 - 97.8 FL    MCH 26.4 26.1 - 32.9 PG    MCHC 30.8 (L) 31.4 - 35.0 g/dL    RDW 17.9 (H) 11.9 - 14.6 %    PLATELET 97 (L) 544 - 450 K/uL    MPV 11.6 9.4 - 12.3 FL    ABSOLUTE NRBC 0.00 0.0 - 0.2 K/uL   METABOLIC PANEL, COMPREHENSIVE    Collection Time: 03/12/22  6:51 AM   Result Value Ref Range    Sodium 134 (L) 136 - 145 mmol/L Potassium 3.7 3.5 - 5.1 mmol/L    Chloride 102 98 - 107 mmol/L    CO2 26 21 - 32 mmol/L    Anion gap 6 (L) 7 - 16 mmol/L    Glucose 89 65 - 100 mg/dL    BUN 22 6 - 23 MG/DL    Creatinine 0.80 0.6 - 1.0 MG/DL    GFR est AA >60 >60 ml/min/1.73m2    GFR est non-AA >60 >60 ml/min/1.73m2    Calcium 8.3 8.3 - 10.4 MG/DL    Bilirubin, total 2.4 (H) 0.2 - 1.1 MG/DL    ALT (SGPT) 17 12 - 65 U/L    AST (SGOT) 30 15 - 37 U/L    Alk. phosphatase 309 (H) 50 - 136 U/L    Protein, total 5.9 (L) 6.3 - 8.2 g/dL    Albumin 2.5 (L) 3.5 - 5.0 g/dL    Globulin 3.4 2.3 - 3.5 g/dL    A-G Ratio 0.7 (L) 1.2 - 3.5     GLUCOSE, POC    Collection Time: 03/12/22  7:46 AM   Result Value Ref Range    Glucose (POC) 93 65 - 100 mg/dL    Performed by [address], POC    Collection Time: 03/12/22 11:05 AM   Result Value Ref Range    Glucose (POC) 137 (H) 65 - 100 mg/dL    Performed by Amada Gutierrez        All Micro Results     Procedure Component Value Units Date/Time    CULTURE, BLOOD #1 [[id number]] Collected: 03/08/22 2156    Order Status: Completed Specimen: Blood Updated: 03/12/22 0915     Special Requests: --        RIGHT  HAND       Culture result: NO GROWTH 4 DAYS       CULTURE, BLOOD #2 [[id number]] Collected: 03/08/22 2241    Order Status: Completed Specimen: Blood Updated: 03/12/22 0915     Special Requests: --        RIGHT  HAND       Culture result: NO GROWTH 4 DAYS       CULTURE, BODY FLUID SUAD Mackey Asa [[id number]] Collected: 03/08/22 1637    Order Status: Completed Specimen:  Body Fluid from Abdominal Fluid Updated: 03/11/22 0884     Special Requests: NO SPECIAL REQUESTS        GRAM STAIN NO WBC'S SEEN         NO DEFINITE ORGANISM SEEN        Culture result: NO GROWTH 2 DAYS       CULTURE, URINE [[id number]]  (Abnormal)  (Susceptibility) Collected: 03/08/22 1416    Order Status: Completed Specimen: Urine from Clean catch Updated: 03/11/22 0797     Special Requests: NO SPECIAL REQUESTS        Culture result:       >100,000 COLONIES/mL ESCHERICHIA COLI          CULTURE, BLOOD [[id number]] Collected: 03/08/22 1745    Order Status: Canceled Specimen: Blood     CULTURE, BLOOD [[id number]] Collected: 03/08/22 1745    Order Status: Canceled Specimen: Blood           Other Studies:  No results found.     Current Meds:  Current Facility-Administered Medications   Medication Dose Route Frequency    metoprolol succinate (TOPROL-XL) tablet 200 mg  200 mg Oral Q24H    [Held by provider] bumetanide (BUMEX) tablet 1 mg  1 mg Oral BID    furosemide (LASIX) tablet 60 mg  60 mg Oral ACB&D    spironolactone (ALDACTONE) tablet 50 mg  50 mg Oral DAILY    apixaban (ELIQUIS) tablet 5 mg  5 mg Oral Q12H    cefepime (MAXIPIME) 2 g in 0.9% sodium chloride (MBP/ADV) 100 mL MBP  2 g IntraVENous Q12H    tuberculin injection 5 Units  5 Units IntraDERMal ONCE    gabapentin (NEURONTIN) capsule 600 mg  600 mg Oral TID    amiodarone (CORDARONE) tablet 200 mg  200 mg Oral BID    insulin lispro (HUMALOG) injection   SubCUTAneous AC&HS    melatonin tablet 5 mg  5 mg Oral QHS    lip protectant (BLISTEX) ointment 1 Each  1 Each Topical PRN    dilTIAZem (CARDIZEM) 100 mg in 0.9% sodium chloride (MBP/ADV) 100 mL infusion  0-15 mg/hr IntraVENous TITRATE    sodium chloride (NS) flush 5-40 mL  5-40 mL IntraVENous Q8H    sodium chloride (NS) flush 5-40 mL  5-40 mL IntraVENous PRN    acetaminophen (TYLENOL) tablet 650 mg  650 mg Oral Q6H PRN    Or    acetaminophen (TYLENOL) suppository 650 mg  650 mg Rectal Q6H PRN    polyethylene glycol (MIRALAX) packet 17 g  17 g Oral DAILY PRN    ondansetron (ZOFRAN ODT) tablet 4 mg  4 mg Oral Q8H PRN    Or    ondansetron (ZOFRAN) injection 4 mg  4 mg IntraVENous Q6H PRN    potassium chloride 10 mEq in 100 ml IVPB  10 mEq IntraVENous PRN    magnesium sulfate 2 g/50 ml IVPB (premix or compounded)  2 g IntraVENous PRN    famotidine (PEPCID) tablet 20 mg  20 mg Oral BID    nicotine buccal (POLACRILEX) lozenge 2 mg  2 mg Oral Q4H PRN    nicotine (NICODERM CQ) 21 mg/24 hr patch 1 Patch  1 Patch TransDERmal DAILY    levalbuterol (XOPENEX) nebulizer soln 0.63 mg/3 mL  0.63 mg Nebulization Q6H RT    ipratropium (ATROVENT) 0.02 % nebulizer solution 0.5 mg  0.5 mg Nebulization Q6H RT    budesonide (PULMICORT) 500 mcg/2 ml nebulizer suspension  500 mcg Nebulization BID RT    HYDROmorphone (DILAUDID) injection 0.5 mg  0.5 mg IntraVENous Q4H PRN    HYDROmorphone (DILAUDID) injection 0.2 mg  0.2 mg IntraVENous Q4H PRN    midodrine (PROAMATINE) tablet 10 mg  10 mg Oral TID WITH MEALS    aspirin chewable tablet 81 mg  81 mg Oral DAILY    nystatin (MYCOSTATIN) 100,000 unit/gram powder   Topical BID       Signed:  Loraine Lema DO    Part of this note may have been written by using a voice dictation software. The note has been proof read but may still contain some grammatical/other typographical errors.

## 2022-03-12 NOTE — PROGRESS NOTES
Date of Outreach Update:  Ellyn Blake was seen and assessed. MEWS Score: 2 (03/12/22 1050)  Vitals:    03/12/22 0720 03/12/22 0742 03/12/22 1050 03/12/22 1523   BP:  100/69 99/65 (!) 96/58   Pulse:  (!) 113 100 (!) 101   Resp:  17 16 17   Temp:  98.6 °F (37 °C) 98.2 °F (36.8 °C) 97.9 °F (36.6 °C)   SpO2: 92% 93% 92% 99%   Weight:       Height:             Pain Assessment  Pain Intensity 1: 7 (03/12/22 0920)  Pain Location 1: Leg  Pain Intervention(s) 1: Medication (see MAR)  Patient Stated Pain Goal: 0      Previous Outreach assessment has been reviewed. There have been no significant clinical changes since the completion of the last dated Outreach assessment. Will continue to follow up per outreach protocol.     Signed By:   Parvin Pickering RN    March 12, 2022 5:21 PM

## 2022-03-12 NOTE — PROGRESS NOTES
Date of Outreach Update:  Kimberly Shields was seen and assessed. Previous Outreach assessment has been reviewed. There have been no significant clinical changes since the completion of the last dated Outreach assessment. Will continue to follow up per outreach protocol.     Signed By:   Emil Schilder, RN    March 12, 2022 2:10 AM

## 2022-03-13 VITALS
OXYGEN SATURATION: 91 % | WEIGHT: 293 LBS | HEART RATE: 105 BPM | TEMPERATURE: 98.2 F | DIASTOLIC BLOOD PRESSURE: 72 MMHG | BODY MASS INDEX: 39.68 KG/M2 | RESPIRATION RATE: 18 BRPM | HEIGHT: 72 IN | SYSTOLIC BLOOD PRESSURE: 107 MMHG

## 2022-03-13 LAB
ALBUMIN SERPL-MCNC: 2.4 G/DL (ref 3.5–5)
ALBUMIN/GLOB SERPL: 0.7 {RATIO} (ref 1.2–3.5)
ALP SERPL-CCNC: 351 U/L (ref 50–136)
ALT SERPL-CCNC: 14 U/L (ref 12–65)
ANION GAP SERPL CALC-SCNC: 7 MMOL/L (ref 7–16)
AST SERPL-CCNC: 24 U/L (ref 15–37)
BACTERIA SPEC CULT: NORMAL
BACTERIA SPEC CULT: NORMAL
BILIRUB SERPL-MCNC: 1.8 MG/DL (ref 0.2–1.1)
BUN SERPL-MCNC: 19 MG/DL (ref 6–23)
CALCIUM SERPL-MCNC: 8.4 MG/DL (ref 8.3–10.4)
CHLORIDE SERPL-SCNC: 100 MMOL/L (ref 98–107)
CO2 SERPL-SCNC: 29 MMOL/L (ref 21–32)
CREAT SERPL-MCNC: 0.7 MG/DL (ref 0.6–1)
ERYTHROCYTE [DISTWIDTH] IN BLOOD BY AUTOMATED COUNT: 17.8 % (ref 11.9–14.6)
GLOBULIN SER CALC-MCNC: 3.4 G/DL (ref 2.3–3.5)
GLUCOSE BLD STRIP.AUTO-MCNC: 115 MG/DL (ref 65–100)
GLUCOSE BLD STRIP.AUTO-MCNC: 166 MG/DL (ref 65–100)
GLUCOSE SERPL-MCNC: 115 MG/DL (ref 65–100)
HCT VFR BLD AUTO: 27.4 % (ref 35.8–46.3)
HGB BLD-MCNC: 8.7 G/DL (ref 11.7–15.4)
MCH RBC QN AUTO: 26.5 PG (ref 26.1–32.9)
MCHC RBC AUTO-ENTMCNC: 31.8 G/DL (ref 31.4–35)
MCV RBC AUTO: 83.5 FL (ref 79.6–97.8)
NRBC # BLD: 0 K/UL (ref 0–0.2)
PLATELET # BLD AUTO: 113 K/UL (ref 150–450)
PMV BLD AUTO: 11.1 FL (ref 9.4–12.3)
POTASSIUM SERPL-SCNC: 3.2 MMOL/L (ref 3.5–5.1)
PROT SERPL-MCNC: 5.8 G/DL (ref 6.3–8.2)
RBC # BLD AUTO: 3.28 M/UL (ref 4.05–5.2)
SERVICE CMNT-IMP: ABNORMAL
SERVICE CMNT-IMP: ABNORMAL
SERVICE CMNT-IMP: NORMAL
SERVICE CMNT-IMP: NORMAL
SODIUM SERPL-SCNC: 136 MMOL/L (ref 136–145)
WBC # BLD AUTO: 5.3 K/UL (ref 4.3–11.1)

## 2022-03-13 PROCEDURE — 74011250637 HC RX REV CODE- 250/637: Performed by: NURSE PRACTITIONER

## 2022-03-13 PROCEDURE — 74011250637 HC RX REV CODE- 250/637: Performed by: FAMILY MEDICINE

## 2022-03-13 PROCEDURE — 77010033678 HC OXYGEN DAILY

## 2022-03-13 PROCEDURE — 80053 COMPREHEN METABOLIC PANEL: CPT

## 2022-03-13 PROCEDURE — 74011636637 HC RX REV CODE- 636/637: Performed by: INTERNAL MEDICINE

## 2022-03-13 PROCEDURE — 74011250636 HC RX REV CODE- 250/636: Performed by: INTERNAL MEDICINE

## 2022-03-13 PROCEDURE — 94640 AIRWAY INHALATION TREATMENT: CPT

## 2022-03-13 PROCEDURE — 36415 COLL VENOUS BLD VENIPUNCTURE: CPT

## 2022-03-13 PROCEDURE — 74011250637 HC RX REV CODE- 250/637: Performed by: INTERNAL MEDICINE

## 2022-03-13 PROCEDURE — 94760 N-INVAS EAR/PLS OXIMETRY 1: CPT

## 2022-03-13 PROCEDURE — 85027 COMPLETE CBC AUTOMATED: CPT

## 2022-03-13 PROCEDURE — 82962 GLUCOSE BLOOD TEST: CPT

## 2022-03-13 PROCEDURE — 74011000250 HC RX REV CODE- 250: Performed by: INTERNAL MEDICINE

## 2022-03-13 RX ORDER — SULFAMETHOXAZOLE AND TRIMETHOPRIM 800; 160 MG/1; MG/1
1 TABLET ORAL EVERY 12 HOURS
Qty: 6 TABLET | Refills: 0 | Status: SHIPPED | OUTPATIENT
Start: 2022-03-13 | End: 2022-03-16

## 2022-03-13 RX ORDER — POTASSIUM CHLORIDE 20 MEQ/1
40 TABLET, EXTENDED RELEASE ORAL
Status: COMPLETED | OUTPATIENT
Start: 2022-03-13 | End: 2022-03-13

## 2022-03-13 RX ORDER — FUROSEMIDE 10 MG/ML
60 INJECTION INTRAMUSCULAR; INTRAVENOUS 2 TIMES DAILY
Status: DISCONTINUED | OUTPATIENT
Start: 2022-03-13 | End: 2022-03-13 | Stop reason: HOSPADM

## 2022-03-13 RX ORDER — SULFAMETHOXAZOLE AND TRIMETHOPRIM 800; 160 MG/1; MG/1
1 TABLET ORAL EVERY 12 HOURS
Status: DISCONTINUED | OUTPATIENT
Start: 2022-03-13 | End: 2022-03-13 | Stop reason: HOSPADM

## 2022-03-13 RX ADMIN — NYSTATIN: 100000 POWDER TOPICAL at 08:01

## 2022-03-13 RX ADMIN — BUDESONIDE 500 MCG: 0.5 INHALANT RESPIRATORY (INHALATION) at 07:47

## 2022-03-13 RX ADMIN — ASPIRIN 81 MG: 81 TABLET, CHEWABLE ORAL at 08:00

## 2022-03-13 RX ADMIN — Medication 2 UNITS: at 11:30

## 2022-03-13 RX ADMIN — LEVALBUTEROL HYDROCHLORIDE 0.63 MG: 0.63 SOLUTION RESPIRATORY (INHALATION) at 03:50

## 2022-03-13 RX ADMIN — SODIUM CHLORIDE, PRESERVATIVE FREE 10 ML: 5 INJECTION INTRAVENOUS at 05:54

## 2022-03-13 RX ADMIN — GABAPENTIN 600 MG: 300 CAPSULE ORAL at 05:53

## 2022-03-13 RX ADMIN — SPIRONOLACTONE 50 MG: 25 TABLET ORAL at 08:00

## 2022-03-13 RX ADMIN — AMIODARONE HYDROCHLORIDE 200 MG: 200 TABLET ORAL at 08:00

## 2022-03-13 RX ADMIN — MIDODRINE HYDROCHLORIDE 10 MG: 5 TABLET ORAL at 12:31

## 2022-03-13 RX ADMIN — LEVALBUTEROL HYDROCHLORIDE 0.63 MG: 0.63 SOLUTION RESPIRATORY (INHALATION) at 07:47

## 2022-03-13 RX ADMIN — IPRATROPIUM BROMIDE 0.5 MG: 0.5 SOLUTION RESPIRATORY (INHALATION) at 03:49

## 2022-03-13 RX ADMIN — MIDODRINE HYDROCHLORIDE 10 MG: 5 TABLET ORAL at 08:00

## 2022-03-13 RX ADMIN — IPRATROPIUM BROMIDE 0.5 MG: 0.5 SOLUTION RESPIRATORY (INHALATION) at 07:47

## 2022-03-13 RX ADMIN — FUROSEMIDE 60 MG: 40 TABLET ORAL at 06:40

## 2022-03-13 RX ADMIN — APIXABAN 5 MG: 5 TABLET, FILM COATED ORAL at 07:59

## 2022-03-13 RX ADMIN — POTASSIUM CHLORIDE 40 MEQ: 20 TABLET, EXTENDED RELEASE ORAL at 12:31

## 2022-03-13 RX ADMIN — FAMOTIDINE 20 MG: 20 TABLET, FILM COATED ORAL at 08:00

## 2022-03-13 RX ADMIN — SULFAMETHOXAZOLE AND TRIMETHOPRIM 1 TABLET: 800; 160 TABLET ORAL at 08:22

## 2022-03-13 RX ADMIN — FUROSEMIDE 60 MG: 10 INJECTION, SOLUTION INTRAMUSCULAR; INTRAVENOUS at 12:31

## 2022-03-13 NOTE — PROGRESS NOTES
Date of Outreach Update:  Duane Herrera was seen and assessed. Pt continuing to improve / UOP HS close to 4500 / WBC trending down Na improving     All labs and notes reviewed     MEWS Score: 2 (03/12/22 1050)  Vitals:    03/12/22 2145 03/13/22 0026 03/13/22 0351 03/13/22 0446   BP:  97/63  110/70   Pulse:  95  (!) 102   Resp:  16  16   Temp:  98.3 °F (36.8 °C)  98.4 °F (36.9 °C)   SpO2: 98% 91% 97% 92%   Weight:       Height:             Pain Assessment  Pain Intensity 1: 2 (03/12/22 2340)  Pain Location 1: Leg  Pain Intervention(s) 1: Medication (see MAR)  Patient Stated Pain Goal: 0      Previous Outreach assessment has been reviewed. There have been no significant clinical changes since the completion of the last dated Outreach assessment. Will continue to follow up per outreach protocol.     Signed By:   Bettina Fields RN    March 13, 2022 7:20 AM

## 2022-03-13 NOTE — PROGRESS NOTES
Date of Outreach Update:  Yobany Edgar was seen and assessed. MEWS Score: 2 (03/12/22 1050)  Vitals:    03/12/22 0742 03/12/22 1050 03/12/22 1523 03/12/22 2044   BP: 100/69 99/65 (!) 96/58 111/79   Pulse: (!) 113 100 (!) 101 (!) 103   Resp: 17 16 17 20   Temp: 98.6 °F (37 °C) 98.2 °F (36.8 °C) 97.9 °F (36.6 °C) 99.3 °F (37.4 °C)   SpO2: 93% 92% 99% 94%   Weight:       Height:             Pain Assessment  Pain Intensity 1: 9 (03/12/22 2020)  Pain Location 1: Leg  Pain Intervention(s) 1: Emotional support,Distraction  Patient Stated Pain Goal: 0      Previous Outreach assessment has been reviewed. There have been no significant clinical changes since the completion of the last dated Outreach assessment. Pt alert and oriented. Denies any pain or SOB. Respirations are even and unlabored. No complaints at this time. Primary RN to call with any concerns. Will continue to follow up per outreach protocol.     Signed By:   Belle Simms RN    March 12, 2022 9:44 PM

## 2022-03-13 NOTE — PROGRESS NOTES
Patient agitated and insisting on talking with Dr. Zenia Manzanares about discharging her today. She has removed the wrapping from bilateral legs and the heart monitor. She states she did this because she is getting in the shower. (She has had an incontinent episode in the chair). Dr Zenai Manzanares in room and talking with patient, discussing the risk and danger to herself. She continues to be agitated and persistent about leaving. I have emptied her echols but encouraged her to calm down and wait till her mother comes in and they talk before making any further rash decisions.

## 2022-03-13 NOTE — DISCHARGE SUMMARY
Hospitalist Discharge Summary   Admit Date:  3/8/2022  1:48 PM   DC Note date: 3/13/2022  Name:  Danette Mail   Age:  55 y.o.   Sex:  female  :  1975   MRN:  [id number]   Room:  [facility]  PCP:  None    Presenting Complaint: Abdominal Pain    Initial Admission Diagnosis: New onset a-fib ([facility] [I48.91]  Atrial fibrillation with RVR ([facility] [I48.91]  Respiratory failure with hypoxia ([facility] [J96.91]  Anasarca [R60.1]  Cirrhosis ([facility] [K74.60]  SBP (spontaneous bacterial peritonitis) ([facility] [K65.2]     Problem List for this Hospitalization:  Hospital Problems as of 3/13/2022 Never Reviewed          Codes Class Noted - Resolved POA    Cellulitis ICD-10-CM: L03.90  ICD-9-CM: 682.9  3/11/2022 - Present Yes        E. coli UTI ICD-10-CM: N39.0, B96.20  ICD-9-CM: 599.0, 041.49  3/11/2022 - Present Yes        Suspected sleep apnea ICD-10-CM: R29.818  ICD-9-CM: 781.99  3/10/2022 - Present Yes        Pulmonary hypertension ([facility] ICD-10-CM: I27.20  ICD-9-CM: 416.8  3/10/2022 - Present Yes        Methamphetamine abuse ([facility] ICD-10-CM: F15.10  ICD-9-CM: 305.70  3/10/2022 - Present Yes        * (Principal) Systolic congestive heart failure (HCC) ICD-10-CM: I50.20  ICD-9-CM: 428.20, 428.0  3/9/2022 - Present Yes        Anasarca ICD-10-CM: R60.1  ICD-9-CM: 782.3  3/8/2022 - Present Yes        Cirrhosis ([facility] ICD-10-CM: K74.60  ICD-9-CM: 571.5  3/8/2022 - Present Yes        SBP (spontaneous bacterial peritonitis) ([facility] ICD-10-CM: X40.4  ICD-9-CM: 567.23  3/8/2022 - Present Unknown        Atrial fibrillation with RVR (HCC) ICD-10-CM: I48.91  ICD-9-CM: 427.31  3/8/2022 - Present Yes        Respiratory failure with hypoxia ([facility] ICD-10-CM: J96.91  ICD-9-CM: 518.81  3/8/2022 - Present Yes        Hyponatremia ICD-10-CM: E87.1  ICD-9-CM: 276.1  3/8/2022 - Present Yes        KAIDEN (acute kidney injury) ([facility] ICD-10-CM: N17.9  ICD-9-CM: 584.9  3/8/2022 - Present Yes        Dependence on nicotine from cigarettes (Chronic) ICD-10-CM: D48.225  ICD-9-CM: 305.1  3/8/2022 - Present Yes        Polysubstance abuse ([facility] ICD-10-CM: F19.10  ICD-9-CM: 305.90  3/8/2022 - Present Yes        RESOLVED: COPD exacerbation ([facility] ICD-10-CM: J44.1  ICD-9-CM: 491.21  3/8/2022 - 3/11/2022 Yes            Did Patient have Sepsis (YES OR NO): Septic shock present at admission    Hospital Course:  Shadia Ortiz is a 55 y.o. female with a past medical history of cardiomyopathy, CHF hypertension, diabetes, smoker of 1 pack of cigarettes per day, atrial fibrillation, COPD and drug abuse per the patient's mom who presents with severe shortness of breath and abdominal distention and medication noncompliance.  Symptoms have been ongoing for about a week acutely worse today per her partner at the bedside.  She was seen and evaluated in the ER found to be in A. fib with RVR with a heart rate in the 160s.  A grossly distended abdomen.  Likely the major factor in her shortness of breath.      She also complains of abdominal pain.  Writhing around in pain.  Poor historian.  History is taken from ER physician and staff and patient's chart and partner at bedside. Assessment and plan  Active Problems:  Diastolic CHF and cardiomyopathy with anasarca, ascites and volume overload, chronic venous stasis-patient was admitted to the intensive care unit where she underwent echo that demonstrated diastolic dysfunction of 50 to 55% and elevated RVSP. Patient was aggressively diuresed during hospitalization with IV Lasix and at time of discharge was transitioned to her home regiment of Bumex. Prescriptions were not provided to the patient at discharge secondary to leaving [address]                 Chronic venous stasis bilaterally-patient was aggressively being diuresed with Lasix 60 mg IV twice daily prior to discharge. Patient was not euvolemic at discharge and continued to be volume overloaded. Patient did leave [address]   I counseled the patient on the importance of staying in the hospital for continued care including risk of death and severe injury. Further I counseled the patient on returning to the hospital if she in any way got ill or had recurrence of her initial presenting symptoms.          Respiratory failure with hypoxia-patient was treated with oxygen via nasal cannula and felt to be secondary to pulmonary edema. Patient at time of discharge was not requiring oxygen supplementation.          Sepsis with septic shock-patient presented with septic shock and required IV pressors and large volume fluid boluses. Patient was started on broad-spectrum antibiotics and ultimately were deescalated to oral Bactrim with suspected urinary source. Patient had been clinically improving prior to discharge but had not completed antibiotics and a prescription for 3 further days of Bactrim were provided to the patient at time of discharge. Again it is stressed that the patient did leave [address] I counseled the patient as above.           UTI-urine cultures demonstrating gram-negative rods with resistance to multiple antibiotics. During hospitalization patient was treated with IV cefepime and at time of discharge patient has been transitioned to Bactrim based on her cultures and sensitivities. Patient left AGAINST MEDICAL ADVICE.             Diabetes-patient noted to have a hemoglobin A1c of 6.9 and had been noncompliant with her outpatient insulin prior to hospitalization. Encourage the patient to be compliant with her outpatient regimen and continued this at discharge. Patient did leave [address] and no new prescriptions were provided to the patient for her underlying diabetes.        Atrial fibrillation with rapid ventricular response-patient required Cardizem drip during hospitalization was transitioned to oral amiodarone and metoprolol 200 mg XL p.o. daily. Patient further was anticoagulated with Eliquis.   At time of discharge patient's home regimen was resumed and no new prescriptions were provided to the patient as she did leave [address] and I was uncomfortable with providing ongoing care on an outpatient basis without close medical monitoring. I did explain potential risk and benefit including serious injury and death. Further the patient was encouraged to return to the hospital if she in any way became ill including chest pain palpitations or accelerated heart rate.          Pulmonary hypertension-pulmonary work-up was initiated during hospitalization however work-up was not completed as patient did leave AMA patient would benefit From continued work-up on an outpatient basis and will need a VQ scan as well as sleep study and right heart catheterization. Patient did leave prematurely [address]       Obstructive sleep apnea-we will need outpatient sleep study           Anemia, thrombocytopenia-no evidence of bleeding at this time will monitor.           History of polysubstance abuse-patient was counseled at length multiple times regarding the importance of substance abuse avoidance. Patient left AMA and stated that she was \"going back to the drug house\"          Morbid obesity BMI of 39.70 with a weight of 300 pounds            Hyponatremia-resolved during hospitalization with IV fluids and likely secondary to volume depletion        Elevated lactic acid-resolved during hospitalization with IV fluid          Hypokalemia-patient did not complete IV potassium supplementation prior to discharge. Patient left AMA          KAIDEN versus chronic kidney disease-resolve prior to discharge.          Yeast infection of pannus/vaginal labia-patient received IV Diflucan and topical Diflucan during hospitalization. No new treatment was continued at discharge.   Patient left AMA.               Disposition: Left Against Medical Advice  Diet: ADULT DIET Regular; Low Fat/Low Chol/High Fiber/2 gm Na; 1500 ml  Code Status: Full Code    Follow Up Orders: Follow-up Appointments   Procedures    FOLLOW UP VISIT Appointment in: 3 - 5 Days pcp     pcp     Standing Status:   Standing     Number of Occurrences:   1     Order Specific Question:   Appointment in     Answer:   3 - 5 Days       Follow-up Information     Follow up With Specialties Details Why Contact Info    None    None ([phone]) Patient stated that they have no PCP            Follow up labs/diagnostics (ultimately defer to outpatient provider):  None    Time spent in patient discharge and coordination 39 minutes. Plan was discussed with patient and mother at bedside. All questions answered. Patient was stable at time of discharge. Instructions given to call a physician or return if any concerns. Discharge Info:   Discharge Medication List as of 3/13/2022  2:44 PM      START taking these medications    Details   trimethoprim-sulfamethoxazole (BACTRIM DS, SEPTRA DS) 160-800 mg per tablet Take 1 Tablet by mouth every twelve (12) hours for 3 days. , Print, Disp-6 Tablet, R-0         CONTINUE these medications which have NOT CHANGED    Details   bumetanide (BUMEX) 1 mg tablet Take 1 mg by mouth two (2) times a day., Historical Med      spironolactone (ALDACTONE) 25 mg tablet Take 25 mg by mouth daily. , Historical Med      fluticasone propionate (FLOVENT DISKUS) 100 mcg/actuation dsdv inhaler Take 1 Puff by inhalation two (2) times a day., Historical Med      fluticasone propion-salmeteroL (ADVAIR/WIXELA) 100-50 mcg/dose diskus inhaler Take 1 Puff by inhalation two (2) times a day. Indications: controller medication for asthma, Historical Med      metoprolol succinate (TOPROL-XL) 200 mg XL tablet Take 200 mg by mouth daily. , Historical Med      apixaban (Eliquis) 5 mg tablet Take 5 mg by mouth two (2) times a day., Historical Med      gabapentin (Neurontin) 300 mg capsule Take 300 mg by mouth three (3) times daily. , Historical Med      albuterol (PROVENTIL HFA, VENTOLIN HFA, PROAIR HFA) 90 mcg/actuation inhaler Take 2 Puffs by inhalation every four (4) hours as needed for Wheezing., Historical Med      aspirin 81 mg chewable tablet Take 81 mg by mouth daily. Indications: treatment to prevent a heart attack, Historical Med      insulin lispro (HumaLOG U-100 Insulin) 100 unit/mL injection 10 Units by SubCUTAneous route Before breakfast, lunch, and dinner. Indications: type 2 diabetes mellitus, Historical Med      insulin glargine (Lantus U-100 Insulin) 100 unit/mL injection 20 Units by SubCUTAneous route nightly. Indications: type 2 diabetes mellitus, Historical Med             Procedures done this admission:  * No surgery found *    Consults this admission:  IP CONSULT TO INTERVENTIONAL RADIOLOGY  IP CONSULT TO NEPHROLOGY  IP CONSULT TO PULMONOLOGY    Echocardiogram/EKG results:  Results from Hospital Encounter encounter on 03/08/22    ECHO ADULT COMPLETE    Interpretation Summary    Left Ventricle: Left ventricle is mildly dilated. Normal wall thickness. Normal wall motion. Low normal left ventricular systolic function with a visually estimated EF of 50 - 55%. Abnormal diastolic function.   Right Ventricle: Right ventricle is moderately dilated. Mildly reduced systolic function.   Aortic Valve: Mild sclerosis of the aortic valve cusp.   Mitral Valve: Mild transvalvular regurgitation.   Tricuspid Valve: Moderate transvalvular regurgitation. Mildly elevated RVSP.   Left Atrium: Left atrium is moderately dilated.   Right Atrium: Right atrium is moderately dilated.   Contrast used: Definity.        EKG Results     Procedure 720 Value Units Date/Time    EKG, 12 LEAD, INITIAL [[id number]] Collected: 03/10/22 0737    Order Status: Completed Updated: 03/10/22 1254     Ventricular Rate 116 BPM      Atrial Rate 120 BPM      QRS Duration 80 ms      Q-T Interval 334 ms      QTC Calculation (Bezet) 464 ms      Calculated R Axis -110 degrees      Calculated T Axis 43 degrees      Diagnosis --     Atrial fibrillation with rapid ventricular response  Possible Right ventricular hypertrophy  Inferior infarct (cited on or before 10-MAR-2022)  Possible Anterolateral infarct (cited on or before 10-MAR-2022)  Abnormal ECG  When compared with ECG of 08-MAR-2022 19:26,  Atrial fibrillation has replaced Sinus rhythm  Confirmed by [facility]  MD (), Danni Iverson ([id number]) on 3/10/2022 12:53:36 PM      EKG, 12 LEAD, SUBSEQUENT [[id number]] Collected: 03/08/22 1926    Order Status: Completed Updated: 03/09/22 0654     Ventricular Rate 133 BPM      Atrial Rate 129 BPM      QRS Duration 72 ms      Q-T Interval 276 ms      QTC Calculation (Bezet) 410 ms      Calculated R Axis -148 degrees      Calculated T Axis 13 degrees      Diagnosis --     Supraventricular tachycardia  Low voltage QRS  Poor R wave progression  Abnormal ECG  No previous ECGs available  Confirmed by Freddy Morris ([id number]) on 3/9/2022 6:53:59 AM      EKG, 12 LEAD, INITIAL [[id number]]     Order Status: Completed           Diagnostic Imaging/Tests:   [facility]    Result Date: 3/9/2022  Right upper quadrant abdominal ultrasound. CLINICAL INDICATION: Possible cirrhosis, recent paracentesis for ascites PROCEDURE: Realtime grayscale and color Doppler evaluation of the right upper abdominal quadrant. COMPARISON: No prior FINDINGS: The liver shows a subtle nodular margin along the right hepatic lobe. The echotexture is heterogeneous without a distinct mass. The gallbladder wall is thickened at 7.5 mm. Gallstones are present. A negative sonographic Navas signs is reported. The imaged pancreas is unremarkable. The common bile duct is normal in caliber at 6.0 mm. The right kidney is normal in size and echogenicity measuring 10.4 cm. There is a small right pleural effusion. The aorta and IVC are unremarkable. 1. Heterogeneous echotexture to the liver with a nodular margin. This can be seen with cirrhosis.  2. Nonspecific gallbladder wall thickening can also be seen with liver disease. 3. Cholelithiasis. 4. Small volume ascites    XR CHEST PORT    Result Date: 3/8/2022  EXAMINATION: CHEST RADIOGRAPH 3/8/2022 2:11 PM ACCESSION NUMBER: [id number] INDICATION: volume overload COMPARISON: None available TECHNIQUE: A single view of the chest was obtained. FINDINGS: Underpenetrated exam. Support Lines and Tubes: None Cardiac Silhouette: Enlarged. Lungs: Interstitial and alveolar pulmonary edema. Pleura: Small bilateral pleural effusions. No pneumothorax. Osseous Structures: Unremarkable. Upper Abdomen: Unremarkable. 1. Enlarged cardiac silhouette. 2. Interstitial and alveolar pulmonary edema and small bilateral pleural effusions. 3. Underpenetrated exam. All findings within that limitation. ECHO ADULT COMPLETE    Result Date: 3/9/2022    Left Ventricle: Left ventricle is mildly dilated. Normal wall thickness. Normal wall motion. Low normal left ventricular systolic function with a visually estimated EF of 50 - 55%. Abnormal diastolic function.   Right Ventricle: Right ventricle is moderately dilated. Mildly reduced systolic function.   Aortic Valve: Mild sclerosis of the aortic valve cusp.   Mitral Valve: Mild transvalvular regurgitation.   Tricuspid Valve: Moderate transvalvular regurgitation. Mildly elevated RVSP.   Left Atrium: Left atrium is moderately dilated.   Right Atrium: Right atrium is moderately dilated.   Contrast used: Definity. IR PARACENTESIS ABD W IMAGE    Result Date: 3/9/2022  Title: Ultrasound guided Paracentesis. Indication: New onset a-fib ([address] Atrial fibrillation with RVR ([address] Respiratory failure with hypoxia ([address] Anasarca, Cirrhosis ([address] SBP (spontaneous bacterial peritonitis) ([address] Consent:  Informed written and oral consent was obtained from the patient after explanation of benefits and risks of procedure (including, but not limited to: infection, hemorrhage, visceral injury).   The patient's questions were answered to her satisfaction. The patient stated understanding and requested that we proceed. Procedure:  Sterile barrier technique (including:  cap, mask, sterile gloves, sterile sheet, hand hygiene, and chlorhexidene for cutaneous antisepsis) were used. Following routine prep and drape of the abdomen, a local field block with 1% lidocaine was achieved. Ultrasound evaluation was performed. Fluid was identified in the right lower quadrant. The anticipated needle tract was interrogated using Doppler ultrasound. Using real-time ultrasound guidance, the peritoneal cavity was accessed with a 8 Fr Safety Centesis Catheter. A total of 15,000 cc of thin yellow fluid was removed. The catheter was removed and a bandage was applied. Complications: None. Specimen: Cytopathology/Microbiology Findings: Large volume ascites. Uncomplicated ultrasound guided paracentesis with removal of 15,000 mL of clear yellow ascites. All Micro Results     Procedure Component Value Units Date/Time    CULTURE, BLOOD #1 [[id number]] Collected: 03/08/22 2256    Order Status: Completed Specimen: Blood Updated: 03/13/22 0915     Special Requests: --        RIGHT  HAND       Culture result: NO GROWTH 5 DAYS       CULTURE, BLOOD #2 [[id number]] Collected: 03/08/22 2341    Order Status: Completed Specimen: Blood Updated: 03/13/22 0915     Special Requests: --        RIGHT  HAND       Culture result: NO GROWTH 5 DAYS       CULTURE, BODY FLUID [address] [[id number]] Collected: 03/08/22 1637    Order Status: Completed Specimen:  Body Fluid from Abdominal Fluid Updated: 03/11/22 0847     Special Requests: NO SPECIAL REQUESTS        GRAM STAIN NO WBC'S SEEN         NO DEFINITE ORGANISM SEEN        Culture result: NO GROWTH 2 DAYS       CULTURE, URINE [[id number]]  (Abnormal)  (Susceptibility) Collected: 03/08/22 1416    Order Status: Completed Specimen: Urine from Clean catch Updated: 03/11/22 0723     Special Requests: NO SPECIAL REQUESTS Culture result:       >100,000 COLONIES/mL ESCHERICHIA COLI          CULTURE, BLOOD [[id number]] Collected: 03/08/22 1745    Order Status: Canceled Specimen: Blood     CULTURE, BLOOD [[id number]] Collected: 03/08/22 1745    Order Status: Canceled Specimen: Blood           Labs: Results:       BMP, Mg, Phos Recent Labs     03/13/22 0558 03/12/22  0651 03/11/22  0325    134* 133*   K 3.2* 3.7 3.9    102 103   CO2 29 26 23   AGAP 7 6* 7   BUN 19 22 26*   CREA 0.70 0.80 1.00   CA 8.4 8.3 8.0*   * 89 141*      CBC Recent Labs     03/13/22 0558 03/12/22  0651 03/11/22  0325   WBC 5.3 7.1 11.4*   RBC 3.28* 3.26* 3.35*   HGB 8.7* 8.6* 9.0*   HCT 27.4* 27.9* 29.6*   * 97* 109*      LFT Recent Labs     03/13/22 0558 03/12/22  0651 03/11/22  0325   ALT 14 17 17   * 309* 229*   TP 5.8* 5.9* 5.8*   ALB 2.4* 2.5* 2.7*   GLOB 3.4 3.4 3.1   AGRAT 0.7* 0.7* 0.9*      Cardiac Testing No results found for: BNPP, BNP, CPK, RCK1, RCK2, RCK3, RCK4, CKMB, CKNDX, CKND1, TROPT, TROIQ   Coagulation Tests Lab Results   Component Value Date/Time    Prothrombin time 18.1 (H) 03/09/2022 03:45 AM    INR 1.5 03/09/2022 03:45 AM    aPTT 30.5 03/08/2022 03:56 PM      A1c Lab Results   Component Value Date/Time    Hemoglobin A1c 6.9 (H) 03/11/2022 03:25 AM      Lipid Panel No results found for: CHOL, CHOLPOCT, CHOLX, CHLST, CHOLV, 017715, HDL, HDLP, LDL, LDLC, DLDLP, 847154, VLDLC, VLDL, TGLX, TRIGL, TRIGP, TGLPOCT, CHHD, CHHDX   Thyroid Panel No results found for: TSH, T4, FT4, TT3, T3U, TSHEXT     Most Recent UA Lab Results   Component Value Date/Time    Color MARIE 03/08/2022 02:16 PM    Appearance CLOUDY 03/08/2022 02:16 PM    pH (UA) 6.0 03/08/2022 02:16 PM    Protein TRACE (A) 03/08/2022 02:16 PM    Glucose Negative 03/08/2022 02:16 PM    Ketone Negative 03/08/2022 02:16 PM    Bilirubin Negative 03/08/2022 02:16 PM    Blood SMALL (A) 03/08/2022 02:16 PM    Urobilinogen 1.0 03/08/2022 02:16 PM    Nitrites Positive (A) 03/08/2022 02:16 PM    Leukocyte Esterase MODERATE (A) 03/08/2022 02:16 PM    Epithelial cells 0-3 03/08/2022 02:16 PM    Bacteria 0 03/08/2022 02:16 PM    Other observations RESULTS VERIFIED MANUALLY 03/08/2022 02:16 PM          All Labs from Last 24 Hrs:  Recent Results (from the past 24 hour(s))   GLUCOSE, POC    Collection Time: 03/12/22  4:04 PM   Result Value Ref Range    Glucose (POC) 149 (H) 65 - 100 mg/dL    Performed by Blossom Benavides    GLUCOSE, POC    Collection Time: 03/12/22  8:43 PM   Result Value Ref Range    Glucose (POC) 166 (H) 65 - 100 mg/dL    Performed by KimLAURA    METABOLIC PANEL, COMPREHENSIVE    Collection Time: 03/13/22  5:58 AM   Result Value Ref Range    Sodium 136 136 - 145 mmol/L    Potassium 3.2 (L) 3.5 - 5.1 mmol/L    Chloride 100 98 - 107 mmol/L    CO2 29 21 - 32 mmol/L    Anion gap 7 7 - 16 mmol/L    Glucose 115 (H) 65 - 100 mg/dL    BUN 19 6 - 23 MG/DL    Creatinine 0.70 0.6 - 1.0 MG/DL    GFR est AA >60 >60 ml/min/1.73m2    GFR est non-AA >60 >60 ml/min/1.73m2    Calcium 8.4 8.3 - 10.4 MG/DL    Bilirubin, total 1.8 (H) 0.2 - 1.1 MG/DL    ALT (SGPT) 14 12 - 65 U/L    AST (SGOT) 24 15 - 37 U/L    Alk.  phosphatase 351 (H) 50 - 136 U/L    Protein, total 5.8 (L) 6.3 - 8.2 g/dL    Albumin 2.4 (L) 3.5 - 5.0 g/dL    Globulin 3.4 2.3 - 3.5 g/dL    A-G Ratio 0.7 (L) 1.2 - 3.5     CBC W/O DIFF    Collection Time: 03/13/22  5:58 AM   Result Value Ref Range    WBC 5.3 4.3 - 11.1 K/uL    RBC 3.28 (L) 4.05 - 5.2 M/uL    HGB 8.7 (L) 11.7 - 15.4 g/dL    HCT 27.4 (L) 35.8 - 46.3 %    MCV 83.5 79.6 - 97.8 FL    MCH 26.5 26.1 - 32.9 PG    MCHC 31.8 31.4 - 35.0 g/dL    RDW 17.8 (H) 11.9 - 14.6 %    PLATELET 101 (L) 055 - 450 K/uL    MPV 11.1 9.4 - 12.3 FL    ABSOLUTE NRBC 0.00 0.0 - 0.2 K/uL   GLUCOSE, POC    Collection Time: 03/13/22  7:25 AM   Result Value Ref Range    Glucose (POC) 115 (H) 65 - 100 mg/dL    Performed by [facility]    GLUCOSE, POC    Collection Time: 03/13/22 10:52 AM   Result Value Ref Range    Glucose (POC) 166 (H) 65 - 100 mg/dL    Performed by [facility]any[facility]        Current Med List in Hospital:   Current Facility-Administered Medications   Medication Dose Route Frequency    trimethoprim-sulfamethoxazole (BACTRIM DS, SEPTRA DS) 160-800 mg per tablet 1 Tablet  1 Tablet Oral Q12H    furosemide (LASIX) injection 60 mg  60 mg IntraVENous BID    [Held by provider] bumetanide (BUMEX) tablet 1 mg  1 mg Oral BID    metoprolol succinate (TOPROL-XL) tablet 200 mg  200 mg Oral Q24H    spironolactone (ALDACTONE) tablet 50 mg  50 mg Oral DAILY    apixaban (ELIQUIS) tablet 5 mg  5 mg Oral Q12H    gabapentin (NEURONTIN) capsule 600 mg  600 mg Oral TID    amiodarone (CORDARONE) tablet 200 mg  200 mg Oral BID    insulin lispro (HUMALOG) injection   SubCUTAneous AC&HS    melatonin tablet 5 mg  5 mg Oral QHS    lip protectant (BLISTEX) ointment 1 Each  1 Each Topical PRN    sodium chloride (NS) flush 5-40 mL  5-40 mL IntraVENous Q8H    sodium chloride (NS) flush 5-40 mL  5-40 mL IntraVENous PRN    acetaminophen (TYLENOL) tablet 650 mg  650 mg Oral Q6H PRN    Or    acetaminophen (TYLENOL) suppository 650 mg  650 mg Rectal Q6H PRN    polyethylene glycol (MIRALAX) packet 17 g  17 g Oral DAILY PRN    ondansetron (ZOFRAN ODT) tablet 4 mg  4 mg Oral Q8H PRN    Or    ondansetron (ZOFRAN) injection 4 mg  4 mg IntraVENous Q6H PRN    potassium chloride 10 mEq in 100 ml IVPB  10 mEq IntraVENous PRN    magnesium sulfate 2 g/50 ml IVPB (premix or compounded)  2 g IntraVENous PRN    famotidine (PEPCID) tablet 20 mg  20 mg Oral BID    nicotine buccal (POLACRILEX) lozenge 2 mg  2 mg Oral Q4H PRN    nicotine (NICODERM CQ) 21 mg/24 hr patch 1 Patch  1 Patch TransDERmal DAILY    levalbuterol (XOPENEX) nebulizer soln 0.63 mg/3 mL  0.63 mg Nebulization Q6H RT    ipratropium (ATROVENT) 0.02 % nebulizer solution 0.5 mg  0.5 mg Nebulization Q6H RT    budesonide (PULMICORT) 500 mcg/2 ml nebulizer suspension  500 mcg Nebulization BID RT    HYDROmorphone (DILAUDID) injection 0.5 mg  0.5 mg IntraVENous Q4H PRN    HYDROmorphone (DILAUDID) injection 0.2 mg  0.2 mg IntraVENous Q4H PRN    midodrine (PROAMATINE) tablet 10 mg  10 mg Oral TID WITH MEALS    aspirin chewable tablet 81 mg  81 mg Oral DAILY    nystatin (MYCOSTATIN) 100,000 unit/gram powder   Topical BID     Current Outpatient Medications   Medication Sig    trimethoprim-sulfamethoxazole (BACTRIM DS, SEPTRA DS) 160-800 mg per tablet Take 1 Tablet by mouth every twelve (12) hours for 3 days.  bumetanide (BUMEX) 1 mg tablet Take 1 mg by mouth two (2) times a day.  spironolactone (ALDACTONE) 25 mg tablet Take 25 mg by mouth daily.  fluticasone propionate (FLOVENT DISKUS) 100 mcg/actuation dsdv inhaler Take 1 Puff by inhalation two (2) times a day.  fluticasone propion-salmeteroL (ADVAIR/WIXELA) 100-50 mcg/dose diskus inhaler Take 1 Puff by inhalation two (2) times a day. Indications: controller medication for asthma    metoprolol succinate (TOPROL-XL) 200 mg XL tablet Take 200 mg by mouth daily.  apixaban (Eliquis) 5 mg tablet Take 5 mg by mouth two (2) times a day.  gabapentin (Neurontin) 300 mg capsule Take 300 mg by mouth three (3) times daily.  albuterol (PROVENTIL HFA, VENTOLIN HFA, PROAIR HFA) 90 mcg/actuation inhaler Take 2 Puffs by inhalation every four (4) hours as needed for Wheezing.  aspirin 81 mg chewable tablet Take 81 mg by mouth daily. Indications: treatment to prevent a heart attack    insulin lispro (HumaLOG U-100 Insulin) 100 unit/mL injection 10 Units by SubCUTAneous route Before breakfast, lunch, and dinner. Indications: type 2 diabetes mellitus    insulin glargine (Lantus U-100 Insulin) 100 unit/mL injection 20 Units by SubCUTAneous route nightly.  Indications: type 2 diabetes mellitus       No Known Allergies  Immunization History   Administered Date(s) Administered    TB Skin Test (PPD) Intradermal 03/11/2022       Recent Vital Data:  Patient Vitals for the past 24 hrs:   Temp Pulse Resp BP SpO2   03/13/22 1135 98.2 °F (36.8 °C) (!) 105 18 107/72 91 %   03/13/22 0747 -- -- -- -- 92 %   03/13/22 0746 97.8 °F (36.6 °C) 98 16 106/72 90 %   03/13/22 0446 98.4 °F (36.9 °C) (!) 102 16 110/70 92 %   03/13/22 0351 -- -- -- -- 97 %   03/13/22 0026 98.3 °F (36.8 °C) 95 16 97/63 91 %   03/12/22 2145 -- -- -- -- 98 %   03/12/22 2044 99.3 °F (37.4 °C) (!) 103 20 111/79 94 %   03/12/22 1523 97.9 °F (36.6 °C) (!) 101 17 (!) 96/58 99 %     Oxygen Therapy  O2 Sat (%): 91 % (03/13/22 1135)  Pulse via Oximetry: 116 beats per minute (03/13/22 0747)  O2 Device: None (Room air) (03/13/22 0755)  Skin Assessment: Clean, dry, & intact (03/11/22 1600)  Skin Protection for O2 Device: N/A (03/11/22 0402)  O2 Flow Rate (L/min): 2 l/min (02 while pt is sleeping) (03/13/22 0747)    Estimated body mass index is 39.26 kg/m² as calculated from the following:    Height as of this encounter: 6' 1\" (1.854 m). Weight as of this encounter: 135 kg (297 lb 9.6 oz). Intake/Output Summary (Last 24 hours) at 3/13/2022 1448  Last data filed at 3/13/2022 1407  Gross per 24 hour   Intake 720 ml   Output 55272 ml   Net -02030 ml         Physical Exam:  Exam.  Please refer to progress note written on the same day. Patient refused exam.      Signed:  Unruly Sol DO    Part of this note may have been written by using a voice dictation software. The note has been proof read but may still contain some grammatical/other typographical errors.

## 2022-03-13 NOTE — PROGRESS NOTES
Hospitalist Progress Note   Admit Date:  3/8/2022  1:48 PM   Name:  Kilo Monson   Age:  55 y.o. Sex:  female  :  1975   MRN:  [id number]   Room:  303/01    Presenting Complaint: Abdominal Pain    Reason(s) for Admission: New onset a-fib [facility]) [I48.91]  Atrial fibrillation with RVR ([facility] [I48.91]  Respiratory failure with hypoxia ([facility] [J96.91]  Anasarca [R60.1]  Cirrhosis ([facility] [K74.60]  SBP (spontaneous bacterial peritonitis) [facility] Hospital Course & Interval History:   Kilo Monson is a 55 y.o. female with a past medical history of cardiomyopathy, CHF hypertension, diabetes, smoker of 1 pack of cigarettes per day, atrial fibrillation, COPD and drug abuse per the patient's mom who presents with severe shortness of breath and abdominal distention and medication noncompliance. Symptoms have been ongoing for about a week acutely worse today per her partner at the bedside. She was seen and evaluated in the ER found to be in A. fib with RVR with a heart rate in the 160s. A grossly distended abdomen. Likely the major factor in her shortness of breath.      She also complains of abdominal pain. Writhing around in pain. Poor historian. History is taken from ER physician and staff and patient's chart and partner at bedside. Subjective/24hr Events (22): Patient seen and examined and complains of bilateral lower extremity pain. Denies fever chills nausea vomiting chest pain and shortness of breath. ROS:  10 systems reviewed and negative except as noted above. Assessment & Plan: Active Problems:  Diastolic CHF and cardiomyopathy with anasarca, ascites and volume overload, chronic venous stasis  -We will admit to the ICU  -Interventional radiology consulted for emergent paracentesis  -Coags are pending  -Diuretics as tolerated-blood pressure significantly low at this time in the 90s to 100s.   3/9-EF noted to be 50 to 45% with diastolic dysfunction and mildly elevated RVSP. Given patient's hypotension and tachycardia and KAIDEN will hold diuresis at this time. Patient too unstable to undergo further cardiac evaluation including potential left and right heart catheterizations. Once patient clinically stabilizes will consult cardiology. Will get urine micro creatinine protein ratio and will get right upper quadrant ultrasound to evaluate liver. 3/10-patient's blood pressure significantly improved today. Will start Lasix 40 mg IV daily and likely will need significant up titration as patient's vital signs stabilized and is noted to be taking Bumex 1 mg daily on an outpatient basis. Patient will need aggressive diuresis once her vital signs stabilized. Patient noted to have significant bilateral venous stasis changes with bilateral erythema. Likely will improve with aggressive diuresis and will ask wound care to apply Unaboot. Unlikely hepatic source given relatively benign right upper quadrant ultrasound. Urine protein creatinine ratio also not clinically impressive and as such likely not a renal source and suspect severe pulmonary hypertension precipitated by obstructive sleep apnea morbid obesity and chronic methamphetamine use.  3/11-we will increase Lasix to 40 mg IV twice daily and continue spironolactone and monitor patient's volume status as well as blood pressure closely given she currently has low normal pressures. Will start patient on 1500 cc fluid restriction and monitor volume status closely. Patient may need another paracentesis for addressing anasarca however given patient's unstable vital signs at this time will delay repeat procedure. 3/12-we will increase patient's Lasix to 60 mg IV twice daily and monitor volume status as well as urinary output and will again attempt to reinstitute 1500 cc fluid restriction. 3/13-patient continues to diurese.   Continue to monitor volume status and CO2 as well as renal function on Lasix 60 mg IV twice daily.            Chronic venous stasis bilaterally-  3/10-should improve with Unaboot and diuresis. Consider discontinuation of vancomycin in a.m. with likely urinary source of infection and no clear evidence of cellulitis as patient's clinical exam findings are most consistent with chronic venous stasis. 3/11-fluid restriction increase Lasix and continue spironolactone. 3/12-fluid restrictions and will increase patient's Lasix to 60 mg IV twice daily and monitor  3/13-improving with aggressive diuresis       Respiratory failure with hypoxia  Patient does not wear oxygen at home. She is wheezing and short of breath came air on 8 L via nasal cannula  Respiratory rate of 26. Use of accessory muscles. Unable to speak in full sentences   although she is agreeable to go over the floor respiratory failure secondary to gross anasarca and ascites she would not be a good if IR can pull off some of the fluid of her belly  3/9-likely some combination of obstructive sleep apnea as well as pulmonary edema. Holding diuresis at this time due to hypotension and tachycardia. Will monitor closely  3/10-likely secondary to underlying pulmonary edema. Diuresis when patient's blood pressure and heart rate are stable. 3/11-improving now only requiring 2 L via nasal cannula. Continue to diuresis. 3/12-we will attempt to wean from oxygen as we continue diuresing. 3/13-resolved. Patient likely will need at bedtime O2 secondary to underlying obstructive sleep apnea      Sepsis with septic shock-  ER did preliminary bedside ultrasound concerning for cirrhotic liver  -We will treat empirically for SBP  -Follow-up ascitic fluid  -Will not be too aggressive with pain management secondary to the need to maintain her blood pressures. 3/9-clinically worsening. Patient meets sepsis criteria with leukocytosis tachycardia and fever. Will discontinue ceftriaxone and start patient on vancomycin and cefepime.   Source unclear at this time.  Urine cultures and fluid cultures from paracentesis pending blood cultures pending with no growth to date. Continue Levophed at this time and we will bolus the patient 1 L of normal saline as patient did have 15 L of fluid removed from abdomen yesterday and I suspect ongoing fluid shifts. 3/10-question urinary source with gram-negative rods growing in urine. Blood cultures and ascitic fluid demonstrate no growth to date. Will de-escalate antibiotic regimen to ceftriaxone only. Currently weaning off pressors. 3/11-Blood cultures and cultures from paracentesis are negative to date urine cultures positive for gram-negative rods. Continue ceftriaxone and monitor. Off Levophed at this time. Continue midodrine   3/12-suspect urinary source. Improving not resolved. 3/13-resolved        UTI-  3/10-urine cultures demonstrating gram-negative rods. Continue cefepime at this time as cultures and sensitivities demonstrate resistance to ceftriaxone. 3/12 continue cefepime. Likely transition to oral antibiotics in a.m. if patient continues to clinically improve  3/13-we will transition to oral Bactrim based on cultures and sensitivities. Will need to closely monitor patient serum potassium. Diabetes-  3/10-we will obtain hemoglobin A1c and start sliding scale today. Patient's blood sugars to goal on sliding scale at this time. Patient noted to have diabetic foot foot wounds at multiple spots and will consult wound care. 3/11-hemoglobin A1c pending. Blood sugars to goal on sliding scale alone  3/12-hemoglobin A1c noted to be 6.9 and likely would benefit from oral agent alone on an outpatient basis. Continue sliding scale on an inpatient basis. Sugars currently to goal.  3/13-to goal on sliding scale       Atrial fibrillation with rapid ventricular response-May be secondary to ongoing sepsis.   Patient on Levophed for blood pressure as well as Midrin and we will bolus the patient 1 L of normal saline at this time and see if this improves patient's tachycardia. 3/10-given underlying hypotension we will bolus the patient 150 mg of amiodarone and then transition to oral amiodarone 200 mg twice daily and monitor. Once patient blood pressure improves may likely benefit from moving away from amiodarone. Will start patient on metoprolol 25 mg p.o. twice daily and uptitrate as tolerated  3/11-we will increase patient's metoprolol to 75 mg twice daily and continue amiodarone. Will resume patient's Eliquis. Attempting to titrate off Cardizem drip  3/12-we will increase metoprolol to 100 mg p.o. twice daily and likely transition patient to patient's home oral regimen of metoprolol 200 mg XL p.o. daily tomorrow if heart rate improved. 3/13-we will transition to patient's oral metoprolol 200 mg XL p.o. daily and monitor heart rate. Pulmonary hypertension-  3/10-MARCELO HIV and hepatitis pending. Patient will need sleep study VQ scan and likely right heart cath. Will consult pulmonology. Patient will need aggressive diuresis once vital signs normalized. 3/11-pulmonology consulted and work-up pending. Will need VQ scan prior to discharge. 3/12-work-up pending. Palm consulted    Obstructive sleep apnea-we will need outpatient sleep study        Anemia, thrombocytopenia-no evidence of bleeding at this time will monitor.         History of polysubstance abuse  Patient endorses smoking and using marijuana  She smokes about 1 pack of cigarettes per day  Patient denies any use of methamphetamines or other drugs  However this was the history given by her mother to the ER  We will get a urine drug screen unable  3/9-the patient does deny IV drug use but freely admits to frequent smoking of methamphetamines. Patient's urine drug screen positive for marijuana and methamphetamines.   Will check hepatitis panel  3/10-noted        Morbid obesity BMI of 39.70 with a weight of 300 pounds           Hyponatremia  Likely secondary to SIADH in the setting of CHF and COPD  3/9-resolved  3/10-slightly decreased today. Will need to monitor as we initiate diuresis  3/11-stable. Will monitor closely with daily BMP given aggressive diuresis      Elevated lactic acid  Patient concerning for sepsis and was  Elevation could also be secondary to cirrhosis  Will cover her for SBP as above  However given her volume overloaded status will hold off on IV fluids  3/9-resolved      Hypokalemia-  3/13-replace with oral supplementation and will need to monitor serum potassium closely with transition to oral Bactrim. KAIDEN versus chronic kidney disease  No prior labs available for comparison  We will avoid nephrotoxins  And dose medications for her renal function  3/9-worsening with removal of 15 L of fluid from abdomen and diuresis. We will hold diuresis and bolus the patient 1 L of normal saline and will administer albumin and monitor. Patient will likely need continued ongoing diuresis however this is difficult in the face of profound hypotension requiring Levophed. Will ask nephrology to evaluate patient. 3/10-improving with fluid resuscitation. We will continue to monitor as we initiate diuresis. 3/11-resolved      Yeast infection of pannus/vaginal labia-  3/10-severe. Will start patient on IV Diflucan and topical Diflucan.  3/11-continue IV Diflucan and topical diet glucan  3/12-we will complete IV Diflucan today continue topical nystatin powder    Debility-inpatient rehab per PT OT     Dispo/Discharge Planning:   Inpatient rehab  Further work-up and management based on clinical course.   Anticipate home at discharge              Diet: Diabetic diet  VTE ppx:  Eliquis  code status: Full      Hospital Problems as of 3/13/2022 Never Reviewed          Codes Class Noted - Resolved POA    Cellulitis ICD-10-CM: L03.90  ICD-9-CM: 682.9  3/11/2022 - Present Yes        E. coli UTI ICD-10-CM: N39.0, B96.20  ICD-9-CM: 599.0, 041.49  3/11/2022 - Present Yes        Suspected sleep apnea ICD-10-CM: R29.818  ICD-9-CM: 781.99  3/10/2022 - Present Yes        Pulmonary hypertension ([facility] ICD-10-CM: I27.20  ICD-9-CM: 416.8  3/10/2022 - Present Yes        Methamphetamine abuse (HCC) ICD-10-CM: F15.10  ICD-9-CM: 305.70  3/10/2022 - Present Yes        * (Principal) Systolic congestive heart failure (HCC) ICD-10-CM: I50.20  ICD-9-CM: 428.20, 428.0  3/9/2022 - Present Yes        Anasarca ICD-10-CM: R60.1  ICD-9-CM: 782.3  3/8/2022 - Present Yes        Cirrhosis ([facility] ICD-10-CM: K74.60  ICD-9-CM: 571.5  3/8/2022 - Present Yes        SBP (spontaneous bacterial peritonitis) ([facility] ICD-10-CM: W23.0  ICD-9-CM: 567.23  3/8/2022 - Present Unknown        Atrial fibrillation with RVR (HCC) ICD-10-CM: I48.91  ICD-9-CM: 427.31  3/8/2022 - Present Yes        Respiratory failure with hypoxia [facility]) ICD-10-CM: J96.91  ICD-9-CM: 518.81  3/8/2022 - Present Yes        Hyponatremia ICD-10-CM: E87.1  ICD-9-CM: 276.1  3/8/2022 - Present Yes        KAIDEN (acute kidney injury) ([facility] ICD-10-CM: N17.9  ICD-9-CM: 584.9  3/8/2022 - Present Yes        Dependence on nicotine from cigarettes (Chronic) ICD-10-CM: F65.894  ICD-9-CM: 305.1  3/8/2022 - Present Yes        Polysubstance abuse ([facility] ICD-10-CM: F19.10  ICD-9-CM: 305.90  3/8/2022 - Present Yes        RESOLVED: COPD exacerbation ([facility] ICD-10-CM: J44.1  ICD-9-CM: 491.21  3/8/2022 - 3/11/2022 Yes              Objective:     Patient Vitals for the past 24 hrs:   Temp Pulse Resp BP SpO2   03/13/22 0747 -- -- -- -- 92 %   03/13/22 0746 97.8 °F (36.6 °C) 98 16 106/72 90 %   03/13/22 0446 98.4 °F (36.9 °C) (!) 102 16 110/70 92 %   03/13/22 0351 -- -- -- -- 97 %   03/13/22 0026 98.3 °F (36.8 °C) 95 16 97/63 91 %   03/12/22 2145 -- -- -- -- 98 %   03/12/22 2044 99.3 °F (37.4 °C) (!) 103 20 111/79 94 %   03/12/22 1523 97.9 °F (36.6 °C) (!) 101 17 (!) 96/58 99 %     Oxygen Therapy  O2 Sat (%): 92 % (03/13/22 0747)  Pulse via Oximetry: 116 beats per minute (03/13/22 0747)  O2 Device: Nasal cannula (03/13/22 0747)  Skin Assessment: Clean, dry, & intact (03/11/22 1600)  Skin Protection for O2 Device: N/A (03/11/22 0402)  O2 Flow Rate (L/min): 2 l/min (02 while pt is sleeping) (03/13/22 0747)    Estimated body mass index is 39.26 kg/m² as calculated from the following:    Height as of this encounter: 6' 1\" (1.854 m). Weight as of this encounter: 135 kg (297 lb 9.6 oz). Intake/Output Summary (Last 24 hours) at 3/13/2022 1119  Last data filed at 3/13/2022 0749  Gross per 24 hour   Intake 720 ml   Output 7825 ml   Net -7105 ml         Physical Exam:     Blood pressure 106/72, pulse 98, temperature 97.8 °F (36.6 °C), resp. rate 16, height 6' 1\" (1.854 m), weight 135 kg (297 lb 9.6 oz), SpO2 92 %, not currently breastfeeding. General:    Well nourished. No overt distress, hypersomnolent  Head:  Normocephalic, atraumatic  Eyes:  Sclerae appear normal.  Pupils equally round. ENT:  Nares appear normal, no drainage. Moist oral mucosa  Neck:  No restricted ROM. Trachea midline   CV:   RRR. No m/r/g. No jugular venous distension. Lungs:   Wheezing diffusely  Abdomen: Bowel sounds present. Soft, nontender, distended  Extremities: No cyanosis or clubbing.  +3-4 bilateral edema with erythema  Skin:     No rashes and normal coloration. Warm and dry. Numerous homemade tattoos  Neuro:  CN II-XII grossly intact. Sensation intact. A&Ox3  Psych:  Normal mood and affect.       I have reviewed ordered lab tests and independently visualized imaging below:    Recent Labs:  Recent Results (from the past 48 hour(s))   GLUCOSE, POC    Collection Time: 03/11/22 10:44 AM   Result Value Ref Range    Glucose (POC) 145 (H) 65 - 100 mg/dL    Performed by Freddy    GLUCOSE, POC    Collection Time: 03/11/22  3:41 PM   Result Value Ref Range    Glucose (POC) 116 (H) 65 - 100 mg/dL    Performed by Kaley    GLUCOSE, POC    Collection Time: 03/11/22  8:33 PM   Result Value Ref Range    Glucose (POC) 154 (H) 65 - 100 mg/dL    Performed by Melanie    CBC W/O DIFF    Collection Time: 03/12/22  6:51 AM   Result Value Ref Range    WBC 7.1 4.3 - 11.1 K/uL    RBC 3.26 (L) 4.05 - 5.2 M/uL    HGB 8.6 (L) 11.7 - 15.4 g/dL    HCT 27.9 (L) 35.8 - 46.3 %    MCV 85.6 79.6 - 97.8 FL    MCH 26.4 26.1 - 32.9 PG    MCHC 30.8 (L) 31.4 - 35.0 g/dL    RDW 17.9 (H) 11.9 - 14.6 %    PLATELET 97 (L) 078 - 450 K/uL    MPV 11.6 9.4 - 12.3 FL    ABSOLUTE NRBC 0.00 0.0 - 0.2 K/uL   METABOLIC PANEL, COMPREHENSIVE    Collection Time: 03/12/22  6:51 AM   Result Value Ref Range    Sodium 134 (L) 136 - 145 mmol/L    Potassium 3.7 3.5 - 5.1 mmol/L    Chloride 102 98 - 107 mmol/L    CO2 26 21 - 32 mmol/L    Anion gap 6 (L) 7 - 16 mmol/L    Glucose 89 65 - 100 mg/dL    BUN 22 6 - 23 MG/DL    Creatinine 0.80 0.6 - 1.0 MG/DL    GFR est AA >60 >60 ml/min/1.73m2    GFR est non-AA >60 >60 ml/min/1.73m2    Calcium 8.3 8.3 - 10.4 MG/DL    Bilirubin, total 2.4 (H) 0.2 - 1.1 MG/DL    ALT (SGPT) 17 12 - 65 U/L    AST (SGOT) 30 15 - 37 U/L    Alk.  phosphatase 309 (H) 50 - 136 U/L    Protein, total 5.9 (L) 6.3 - 8.2 g/dL    Albumin 2.5 (L) 3.5 - 5.0 g/dL    Globulin 3.4 2.3 - 3.5 g/dL    A-G Ratio 0.7 (L) 1.2 - 3.5     GLUCOSE, POC    Collection Time: 03/12/22  7:46 AM   Result Value Ref Range    Glucose (POC) 93 65 - 100 mg/dL    Performed by [address], POC    Collection Time: 03/12/22 11:05 AM   Result Value Ref Range    Glucose (POC) 137 (H) 65 - 100 mg/dL    Performed by [address], POC    Collection Time: 03/12/22  4:04 PM   Result Value Ref Range    Glucose (POC) 149 (H) 65 - 100 mg/dL    Performed by [address], POC    Collection Time: 03/12/22  8:43 PM   Result Value Ref Range    Glucose (POC) 166 (H) 65 - 100 mg/dL    Performed by [facility]    METABOLIC PANEL, COMPREHENSIVE    Collection Time: 03/13/22  5:58 AM   Result Value Ref Range    Sodium 136 136 - 145 mmol/L    Potassium 3.2 (L) 3.5 - 5.1 mmol/L    Chloride 100 98 - 107 mmol/L    CO2 29 21 - 32 mmol/L    Anion gap 7 7 - 16 mmol/L    Glucose 115 (H) 65 - 100 mg/dL    BUN 19 6 - 23 MG/DL    Creatinine 0.70 0.6 - 1.0 MG/DL    GFR est AA >60 >60 ml/min/1.73m2    GFR est non-AA >60 >60 ml/min/1.73m2    Calcium 8.4 8.3 - 10.4 MG/DL    Bilirubin, total 1.8 (H) 0.2 - 1.1 MG/DL    ALT (SGPT) 14 12 - 65 U/L    AST (SGOT) 24 15 - 37 U/L    Alk. phosphatase 351 (H) 50 - 136 U/L    Protein, total 5.8 (L) 6.3 - 8.2 g/dL    Albumin 2.4 (L) 3.5 - 5.0 g/dL    Globulin 3.4 2.3 - 3.5 g/dL    A-G Ratio 0.7 (L) 1.2 - 3.5     CBC W/O DIFF    Collection Time: 03/13/22  5:58 AM   Result Value Ref Range    WBC 5.3 4.3 - 11.1 K/uL    RBC 3.28 (L) 4.05 - 5.2 M/uL    HGB 8.7 (L) 11.7 - 15.4 g/dL    HCT 27.4 (L) 35.8 - 46.3 %    MCV 83.5 79.6 - 97.8 FL    MCH 26.5 26.1 - 32.9 PG    MCHC 31.8 31.4 - 35.0 g/dL    RDW 17.8 (H) 11.9 - 14.6 %    PLATELET 722 (L) 175 - 450 K/uL    MPV 11.1 9.4 - 12.3 FL    ABSOLUTE NRBC 0.00 0.0 - 0.2 K/uL   GLUCOSE, POC    Collection Time: 03/13/22  7:25 AM   Result Value Ref Range    Glucose (POC) 115 (H) 65 - 100 mg/dL    Performed by [facility]    GLUCOSE, POC    Collection Time: 03/13/22 10:52 AM   Result Value Ref Range    Glucose (POC) 166 (H) 65 - 100 mg/dL    Performed by [facility]        All Micro Results     Procedure Component Value Units Date/Time    CULTURE, BLOOD #1 [[id number]] Collected: 03/08/22 2256    Order Status: Completed Specimen: Blood Updated: 03/13/22 0915     Special Requests: --        RIGHT  HAND       Culture result: NO GROWTH 5 DAYS       CULTURE, BLOOD #2 [[id number]] Collected: 03/08/22 2341    Order Status: Completed Specimen: Blood Updated: 03/13/22 0915     Special Requests: --        RIGHT  HAND       Culture result: NO GROWTH 5 DAYS       CULTURE, BODY FLUID Alois Citizen STAIN [[id number]] Collected: 03/08/22 1637    Order Status: Completed Specimen:  Body Fluid from Abdominal Fluid Updated: 03/11/22 0849     Special Requests: NO SPECIAL REQUESTS        GRAM STAIN NO WBC'S SEEN         NO DEFINITE ORGANISM SEEN        Culture result: NO GROWTH 2 DAYS       CULTURE, URINE [[id number]]  (Abnormal)  (Susceptibility) Collected: 03/08/22 1416    Order Status: Completed Specimen: Urine from Clean catch Updated: 03/11/22 0723     Special Requests: NO SPECIAL REQUESTS        Culture result:       >100,000 COLONIES/mL ESCHERICHIA COLI          CULTURE, BLOOD [[id number]] Collected: 03/08/22 1745    Order Status: Canceled Specimen: Blood     CULTURE, BLOOD [[id number]] Collected: 03/08/22 1745    Order Status: Canceled Specimen: Blood           Other Studies:  No results found.     Current Meds:  Current Facility-Administered Medications   Medication Dose Route Frequency    trimethoprim-sulfamethoxazole (BACTRIM DS, SEPTRA DS) 160-800 mg per tablet 1 Tablet  1 Tablet Oral Q12H    potassium chloride (K-DUR, KLOR-CON M20) SR tablet 40 mEq  40 mEq Oral NOW    [Held by provider] bumetanide (BUMEX) tablet 1 mg  1 mg Oral BID    furosemide (LASIX) tablet 60 mg  60 mg Oral ACB&D    metoprolol succinate (TOPROL-XL) tablet 200 mg  200 mg Oral Q24H    spironolactone (ALDACTONE) tablet 50 mg  50 mg Oral DAILY    apixaban (ELIQUIS) tablet 5 mg  5 mg Oral Q12H    gabapentin (NEURONTIN) capsule 600 mg  600 mg Oral TID    amiodarone (CORDARONE) tablet 200 mg  200 mg Oral BID    insulin lispro (HUMALOG) injection   SubCUTAneous AC&HS    melatonin tablet 5 mg  5 mg Oral QHS    lip protectant (BLISTEX) ointment 1 Each  1 Each Topical PRN    dilTIAZem (CARDIZEM) 100 mg in 0.9% sodium chloride (MBP/ADV) 100 mL infusion  0-15 mg/hr IntraVENous TITRATE    sodium chloride (NS) flush 5-40 mL  5-40 mL IntraVENous Q8H    sodium chloride (NS) flush 5-40 mL  5-40 mL IntraVENous PRN    acetaminophen (TYLENOL) tablet 650 mg  650 mg Oral Q6H PRN    Or    acetaminophen (TYLENOL) suppository 650 mg  650 mg Rectal Q6H PRN    polyethylene glycol (MIRALAX) packet 17 g  17 g Oral DAILY PRN    ondansetron (ZOFRAN ODT) tablet 4 mg  4 mg Oral Q8H PRN    Or    ondansetron (ZOFRAN) injection 4 mg  4 mg IntraVENous Q6H PRN    potassium chloride 10 mEq in 100 ml IVPB  10 mEq IntraVENous PRN    magnesium sulfate 2 g/50 ml IVPB (premix or compounded)  2 g IntraVENous PRN    famotidine (PEPCID) tablet 20 mg  20 mg Oral BID    nicotine buccal (POLACRILEX) lozenge 2 mg  2 mg Oral Q4H PRN    nicotine (NICODERM CQ) 21 mg/24 hr patch 1 Patch  1 Patch TransDERmal DAILY    levalbuterol (XOPENEX) nebulizer soln 0.63 mg/3 mL  0.63 mg Nebulization Q6H RT    ipratropium (ATROVENT) 0.02 % nebulizer solution 0.5 mg  0.5 mg Nebulization Q6H RT    budesonide (PULMICORT) 500 mcg/2 ml nebulizer suspension  500 mcg Nebulization BID RT    HYDROmorphone (DILAUDID) injection 0.5 mg  0.5 mg IntraVENous Q4H PRN    HYDROmorphone (DILAUDID) injection 0.2 mg  0.2 mg IntraVENous Q4H PRN    midodrine (PROAMATINE) tablet 10 mg  10 mg Oral TID WITH MEALS    aspirin chewable tablet 81 mg  81 mg Oral DAILY    nystatin (MYCOSTATIN) 100,000 unit/gram powder   Topical BID       Signed:  Narendra Manley DO    Part of this note may have been written by using a voice dictation software. The note has been proof read but may still contain some grammatical/other typographical errors.

## 2022-03-13 NOTE — PROGRESS NOTES
Date of Outreach Update:  Kami Polanco was seen and assessed. MEWS Score: 2 (03/12/22 1050)  Vitals:    03/12/22 1523 03/12/22 2044 03/12/22 2145 03/13/22 0026   BP: (!) 96/58 111/79  97/63   Pulse: (!) 101 (!) 103  95   Resp: 17 20  16   Temp: 97.9 °F (36.6 °C) 99.3 °F (37.4 °C)  98.3 °F (36.8 °C)   SpO2: 99% 94% 98% 91%   Weight:       Height:             Pain Assessment  Pain Intensity 1: 2 (03/12/22 2340)  Pain Location 1: Leg  Pain Intervention(s) 1: Medication (see MAR)  Patient Stated Pain Goal: 0      Previous Outreach assessment has been reviewed. There have been no significant clinical changes since the completion of the last dated Outreach assessment. Will continue to follow up per outreach protocol.     Signed By:   Phani Jones RN    March 13, 2022 3:49 AM

## 2022-03-13 NOTE — PROGRESS NOTES
Problem: Pressure Injury - Risk of  Goal: *Prevention of pressure injury  Description: Document Be Scale and appropriate interventions in the flowsheet. Outcome: Progressing Towards Goal  Note: Pressure Injury Interventions:  Sensory Interventions: Assess changes in LOC,Keep linens dry and wrinkle-free,Maintain/enhance activity level,Minimize linen layers    Moisture Interventions: Absorbent underpads,Apply protective barrier, creams and emollients,Minimize layers    Activity Interventions: Pressure redistribution bed/mattress(bed type)    Mobility Interventions: Pressure redistribution bed/mattress (bed type)    Nutrition Interventions: Document food/fluid/supplement intake    Friction and Shear Interventions: Foam dressings/transparent film/skin sealants                Problem: Patient Education: Go to Patient Education Activity  Goal: Patient/Family Education  Outcome: Progressing Towards Goal     Problem: Falls - Risk of  Goal: *Absence of Falls  Description: Document Lolis Fall Risk and appropriate interventions in the flowsheet.   Outcome: Progressing Towards Goal  Note: Fall Risk Interventions:  Mobility Interventions: Communicate number of staff needed for ambulation/transfer    Mentation Interventions: Adequate sleep, hydration, pain control    Medication Interventions: Patient to call before getting OOB    Elimination Interventions: Call light in reach    History of Falls Interventions: Vital signs minimum Q4HRs X 24 hrs (comment for end date)         Problem: Patient Education: Go to Patient Education Activity  Goal: Patient/Family Education  Outcome: Progressing Towards Goal     Problem: Patient Education: Go to Patient Education Activity  Goal: Patient/Family Education  Outcome: Progressing Towards Goal     Problem: Afib Pathway: Day 2  Goal: Off Pathway (Use only if patient is Off Pathway)  Outcome: Progressing Towards Goal  Goal: Activity/Safety  Outcome: Progressing Towards Goal  Goal: Consults, if ordered  Outcome: Progressing Towards Goal  Goal: Diagnostic Test/Procedures  Outcome: Progressing Towards Goal  Goal: Nutrition/Diet  Outcome: Progressing Towards Goal  Goal: Discharge Planning  Outcome: Progressing Towards Goal  Goal: Medications  Outcome: Progressing Towards Goal  Goal: Respiratory  Outcome: Progressing Towards Goal  Goal: Treatments/Interventions/Procedures  Outcome: Progressing Towards Goal  Goal: Psychosocial  Outcome: Progressing Towards Goal  Goal: *Hemodynamically stable  Outcome: Progressing Towards Goal  Goal: *Optimal pain control at patient's stated goal  Outcome: Progressing Towards Goal  Goal: *Stable cardiac rhythm  Outcome: Progressing Towards Goal  Goal: *Lungs clear or at baseline  Outcome: Progressing Towards Goal  Goal: *Describes available resources and support systems  Outcome: Progressing Towards Goal     Problem: Afib Pathway: Day 3  Goal: Off Pathway (Use only if patient is Off Pathway)  Outcome: Progressing Towards Goal  Goal: Activity/Safety  Outcome: Progressing Towards Goal  Goal: Diagnostic Test/Procedures  Outcome: Progressing Towards Goal  Goal: Nutrition/Diet  Outcome: Progressing Towards Goal  Goal: Discharge Planning  Outcome: Progressing Towards Goal  Goal: Medications  Outcome: Progressing Towards Goal  Goal: Respiratory  Outcome: Progressing Towards Goal  Goal: Treatments/Interventions/Procedures  Outcome: Progressing Towards Goal  Goal: Psychosocial  Outcome: Progressing Towards Goal     Problem: Afib: Discharge Outcomes (not in CAT)  Goal: *Hemodynamically stable  Outcome: Progressing Towards Goal  Goal: *Stable cardiac rhythm  Outcome: Progressing Towards Goal  Goal: *Lungs clear or at baseline  Outcome: Progressing Towards Goal  Goal: *Optimal pain control at patient's stated goal  Outcome: Progressing Towards Goal  Goal: *Identifies cardiac risk factors  Outcome: Progressing Towards Goal  Goal: *Verbalizes home exercise program, activity guidelines, cardiac precautions  Outcome: Progressing Towards Goal  Goal: *Verbalizes understanding and describes prescribed diet  Outcome: Progressing Towards Goal  Goal: *Verbalizes understanding and describes medication purposes and frequencies  Outcome: Progressing Towards Goal  Goal: *Anxiety reduced or absent  Outcome: Progressing Towards Goal  Goal: *Understands and describes signs and symptoms to report to providers(Stroke Metric)  Outcome: Progressing Towards Goal  Goal: *Describes follow-up/return visits to physicians  Outcome: Progressing Towards Goal  Goal: *Describes available resources and support systems  Outcome: Progressing Towards Goal  Goal: *Influenza immunization  Outcome: Progressing Towards Goal  Goal: *Pneumococcal immunization  Outcome: Progressing Towards Goal  Goal: *Describes smoking cessation resources  Outcome: Progressing Towards Goal     Problem: Patient Education: Go to Patient Education Activity  Goal: Patient/Family Education  Outcome: Progressing Towards Goal     Problem: Acute Renal Failure: Day 2  Goal: Off Pathway (Use only if patient is Off Pathway)  Outcome: Progressing Towards Goal  Goal: Activity/Safety  Outcome: Progressing Towards Goal  Goal: Consults, if ordered  Outcome: Progressing Towards Goal  Goal: Diagnostic Test/Procedures  Outcome: Progressing Towards Goal  Goal: Nutrition/Diet  Outcome: Progressing Towards Goal  Goal: Discharge Planning  Outcome: Progressing Towards Goal  Goal: Medications  Outcome: Progressing Towards Goal  Goal: Respiratory  Outcome: Progressing Towards Goal  Goal: Treatments/Interventions/Procedures  Outcome: Progressing Towards Goal  Goal: Psychosocial  Outcome: Progressing Towards Goal  Goal: *Optimal pain control at patient's stated goal  Outcome: Progressing Towards Goal  Goal: *Urinary output within identified parameters  Outcome: Progressing Towards Goal  Goal: *Hemodynamically stable  Outcome: Progressing Towards Goal  Goal: *Tolerating diet  Outcome: Progressing Towards Goal  Goal: *Lab values improving  Outcome: Progressing Towards Goal     Problem: Acute Renal Failure: Day 3  Goal: Off Pathway (Use only if patient is Off Pathway)  Outcome: Progressing Towards Goal  Goal: Activity/Safety  Outcome: Progressing Towards Goal  Goal: Consults, if ordered  Outcome: Progressing Towards Goal  Goal: Diagnostic Test/Procedures  Outcome: Progressing Towards Goal  Goal: Nutrition/Diet  Outcome: Progressing Towards Goal  Goal: Discharge Planning  Outcome: Progressing Towards Goal  Goal: Medications  Outcome: Progressing Towards Goal  Goal: Respiratory  Outcome: Progressing Towards Goal  Goal: Treatments/Interventions/Procedures  Outcome: Progressing Towards Goal  Goal: Psychosocial  Outcome: Progressing Towards Goal  Goal: *Optimal pain control at patient's stated goal  Outcome: Progressing Towards Goal  Goal: *Urinary output within identified parameters  Outcome: Progressing Towards Goal  Goal: *Hemodynamically stable  Outcome: Progressing Towards Goal  Goal: *Tolerating diet  Outcome: Progressing Towards Goal  Goal: *Lab values improving  Outcome: Progressing Towards Goal     Problem: Acute Renal Failure: Day 4  Goal: Off Pathway (Use only if patient is Off Pathway)  Outcome: Progressing Towards Goal  Goal: Activity/Safety  Outcome: Progressing Towards Goal  Goal: Consults, if ordered  Outcome: Progressing Towards Goal  Goal: Diagnostic Test/Procedures  Outcome: Progressing Towards Goal  Goal: Nutrition/Diet  Outcome: Progressing Towards Goal  Goal: Discharge Planning  Outcome: Progressing Towards Goal  Goal: Medications  Outcome: Progressing Towards Goal  Goal: Respiratory  Outcome: Progressing Towards Goal  Goal: Treatments/Interventions/Procedures  Outcome: Progressing Towards Goal  Goal: Psychosocial  Outcome: Progressing Towards Goal  Goal: *Optimal pain control at patient's stated goal  Outcome: Progressing Towards Goal  Goal: *Urinary output within identified parameters  Outcome: Progressing Towards Goal  Goal: *Hemodynamically stable  Outcome: Progressing Towards Goal  Goal: *Tolerating diet  Outcome: Progressing Towards Goal  Goal: *Lab values improving  Outcome: Progressing Towards Goal     Problem: Acute Renal Failure: Day 5  Goal: Off Pathway (Use only if patient is Off Pathway)  Outcome: Progressing Towards Goal  Goal: Activity/Safety  Outcome: Progressing Towards Goal  Goal: Diagnostic Test/Procedures  Outcome: Progressing Towards Goal  Goal: Nutrition/Diet  Outcome: Progressing Towards Goal  Goal: Discharge Planning  Outcome: Progressing Towards Goal  Goal: Medications  Outcome: Progressing Towards Goal  Goal: Respiratory  Outcome: Progressing Towards Goal  Goal: Treatments/Interventions/Procedures  Outcome: Progressing Towards Goal  Goal: Psychosocial  Outcome: Progressing Towards Goal  Goal: *Optimal pain control at patient's stated goal  Outcome: Progressing Towards Goal  Goal: *Urinary output within identified parameters  Outcome: Progressing Towards Goal  Goal: *Hemodynamically stable  Outcome: Progressing Towards Goal  Goal: *Tolerating diet  Outcome: Progressing Towards Goal  Goal: *Lab values improving  Outcome: Progressing Towards Goal     Problem: Acute Renal Failure: Day 6  Goal: Off Pathway (Use only if patient is Off Pathway)  Outcome: Progressing Towards Goal  Goal: Activity/Safety  Outcome: Progressing Towards Goal  Goal: Diagnostic Test/Procedures  Outcome: Progressing Towards Goal  Goal: Nutrition/Diet  Outcome: Progressing Towards Goal  Goal: Discharge Planning  Outcome: Progressing Towards Goal  Goal: Medications  Outcome: Progressing Towards Goal  Goal: Respiratory  Outcome: Progressing Towards Goal  Goal: Treatments/Interventions/Procedures  Outcome: Progressing Towards Goal  Goal: Psychosocial  Outcome: Progressing Towards Goal     Problem: Acute Renal Failure: Discharge Outcomes  Goal: *Optimal pain control at patient's stated goal  Outcome: Progressing Towards Goal  Goal: *Urinary output within identified parameters  Outcome: Progressing Towards Goal  Goal: *Hemodynamically stable  Outcome: Progressing Towards Goal  Goal: *Tolerating diet  Outcome: Progressing Towards Goal  Goal: *Lab values stabilized  Outcome: Progressing Towards Goal  Goal: *Verbalizes understanding and describes medication purposes and frequencies  Outcome: Progressing Towards Goal  Goal: *Medication reconciliation  Outcome: Progressing Towards Goal

## 2022-03-14 LAB
A1AT PHENOTYP SERPL IFE: ABNORMAL
A1AT SERPL-MCNC: 194 MG/DL (ref 101–187)
BILIRUBIN, FBILLT: 0.4 MG/DL
SPECIMEN SOURCE: NORMAL

## 2022-03-18 PROBLEM — I25.10 CAD (CORONARY ARTERY DISEASE): Status: ACTIVE | Noted: 2019-09-18

## 2022-03-18 PROBLEM — I27.20 PULMONARY HYPERTENSION (HCC): Status: ACTIVE | Noted: 2022-03-10

## 2022-03-18 PROBLEM — R60.1 ANASARCA: Status: ACTIVE | Noted: 2021-01-31

## 2022-03-18 PROBLEM — R60.1 ANASARCA: Status: ACTIVE | Noted: 2022-03-08

## 2022-03-18 PROBLEM — F15.10 METHAMPHETAMINE ABUSE (HCC): Status: ACTIVE | Noted: 2021-01-30

## 2022-03-18 PROBLEM — I50.20 SYSTOLIC CONGESTIVE HEART FAILURE (HCC): Status: ACTIVE | Noted: 2022-03-09

## 2022-03-19 PROBLEM — K65.2 SBP (SPONTANEOUS BACTERIAL PERITONITIS) (HCC): Status: ACTIVE | Noted: 2022-03-08

## 2022-03-19 PROBLEM — R29.818 SUSPECTED SLEEP APNEA: Status: ACTIVE | Noted: 2022-03-10

## 2022-03-19 PROBLEM — L03.90 CELLULITIS: Status: ACTIVE | Noted: 2019-09-16

## 2022-03-19 PROBLEM — I50.9 CONGESTIVE HEART FAILURE (CHF) (HCC): Status: ACTIVE | Noted: 2021-02-10

## 2022-03-19 PROBLEM — N17.9 AKI (ACUTE KIDNEY INJURY) (HCC): Status: ACTIVE | Noted: 2022-03-08

## 2022-03-19 PROBLEM — B96.20 E. COLI UTI: Status: ACTIVE | Noted: 2022-03-11

## 2022-03-19 PROBLEM — F19.10 POLYSUBSTANCE ABUSE (HCC): Status: ACTIVE | Noted: 2022-03-08

## 2022-03-19 PROBLEM — D64.9 CHRONIC ANEMIA: Status: ACTIVE | Noted: 2021-02-10

## 2022-03-19 PROBLEM — I50.23 ACUTE ON CHRONIC SYSTOLIC CHF (CONGESTIVE HEART FAILURE) (HCC): Status: ACTIVE | Noted: 2021-01-30

## 2022-03-19 PROBLEM — N39.0 E. COLI UTI: Status: ACTIVE | Noted: 2022-03-11

## 2022-03-19 PROBLEM — J90 PLEURAL EFFUSION, BILATERAL: Status: ACTIVE | Noted: 2021-01-31

## 2022-03-19 PROBLEM — J96.91 RESPIRATORY FAILURE WITH HYPOXIA (HCC): Status: ACTIVE | Noted: 2022-03-08

## 2022-03-19 PROBLEM — F17.210 DEPENDENCE ON NICOTINE FROM CIGARETTES: Status: ACTIVE | Noted: 2022-03-08

## 2022-03-19 PROBLEM — K74.60 CIRRHOSIS (HCC): Status: ACTIVE | Noted: 2022-03-08

## 2022-03-19 PROBLEM — Z72.0 TOBACCO ABUSE: Status: ACTIVE | Noted: 2021-01-30

## 2022-03-19 PROBLEM — L03.90 CELLULITIS: Status: ACTIVE | Noted: 2022-03-11

## 2022-03-19 PROBLEM — I48.91 ATRIAL FIBRILLATION WITH RVR (HCC): Status: ACTIVE | Noted: 2022-03-08

## 2022-03-19 PROBLEM — A49.8 PSEUDOMONAS INFECTION: Status: ACTIVE | Noted: 2021-02-10

## 2022-03-19 PROBLEM — R50.9 FEVER: Status: ACTIVE | Noted: 2021-08-03

## 2022-03-19 PROBLEM — I48.91 ATRIAL FIBRILLATION WITH RVR (HCC): Status: ACTIVE | Noted: 2021-01-30

## 2022-03-20 PROBLEM — F15.10 METHAMPHETAMINE ABUSE (HCC): Status: ACTIVE | Noted: 2022-03-10

## 2022-03-20 PROBLEM — E11.65 HYPERGLYCEMIA DUE TO TYPE 2 DIABETES MELLITUS (HCC): Status: ACTIVE | Noted: 2019-09-17

## 2022-03-20 PROBLEM — E87.1 HYPONATREMIA: Status: ACTIVE | Noted: 2022-03-08

## 2023-06-26 ENCOUNTER — HOSPITAL ENCOUNTER (INPATIENT)
Age: 48
LOS: 1 days | Discharge: LEFT BEFORE DISCHARGE | DRG: 194 | End: 2023-06-27
Attending: EMERGENCY MEDICINE | Admitting: HOSPITALIST
Payer: MEDICAID

## 2023-06-26 ENCOUNTER — APPOINTMENT (OUTPATIENT)
Dept: GENERAL RADIOLOGY | Age: 48
DRG: 194 | End: 2023-06-26
Payer: MEDICAID

## 2023-06-26 DIAGNOSIS — J90 PLEURAL EFFUSION ON RIGHT: ICD-10-CM

## 2023-06-26 DIAGNOSIS — R09.02 HYPOXIA: ICD-10-CM

## 2023-06-26 DIAGNOSIS — I50.9 ACUTE ON CHRONIC CONGESTIVE HEART FAILURE, UNSPECIFIED HEART FAILURE TYPE (HCC): Primary | ICD-10-CM

## 2023-06-26 PROCEDURE — 93005 ELECTROCARDIOGRAM TRACING: CPT | Performed by: EMERGENCY MEDICINE

## 2023-06-26 PROCEDURE — 80053 COMPREHEN METABOLIC PANEL: CPT

## 2023-06-26 PROCEDURE — 85025 COMPLETE CBC W/AUTO DIFF WBC: CPT

## 2023-06-26 PROCEDURE — 83880 ASSAY OF NATRIURETIC PEPTIDE: CPT

## 2023-06-26 PROCEDURE — 85610 PROTHROMBIN TIME: CPT

## 2023-06-26 PROCEDURE — 71046 X-RAY EXAM CHEST 2 VIEWS: CPT

## 2023-06-26 PROCEDURE — 84484 ASSAY OF TROPONIN QUANT: CPT

## 2023-06-26 PROCEDURE — 99285 EMERGENCY DEPT VISIT HI MDM: CPT

## 2023-06-26 PROCEDURE — 84443 ASSAY THYROID STIM HORMONE: CPT

## 2023-06-26 PROCEDURE — 96374 THER/PROPH/DIAG INJ IV PUSH: CPT

## 2023-06-26 PROCEDURE — 83735 ASSAY OF MAGNESIUM: CPT

## 2023-06-26 PROCEDURE — 6360000002 HC RX W HCPCS: Performed by: EMERGENCY MEDICINE

## 2023-06-26 RX ORDER — POTASSIUM CHLORIDE 20 MEQ/1
TABLET, EXTENDED RELEASE ORAL
COMMUNITY
Start: 2023-04-15

## 2023-06-26 RX ORDER — DIGOXIN 250 MCG
TABLET ORAL
COMMUNITY
Start: 2023-06-07

## 2023-06-26 RX ORDER — ALBUTEROL SULFATE 2.5 MG/3ML
2.5 SOLUTION RESPIRATORY (INHALATION)
Status: COMPLETED | OUTPATIENT
Start: 2023-06-26 | End: 2023-06-26

## 2023-06-26 RX ORDER — PANTOPRAZOLE SODIUM 40 MG/1
TABLET, DELAYED RELEASE ORAL
COMMUNITY
Start: 2023-04-15

## 2023-06-26 RX ADMIN — ALBUTEROL SULFATE 2.5 MG: 2.5 SOLUTION RESPIRATORY (INHALATION) at 23:40

## 2023-06-26 ASSESSMENT — PAIN DESCRIPTION - LOCATION: LOCATION: GENERALIZED

## 2023-06-26 ASSESSMENT — PAIN - FUNCTIONAL ASSESSMENT: PAIN_FUNCTIONAL_ASSESSMENT: 0-10

## 2023-06-26 ASSESSMENT — LIFESTYLE VARIABLES
HOW MANY STANDARD DRINKS CONTAINING ALCOHOL DO YOU HAVE ON A TYPICAL DAY: PATIENT DOES NOT DRINK
HOW OFTEN DO YOU HAVE A DRINK CONTAINING ALCOHOL: NEVER

## 2023-06-26 ASSESSMENT — PAIN SCALES - GENERAL: PAINLEVEL_OUTOF10: 10

## 2023-06-27 ENCOUNTER — APPOINTMENT (OUTPATIENT)
Dept: ULTRASOUND IMAGING | Age: 48
DRG: 194 | End: 2023-06-27
Payer: MEDICAID

## 2023-06-27 VITALS
RESPIRATION RATE: 19 BRPM | OXYGEN SATURATION: 96 % | DIASTOLIC BLOOD PRESSURE: 58 MMHG | WEIGHT: 274.69 LBS | HEIGHT: 72 IN | SYSTOLIC BLOOD PRESSURE: 116 MMHG | TEMPERATURE: 97.9 F | HEART RATE: 104 BPM | BODY MASS INDEX: 37.21 KG/M2

## 2023-06-27 PROBLEM — F19.10 POLYSUBSTANCE ABUSE (HCC): Status: ACTIVE | Noted: 2022-03-08

## 2023-06-27 PROBLEM — J96.91 RESPIRATORY FAILURE WITH HYPOXIA (HCC): Status: ACTIVE | Noted: 2022-03-08

## 2023-06-27 PROBLEM — I50.23 ACUTE ON CHRONIC SYSTOLIC (CONGESTIVE) HEART FAILURE (HCC): Status: ACTIVE | Noted: 2023-06-27

## 2023-06-27 PROBLEM — I50.23 ACUTE ON CHRONIC SYSTOLIC CHF (CONGESTIVE HEART FAILURE) (HCC): Status: ACTIVE | Noted: 2021-01-30

## 2023-06-27 PROBLEM — E11.65 HYPERGLYCEMIA DUE TO TYPE 2 DIABETES MELLITUS (HCC): Status: ACTIVE | Noted: 2019-09-17

## 2023-06-27 PROBLEM — R60.1 ANASARCA: Status: ACTIVE | Noted: 2022-03-08

## 2023-06-27 PROBLEM — E87.1 HYPONATREMIA: Status: ACTIVE | Noted: 2022-03-08

## 2023-06-27 LAB
ALBUMIN SERPL-MCNC: 2.9 G/DL (ref 3.5–5)
ALBUMIN/GLOB SERPL: 0.6 (ref 0.4–1.6)
ALP SERPL-CCNC: 240 U/L (ref 50–136)
ALT SERPL-CCNC: 12 U/L (ref 12–65)
ANION GAP SERPL CALC-SCNC: 3 MMOL/L (ref 2–11)
ANION GAP SERPL CALC-SCNC: 3 MMOL/L (ref 2–11)
AST SERPL-CCNC: 16 U/L (ref 15–37)
BASOPHILS # BLD: 0.1 K/UL (ref 0–0.2)
BASOPHILS NFR BLD: 1 % (ref 0–2)
BILIRUB SERPL-MCNC: 0.6 MG/DL (ref 0.2–1.1)
BUN SERPL-MCNC: 16 MG/DL (ref 6–23)
BUN SERPL-MCNC: 18 MG/DL (ref 6–23)
CALCIUM SERPL-MCNC: 8.2 MG/DL (ref 8.3–10.4)
CALCIUM SERPL-MCNC: 8.4 MG/DL (ref 8.3–10.4)
CHLORIDE SERPL-SCNC: 96 MMOL/L (ref 101–110)
CHLORIDE SERPL-SCNC: 97 MMOL/L (ref 101–110)
CO2 SERPL-SCNC: 29 MMOL/L (ref 21–32)
CO2 SERPL-SCNC: 29 MMOL/L (ref 21–32)
CREAT SERPL-MCNC: 1.2 MG/DL (ref 0.6–1)
CREAT SERPL-MCNC: 1.2 MG/DL (ref 0.6–1)
DIFFERENTIAL METHOD BLD: ABNORMAL
EKG DIAGNOSIS: NORMAL
EKG Q-T INTERVAL: 286 MS
EKG QRS DURATION: 76 MS
EKG QTC CALCULATION (BAZETT): 400 MS
EKG R AXIS: 265 DEGREES
EKG T AXIS: 51 DEGREES
EKG VENTRICULAR RATE: 118 BPM
EOSINOPHIL # BLD: 0.2 K/UL (ref 0–0.8)
EOSINOPHIL NFR BLD: 2 % (ref 0.5–7.8)
ERYTHROCYTE [DISTWIDTH] IN BLOOD BY AUTOMATED COUNT: 16 % (ref 11.9–14.6)
GLOBULIN SER CALC-MCNC: 4.5 G/DL (ref 2.8–4.5)
GLUCOSE BLD STRIP.AUTO-MCNC: 313 MG/DL (ref 65–100)
GLUCOSE BLD STRIP.AUTO-MCNC: 368 MG/DL (ref 65–100)
GLUCOSE SERPL-MCNC: 321 MG/DL (ref 65–100)
GLUCOSE SERPL-MCNC: 350 MG/DL (ref 65–100)
HCT VFR BLD AUTO: 34.2 % (ref 35.8–46.3)
HGB BLD-MCNC: 10.5 G/DL (ref 11.7–15.4)
IMM GRANULOCYTES # BLD AUTO: 0 K/UL (ref 0–0.5)
IMM GRANULOCYTES NFR BLD AUTO: 1 % (ref 0–5)
INR PPP: 1.2
LYMPHOCYTES # BLD: 1 K/UL (ref 0.5–4.6)
LYMPHOCYTES NFR BLD: 14 % (ref 13–44)
MAGNESIUM SERPL-MCNC: 1.6 MG/DL (ref 1.8–2.4)
MAGNESIUM SERPL-MCNC: 1.7 MG/DL (ref 1.8–2.4)
MCH RBC QN AUTO: 26.6 PG (ref 26.1–32.9)
MCHC RBC AUTO-ENTMCNC: 30.7 G/DL (ref 31.4–35)
MCV RBC AUTO: 86.8 FL (ref 82–102)
MONOCYTES # BLD: 0.5 K/UL (ref 0.1–1.3)
MONOCYTES NFR BLD: 8 % (ref 4–12)
NEUTS SEG # BLD: 5.1 K/UL (ref 1.7–8.2)
NEUTS SEG NFR BLD: 74 % (ref 43–78)
NRBC # BLD: 0 K/UL (ref 0–0.2)
NT PRO BNP: 3167 PG/ML (ref 5–125)
PLATELET # BLD AUTO: 197 K/UL (ref 150–450)
PMV BLD AUTO: 10.6 FL (ref 9.4–12.3)
POTASSIUM SERPL-SCNC: 4.3 MMOL/L (ref 3.5–5.1)
POTASSIUM SERPL-SCNC: 4.4 MMOL/L (ref 3.5–5.1)
PROT SERPL-MCNC: 7.4 G/DL (ref 6.3–8.2)
PROTHROMBIN TIME: 15.9 SEC (ref 12.6–14.3)
RBC # BLD AUTO: 3.94 M/UL (ref 4.05–5.2)
SERVICE CMNT-IMP: ABNORMAL
SERVICE CMNT-IMP: ABNORMAL
SODIUM SERPL-SCNC: 128 MMOL/L (ref 133–143)
SODIUM SERPL-SCNC: 129 MMOL/L (ref 133–143)
TROPONIN I BLD-MCNC: 0.02 NG/ML (ref 0.02–0.05)
TROPONIN I SERPL HS-MCNC: 33.8 PG/ML (ref 0–14)
TSH, 3RD GENERATION: 5.43 UIU/ML (ref 0.36–3.74)
WBC # BLD AUTO: 6.8 K/UL (ref 4.3–11.1)

## 2023-06-27 PROCEDURE — 1100000003 HC PRIVATE W/ TELEMETRY

## 2023-06-27 PROCEDURE — 2580000003 HC RX 258: Performed by: HOSPITALIST

## 2023-06-27 PROCEDURE — 2500000003 HC RX 250 WO HCPCS: Performed by: HOSPITALIST

## 2023-06-27 PROCEDURE — 36415 COLL VENOUS BLD VENIPUNCTURE: CPT

## 2023-06-27 PROCEDURE — 6360000002 HC RX W HCPCS: Performed by: EMERGENCY MEDICINE

## 2023-06-27 PROCEDURE — 93010 ELECTROCARDIOGRAM REPORT: CPT | Performed by: INTERNAL MEDICINE

## 2023-06-27 PROCEDURE — 83735 ASSAY OF MAGNESIUM: CPT

## 2023-06-27 PROCEDURE — 82962 GLUCOSE BLOOD TEST: CPT

## 2023-06-27 PROCEDURE — 6370000000 HC RX 637 (ALT 250 FOR IP): Performed by: HOSPITALIST

## 2023-06-27 RX ORDER — POTASSIUM CHLORIDE 7.45 MG/ML
10 INJECTION INTRAVENOUS PRN
Status: DISCONTINUED | OUTPATIENT
Start: 2023-06-27 | End: 2023-06-27 | Stop reason: HOSPADM

## 2023-06-27 RX ORDER — ACETAMINOPHEN 650 MG/1
650 SUPPOSITORY RECTAL EVERY 6 HOURS PRN
Status: DISCONTINUED | OUTPATIENT
Start: 2023-06-27 | End: 2023-06-27 | Stop reason: HOSPADM

## 2023-06-27 RX ORDER — BUMETANIDE 0.25 MG/ML
1 INJECTION INTRAMUSCULAR; INTRAVENOUS EVERY 12 HOURS
Status: DISCONTINUED | OUTPATIENT
Start: 2023-06-27 | End: 2023-06-27 | Stop reason: HOSPADM

## 2023-06-27 RX ORDER — ONDANSETRON 2 MG/ML
4 INJECTION INTRAMUSCULAR; INTRAVENOUS EVERY 6 HOURS PRN
Status: DISCONTINUED | OUTPATIENT
Start: 2023-06-27 | End: 2023-06-27 | Stop reason: HOSPADM

## 2023-06-27 RX ORDER — SODIUM CHLORIDE 0.9 % (FLUSH) 0.9 %
5-40 SYRINGE (ML) INJECTION EVERY 12 HOURS SCHEDULED
Status: DISCONTINUED | OUTPATIENT
Start: 2023-06-27 | End: 2023-06-27 | Stop reason: HOSPADM

## 2023-06-27 RX ORDER — INSULIN GLARGINE 100 [IU]/ML
10 INJECTION, SOLUTION SUBCUTANEOUS NIGHTLY
Status: DISCONTINUED | OUTPATIENT
Start: 2023-06-27 | End: 2023-06-27

## 2023-06-27 RX ORDER — ONDANSETRON 4 MG/1
4 TABLET, ORALLY DISINTEGRATING ORAL EVERY 8 HOURS PRN
Status: DISCONTINUED | OUTPATIENT
Start: 2023-06-27 | End: 2023-06-27 | Stop reason: HOSPADM

## 2023-06-27 RX ORDER — NICOTINE 21 MG/24HR
1 PATCH, TRANSDERMAL 24 HOURS TRANSDERMAL DAILY
Status: DISCONTINUED | OUTPATIENT
Start: 2023-06-27 | End: 2023-06-27 | Stop reason: HOSPADM

## 2023-06-27 RX ORDER — INSULIN LISPRO 100 [IU]/ML
0-4 INJECTION, SOLUTION INTRAVENOUS; SUBCUTANEOUS NIGHTLY
Status: DISCONTINUED | OUTPATIENT
Start: 2023-06-27 | End: 2023-06-27 | Stop reason: HOSPADM

## 2023-06-27 RX ORDER — FUROSEMIDE 10 MG/ML
80 INJECTION INTRAMUSCULAR; INTRAVENOUS ONCE
Status: COMPLETED | OUTPATIENT
Start: 2023-06-27 | End: 2023-06-27

## 2023-06-27 RX ORDER — ACETAMINOPHEN 325 MG/1
650 TABLET ORAL EVERY 6 HOURS PRN
Status: DISCONTINUED | OUTPATIENT
Start: 2023-06-27 | End: 2023-06-27 | Stop reason: HOSPADM

## 2023-06-27 RX ORDER — LISINOPRIL 5 MG/1
5 TABLET ORAL DAILY
Status: DISCONTINUED | OUTPATIENT
Start: 2023-06-27 | End: 2023-06-27 | Stop reason: HOSPADM

## 2023-06-27 RX ORDER — POTASSIUM CHLORIDE 20 MEQ/1
40 TABLET, EXTENDED RELEASE ORAL PRN
Status: DISCONTINUED | OUTPATIENT
Start: 2023-06-27 | End: 2023-06-27 | Stop reason: HOSPADM

## 2023-06-27 RX ORDER — MAGNESIUM SULFATE IN WATER 40 MG/ML
2000 INJECTION, SOLUTION INTRAVENOUS PRN
Status: DISCONTINUED | OUTPATIENT
Start: 2023-06-27 | End: 2023-06-27 | Stop reason: HOSPADM

## 2023-06-27 RX ORDER — DEXTROSE MONOHYDRATE 100 MG/ML
INJECTION, SOLUTION INTRAVENOUS CONTINUOUS PRN
Status: DISCONTINUED | OUTPATIENT
Start: 2023-06-27 | End: 2023-06-27 | Stop reason: HOSPADM

## 2023-06-27 RX ORDER — POLYETHYLENE GLYCOL 3350 17 G/17G
17 POWDER, FOR SOLUTION ORAL DAILY PRN
Status: DISCONTINUED | OUTPATIENT
Start: 2023-06-27 | End: 2023-06-27 | Stop reason: HOSPADM

## 2023-06-27 RX ORDER — SODIUM CHLORIDE 9 MG/ML
INJECTION, SOLUTION INTRAVENOUS PRN
Status: DISCONTINUED | OUTPATIENT
Start: 2023-06-27 | End: 2023-06-27 | Stop reason: HOSPADM

## 2023-06-27 RX ORDER — LORAZEPAM 2 MG/ML
0.5 INJECTION INTRAMUSCULAR ONCE
Status: DISCONTINUED | OUTPATIENT
Start: 2023-06-27 | End: 2023-06-27 | Stop reason: HOSPADM

## 2023-06-27 RX ORDER — MAGNESIUM SULFATE IN WATER 40 MG/ML
2000 INJECTION, SOLUTION INTRAVENOUS ONCE
Status: DISCONTINUED | OUTPATIENT
Start: 2023-06-27 | End: 2023-06-27 | Stop reason: HOSPADM

## 2023-06-27 RX ORDER — INSULIN GLARGINE 100 [IU]/ML
15 INJECTION, SOLUTION SUBCUTANEOUS NIGHTLY
Status: DISCONTINUED | OUTPATIENT
Start: 2023-06-28 | End: 2023-06-27 | Stop reason: HOSPADM

## 2023-06-27 RX ORDER — CARVEDILOL 3.12 MG/1
3.12 TABLET ORAL 2 TIMES DAILY WITH MEALS
Status: DISCONTINUED | OUTPATIENT
Start: 2023-06-27 | End: 2023-06-27 | Stop reason: HOSPADM

## 2023-06-27 RX ORDER — SODIUM CHLORIDE 0.9 % (FLUSH) 0.9 %
5-40 SYRINGE (ML) INJECTION PRN
Status: DISCONTINUED | OUTPATIENT
Start: 2023-06-27 | End: 2023-06-27 | Stop reason: HOSPADM

## 2023-06-27 RX ORDER — INSULIN LISPRO 100 [IU]/ML
0-16 INJECTION, SOLUTION INTRAVENOUS; SUBCUTANEOUS
Status: DISCONTINUED | OUTPATIENT
Start: 2023-06-27 | End: 2023-06-27 | Stop reason: HOSPADM

## 2023-06-27 RX ADMIN — APIXABAN 5 MG: 5 TABLET, FILM COATED ORAL at 08:34

## 2023-06-27 RX ADMIN — CARVEDILOL 3.12 MG: 3.12 TABLET, FILM COATED ORAL at 08:34

## 2023-06-27 RX ADMIN — BUMETANIDE 1 MG: 0.25 INJECTION INTRAMUSCULAR; INTRAVENOUS at 05:20

## 2023-06-27 RX ADMIN — INSULIN LISPRO 12 UNITS: 100 INJECTION, SOLUTION INTRAVENOUS; SUBCUTANEOUS at 08:35

## 2023-06-27 RX ADMIN — LISINOPRIL 5 MG: 5 TABLET ORAL at 08:34

## 2023-06-27 RX ADMIN — SODIUM CHLORIDE, PRESERVATIVE FREE 10 ML: 5 INJECTION INTRAVENOUS at 08:34

## 2023-06-27 RX ADMIN — FUROSEMIDE 80 MG: 10 INJECTION, SOLUTION INTRAMUSCULAR; INTRAVENOUS at 01:34

## 2023-06-27 RX ADMIN — INSULIN GLARGINE 10 UNITS: 100 INJECTION, SOLUTION SUBCUTANEOUS at 04:44

## 2023-06-30 ENCOUNTER — HOSPITAL ENCOUNTER (INPATIENT)
Age: 48
LOS: 1 days | Discharge: LEFT BEFORE DISCHARGE | DRG: 194 | End: 2023-07-02
Attending: STUDENT IN AN ORGANIZED HEALTH CARE EDUCATION/TRAINING PROGRAM | Admitting: FAMILY MEDICINE
Payer: MEDICAID

## 2023-06-30 ENCOUNTER — APPOINTMENT (OUTPATIENT)
Dept: CT IMAGING | Age: 48
DRG: 194 | End: 2023-06-30
Payer: MEDICAID

## 2023-06-30 ENCOUNTER — APPOINTMENT (OUTPATIENT)
Dept: GENERAL RADIOLOGY | Age: 48
DRG: 194 | End: 2023-06-30
Payer: MEDICAID

## 2023-06-30 ENCOUNTER — HOSPITAL ENCOUNTER (EMERGENCY)
Age: 48
Discharge: HOME | DRG: 194 | End: 2023-06-30
Attending: EMERGENCY MEDICINE
Payer: MEDICAID

## 2023-06-30 VITALS
BODY MASS INDEX: 39.68 KG/M2 | HEART RATE: 82 BPM | TEMPERATURE: 97.8 F | SYSTOLIC BLOOD PRESSURE: 129 MMHG | RESPIRATION RATE: 12 BRPM | WEIGHT: 293 LBS | OXYGEN SATURATION: 97 % | DIASTOLIC BLOOD PRESSURE: 91 MMHG | HEIGHT: 72 IN

## 2023-06-30 DIAGNOSIS — R60.1 ANASARCA: ICD-10-CM

## 2023-06-30 DIAGNOSIS — R10.84 GENERALIZED ABDOMINAL PAIN: ICD-10-CM

## 2023-06-30 DIAGNOSIS — R18.8 ASCITES OF LIVER: ICD-10-CM

## 2023-06-30 DIAGNOSIS — N17.9 ACUTE KIDNEY INJURY (HCC): ICD-10-CM

## 2023-06-30 DIAGNOSIS — K76.9 HEPATIC DYSFUNCTION: ICD-10-CM

## 2023-06-30 DIAGNOSIS — R09.02 HYPOXIA: ICD-10-CM

## 2023-06-30 DIAGNOSIS — E87.70 HYPERVOLEMIA, UNSPECIFIED HYPERVOLEMIA TYPE: ICD-10-CM

## 2023-06-30 DIAGNOSIS — I50.9 ACUTE ON CHRONIC CONGESTIVE HEART FAILURE, UNSPECIFIED HEART FAILURE TYPE (HCC): Primary | ICD-10-CM

## 2023-06-30 DIAGNOSIS — J96.01 ACUTE RESPIRATORY FAILURE WITH HYPOXIA AND HYPERCAPNIA (HCC): Primary | ICD-10-CM

## 2023-06-30 DIAGNOSIS — J96.02 ACUTE RESPIRATORY FAILURE WITH HYPOXIA AND HYPERCAPNIA (HCC): Primary | ICD-10-CM

## 2023-06-30 PROBLEM — J90 PLEURAL EFFUSION, BILATERAL: Status: ACTIVE | Noted: 2021-01-31

## 2023-06-30 PROBLEM — I25.10 CAD (CORONARY ARTERY DISEASE): Status: ACTIVE | Noted: 2019-09-18

## 2023-06-30 PROBLEM — Z72.0 TOBACCO ABUSE: Status: ACTIVE | Noted: 2021-01-30

## 2023-06-30 PROBLEM — F15.10 METHAMPHETAMINE ABUSE (HCC): Status: ACTIVE | Noted: 2022-03-10

## 2023-06-30 LAB
ALBUMIN SERPL-MCNC: 2.7 G/DL (ref 3.5–5)
ALBUMIN/GLOB SERPL: 0.7 (ref 0.4–1.6)
ALP SERPL-CCNC: 248 U/L (ref 50–136)
ALT SERPL-CCNC: 11 U/L (ref 12–65)
AMPHET UR QL SCN: NEGATIVE
ANION GAP SERPL CALC-SCNC: 5 MMOL/L (ref 2–11)
APPEARANCE UR: CLEAR
ARTERIAL PATENCY WRIST A: POSITIVE
AST SERPL-CCNC: 21 U/L (ref 15–37)
BACTERIA URNS QL MICRO: ABNORMAL /HPF
BASE DEFICIT BLD-SCNC: 0.4 MMOL/L
BASOPHILS # BLD: 0.1 K/UL (ref 0–0.2)
BASOPHILS NFR BLD: 2 % (ref 0–2)
BDY SITE: ABNORMAL
BENZODIAZ UR QL: NEGATIVE
BILIRUB SERPL-MCNC: 0.3 MG/DL (ref 0.2–1.1)
BILIRUB UR QL: NEGATIVE
BUN SERPL-MCNC: 26 MG/DL (ref 6–23)
CALCIUM SERPL-MCNC: 8.2 MG/DL (ref 8.3–10.4)
CANNABINOIDS UR QL SCN: POSITIVE
CASTS URNS QL MICRO: ABNORMAL /LPF
CHLORIDE SERPL-SCNC: 100 MMOL/L (ref 101–110)
CO2 SERPL-SCNC: 26 MMOL/L (ref 21–32)
COCAINE UR QL SCN: NEGATIVE
COLOR UR: ABNORMAL
CREAT SERPL-MCNC: 1.4 MG/DL (ref 0.6–1)
DIFFERENTIAL METHOD BLD: ABNORMAL
EKG ATRIAL RATE: 0 BPM
EKG DIAGNOSIS: NORMAL
EKG Q-T INTERVAL: 366 MS
EKG QRS DURATION: 97 MS
EKG QTC CALCULATION (BAZETT): 441 MS
EKG R AXIS: 228 DEGREES
EKG T AXIS: 77 DEGREES
EKG VENTRICULAR RATE: 87 BPM
EOSINOPHIL # BLD: 0.1 K/UL (ref 0–0.8)
EOSINOPHIL NFR BLD: 2 % (ref 0.5–7.8)
EPI CELLS #/AREA URNS HPF: ABNORMAL /HPF
ERYTHROCYTE [DISTWIDTH] IN BLOOD BY AUTOMATED COUNT: 16.1 % (ref 11.9–14.6)
GAS FLOW.O2 O2 DELIVERY SYS: ABNORMAL
GLOBULIN SER CALC-MCNC: 4.1 G/DL (ref 2.8–4.5)
GLUCOSE SERPL-MCNC: 367 MG/DL (ref 65–100)
GLUCOSE UR STRIP.AUTO-MCNC: NEGATIVE MG/DL
HCO3 BLD-SCNC: 27.4 MMOL/L (ref 22–26)
HCT VFR BLD AUTO: 34.4 % (ref 35.8–46.3)
HGB BLD-MCNC: 10.2 G/DL (ref 11.7–15.4)
HGB UR QL STRIP: NEGATIVE
IMM GRANULOCYTES # BLD AUTO: 0.1 K/UL (ref 0–0.5)
IMM GRANULOCYTES NFR BLD AUTO: 1 % (ref 0–5)
KETONES UR QL STRIP.AUTO: NEGATIVE MG/DL
LEUKOCYTE ESTERASE UR QL STRIP.AUTO: NEGATIVE
LYMPHOCYTES # BLD: 1 K/UL (ref 0.5–4.6)
LYMPHOCYTES NFR BLD: 15 % (ref 13–44)
MCH RBC QN AUTO: 26.1 PG (ref 26.1–32.9)
MCHC RBC AUTO-ENTMCNC: 29.7 G/DL (ref 31.4–35)
MCV RBC AUTO: 88 FL (ref 82–102)
MONOCYTES # BLD: 0.6 K/UL (ref 0.1–1.3)
MONOCYTES NFR BLD: 9 % (ref 4–12)
NEUTS SEG # BLD: 4.6 K/UL (ref 1.7–8.2)
NEUTS SEG NFR BLD: 71 % (ref 43–78)
NITRITE UR QL STRIP.AUTO: NEGATIVE
NRBC # BLD: 0 K/UL (ref 0–0.2)
NT PRO BNP: 3893 PG/ML (ref 5–125)
O2/TOTAL GAS SETTING VFR VENT: 50 %
OPIATES UR QL: NEGATIVE
OTHER OBSERVATIONS: ABNORMAL
PCO2 BLD: 57 MMHG (ref 35–45)
PH BLD: 7.29 (ref 7.35–7.45)
PH UR STRIP: 5 (ref 5–9)
PLATELET # BLD AUTO: 217 K/UL (ref 150–450)
PMV BLD AUTO: 11.1 FL (ref 9.4–12.3)
PO2 BLD: 108 MMHG (ref 75–100)
POTASSIUM SERPL-SCNC: 5.1 MMOL/L (ref 3.5–5.1)
PROT SERPL-MCNC: 6.8 G/DL (ref 6.3–8.2)
PROT UR STRIP-MCNC: ABNORMAL MG/DL
RBC # BLD AUTO: 3.91 M/UL (ref 4.05–5.2)
RBC #/AREA URNS HPF: ABNORMAL /HPF
SAO2 % BLD: 97.4 % (ref 95–98)
SERVICE CMNT-IMP: ABNORMAL
SODIUM SERPL-SCNC: 131 MMOL/L (ref 133–143)
SP GR UR REFRACTOMETRY: 1.03 (ref 1–1.02)
SPECIMEN TYPE: ABNORMAL
T4 FREE SERPL-MCNC: 0.9 NG/DL (ref 0.78–1.46)
TROPONIN I SERPL HS-MCNC: 20.9 PG/ML (ref 0–14)
UROBILINOGEN UR QL STRIP.AUTO: 1 EU/DL (ref 0.2–1)
WBC # BLD AUTO: 6.6 K/UL (ref 4.3–11.1)
WBC URNS QL MICRO: ABNORMAL /HPF

## 2023-06-30 PROCEDURE — 80307 DRUG TEST PRSMV CHEM ANLYZR: CPT

## 2023-06-30 PROCEDURE — 84484 ASSAY OF TROPONIN QUANT: CPT

## 2023-06-30 PROCEDURE — 80053 COMPREHEN METABOLIC PANEL: CPT

## 2023-06-30 PROCEDURE — 83880 ASSAY OF NATRIURETIC PEPTIDE: CPT

## 2023-06-30 PROCEDURE — 93010 ELECTROCARDIOGRAM REPORT: CPT | Performed by: INTERNAL MEDICINE

## 2023-06-30 PROCEDURE — 71046 X-RAY EXAM CHEST 2 VIEWS: CPT

## 2023-06-30 PROCEDURE — 96374 THER/PROPH/DIAG INJ IV PUSH: CPT

## 2023-06-30 PROCEDURE — 71045 X-RAY EXAM CHEST 1 VIEW: CPT

## 2023-06-30 PROCEDURE — 82803 BLOOD GASES ANY COMBINATION: CPT

## 2023-06-30 PROCEDURE — 6370000000 HC RX 637 (ALT 250 FOR IP): Performed by: EMERGENCY MEDICINE

## 2023-06-30 PROCEDURE — 36600 WITHDRAWAL OF ARTERIAL BLOOD: CPT

## 2023-06-30 PROCEDURE — 99285 EMERGENCY DEPT VISIT HI MDM: CPT

## 2023-06-30 PROCEDURE — 93005 ELECTROCARDIOGRAM TRACING: CPT | Performed by: EMERGENCY MEDICINE

## 2023-06-30 PROCEDURE — 85025 COMPLETE CBC W/AUTO DIFF WBC: CPT

## 2023-06-30 PROCEDURE — 93005 ELECTROCARDIOGRAM TRACING: CPT | Performed by: STUDENT IN AN ORGANIZED HEALTH CARE EDUCATION/TRAINING PROGRAM

## 2023-06-30 PROCEDURE — 6360000004 HC RX CONTRAST MEDICATION: Performed by: EMERGENCY MEDICINE

## 2023-06-30 PROCEDURE — 81001 URINALYSIS AUTO W/SCOPE: CPT

## 2023-06-30 PROCEDURE — 84439 ASSAY OF FREE THYROXINE: CPT

## 2023-06-30 PROCEDURE — 74177 CT ABD & PELVIS W/CONTRAST: CPT

## 2023-06-30 PROCEDURE — 96375 TX/PRO/DX INJ NEW DRUG ADDON: CPT

## 2023-06-30 PROCEDURE — 71260 CT THORAX DX C+: CPT

## 2023-06-30 PROCEDURE — 2580000003 HC RX 258: Performed by: EMERGENCY MEDICINE

## 2023-06-30 PROCEDURE — 6360000002 HC RX W HCPCS: Performed by: EMERGENCY MEDICINE

## 2023-06-30 RX ORDER — SODIUM CHLORIDE 0.9 % (FLUSH) 0.9 %
10 SYRINGE (ML) INJECTION
Status: DISCONTINUED | OUTPATIENT
Start: 2023-06-30 | End: 2023-06-30 | Stop reason: HOSPADM

## 2023-06-30 RX ORDER — ONDANSETRON 2 MG/ML
4 INJECTION INTRAMUSCULAR; INTRAVENOUS
Status: COMPLETED | OUTPATIENT
Start: 2023-06-30 | End: 2023-06-30

## 2023-06-30 RX ORDER — NICOTINE 21 MG/24HR
1 PATCH, TRANSDERMAL 24 HOURS TRANSDERMAL DAILY
Status: DISCONTINUED | OUTPATIENT
Start: 2023-06-30 | End: 2023-06-30 | Stop reason: HOSPADM

## 2023-06-30 RX ORDER — 0.9 % SODIUM CHLORIDE 0.9 %
100 INTRAVENOUS SOLUTION INTRAVENOUS
Status: COMPLETED | OUTPATIENT
Start: 2023-06-30 | End: 2023-06-30

## 2023-06-30 RX ORDER — FUROSEMIDE 10 MG/ML
60 INJECTION INTRAMUSCULAR; INTRAVENOUS ONCE
Status: COMPLETED | OUTPATIENT
Start: 2023-06-30 | End: 2023-06-30

## 2023-06-30 RX ADMIN — ONDANSETRON 4 MG: 2 INJECTION INTRAMUSCULAR; INTRAVENOUS at 11:03

## 2023-06-30 RX ADMIN — FENTANYL CITRATE 75 MCG: 50 INJECTION, SOLUTION INTRAMUSCULAR; INTRAVENOUS at 11:04

## 2023-06-30 RX ADMIN — IOPAMIDOL 100 ML: 755 INJECTION, SOLUTION INTRAVENOUS at 12:46

## 2023-06-30 RX ADMIN — FUROSEMIDE 60 MG: 10 INJECTION, SOLUTION INTRAMUSCULAR; INTRAVENOUS at 16:03

## 2023-06-30 RX ADMIN — SODIUM CHLORIDE 100 ML: 9 INJECTION, SOLUTION INTRAVENOUS at 12:46

## 2023-06-30 RX ADMIN — INSULIN HUMAN 3 UNITS: 100 INJECTION, SOLUTION PARENTERAL at 13:07

## 2023-06-30 ASSESSMENT — PAIN SCALES - GENERAL
PAINLEVEL_OUTOF10: 10
PAINLEVEL_OUTOF10: 10
PAINLEVEL_OUTOF10: 2
PAINLEVEL_OUTOF10: 10
PAINLEVEL_OUTOF10: 6

## 2023-06-30 ASSESSMENT — PAIN - FUNCTIONAL ASSESSMENT
PAIN_FUNCTIONAL_ASSESSMENT: 0-10
PAIN_FUNCTIONAL_ASSESSMENT: 0-10

## 2023-06-30 ASSESSMENT — PAIN DESCRIPTION - LOCATION
LOCATION: CHEST
LOCATION: FOOT

## 2023-06-30 ASSESSMENT — LIFESTYLE VARIABLES
HOW OFTEN DO YOU HAVE A DRINK CONTAINING ALCOHOL: NEVER
HOW MANY STANDARD DRINKS CONTAINING ALCOHOL DO YOU HAVE ON A TYPICAL DAY: PATIENT DOES NOT DRINK

## 2023-07-01 PROBLEM — F17.210 DEPENDENCE ON NICOTINE FROM CIGARETTES: Status: ACTIVE | Noted: 2022-03-08

## 2023-07-01 LAB
ALBUMIN SERPL-MCNC: 3 G/DL (ref 3.5–5)
ALBUMIN/GLOB SERPL: 0.7 (ref 0.4–1.6)
ALP SERPL-CCNC: 240 U/L (ref 50–136)
ALT SERPL-CCNC: 13 U/L (ref 12–65)
AMPHET UR QL SCN: NEGATIVE
ANION GAP SERPL CALC-SCNC: 3 MMOL/L (ref 2–11)
AST SERPL-CCNC: 20 U/L (ref 15–37)
BASOPHILS # BLD: 0.1 K/UL (ref 0–0.2)
BASOPHILS NFR BLD: 2 % (ref 0–2)
BENZODIAZ UR QL: NEGATIVE
BILIRUB SERPL-MCNC: 0.4 MG/DL (ref 0.2–1.1)
BUN SERPL-MCNC: 26 MG/DL (ref 6–23)
CALCIUM SERPL-MCNC: 8.3 MG/DL (ref 8.3–10.4)
CANNABINOIDS UR QL SCN: POSITIVE
CHLORIDE SERPL-SCNC: 97 MMOL/L (ref 101–110)
CO2 SERPL-SCNC: 28 MMOL/L (ref 21–32)
COCAINE UR QL SCN: NEGATIVE
CREAT SERPL-MCNC: 1.4 MG/DL (ref 0.6–1)
DIFFERENTIAL METHOD BLD: ABNORMAL
EKG ATRIAL RATE: 0 BPM
EKG DIAGNOSIS: NORMAL
EKG Q-T INTERVAL: 364 MS
EKG QRS DURATION: 93 MS
EKG QTC CALCULATION (BAZETT): 436 MS
EKG R AXIS: 250 DEGREES
EKG T AXIS: 69 DEGREES
EKG VENTRICULAR RATE: 86 BPM
EOSINOPHIL # BLD: 0.2 K/UL (ref 0–0.8)
EOSINOPHIL NFR BLD: 2 % (ref 0.5–7.8)
ERYTHROCYTE [DISTWIDTH] IN BLOOD BY AUTOMATED COUNT: 15.9 % (ref 11.9–14.6)
GLOBULIN SER CALC-MCNC: 4.5 G/DL (ref 2.8–4.5)
GLUCOSE BLD STRIP.AUTO-MCNC: 200 MG/DL (ref 65–100)
GLUCOSE BLD STRIP.AUTO-MCNC: 203 MG/DL (ref 65–100)
GLUCOSE BLD STRIP.AUTO-MCNC: 221 MG/DL (ref 65–100)
GLUCOSE BLD STRIP.AUTO-MCNC: 236 MG/DL (ref 65–100)
GLUCOSE BLD STRIP.AUTO-MCNC: 283 MG/DL (ref 65–100)
GLUCOSE SERPL-MCNC: 255 MG/DL (ref 65–100)
HCT VFR BLD AUTO: 35.5 % (ref 35.8–46.3)
HGB BLD-MCNC: 10.5 G/DL (ref 11.7–15.4)
IMM GRANULOCYTES # BLD AUTO: 0 K/UL (ref 0–0.5)
IMM GRANULOCYTES NFR BLD AUTO: 1 % (ref 0–5)
LYMPHOCYTES # BLD: 1 K/UL (ref 0.5–4.6)
LYMPHOCYTES NFR BLD: 14 % (ref 13–44)
MCH RBC QN AUTO: 26.1 PG (ref 26.1–32.9)
MCHC RBC AUTO-ENTMCNC: 29.6 G/DL (ref 31.4–35)
MCV RBC AUTO: 88.1 FL (ref 82–102)
MONOCYTES # BLD: 0.6 K/UL (ref 0.1–1.3)
MONOCYTES NFR BLD: 8 % (ref 4–12)
NEUTS SEG # BLD: 5.4 K/UL (ref 1.7–8.2)
NEUTS SEG NFR BLD: 74 % (ref 43–78)
NRBC # BLD: 0 K/UL (ref 0–0.2)
NT PRO BNP: 4663 PG/ML (ref 5–125)
OPIATES UR QL: NEGATIVE
PLATELET # BLD AUTO: 236 K/UL (ref 150–450)
PMV BLD AUTO: 10.6 FL (ref 9.4–12.3)
POTASSIUM SERPL-SCNC: 4.9 MMOL/L (ref 3.5–5.1)
PROT SERPL-MCNC: 7.5 G/DL (ref 6.3–8.2)
RBC # BLD AUTO: 4.03 M/UL (ref 4.05–5.2)
SERVICE CMNT-IMP: ABNORMAL
SODIUM SERPL-SCNC: 128 MMOL/L (ref 133–143)
TROPONIN I SERPL HS-MCNC: 25.1 PG/ML (ref 0–14)
TROPONIN I SERPL HS-MCNC: 29.5 PG/ML (ref 0–14)
WBC # BLD AUTO: 7.4 K/UL (ref 4.3–11.1)

## 2023-07-01 PROCEDURE — 6370000000 HC RX 637 (ALT 250 FOR IP): Performed by: FAMILY MEDICINE

## 2023-07-01 PROCEDURE — 80307 DRUG TEST PRSMV CHEM ANLYZR: CPT

## 2023-07-01 PROCEDURE — 82962 GLUCOSE BLOOD TEST: CPT

## 2023-07-01 PROCEDURE — 2580000003 HC RX 258: Performed by: FAMILY MEDICINE

## 2023-07-01 PROCEDURE — 94640 AIRWAY INHALATION TREATMENT: CPT

## 2023-07-01 PROCEDURE — 2700000000 HC OXYGEN THERAPY PER DAY

## 2023-07-01 PROCEDURE — 93010 ELECTROCARDIOGRAM REPORT: CPT | Performed by: INTERNAL MEDICINE

## 2023-07-01 PROCEDURE — 94760 N-INVAS EAR/PLS OXIMETRY 1: CPT

## 2023-07-01 PROCEDURE — 6360000002 HC RX W HCPCS: Performed by: INTERNAL MEDICINE

## 2023-07-01 PROCEDURE — 36415 COLL VENOUS BLD VENIPUNCTURE: CPT

## 2023-07-01 PROCEDURE — 6370000000 HC RX 637 (ALT 250 FOR IP): Performed by: INTERNAL MEDICINE

## 2023-07-01 PROCEDURE — 1100000003 HC PRIVATE W/ TELEMETRY

## 2023-07-01 RX ORDER — ATORVASTATIN CALCIUM 40 MG/1
40 TABLET, FILM COATED ORAL NIGHTLY
Status: DISCONTINUED | OUTPATIENT
Start: 2023-07-01 | End: 2023-07-02 | Stop reason: HOSPADM

## 2023-07-01 RX ORDER — ONDANSETRON 4 MG/1
4 TABLET, ORALLY DISINTEGRATING ORAL EVERY 8 HOURS PRN
Status: DISCONTINUED | OUTPATIENT
Start: 2023-07-01 | End: 2023-07-02 | Stop reason: HOSPADM

## 2023-07-01 RX ORDER — METOPROLOL SUCCINATE 100 MG/1
200 TABLET, EXTENDED RELEASE ORAL DAILY
Status: DISCONTINUED | OUTPATIENT
Start: 2023-07-01 | End: 2023-07-02 | Stop reason: HOSPADM

## 2023-07-01 RX ORDER — PANTOPRAZOLE SODIUM 40 MG/1
40 TABLET, DELAYED RELEASE ORAL
Status: DISCONTINUED | OUTPATIENT
Start: 2023-07-01 | End: 2023-07-02 | Stop reason: HOSPADM

## 2023-07-01 RX ORDER — SODIUM CHLORIDE 9 MG/ML
INJECTION, SOLUTION INTRAVENOUS PRN
Status: DISCONTINUED | OUTPATIENT
Start: 2023-07-01 | End: 2023-07-02 | Stop reason: HOSPADM

## 2023-07-01 RX ORDER — BUDESONIDE 0.5 MG/2ML
250 INHALANT ORAL 2 TIMES DAILY
Status: DISCONTINUED | OUTPATIENT
Start: 2023-07-01 | End: 2023-07-02 | Stop reason: HOSPADM

## 2023-07-01 RX ORDER — INSULIN GLARGINE 100 [IU]/ML
20 INJECTION, SOLUTION SUBCUTANEOUS NIGHTLY
Status: DISCONTINUED | OUTPATIENT
Start: 2023-07-01 | End: 2023-07-02 | Stop reason: HOSPADM

## 2023-07-01 RX ORDER — POTASSIUM CHLORIDE 20 MEQ/1
20 TABLET, EXTENDED RELEASE ORAL
Status: DISCONTINUED | OUTPATIENT
Start: 2023-07-01 | End: 2023-07-02 | Stop reason: HOSPADM

## 2023-07-01 RX ORDER — ACETAMINOPHEN 325 MG/1
650 TABLET ORAL EVERY 6 HOURS PRN
Status: DISCONTINUED | OUTPATIENT
Start: 2023-07-01 | End: 2023-07-02 | Stop reason: HOSPADM

## 2023-07-01 RX ORDER — DEXTROSE MONOHYDRATE 100 MG/ML
INJECTION, SOLUTION INTRAVENOUS CONTINUOUS PRN
Status: DISCONTINUED | OUTPATIENT
Start: 2023-07-01 | End: 2023-07-02 | Stop reason: HOSPADM

## 2023-07-01 RX ORDER — ENOXAPARIN SODIUM 100 MG/ML
30 INJECTION SUBCUTANEOUS 2 TIMES DAILY
Status: DISCONTINUED | OUTPATIENT
Start: 2023-07-01 | End: 2023-07-01

## 2023-07-01 RX ORDER — GABAPENTIN 300 MG/1
600 CAPSULE ORAL EVERY 8 HOURS
Status: DISCONTINUED | OUTPATIENT
Start: 2023-07-01 | End: 2023-07-02 | Stop reason: HOSPADM

## 2023-07-01 RX ORDER — SODIUM CHLORIDE 0.9 % (FLUSH) 0.9 %
5-40 SYRINGE (ML) INJECTION PRN
Status: DISCONTINUED | OUTPATIENT
Start: 2023-07-01 | End: 2023-07-02 | Stop reason: HOSPADM

## 2023-07-01 RX ORDER — ONDANSETRON 2 MG/ML
4 INJECTION INTRAMUSCULAR; INTRAVENOUS EVERY 6 HOURS PRN
Status: DISCONTINUED | OUTPATIENT
Start: 2023-07-01 | End: 2023-07-02 | Stop reason: HOSPADM

## 2023-07-01 RX ORDER — SODIUM CHLORIDE 0.9 % (FLUSH) 0.9 %
5-40 SYRINGE (ML) INJECTION EVERY 12 HOURS SCHEDULED
Status: DISCONTINUED | OUTPATIENT
Start: 2023-07-01 | End: 2023-07-02 | Stop reason: HOSPADM

## 2023-07-01 RX ORDER — LISINOPRIL 5 MG/1
5 TABLET ORAL DAILY
Status: DISCONTINUED | OUTPATIENT
Start: 2023-07-01 | End: 2023-07-02 | Stop reason: HOSPADM

## 2023-07-01 RX ORDER — BUMETANIDE 1 MG/1
2 TABLET ORAL 2 TIMES DAILY
Status: DISCONTINUED | OUTPATIENT
Start: 2023-07-01 | End: 2023-07-02 | Stop reason: HOSPADM

## 2023-07-01 RX ORDER — DIGOXIN 125 MCG
250 TABLET ORAL DAILY
Status: DISCONTINUED | OUTPATIENT
Start: 2023-07-01 | End: 2023-07-02 | Stop reason: HOSPADM

## 2023-07-01 RX ORDER — INSULIN LISPRO 100 [IU]/ML
0-4 INJECTION, SOLUTION INTRAVENOUS; SUBCUTANEOUS NIGHTLY
Status: DISCONTINUED | OUTPATIENT
Start: 2023-07-01 | End: 2023-07-02 | Stop reason: HOSPADM

## 2023-07-01 RX ORDER — IBUPROFEN 600 MG/1
1 TABLET ORAL PRN
Status: DISCONTINUED | OUTPATIENT
Start: 2023-07-01 | End: 2023-07-02 | Stop reason: HOSPADM

## 2023-07-01 RX ORDER — OXYCODONE HYDROCHLORIDE 5 MG/1
5 TABLET ORAL EVERY 6 HOURS PRN
Status: DISCONTINUED | OUTPATIENT
Start: 2023-07-01 | End: 2023-07-02 | Stop reason: HOSPADM

## 2023-07-01 RX ORDER — FLUTICASONE PROPIONATE 44 UG/1
1 AEROSOL, METERED RESPIRATORY (INHALATION)
Status: DISCONTINUED | OUTPATIENT
Start: 2023-07-01 | End: 2023-07-01 | Stop reason: SDUPTHER

## 2023-07-01 RX ORDER — POLYETHYLENE GLYCOL 3350 17 G/17G
17 POWDER, FOR SOLUTION ORAL DAILY PRN
Status: DISCONTINUED | OUTPATIENT
Start: 2023-07-01 | End: 2023-07-02 | Stop reason: HOSPADM

## 2023-07-01 RX ORDER — INSULIN LISPRO 100 [IU]/ML
0-8 INJECTION, SOLUTION INTRAVENOUS; SUBCUTANEOUS
Status: DISCONTINUED | OUTPATIENT
Start: 2023-07-01 | End: 2023-07-02 | Stop reason: HOSPADM

## 2023-07-01 RX ORDER — ASPIRIN 81 MG/1
81 TABLET, CHEWABLE ORAL DAILY
Status: DISCONTINUED | OUTPATIENT
Start: 2023-07-01 | End: 2023-07-02 | Stop reason: HOSPADM

## 2023-07-01 RX ADMIN — POTASSIUM CHLORIDE 20 MEQ: 1500 TABLET, EXTENDED RELEASE ORAL at 08:28

## 2023-07-01 RX ADMIN — INSULIN LISPRO 2 UNITS: 100 INJECTION, SOLUTION INTRAVENOUS; SUBCUTANEOUS at 16:45

## 2023-07-01 RX ADMIN — BUMETANIDE 2 MG: 1 TABLET ORAL at 20:27

## 2023-07-01 RX ADMIN — OXYCODONE HYDROCHLORIDE 5 MG: 5 TABLET ORAL at 20:32

## 2023-07-01 RX ADMIN — BUDESONIDE 250 MCG: 0.5 INHALANT RESPIRATORY (INHALATION) at 19:46

## 2023-07-01 RX ADMIN — SODIUM CHLORIDE, PRESERVATIVE FREE 10 ML: 5 INJECTION INTRAVENOUS at 20:28

## 2023-07-01 RX ADMIN — APIXABAN 5 MG: 5 TABLET, FILM COATED ORAL at 20:27

## 2023-07-01 RX ADMIN — GABAPENTIN 600 MG: 300 CAPSULE ORAL at 14:27

## 2023-07-01 RX ADMIN — INSULIN LISPRO 2 UNITS: 100 INJECTION, SOLUTION INTRAVENOUS; SUBCUTANEOUS at 08:30

## 2023-07-01 RX ADMIN — APIXABAN 5 MG: 5 TABLET, FILM COATED ORAL at 08:30

## 2023-07-01 RX ADMIN — LISINOPRIL 5 MG: 5 TABLET ORAL at 08:28

## 2023-07-01 RX ADMIN — INSULIN GLARGINE 20 UNITS: 100 INJECTION, SOLUTION SUBCUTANEOUS at 03:27

## 2023-07-01 RX ADMIN — PANTOPRAZOLE SODIUM 40 MG: 40 TABLET, DELAYED RELEASE ORAL at 05:38

## 2023-07-01 RX ADMIN — GABAPENTIN 600 MG: 300 CAPSULE ORAL at 05:38

## 2023-07-01 RX ADMIN — DIGOXIN 250 MCG: 125 TABLET ORAL at 08:33

## 2023-07-01 RX ADMIN — SODIUM CHLORIDE, PRESERVATIVE FREE 10 ML: 5 INJECTION INTRAVENOUS at 08:34

## 2023-07-01 RX ADMIN — BUMETANIDE 2 MG: 1 TABLET ORAL at 08:28

## 2023-07-01 RX ADMIN — INSULIN LISPRO 2 UNITS: 100 INJECTION, SOLUTION INTRAVENOUS; SUBCUTANEOUS at 12:40

## 2023-07-01 RX ADMIN — BUDESONIDE 250 MCG: 0.5 INHALANT RESPIRATORY (INHALATION) at 11:17

## 2023-07-01 RX ADMIN — ATORVASTATIN CALCIUM 40 MG: 40 TABLET, FILM COATED ORAL at 03:27

## 2023-07-01 RX ADMIN — METOPROLOL SUCCINATE 200 MG: 100 TABLET, EXTENDED RELEASE ORAL at 08:29

## 2023-07-01 RX ADMIN — GABAPENTIN 600 MG: 300 CAPSULE ORAL at 22:12

## 2023-07-01 RX ADMIN — ASPIRIN 81 MG: 81 TABLET, CHEWABLE ORAL at 08:28

## 2023-07-01 RX ADMIN — ATORVASTATIN CALCIUM 40 MG: 40 TABLET, FILM COATED ORAL at 20:28

## 2023-07-01 RX ADMIN — OXYCODONE HYDROCHLORIDE 5 MG: 5 TABLET ORAL at 12:40

## 2023-07-01 ASSESSMENT — PAIN DESCRIPTION - LOCATION
LOCATION: BACK
LOCATION: FOOT;BACK;SHOULDER

## 2023-07-01 ASSESSMENT — PAIN DESCRIPTION - DESCRIPTORS: DESCRIPTORS: ACHING

## 2023-07-01 ASSESSMENT — PAIN SCALES - GENERAL
PAINLEVEL_OUTOF10: 0
PAINLEVEL_OUTOF10: 7
PAINLEVEL_OUTOF10: 0
PAINLEVEL_OUTOF10: 8
PAINLEVEL_OUTOF10: 0

## 2023-07-01 ASSESSMENT — PAIN DESCRIPTION - FREQUENCY: FREQUENCY: INTERMITTENT

## 2023-07-01 ASSESSMENT — PAIN DESCRIPTION - PAIN TYPE: TYPE: ACUTE PAIN;CHRONIC PAIN

## 2023-07-01 ASSESSMENT — PAIN DESCRIPTION - ONSET: ONSET: GRADUAL

## 2023-07-01 ASSESSMENT — PAIN DESCRIPTION - ORIENTATION
ORIENTATION: MID
ORIENTATION: RIGHT;UPPER

## 2023-07-01 ASSESSMENT — PAIN - FUNCTIONAL ASSESSMENT: PAIN_FUNCTIONAL_ASSESSMENT: ACTIVITIES ARE NOT PREVENTED

## 2023-07-01 NOTE — PROGRESS NOTES
It is with great charity we pray for your family today: \"Because you dared to believe,    Your kathy has healed you. Go with peace in your heart,    And be free from your suffering! \"    Arabella Carmona,    I believe that if you would touch my body I shall be healed. Give me the kathy to come boldly into your presence.     Amen

## 2023-07-01 NOTE — PLAN OF CARE
Problem: Discharge Planning  Goal: Discharge to home or other facility with appropriate resources  Outcome: Progressing  Flowsheets (Taken 7/1/2023 0240)  Discharge to home or other facility with appropriate resources: Identify barriers to discharge with patient and caregiver     Problem: Pain  Goal: Verbalizes/displays adequate comfort level or baseline comfort level  Outcome: Progressing     Problem: Risk for Elopement  Goal: Patient will not exit the unit/facility without proper excort  Outcome: Progressing  Flowsheets (Taken 7/1/2023 0240)  Nursing Interventions for Elopement Risk: Assist with personal care needs such as toileting, eating, dressing, as needed to reduce the risk of wandering     Problem: Safety - Adult  Goal: Free from fall injury  Outcome: Progressing  Flowsheets (Taken 7/1/2023 0240)  Free From Fall Injury: Instruct family/caregiver on patient safety     Problem: ABCDS Injury Assessment  Goal: Absence of physical injury  Outcome: Progressing

## 2023-07-01 NOTE — PROGRESS NOTES
Hospitalist Progress Note   Admit Date:  2023 11:14 PM   Name:  Kiko Edmonds   Age:  52 y.o. Sex:  female  :  1975   MRN:  [id number]   Room:  [facility]    Presenting/Chief Complaint: Other (Pt states she needs to be admitted)     Reason(s) for Admission: Hypoxia [R09.02]  Acute on chronic systolic CHF (congestive heart failure) ([facility]) [I50.23]  Acute on chronic congestive heart failure, unspecified heart failure type [facility]) [I50.9]     Hospital Course:   Kiko Edmonds is a 52 y.o. female with medical history of   Atrial fibrillation on Eliquis  CAD status post CABG  Systolic congestive heart failure with ejection fraction of 40 to 45%  Most recent echocardiogram was done in 2023  Polysubstance abuse primarily methamphetamine  Diabetes mellitus type 2  COPD  Tobacco use  Chronic right lateral foot ulcer  Noncompliance with several standing out from hospital [address] [place] [address]    who presented with swelling of the abdomen legs and dyspnea, due to exacerbation of her congestive heart failure. Patient left hospital AGAINST MEDICAL ADVICE when she was about to be admitted and then came back and asked to be admitted with the above complaints. No chest pain. She has had more swelling recently. Unclear in terms of whether she was compliant with her medication regimen at home or not. At the time of admission, sodium was 131, glucose 367, elevated proBNP, troponin 20.9. Urine drug screen is positive for THC. CT looking for possibility of PE shows possibility of tiny nonocclusive pulmonary embolus. Patient is supposed to be taking Eliquis. Findings on CT PE protocol -   Tiny curvilinear filling defect within a segmental pulmonary artery in the  right lower lobe seen on image 66 which could represent a tiny nonocclusive  pulmonary embolus. No clear pulmonary embolus is otherwise seen.       2. Cardiomegaly, bilateral pleural effusions, ascites, anasarca, and soft

## 2023-07-01 NOTE — FLOWSHEET NOTE
07/01/23 0240   Dual Clinician Skin Assessment   Dual Skin Assessment (4 Eyes) X   Second Clinical  (First and Last Name) JOSE ANGEL Louis   Pressure Injury Documentation Pressure Injury Noted-See Wound LDA to Document   Skin Integumentary    Skin Integumentary (WDL) X   Skin Color Red;Donny;Ecchymosis (comment)   Wound Prevention/Protection Method Yes   Location of Wound Prevention Sacrum; Coccyx; Foot  (rt foot)   Orientation of Wound Prevention Lower; Posterior   Wound Offloading (Prevention Methods) Bed, pressure reduction mattress;Pillows;Elevate heels   Dressing Present  No   Skin Condition/Temp Warm;Dry;Swollen/edematous   Skin Integrity Wound (see LDA); Scars (comment); Redness;Ecchymosis; Other (comment)  (tattoos)   Location rt lateral foot ulcer-wrapped in gauze & cleaned w wound cleanser, scattered tattoos, scars, and ecchymosis.  BLE red and donny,+2 BLE edema   Skin Fold Management No   Nails X   Multiple Skin Integrity Sites No   Nail Condition Thick;Polish

## 2023-07-02 VITALS
HEART RATE: 80 BPM | TEMPERATURE: 98.3 F | BODY MASS INDEX: 38.09 KG/M2 | DIASTOLIC BLOOD PRESSURE: 76 MMHG | RESPIRATION RATE: 20 BRPM | WEIGHT: 281.2 LBS | OXYGEN SATURATION: 94 % | SYSTOLIC BLOOD PRESSURE: 99 MMHG | HEIGHT: 72 IN

## 2023-07-02 LAB
ANION GAP SERPL CALC-SCNC: 4 MMOL/L (ref 2–11)
BASOPHILS # BLD: 0.1 K/UL (ref 0–0.2)
BASOPHILS NFR BLD: 1 % (ref 0–2)
BUN SERPL-MCNC: 31 MG/DL (ref 6–23)
CALCIUM SERPL-MCNC: 8.4 MG/DL (ref 8.3–10.4)
CHLORIDE SERPL-SCNC: 97 MMOL/L (ref 101–110)
CO2 SERPL-SCNC: 30 MMOL/L (ref 21–32)
CREAT SERPL-MCNC: 1.6 MG/DL (ref 0.6–1)
DIFFERENTIAL METHOD BLD: ABNORMAL
EOSINOPHIL # BLD: 0.2 K/UL (ref 0–0.8)
EOSINOPHIL NFR BLD: 2 % (ref 0.5–7.8)
ERYTHROCYTE [DISTWIDTH] IN BLOOD BY AUTOMATED COUNT: 16 % (ref 11.9–14.6)
GLUCOSE BLD STRIP.AUTO-MCNC: 220 MG/DL (ref 65–100)
GLUCOSE SERPL-MCNC: 217 MG/DL (ref 65–100)
HCT VFR BLD AUTO: 33.5 % (ref 35.8–46.3)
HGB BLD-MCNC: 9.9 G/DL (ref 11.7–15.4)
IMM GRANULOCYTES # BLD AUTO: 0 K/UL (ref 0–0.5)
IMM GRANULOCYTES NFR BLD AUTO: 0 % (ref 0–5)
LYMPHOCYTES # BLD: 1 K/UL (ref 0.5–4.6)
LYMPHOCYTES NFR BLD: 14 % (ref 13–44)
MCH RBC QN AUTO: 26.2 PG (ref 26.1–32.9)
MCHC RBC AUTO-ENTMCNC: 29.6 G/DL (ref 31.4–35)
MCV RBC AUTO: 88.6 FL (ref 82–102)
MONOCYTES # BLD: 0.6 K/UL (ref 0.1–1.3)
MONOCYTES NFR BLD: 9 % (ref 4–12)
NEUTS SEG # BLD: 5.2 K/UL (ref 1.7–8.2)
NEUTS SEG NFR BLD: 74 % (ref 43–78)
NRBC # BLD: 0 K/UL (ref 0–0.2)
PLATELET # BLD AUTO: 210 K/UL (ref 150–450)
PMV BLD AUTO: 10.7 FL (ref 9.4–12.3)
POTASSIUM SERPL-SCNC: 4.9 MMOL/L (ref 3.5–5.1)
RBC # BLD AUTO: 3.78 M/UL (ref 4.05–5.2)
SERVICE CMNT-IMP: ABNORMAL
SODIUM SERPL-SCNC: 131 MMOL/L (ref 133–143)
WBC # BLD AUTO: 7.1 K/UL (ref 4.3–11.1)

## 2023-07-02 PROCEDURE — 2700000000 HC OXYGEN THERAPY PER DAY

## 2023-07-02 PROCEDURE — 94760 N-INVAS EAR/PLS OXIMETRY 1: CPT

## 2023-07-02 PROCEDURE — 85025 COMPLETE CBC W/AUTO DIFF WBC: CPT

## 2023-07-02 PROCEDURE — 80048 BASIC METABOLIC PNL TOTAL CA: CPT

## 2023-07-02 PROCEDURE — 82962 GLUCOSE BLOOD TEST: CPT

## 2023-07-02 PROCEDURE — 36415 COLL VENOUS BLD VENIPUNCTURE: CPT

## 2023-07-02 PROCEDURE — 6370000000 HC RX 637 (ALT 250 FOR IP): Performed by: FAMILY MEDICINE

## 2023-07-02 PROCEDURE — 6360000002 HC RX W HCPCS: Performed by: INTERNAL MEDICINE

## 2023-07-02 PROCEDURE — 94640 AIRWAY INHALATION TREATMENT: CPT

## 2023-07-02 RX ADMIN — PANTOPRAZOLE SODIUM 40 MG: 40 TABLET, DELAYED RELEASE ORAL at 05:54

## 2023-07-02 RX ADMIN — BUDESONIDE 250 MCG: 0.5 INHALANT RESPIRATORY (INHALATION) at 07:38

## 2023-07-02 RX ADMIN — GABAPENTIN 600 MG: 300 CAPSULE ORAL at 05:54

## 2023-07-02 ASSESSMENT — PAIN SCALES - GENERAL
PAINLEVEL_OUTOF10: 0
PAINLEVEL_OUTOF10: 0

## 2023-07-02 NOTE — DISCHARGE SUMMARY
20.9.  Urine drug screen is positive for THC. CT looking for possibility of PE shows possibility of tiny nonocclusive pulmonary embolus. Patient is supposed to be taking Eliquis. Findings on CT PE protocol -   Tiny curvilinear filling defect within a segmental pulmonary artery in the  right lower lobe seen on image 66 which could represent a tiny nonocclusive  pulmonary embolus. No clear pulmonary embolus is otherwise seen. 2. Cardiomegaly, bilateral pleural effusions, ascites, anasarca, and soft tissue  edema otherwise suggesting fluid overload. The heart is moderately enlarged and  therefore this could be related to heart failure. 3. Prominent pelvic lymph nodes with the largest lymph nodes residing in the  right common femoral chain. These could be related to suspected heart failure. Signs of a regional infectious process should be excluded. If none are present,  then these should be followed clinically to ensure resolution with subsequent  clinical improvement. 4. Heterogeneous liver which could indicate passive congestion from suspected  heart failure. This can be correlated with liver function tests. Patient was given diuretic. She had improve her symptoms. Less shortness of breath. Less swelling. On the day of discharge, she demanded that I do paracentesis to remove \"100 pounds of fluid out of my belly\". When I was trying to explain to her that doing such is unlikely to be safe for procedure, she became offensive and told me to leave the room. Later on she told the nurse that because I was charging her and questioning her intention and her account of the story. Then she told the nurse she wanted to leave hospital.  She is explained that leaving hospital [facility] will potentially result in deterioration of her conditions. Deteriorations include but not limited to worsening of her heart failure and possible adverse outcomes including death.   Patient

## 2023-07-02 NOTE — PROGRESS NOTES
Pt states she is leaving AMA. PT educated on risks of leaving AMA and wants to leave anyway. Pt signed AMA forms at 2345. MD Bina Zamudio notified and MD also signed AMA form.

## 2023-07-05 ENCOUNTER — APPOINTMENT (OUTPATIENT)
Dept: GENERAL RADIOLOGY | Age: 48
End: 2023-07-05
Payer: MEDICAID

## 2023-07-05 ENCOUNTER — HOSPITAL ENCOUNTER (EMERGENCY)
Age: 48
Discharge: HOME | End: 2023-07-05
Attending: EMERGENCY MEDICINE
Payer: MEDICAID

## 2023-07-05 VITALS
HEART RATE: 107 BPM | TEMPERATURE: 98.4 F | BODY MASS INDEX: 39.68 KG/M2 | OXYGEN SATURATION: 91 % | RESPIRATION RATE: 22 BRPM | WEIGHT: 293 LBS | SYSTOLIC BLOOD PRESSURE: 134 MMHG | DIASTOLIC BLOOD PRESSURE: 87 MMHG | HEIGHT: 72 IN

## 2023-07-05 DIAGNOSIS — J90 PLEURAL EFFUSION: ICD-10-CM

## 2023-07-05 DIAGNOSIS — K70.31 ASCITES DUE TO ALCOHOLIC CIRRHOSIS (HCC): ICD-10-CM

## 2023-07-05 DIAGNOSIS — R06.00 DYSPNEA, UNSPECIFIED TYPE: Primary | ICD-10-CM

## 2023-07-05 DIAGNOSIS — I50.9 ACUTE ON CHRONIC CONGESTIVE HEART FAILURE, UNSPECIFIED HEART FAILURE TYPE (HCC): ICD-10-CM

## 2023-07-05 LAB
ALBUMIN SERPL-MCNC: 2.8 G/DL (ref 3.5–5)
ALBUMIN/GLOB SERPL: 0.7 (ref 0.4–1.6)
ALP SERPL-CCNC: 243 U/L (ref 50–136)
ALT SERPL-CCNC: 11 U/L (ref 12–65)
ANION GAP SERPL CALC-SCNC: 5 MMOL/L (ref 2–11)
AST SERPL-CCNC: 15 U/L (ref 15–37)
BASOPHILS # BLD: 0.1 K/UL (ref 0–0.2)
BASOPHILS NFR BLD: 2 % (ref 0–2)
BILIRUB SERPL-MCNC: 1 MG/DL (ref 0.2–1.1)
BUN SERPL-MCNC: 17 MG/DL (ref 6–23)
CALCIUM SERPL-MCNC: 8.4 MG/DL (ref 8.3–10.4)
CHLORIDE SERPL-SCNC: 97 MMOL/L (ref 101–110)
CO2 SERPL-SCNC: 28 MMOL/L (ref 21–32)
CREAT SERPL-MCNC: 1 MG/DL (ref 0.6–1)
DIFFERENTIAL METHOD BLD: ABNORMAL
EKG ATRIAL RATE: 0 BPM
EKG ATRIAL RATE: 0 BPM
EKG DIAGNOSIS: ABNORMAL
EKG DIAGNOSIS: NORMAL
EKG Q-T INTERVAL: 372 MS
EKG Q-T INTERVAL: 372 MS
EKG QRS DURATION: 86 MS
EKG QRS DURATION: 86 MS
EKG QTC CALCULATION (BAZETT): 504 MS
EKG QTC CALCULATION (BAZETT): 504 MS
EKG R AXIS: 258 DEGREES
EKG R AXIS: 258 DEGREES
EKG T AXIS: 132 DEGREES
EKG T AXIS: 132 DEGREES
EKG VENTRICULAR RATE: 110 BPM
EKG VENTRICULAR RATE: 110 BPM
EOSINOPHIL # BLD: 0.2 K/UL (ref 0–0.8)
EOSINOPHIL NFR BLD: 3 % (ref 0.5–7.8)
ERYTHROCYTE [DISTWIDTH] IN BLOOD BY AUTOMATED COUNT: 16.1 % (ref 11.9–14.6)
GLOBULIN SER CALC-MCNC: 4.1 G/DL (ref 2.8–4.5)
GLUCOSE SERPL-MCNC: 296 MG/DL (ref 65–100)
HCT VFR BLD AUTO: 32.1 % (ref 35.8–46.3)
HGB BLD-MCNC: 9.6 G/DL (ref 11.7–15.4)
IMM GRANULOCYTES # BLD AUTO: 0 K/UL (ref 0–0.5)
IMM GRANULOCYTES NFR BLD AUTO: 1 % (ref 0–5)
INR PPP: 1.3
LYMPHOCYTES # BLD: 0.8 K/UL (ref 0.5–4.6)
LYMPHOCYTES NFR BLD: 13 % (ref 13–44)
MCH RBC QN AUTO: 25.9 PG (ref 26.1–32.9)
MCHC RBC AUTO-ENTMCNC: 29.9 G/DL (ref 31.4–35)
MCV RBC AUTO: 86.5 FL (ref 82–102)
MONOCYTES # BLD: 0.6 K/UL (ref 0.1–1.3)
MONOCYTES NFR BLD: 10 % (ref 4–12)
NEUTS SEG # BLD: 4.5 K/UL (ref 1.7–8.2)
NEUTS SEG NFR BLD: 72 % (ref 43–78)
NRBC # BLD: 0 K/UL (ref 0–0.2)
NT PRO BNP: 3786 PG/ML (ref 5–125)
PLATELET # BLD AUTO: 190 K/UL (ref 150–450)
PMV BLD AUTO: 10.5 FL (ref 9.4–12.3)
POTASSIUM SERPL-SCNC: 4.5 MMOL/L (ref 3.5–5.1)
PROT SERPL-MCNC: 6.9 G/DL (ref 6.3–8.2)
PROTHROMBIN TIME: 16.3 SEC (ref 12.6–14.3)
RBC # BLD AUTO: 3.71 M/UL (ref 4.05–5.2)
SODIUM SERPL-SCNC: 130 MMOL/L (ref 133–143)
WBC # BLD AUTO: 6.2 K/UL (ref 4.3–11.1)

## 2023-07-05 PROCEDURE — 2700000000 HC OXYGEN THERAPY PER DAY

## 2023-07-05 PROCEDURE — 94664 DEMO&/EVAL PT USE INHALER: CPT

## 2023-07-05 PROCEDURE — 83880 ASSAY OF NATRIURETIC PEPTIDE: CPT

## 2023-07-05 PROCEDURE — 93010 ELECTROCARDIOGRAM REPORT: CPT | Performed by: INTERNAL MEDICINE

## 2023-07-05 PROCEDURE — 6370000000 HC RX 637 (ALT 250 FOR IP): Performed by: EMERGENCY MEDICINE

## 2023-07-05 PROCEDURE — 96375 TX/PRO/DX INJ NEW DRUG ADDON: CPT

## 2023-07-05 PROCEDURE — 96374 THER/PROPH/DIAG INJ IV PUSH: CPT

## 2023-07-05 PROCEDURE — 6360000002 HC RX W HCPCS: Performed by: EMERGENCY MEDICINE

## 2023-07-05 PROCEDURE — 85025 COMPLETE CBC W/AUTO DIFF WBC: CPT

## 2023-07-05 PROCEDURE — 71045 X-RAY EXAM CHEST 1 VIEW: CPT

## 2023-07-05 PROCEDURE — 2500000003 HC RX 250 WO HCPCS: Performed by: EMERGENCY MEDICINE

## 2023-07-05 PROCEDURE — 80053 COMPREHEN METABOLIC PANEL: CPT

## 2023-07-05 PROCEDURE — 93005 ELECTROCARDIOGRAM TRACING: CPT | Performed by: EMERGENCY MEDICINE

## 2023-07-05 PROCEDURE — 99285 EMERGENCY DEPT VISIT HI MDM: CPT

## 2023-07-05 PROCEDURE — 85610 PROTHROMBIN TIME: CPT

## 2023-07-05 PROCEDURE — 94640 AIRWAY INHALATION TREATMENT: CPT

## 2023-07-05 RX ORDER — DILTIAZEM HYDROCHLORIDE 5 MG/ML
15 INJECTION INTRAVENOUS
Status: COMPLETED | OUTPATIENT
Start: 2023-07-05 | End: 2023-07-05

## 2023-07-05 RX ORDER — DILTIAZEM HYDROCHLORIDE 120 MG/1
120 CAPSULE, COATED, EXTENDED RELEASE ORAL
Status: COMPLETED | OUTPATIENT
Start: 2023-07-05 | End: 2023-07-05

## 2023-07-05 RX ORDER — OXYCODONE HYDROCHLORIDE AND ACETAMINOPHEN 5; 325 MG/1; MG/1
2 TABLET ORAL
Status: COMPLETED | OUTPATIENT
Start: 2023-07-05 | End: 2023-07-05

## 2023-07-05 RX ORDER — FUROSEMIDE 10 MG/ML
80 INJECTION INTRAMUSCULAR; INTRAVENOUS
Status: COMPLETED | OUTPATIENT
Start: 2023-07-05 | End: 2023-07-05

## 2023-07-05 RX ORDER — METHYLPREDNISOLONE SODIUM SUCCINATE 1 G/16ML
125 INJECTION, POWDER, LYOPHILIZED, FOR SOLUTION INTRAMUSCULAR; INTRAVENOUS
Status: COMPLETED | OUTPATIENT
Start: 2023-07-05 | End: 2023-07-05

## 2023-07-05 RX ORDER — IPRATROPIUM BROMIDE AND ALBUTEROL SULFATE 2.5; .5 MG/3ML; MG/3ML
1 SOLUTION RESPIRATORY (INHALATION)
Status: COMPLETED | OUTPATIENT
Start: 2023-07-05 | End: 2023-07-05

## 2023-07-05 RX ADMIN — DILTIAZEM HYDROCHLORIDE 15 MG: 5 INJECTION INTRAVENOUS at 13:53

## 2023-07-05 RX ADMIN — IPRATROPIUM BROMIDE AND ALBUTEROL SULFATE 1 DOSE: 2.5; .5 SOLUTION RESPIRATORY (INHALATION) at 13:40

## 2023-07-05 RX ADMIN — DILTIAZEM HYDROCHLORIDE 120 MG: 120 CAPSULE, EXTENDED RELEASE ORAL at 14:16

## 2023-07-05 RX ADMIN — METHYLPREDNISOLONE SODIUM SUCCINATE 125 MG: 125 INJECTION, POWDER, FOR SOLUTION INTRAMUSCULAR; INTRAVENOUS at 13:54

## 2023-07-05 RX ADMIN — FUROSEMIDE 80 MG: 10 INJECTION, SOLUTION INTRAMUSCULAR; INTRAVENOUS at 13:54

## 2023-07-05 RX ADMIN — OXYCODONE HYDROCHLORIDE AND ACETAMINOPHEN 2 TABLET: 5; 325 TABLET ORAL at 16:30

## 2023-07-05 ASSESSMENT — PAIN DESCRIPTION - FREQUENCY: FREQUENCY: CONTINUOUS

## 2023-07-05 ASSESSMENT — ENCOUNTER SYMPTOMS
EYE DISCHARGE: 0
BACK PAIN: 0
COUGH: 0
EYE REDNESS: 0
WHEEZING: 1
ABDOMINAL PAIN: 0
NAUSEA: 0
COLOR CHANGE: 1
RHINORRHEA: 0
VOMITING: 0
ABDOMINAL DISTENTION: 1
SHORTNESS OF BREATH: 1
FACIAL SWELLING: 0

## 2023-07-05 ASSESSMENT — PAIN DESCRIPTION - LOCATION
LOCATION: LEG
LOCATION: LEG;FOOT

## 2023-07-05 ASSESSMENT — PAIN DESCRIPTION - ORIENTATION: ORIENTATION: RIGHT;LEFT

## 2023-07-05 ASSESSMENT — PAIN - FUNCTIONAL ASSESSMENT: PAIN_FUNCTIONAL_ASSESSMENT: 0-10

## 2023-07-05 ASSESSMENT — PAIN SCALES - GENERAL: PAINLEVEL_OUTOF10: 8

## 2023-07-05 NOTE — ED TRIAGE NOTES
Pt arrives to ER via EMS from home. Pt called EMS for increasing SOB that has worsened in the last month. Pt was 92% on RA when EMS arrived. Pt has hx of cirrhosis, CHF, DM, CABG. Pt has visible bilateral LE redness. EMS reports EKG showed a fib, hx of afib per pt. EMS vitals 103/68, , 97% on 2L, 340 BGL. EMS placed 20g in left hand.

## 2023-07-05 NOTE — DISCHARGE INSTRUCTIONS
Double your morning Bumex for 3 days to 2 mg  Leave the afternoon Bumex the same at 1 mg    Hold Eliquis starting this evening, until your thoracentesis/paracentesis on Friday  Interventional radiology should be calling you to set up an appointment time    Resume your Eliquis after the procedure

## 2023-07-06 ENCOUNTER — TRANSCRIBE ORDERS (OUTPATIENT)
Dept: INTERVENTIONAL RADIOLOGY/VASCULAR | Age: 48
End: 2023-07-06

## 2023-07-06 DIAGNOSIS — R18.8 OTHER ASCITES: Primary | ICD-10-CM

## 2023-07-07 ENCOUNTER — HOSPITAL ENCOUNTER (OUTPATIENT)
Dept: INTERVENTIONAL RADIOLOGY/VASCULAR | Age: 48
End: 2023-07-07
Attending: EMERGENCY MEDICINE
Payer: MEDICAID

## 2023-07-07 VITALS
TEMPERATURE: 97.8 F | SYSTOLIC BLOOD PRESSURE: 114 MMHG | OXYGEN SATURATION: 94 % | HEART RATE: 83 BPM | RESPIRATION RATE: 18 BRPM | DIASTOLIC BLOOD PRESSURE: 69 MMHG

## 2023-07-07 VITALS
RESPIRATION RATE: 18 BRPM | HEART RATE: 68 BPM | SYSTOLIC BLOOD PRESSURE: 106 MMHG | DIASTOLIC BLOOD PRESSURE: 65 MMHG | OXYGEN SATURATION: 96 %

## 2023-07-07 DIAGNOSIS — R18.8 OTHER ASCITES: ICD-10-CM

## 2023-07-07 PROCEDURE — C1729 CATH, DRAINAGE: HCPCS

## 2023-07-07 PROCEDURE — 2500000003 HC RX 250 WO HCPCS: Performed by: PHYSICIAN ASSISTANT

## 2023-07-07 PROCEDURE — 49083 ABD PARACENTESIS W/IMAGING: CPT

## 2023-07-07 RX ORDER — LIDOCAINE HYDROCHLORIDE 20 MG/ML
INJECTION, SOLUTION INFILTRATION; PERINEURAL PRN
Status: COMPLETED | OUTPATIENT
Start: 2023-07-07 | End: 2023-07-07

## 2023-07-07 RX ADMIN — LIDOCAINE HYDROCHLORIDE 15 ML: 20 INJECTION, SOLUTION INFILTRATION; PERINEURAL at 08:50

## 2023-07-07 RX ADMIN — LIDOCAINE HYDROCHLORIDE 10 ML: 20 INJECTION, SOLUTION INFILTRATION; PERINEURAL at 10:22

## 2023-07-07 NOTE — OR NURSING
Pt had an episode of increased HR (156) and decreased O2 sat (86% on RA). Pt denies pain but states she does \"feel a little anxious\". Pt has known history of COPD, CHF,  AFIB. States she did not take any of her medications this am. Placed on cardiac monitor and 3L NC. HR back down to 78 almost immediately; remains in Afib. O2 sat now 93% on 3L NC. PA notified of change.

## 2023-07-07 NOTE — OR NURSING
Interventional Radiology Post Paracentesis/Thoracentesis Note    7/7/2023    Procedure(s): Ultrasound guided Therapeutic Paracentesis Performed with 8 Japanese catheter total volume 4200 ml. Preliminary Findings: medium clear and yellow. Complications: None    Plan:  Observation, check labs if drawn.           Chest X-Ray:  no    Full dictated report to follow

## 2023-07-07 NOTE — OR NURSING
Interventional Radiology Post Paracentesis/Thoracentesis Note    7/7/2023    Procedure(s): Ultrasound guided Therapeutic Thoracentesis Performed with 8 Tanzanian catheter total volume 1250 ml. Preliminary Findings: medium clear and yellow. Complications: None    Plan:  Observation, check labs if drawn.           Chest X-Ray:  no    Full dictated report to follow

## 2024-05-04 NOTE — PROGRESS NOTES
Assessment: Stable. 5/1 exam: Bilateral Stage 0 Zone 2- no plus. F/U 2 weeks    Plan:  Next eye exam due 5/15 (Versed usually needed prior to exam)   Bedside and verbal shift change report received from  Kamran Liu [facility] (offgoing nurse). Report included the following information SBAR, Kardex, ED Summary, Procedure Summary, Intake/Output, MAR, Recent Results, Cardiac Rhythm Afib, Alarm Parameters  and Quality Measures.      Dual skin assessment completed at bedside: chest scar, sacral redness and excoriation, redness and irritation on pannus, nonhealing surgical site from removal of R 5th toe, BLE red, hot, and swollen (list pertinent skin assessment findings)    Dual verification of gtts completed (name of gtts verified):   cardizem @ 10

## (undated) DEVICE — KIT THORCENT 8FR L5IN POLYUR W/ 18/22/25GA NDL 3 W STPCOCK

## (undated) DEVICE — STERILE POLYISOPRENE POWDER-FREE SURGICAL GLOVES: Brand: PROTEXIS

## (undated) DEVICE — GEL MEDC ULTRASOUND 5L -- REPLACED BY 326862